# Patient Record
Sex: MALE | Race: BLACK OR AFRICAN AMERICAN | Employment: UNEMPLOYED | URBAN - METROPOLITAN AREA
[De-identification: names, ages, dates, MRNs, and addresses within clinical notes are randomized per-mention and may not be internally consistent; named-entity substitution may affect disease eponyms.]

---

## 2017-10-24 ENCOUNTER — HOSPITAL ENCOUNTER (INPATIENT)
Age: 57
LOS: 3 days | Discharge: HOME | DRG: 897 | End: 2017-10-27
Attending: EMERGENCY MEDICINE | Admitting: GENERAL ACUTE CARE HOSPITAL
Payer: SELF-PAY

## 2017-10-24 ENCOUNTER — APPOINTMENT (OUTPATIENT)
Dept: ULTRASOUND IMAGING | Age: 57
DRG: 897 | End: 2017-10-24
Attending: EMERGENCY MEDICINE
Payer: SELF-PAY

## 2017-10-24 ENCOUNTER — APPOINTMENT (OUTPATIENT)
Dept: CT IMAGING | Age: 57
DRG: 897 | End: 2017-10-24
Attending: EMERGENCY MEDICINE
Payer: SELF-PAY

## 2017-10-24 DIAGNOSIS — F10.930 ALCOHOL WITHDRAWAL SYNDROME WITHOUT COMPLICATION (HCC): Primary | ICD-10-CM

## 2017-10-24 DIAGNOSIS — K52.9 GASTROENTERITIS, ACUTE: ICD-10-CM

## 2017-10-24 DIAGNOSIS — R74.01 TRANSAMINITIS: ICD-10-CM

## 2017-10-24 PROBLEM — F10.939 ALCOHOL WITHDRAWAL (HCC): Status: ACTIVE | Noted: 2017-10-24

## 2017-10-24 LAB
ALBUMIN SERPL-MCNC: 4.4 G/DL (ref 3.5–5)
ALBUMIN/GLOB SERPL: 1.2 {RATIO} (ref 1.1–2.2)
ALP SERPL-CCNC: 125 U/L (ref 45–117)
ALT SERPL-CCNC: 96 U/L (ref 12–78)
ANION GAP SERPL CALC-SCNC: 12 MMOL/L (ref 5–15)
APPEARANCE UR: CLEAR
AST SERPL-CCNC: 299 U/L (ref 15–37)
BACTERIA URNS QL MICRO: ABNORMAL /HPF
BASOPHILS # BLD: 0 K/UL (ref 0–0.1)
BASOPHILS NFR BLD: 0 % (ref 0–1)
BILIRUB SERPL-MCNC: 1.3 MG/DL (ref 0.2–1)
BILIRUB UR QL: NEGATIVE
BUN SERPL-MCNC: 11 MG/DL (ref 6–20)
BUN/CREAT SERPL: 12 (ref 12–20)
CALCIUM SERPL-MCNC: 8.9 MG/DL (ref 8.5–10.1)
CHLORIDE SERPL-SCNC: 100 MMOL/L (ref 97–108)
CO2 SERPL-SCNC: 25 MMOL/L (ref 21–32)
COLOR UR: ABNORMAL
CREAT SERPL-MCNC: 0.95 MG/DL (ref 0.7–1.3)
EOSINOPHIL # BLD: 0 K/UL (ref 0–0.4)
EOSINOPHIL NFR BLD: 0 % (ref 0–7)
EPITH CASTS URNS QL MICRO: ABNORMAL /LPF
ERYTHROCYTE [DISTWIDTH] IN BLOOD BY AUTOMATED COUNT: 14.6 % (ref 11.5–14.5)
ETHANOL SERPL-MCNC: 25 MG/DL
GLOBULIN SER CALC-MCNC: 3.8 G/DL (ref 2–4)
GLUCOSE SERPL-MCNC: 108 MG/DL (ref 65–100)
GLUCOSE UR STRIP.AUTO-MCNC: NEGATIVE MG/DL
HCT VFR BLD AUTO: 31.8 % (ref 36.6–50.3)
HGB BLD-MCNC: 10.9 G/DL (ref 12.1–17)
HGB UR QL STRIP: ABNORMAL
INR PPP: 1.2 (ref 0.9–1.1)
KETONES UR QL STRIP.AUTO: ABNORMAL MG/DL
LEUKOCYTE ESTERASE UR QL STRIP.AUTO: NEGATIVE
LIPASE SERPL-CCNC: 188 U/L (ref 73–393)
LYMPHOCYTES # BLD: 1.5 K/UL (ref 0.8–3.5)
LYMPHOCYTES NFR BLD: 24 % (ref 12–49)
MCH RBC QN AUTO: 33.6 PG (ref 26–34)
MCHC RBC AUTO-ENTMCNC: 34.3 G/DL (ref 30–36.5)
MCV RBC AUTO: 98.1 FL (ref 80–99)
MONOCYTES # BLD: 0.4 K/UL (ref 0–1)
MONOCYTES NFR BLD: 6 % (ref 5–13)
NEUTS SEG # BLD: 4.3 K/UL (ref 1.8–8)
NEUTS SEG NFR BLD: 70 % (ref 32–75)
NITRITE UR QL STRIP.AUTO: NEGATIVE
PH UR STRIP: 6 [PH] (ref 5–8)
PLATELET # BLD AUTO: 297 K/UL (ref 150–400)
POTASSIUM SERPL-SCNC: 4.3 MMOL/L (ref 3.5–5.1)
PROT SERPL-MCNC: 8.2 G/DL (ref 6.4–8.2)
PROT UR STRIP-MCNC: 30 MG/DL
PROTHROMBIN TIME: 11.7 SEC (ref 9–11.1)
RBC # BLD AUTO: 3.24 M/UL (ref 4.1–5.7)
RBC #/AREA URNS HPF: ABNORMAL /HPF (ref 0–5)
SODIUM SERPL-SCNC: 137 MMOL/L (ref 136–145)
SP GR UR REFRACTOMETRY: 1.02 (ref 1–1.03)
TROPONIN I SERPL-MCNC: <0.04 NG/ML
UA: UC IF INDICATED,UAUC: ABNORMAL
UROBILINOGEN UR QL STRIP.AUTO: 1 EU/DL (ref 0.2–1)
WBC # BLD AUTO: 6.2 K/UL (ref 4.1–11.1)
WBC URNS QL MICRO: ABNORMAL /HPF (ref 0–4)
YEAST URNS QL MICRO: PRESENT

## 2017-10-24 PROCEDURE — 85610 PROTHROMBIN TIME: CPT | Performed by: EMERGENCY MEDICINE

## 2017-10-24 PROCEDURE — 93005 ELECTROCARDIOGRAM TRACING: CPT

## 2017-10-24 PROCEDURE — 74011636320 HC RX REV CODE- 636/320: Performed by: EMERGENCY MEDICINE

## 2017-10-24 PROCEDURE — 96376 TX/PRO/DX INJ SAME DRUG ADON: CPT

## 2017-10-24 PROCEDURE — 36415 COLL VENOUS BLD VENIPUNCTURE: CPT | Performed by: EMERGENCY MEDICINE

## 2017-10-24 PROCEDURE — 96374 THER/PROPH/DIAG INJ IV PUSH: CPT

## 2017-10-24 PROCEDURE — 87086 URINE CULTURE/COLONY COUNT: CPT | Performed by: EMERGENCY MEDICINE

## 2017-10-24 PROCEDURE — 83690 ASSAY OF LIPASE: CPT | Performed by: EMERGENCY MEDICINE

## 2017-10-24 PROCEDURE — 96365 THER/PROPH/DIAG IV INF INIT: CPT

## 2017-10-24 PROCEDURE — 81001 URINALYSIS AUTO W/SCOPE: CPT | Performed by: EMERGENCY MEDICINE

## 2017-10-24 PROCEDURE — 74011000250 HC RX REV CODE- 250: Performed by: EMERGENCY MEDICINE

## 2017-10-24 PROCEDURE — 74011000258 HC RX REV CODE- 258: Performed by: EMERGENCY MEDICINE

## 2017-10-24 PROCEDURE — 74177 CT ABD & PELVIS W/CONTRAST: CPT

## 2017-10-24 PROCEDURE — 76705 ECHO EXAM OF ABDOMEN: CPT

## 2017-10-24 PROCEDURE — 96375 TX/PRO/DX INJ NEW DRUG ADDON: CPT

## 2017-10-24 PROCEDURE — 80053 COMPREHEN METABOLIC PANEL: CPT | Performed by: EMERGENCY MEDICINE

## 2017-10-24 PROCEDURE — 65660000000 HC RM CCU STEPDOWN

## 2017-10-24 PROCEDURE — 74011250636 HC RX REV CODE- 250/636: Performed by: EMERGENCY MEDICINE

## 2017-10-24 PROCEDURE — 85025 COMPLETE CBC W/AUTO DIFF WBC: CPT | Performed by: EMERGENCY MEDICINE

## 2017-10-24 PROCEDURE — 84484 ASSAY OF TROPONIN QUANT: CPT | Performed by: EMERGENCY MEDICINE

## 2017-10-24 PROCEDURE — 99285 EMERGENCY DEPT VISIT HI MDM: CPT

## 2017-10-24 PROCEDURE — 80307 DRUG TEST PRSMV CHEM ANLYZR: CPT | Performed by: EMERGENCY MEDICINE

## 2017-10-24 PROCEDURE — 94762 N-INVAS EAR/PLS OXIMTRY CONT: CPT

## 2017-10-24 RX ORDER — LABETALOL HYDROCHLORIDE 5 MG/ML
20 INJECTION, SOLUTION INTRAVENOUS
Status: COMPLETED | OUTPATIENT
Start: 2017-10-24 | End: 2017-10-24

## 2017-10-24 RX ORDER — SODIUM CHLORIDE 9 MG/ML
50 INJECTION, SOLUTION INTRAVENOUS
Status: COMPLETED | OUTPATIENT
Start: 2017-10-24 | End: 2017-10-24

## 2017-10-24 RX ORDER — LORAZEPAM 2 MG/ML
1 INJECTION INTRAMUSCULAR
Status: COMPLETED | OUTPATIENT
Start: 2017-10-24 | End: 2017-10-24

## 2017-10-24 RX ORDER — FENTANYL CITRATE 50 UG/ML
50 INJECTION, SOLUTION INTRAMUSCULAR; INTRAVENOUS
Status: COMPLETED | OUTPATIENT
Start: 2017-10-24 | End: 2017-10-24

## 2017-10-24 RX ORDER — LORAZEPAM 2 MG/ML
2 INJECTION INTRAMUSCULAR
Status: DISCONTINUED | OUTPATIENT
Start: 2017-10-24 | End: 2017-10-27 | Stop reason: HOSPADM

## 2017-10-24 RX ORDER — THIAMINE HYDROCHLORIDE 100 MG/ML
100 INJECTION, SOLUTION INTRAMUSCULAR; INTRAVENOUS
Status: DISCONTINUED | OUTPATIENT
Start: 2017-10-24 | End: 2017-10-24 | Stop reason: SDUPTHER

## 2017-10-24 RX ORDER — SODIUM CHLORIDE 0.9 % (FLUSH) 0.9 %
10 SYRINGE (ML) INJECTION
Status: COMPLETED | OUTPATIENT
Start: 2017-10-24 | End: 2017-10-24

## 2017-10-24 RX ADMIN — LORAZEPAM 1 MG: 2 INJECTION INTRAMUSCULAR; INTRAVENOUS at 16:03

## 2017-10-24 RX ADMIN — SODIUM CHLORIDE 50 ML/HR: 900 INJECTION, SOLUTION INTRAVENOUS at 19:37

## 2017-10-24 RX ADMIN — Medication 5 MG/HR: at 23:14

## 2017-10-24 RX ADMIN — IOPAMIDOL 100 ML: 755 INJECTION, SOLUTION INTRAVENOUS at 19:36

## 2017-10-24 RX ADMIN — LORAZEPAM 1 MG: 2 INJECTION INTRAMUSCULAR; INTRAVENOUS at 18:18

## 2017-10-24 RX ADMIN — LABETALOL HYDROCHLORIDE 20 MG: 5 INJECTION INTRAVENOUS at 21:40

## 2017-10-24 RX ADMIN — THIAMINE HYDROCHLORIDE 100 MG: 100 INJECTION, SOLUTION INTRAMUSCULAR; INTRAVENOUS at 18:18

## 2017-10-24 RX ADMIN — Medication 10 ML: at 19:36

## 2017-10-24 RX ADMIN — LABETALOL HYDROCHLORIDE 20 MG: 5 INJECTION INTRAVENOUS at 20:24

## 2017-10-24 RX ADMIN — SODIUM CHLORIDE 1000 ML: 900 INJECTION, SOLUTION INTRAVENOUS at 18:18

## 2017-10-24 RX ADMIN — FENTANYL CITRATE 50 MCG: 50 INJECTION, SOLUTION INTRAMUSCULAR; INTRAVENOUS at 16:03

## 2017-10-24 RX ADMIN — SODIUM CHLORIDE 1000 ML: 900 INJECTION, SOLUTION INTRAVENOUS at 16:02

## 2017-10-24 RX ADMIN — LORAZEPAM 2 MG: 2 INJECTION INTRAMUSCULAR; INTRAVENOUS at 20:25

## 2017-10-24 NOTE — IP AVS SNAPSHOT
[address] [facility] 
[phone] Patient: Antione Moser MRN: [id number] VAP:2/65/0266 About your hospitalization You were admitted on:  October 24, 2017 You last received care in the:  [facility] 3 NEUROSCIENCE TELEMETRY You were discharged on:  October 27, 2017 Why you were hospitalized Your primary diagnosis was:  Not on File Your diagnoses also included:  Alcohol Withdrawal (Hcc) Things You Need To Do (next 8 weeks) Friday Nov 03, 2017 HOSPITAL FOLLOW-UP with Luisito Watson MD at 10:30 AM  
Where:  [address]) Discharge Orders None A check flo indicates which time of day the medication should be taken. My Medications TAKE these medications as instructed Instructions Each Dose to Equal  
 Morning Noon Evening Bedtime diphenhydrAMINE 25 mg capsule Commonly known as:  BENADRYL Your last dose was: Your next dose is: Take 25 mg by mouth as needed for Itching. 25 mg  
    
   
   
   
  
 folic acid 1 mg tablet Commonly known as:  [employer] Your last dose was: Your next dose is: Take 1 Tab by mouth daily. 1 mg  
    
   
   
   
  
 ibuprofen 200 mg tablet Commonly known as:  MOTRIN Your last dose was: Your next dose is: Take 200 mg by mouth as needed for Pain. 200 mg  
    
   
   
   
  
 lisinopril 20 mg tablet Commonly known as:  Marilynne Shows Your last dose was: Your next dose is: Take 1 Tab by mouth daily. 20 mg  
    
   
   
   
  
 metoprolol tartrate 25 mg tablet Commonly known as:  LOPRESSOR Your last dose was: Your next dose is: Take 1 Tab by mouth two (2) times a day. 25 mg  
    
   
   
   
  
 pantoprazole 40 mg tablet Commonly known as:  PROTONIX Your last dose was: Your next dose is: Take 1 Tab by mouth Daily (before breakfast). 40 mg  
    
   
   
   
  
 therapeutic multivitamin tablet Commonly known as:  [facility] Your last dose was: Your next dose is: Take 1 Tab by mouth daily. 1 Tab  
    
   
   
   
  
 thiamine 100 mg tablet Commonly known as:  B-1 Your last dose was: Your next dose is: Take 1 Tab by mouth daily. 100 mg Where to Get Your Medications Information on where to get these meds will be given to you by the nurse or doctor. ! Ask your nurse or doctor about these medications  
  folic acid 1 mg tablet  
 lisinopril 20 mg tablet  
 metoprolol tartrate 25 mg tablet  
 pantoprazole 40 mg tablet  
 therapeutic multivitamin tablet  
 thiamine 100 mg tablet Discharge Instructions None [email or web address] Announcement We are excited to announce that we are making your provider's discharge notes available to you in Open[employer]. You will see these notes when they are completed and signed by the physician that discharged you from your recent hospital stay. If you have any questions or concerns about any information you see in Openbay, please call the Health Information Department where you were seen or reach out to your Primary Care Provider for more information about your plan of care. Introducing [address] [facility] introduces Openbay patient portal. Now you can access parts of your medical record, email your doctor's office, and request medication refills online. 1. In your internet browser, go to [email or web address] 2. Click on the First Time User? Click Here link in the Sign In box. You will see the New Member Sign Up page. 3. Enter your Openbay Access Code exactly as it appears below.  You will not need to use this code after youve completed the sign-up process. If you do not sign up before the expiration date, you must request a new code. · [employer] Access Code: [email or web address] Expires: 1/22/2018  1:57 PM 
 
4. Enter the last four digits of your Social Security Number (xxxx) and Date of Birth (mm/dd/yyyy) as indicated and click Submit. You will be taken to the next sign-up page. 5. Create a [employer] ID. This will be your [employer] login ID and cannot be changed, so think of one that is secure and easy to remember. 6. Create a [employer] password. You can change your password at any time. 7. Enter your Password Reset Question and Answer. This can be used at a later time if you forget your password. 8. Enter your e-mail address. You will receive e-mail notification when new information is available in [address] 9. Click Sign Up. You can now view and download portions of your medical record. 10. Click the Download Summary menu link to download a portable copy of your medical information. If you have questions, please visit the Frequently Asked Questions section of the [employer] website. Remember, [employer] is NOT to be used for urgent needs. For medical emergencies, dial 911. Now available from your iPhone and Android! Providers Seen During Your Hospitalization Provider Specialty Primary office phone Bernice Lyman MD Emergency Medicine [phone] Antony Yip MD Internal Medicine [phone] Zee Horne MD Internal Medicine [phone] Your Primary Care Physician (PCP) Primary Care Physician Office Phone Office Fax Juan José Polo [phone] You are allergic to the following No active allergies Recent Documentation Height Weight BMI Smoking Status 1.753 m 90.9 kg 29.59 kg/m2 Current Some Day Smoker Emergency Contacts Name Discharge Info Relation Home Work Mobile Nova Wilkinson  Child [2] [phone] Patient Belongings The following personal items are in your possession at time of discharge: 
  Dental Appliances: None  Visual Aid: At home, Glasses      Home Medications: None   Jewelry: None  Clothing: Pants, Slippers, Shirt, Undergarments    Other Valuables: None Please provide this summary of care documentation to your next provider. Signatures-by signing, you are acknowledging that this After Visit Summary has been reviewed with you and you have received a copy. Patient Signature:  ____________________________________________________________ Date:  ____________________________________________________________  
  
[facility] Provider Signature:  ____________________________________________________________ Date:  ____________________________________________________________

## 2017-10-24 NOTE — ED TRIAGE NOTES
Pt. Presents to Ed today for complaints of lower abdominal pain that started last night. Pt. Also reports increase in abdominal distention yesterday. Pt. Verbally reports drinking yesterday. Pt. States he drank a liter of liquor yesterday.

## 2017-10-24 NOTE — ED NOTES
Spoke with Dr. Ramona Hammonds again regarding patient BP. Verbal orders received for IV labetolol 20mg.

## 2017-10-24 NOTE — IP AVS SNAPSHOT
[address] [facility] 
[phone] Patient: Estella Christensen MRN: [id number] AUY:7/79/6680 My Medications TAKE these medications as instructed Instructions Each Dose to Equal  
 Morning Noon Evening Bedtime diphenhydrAMINE 25 mg capsule Commonly known as:  BENADRYL Your last dose was: Your next dose is: Take 25 mg by mouth as needed for Itching. 25 mg  
    
   
   
   
  
 folic acid 1 mg tablet Commonly known as:  Google Your last dose was: Your next dose is: Take 1 Tab by mouth daily. 1 mg  
    
   
   
   
  
 ibuprofen 200 mg tablet Commonly known as:  MOTRIN Your last dose was: Your next dose is: Take 200 mg by mouth as needed for Pain. 200 mg  
    
   
   
   
  
 lisinopril 20 mg tablet Commonly known as:  Robertha Roup Your last dose was: Your next dose is: Take 1 Tab by mouth daily. 20 mg  
    
   
   
   
  
 metoprolol tartrate 25 mg tablet Commonly known as:  LOPRESSOR Your last dose was: Your next dose is: Take 1 Tab by mouth two (2) times a day. 25 mg  
    
   
   
   
  
 pantoprazole 40 mg tablet Commonly known as:  PROTONIX Your last dose was: Your next dose is: Take 1 Tab by mouth Daily (before breakfast). 40 mg  
    
   
   
   
  
 therapeutic multivitamin tablet Commonly known as:  [facility] Your last dose was: Your next dose is: Take 1 Tab by mouth daily. 1 Tab  
    
   
   
   
  
 thiamine 100 mg tablet Commonly known as:  B-1 Your last dose was: Your next dose is: Take 1 Tab by mouth daily. 100 mg Where to Get Your Medications Information on where to get these meds will be given to you by the nurse or doctor. ! Ask your nurse or doctor about these medications  
  folic acid 1 mg tablet  
 lisinopril 20 mg tablet  
 metoprolol tartrate 25 mg tablet  
 pantoprazole 40 mg tablet  
 therapeutic multivitamin tablet  
 thiamine 100 mg tablet

## 2017-10-24 NOTE — Clinical Note
Status[de-identified] Inpatient [101] Type of Bed: Telemetry [19] Inpatient Hospitalization Certified Necessary for the Following Reasons: 3. Patient receiving treatment that can only be provided in an inpatient setting (further clarification in H&P documentation) Admitting Diagnosis: Alcohol withdrawal ([facility] [291.81. ICD-9-CM] Admitting Physician: Isi Proctor [[id number]] Attending Physician: Isi Proctor [[id number]] Estimated Length of Stay: 2 Midnights Discharge Plan[de-identified] Home with Office Follow-up

## 2017-10-24 NOTE — ED NOTES
Patient is a difficult stick with multiple unsuccessful attempts. Spoke with Dr. Guillen Ivan. Staff to attempt ultrasound IV at this time.

## 2017-10-25 LAB
ATRIAL RATE: 97 BPM
CALCULATED P AXIS, ECG09: 28 DEGREES
CALCULATED R AXIS, ECG10: 14 DEGREES
CALCULATED T AXIS, ECG11: 10 DEGREES
DIAGNOSIS, 93000: NORMAL
P-R INTERVAL, ECG05: 114 MS
Q-T INTERVAL, ECG07: 374 MS
QRS DURATION, ECG06: 80 MS
QTC CALCULATION (BEZET), ECG08: 474 MS
VENTRICULAR RATE, ECG03: 97 BPM

## 2017-10-25 PROCEDURE — 74011250636 HC RX REV CODE- 250/636: Performed by: GENERAL ACUTE CARE HOSPITAL

## 2017-10-25 PROCEDURE — 74011250636 HC RX REV CODE- 250/636: Performed by: EMERGENCY MEDICINE

## 2017-10-25 PROCEDURE — 74011000258 HC RX REV CODE- 258: Performed by: EMERGENCY MEDICINE

## 2017-10-25 PROCEDURE — 74011000250 HC RX REV CODE- 250: Performed by: GENERAL ACUTE CARE HOSPITAL

## 2017-10-25 PROCEDURE — 77030011256 HC DRSG MEPILEX <16IN NO BORD MOLN -A

## 2017-10-25 PROCEDURE — 74011250637 HC RX REV CODE- 250/637: Performed by: INTERNAL MEDICINE

## 2017-10-25 PROCEDURE — C9113 INJ PANTOPRAZOLE SODIUM, VIA: HCPCS | Performed by: GENERAL ACUTE CARE HOSPITAL

## 2017-10-25 PROCEDURE — 74011250637 HC RX REV CODE- 250/637: Performed by: GENERAL ACUTE CARE HOSPITAL

## 2017-10-25 PROCEDURE — 65660000000 HC RM CCU STEPDOWN

## 2017-10-25 RX ORDER — LANOLIN ALCOHOL/MO/W.PET/CERES
100 CREAM (GRAM) TOPICAL DAILY
Status: DISCONTINUED | OUTPATIENT
Start: 2017-10-25 | End: 2017-10-27 | Stop reason: HOSPADM

## 2017-10-25 RX ORDER — HYDRALAZINE HYDROCHLORIDE 20 MG/ML
10 INJECTION INTRAMUSCULAR; INTRAVENOUS
Status: DISCONTINUED | OUTPATIENT
Start: 2017-10-25 | End: 2017-10-27 | Stop reason: HOSPADM

## 2017-10-25 RX ORDER — IBUPROFEN 400 MG/1
600 TABLET ORAL
Status: DISCONTINUED | OUTPATIENT
Start: 2017-10-25 | End: 2017-10-25

## 2017-10-25 RX ORDER — DIPHENHYDRAMINE HCL 25 MG
25 CAPSULE ORAL
Status: DISCONTINUED | OUTPATIENT
Start: 2017-10-25 | End: 2017-10-27 | Stop reason: HOSPADM

## 2017-10-25 RX ORDER — SODIUM CHLORIDE 0.9 % (FLUSH) 0.9 %
5-10 SYRINGE (ML) INJECTION EVERY 8 HOURS
Status: DISCONTINUED | OUTPATIENT
Start: 2017-10-25 | End: 2017-10-27 | Stop reason: HOSPADM

## 2017-10-25 RX ORDER — CHLORDIAZEPOXIDE HYDROCHLORIDE 5 MG/1
10 CAPSULE, GELATIN COATED ORAL 3 TIMES DAILY
Status: DISCONTINUED | OUTPATIENT
Start: 2017-10-25 | End: 2017-10-26

## 2017-10-25 RX ORDER — THERA TABS 400 MCG
1 TAB ORAL DAILY
Status: DISCONTINUED | OUTPATIENT
Start: 2017-10-25 | End: 2017-10-27 | Stop reason: HOSPADM

## 2017-10-25 RX ORDER — IBUPROFEN 600 MG/1
600 TABLET ORAL
Status: DISCONTINUED | OUTPATIENT
Start: 2017-10-25 | End: 2017-10-27 | Stop reason: HOSPADM

## 2017-10-25 RX ORDER — SODIUM CHLORIDE 9 MG/ML
100 INJECTION, SOLUTION INTRAVENOUS CONTINUOUS
Status: DISCONTINUED | OUTPATIENT
Start: 2017-10-25 | End: 2017-10-25

## 2017-10-25 RX ORDER — LISINOPRIL 5 MG/1
10 TABLET ORAL DAILY
Status: DISCONTINUED | OUTPATIENT
Start: 2017-10-25 | End: 2017-10-26

## 2017-10-25 RX ORDER — PANTOPRAZOLE SODIUM 40 MG/1
40 TABLET, DELAYED RELEASE ORAL
Status: DISCONTINUED | OUTPATIENT
Start: 2017-10-26 | End: 2017-10-27 | Stop reason: HOSPADM

## 2017-10-25 RX ORDER — FOLIC ACID 1 MG/1
1 TABLET ORAL DAILY
Status: DISCONTINUED | OUTPATIENT
Start: 2017-10-25 | End: 2017-10-27 | Stop reason: HOSPADM

## 2017-10-25 RX ORDER — IBUPROFEN 200 MG
200 TABLET ORAL AS NEEDED
COMMUNITY

## 2017-10-25 RX ORDER — DIPHENHYDRAMINE HCL 25 MG
25 CAPSULE ORAL AS NEEDED
COMMUNITY

## 2017-10-25 RX ORDER — METOPROLOL TARTRATE 25 MG/1
12.5 TABLET, FILM COATED ORAL 2 TIMES DAILY
Status: DISCONTINUED | OUTPATIENT
Start: 2017-10-25 | End: 2017-10-26

## 2017-10-25 RX ORDER — SODIUM CHLORIDE 0.9 % (FLUSH) 0.9 %
5-10 SYRINGE (ML) INJECTION AS NEEDED
Status: DISCONTINUED | OUTPATIENT
Start: 2017-10-25 | End: 2017-10-27 | Stop reason: HOSPADM

## 2017-10-25 RX ORDER — HYDROCORTISONE 1 %
CREAM (GRAM) TOPICAL
Status: DISCONTINUED | OUTPATIENT
Start: 2017-10-25 | End: 2017-10-27 | Stop reason: HOSPADM

## 2017-10-25 RX ORDER — ENOXAPARIN SODIUM 100 MG/ML
40 INJECTION SUBCUTANEOUS EVERY 24 HOURS
Status: DISCONTINUED | OUTPATIENT
Start: 2017-10-25 | End: 2017-10-27 | Stop reason: HOSPADM

## 2017-10-25 RX ORDER — MUPIROCIN 20 MG/G
OINTMENT TOPICAL EVERY 12 HOURS
Status: DISCONTINUED | OUTPATIENT
Start: 2017-10-25 | End: 2017-10-27 | Stop reason: HOSPADM

## 2017-10-25 RX ADMIN — MUPIROCIN: 20 OINTMENT TOPICAL at 08:18

## 2017-10-25 RX ADMIN — Medication 10 ML: at 21:17

## 2017-10-25 RX ADMIN — Medication 10 ML: at 13:57

## 2017-10-25 RX ADMIN — HYDRALAZINE HYDROCHLORIDE 10 MG: 20 INJECTION INTRAMUSCULAR; INTRAVENOUS at 01:59

## 2017-10-25 RX ADMIN — THERA TABS 1 TABLET: TAB at 08:19

## 2017-10-25 RX ADMIN — Medication 100 MG: at 08:18

## 2017-10-25 RX ADMIN — Medication 10 ML: at 01:59

## 2017-10-25 RX ADMIN — CHLORDIAZEPOXIDE HYDROCHLORIDE 10 MG: 5 CAPSULE ORAL at 13:56

## 2017-10-25 RX ADMIN — IBUPROFEN 600 MG: 600 TABLET ORAL at 19:24

## 2017-10-25 RX ADMIN — Medication: at 13:56

## 2017-10-25 RX ADMIN — METOPROLOL TARTRATE 12.5 MG: 25 TABLET ORAL at 09:59

## 2017-10-25 RX ADMIN — CHLORDIAZEPOXIDE HYDROCHLORIDE 10 MG: 5 CAPSULE ORAL at 17:40

## 2017-10-25 RX ADMIN — LORAZEPAM 2 MG: 2 INJECTION INTRAMUSCULAR; INTRAVENOUS at 05:46

## 2017-10-25 RX ADMIN — HYDRALAZINE HYDROCHLORIDE 10 MG: 20 INJECTION INTRAMUSCULAR; INTRAVENOUS at 21:28

## 2017-10-25 RX ADMIN — CHLORDIAZEPOXIDE HYDROCHLORIDE 10 MG: 5 CAPSULE ORAL at 21:17

## 2017-10-25 RX ADMIN — MUPIROCIN: 20 OINTMENT TOPICAL at 21:35

## 2017-10-25 RX ADMIN — HYDRALAZINE HYDROCHLORIDE 10 MG: 20 INJECTION INTRAMUSCULAR; INTRAVENOUS at 08:48

## 2017-10-25 RX ADMIN — ENOXAPARIN SODIUM 40 MG: 40 INJECTION SUBCUTANEOUS at 01:58

## 2017-10-25 RX ADMIN — METOPROLOL TARTRATE 12.5 MG: 25 TABLET ORAL at 21:17

## 2017-10-25 RX ADMIN — LISINOPRIL 10 MG: 5 TABLET ORAL at 10:00

## 2017-10-25 RX ADMIN — FOLIC ACID 1 MG: 1 TABLET ORAL at 08:19

## 2017-10-25 RX ADMIN — IBUPROFEN 600 MG: 600 TABLET ORAL at 05:45

## 2017-10-25 RX ADMIN — SODIUM CHLORIDE 40 MG: 9 INJECTION INTRAMUSCULAR; INTRAVENOUS; SUBCUTANEOUS at 08:18

## 2017-10-25 RX ADMIN — SODIUM CHLORIDE 100 ML/HR: 900 INJECTION, SOLUTION INTRAVENOUS at 01:58

## 2017-10-25 RX ADMIN — DIPHENHYDRAMINE HYDROCHLORIDE 25 MG: 25 CAPSULE ORAL at 12:36

## 2017-10-25 NOTE — ED NOTES
TRANSFER - OUT REPORT:    Verbal report given to (name) on Fredo Liao  being transferred to [employer] (unit) for routine progression of care       Report consisted of patients Situation, Background, Assessment and   Recommendations(SBAR). Information from the following report(s) SBAR, Kardex, ED Summary and MAR was reviewed with the receiving nurse. Lines:   Peripheral IV 10/24/17 Right Antecubital (Active)   Site Assessment Clean, dry, & intact 10/24/2017  6:15 PM   Phlebitis Assessment 0 10/24/2017  6:15 PM   Infiltration Assessment 0 10/24/2017  6:15 PM   Dressing Type Tape;Transparent 10/24/2017  6:15 PM   Hub Color/Line Status Pink 10/24/2017  6:15 PM        Opportunity for questions and clarification was provided.       Patient transported with:   Registered Nurse

## 2017-10-25 NOTE — PROGRESS NOTES
[facility] Consult Service Progress NOTE  Pulmonary, Critical Care, and Sleep Medicine    Name: Kenji Dobson MRN: [id number]   : 1960 Hospital: [place]   Date: 10/25/2017  Admission Date: 10/24/2017     Chart and notes reviewed. Data reviewed. I have evaluated and examined the patient. IMPRESSION:   1. uncontrolled HTN  2. Tobacco use  3. ETOH use  4. Elevated LFTs  5. nausea, vomiting, abdominal pain better  6. Hepatic steatosis      RECOMMENDATIONS/PLAN:   1. stabel for transfer to floor  2. PO HTN meds- will start ACEI and BB  3. Pt education monitor for withdrawal; pt is a  with strong family h/o HTN, CVA  4. advacne diet  5.  Continue CIWA monitoring and Ativan as per protocol     Risk of deterioration: low and medium    [x]      High complexity decision making was performed  [x]      See my orders for details    Hospital Day: 2    No Known Allergies     Current Facility-Administered Medications   Medication    sodium chloride (NS) flush 5-10 mL    sodium chloride (NS) flush 5-10 mL    enoxaparin (LOVENOX) injection 40 mg    therapeutic multivitamin (THERAGRAN) tablet 1 Tab    folic acid (FOLVITE) tablet 1 mg    thiamine (B-1) tablet 100 mg    pantoprazole (PROTONIX) 40 mg in sodium chloride 0.9 % 10 mL injection    hydrALAZINE (APRESOLINE) 20 mg/mL injection 10 mg    mupirocin (BACTROBAN) 2 % ointment    ibuprofen (MOTRIN) tablet 600 mg    lisinopril (PRINIVIL, ZESTRIL) tablet 10 mg    metoprolol tartrate (LOPRESSOR) tablet 12.5 mg    LORazepam (ATIVAN) injection 2 mg      Social History   Substance Use Topics    Smoking status: Current Some Day Smoker     Years: 10.00    Smokeless tobacco: Never Used    Alcohol use Yes      Comment: social      Family History   Problem Relation Age of Onset    Stroke Father     Hypertension Father     Diabetes Sister         Vital Signs: Intake/Output: Intake/Output:   Temp (24hrs), Av.6 °F (37 °C), Min:98.3 °F (36.8 °C), Max:98.8 °F (37.1 °C)      Visit Vitals    BP (!) 170/99    Pulse 91    Temp 98.6 °F (37 °C)    Resp 26    Ht 5' 9\" (1.753 m)    Wt 90.9 kg (200 lb 6.4 oz)    SpO2 96%    BMI 29.59 kg/m2         O2 Device: Room air       Wt Readings from Last 4 Encounters:   10/25/17 90.9 kg (200 lb 6.4 oz)   12/07/15 98.9 kg (218 lb)   08/18/15 96.6 kg (213 lb)   08/14/15 94 kg (207 lb 3.7 oz)          Intake/Output Summary (Last 24 hours) at 10/25/17 0904  Last data filed at 10/25/17 6136   Gross per 24 hour   Intake           610.41 ml   Output              465 ml   Net           145.41 ml       Last shift:      10/25 0701 - 10/25 1900  In: 30 [I.V.:30]  Out: 165 [Urine:165]    Last 3 shifts: 10/23 1901 - 10/25 07  In: 580.4 [I.V.:580.4]  Out: 300 [Urine:300]     Telemetry:normal sinus rhythm    Physical Exam:    General: well developed and well nourished, non-toxic, in no respiratory distress and acyanotic, alert and oriented times 3   HEENT: NCAT   Lungs: normal air entry   Heart: Regular rate and rhythm   Abdomen: soft, non-tender, without masses or organomegaly   :    Extremity: negative, clubbing    Skin: Skin color, texture, turgor normal. No rashes or lesions; Normal upper and lower extremity pulses    Tubes:         DATA:  MAR reviewed and pertinent medications noted or modified as needed    Labs:    Recent Labs      10/24/17   1522  10/24/17   1408   WBC   --   6.2   HGB   --   10.9*   PLT   --   297   INR  1.2*   --      Recent Labs      10/24/17   1408   NA  137   K  4.3   CL  100   CO2  25   GLU  108*   BUN  11   CREA  0.95   CA  8.9   ALB  4.4   SGOT  299*   ALT  96*   LPSE  188     No results for input(s): PH, PCO2, PO2, HCO3, FIO2 in the last 72 hours.   Recent Labs      10/24/17   1522   TROIQ  <0.04     No results found for: BNPP, BNP   No results found for: CULT  No results found for: VANCT, CPK  Lab Results   Component Value Date/Time    Color YELLOW/STRAW 10/24/2017 03:22 PM    Appearance CLEAR 10/24/2017 03:22 PM    Specific gravity >1.030 08/14/2015 12:19 PM    pH (UA) 6.0 10/24/2017 03:22 PM    Protein 30 10/24/2017 03:22 PM    Glucose NEGATIVE  10/24/2017 03:22 PM    Ketone TRACE 10/24/2017 03:22 PM    Bilirubin NEGATIVE  10/24/2017 03:22 PM    Blood TRACE 10/24/2017 03:22 PM    Urobilinogen 1.0 10/24/2017 03:22 PM    Nitrites NEGATIVE  10/24/2017 03:22 PM    Leukocyte Esterase NEGATIVE  10/24/2017 03:22 PM    WBC 10-20 10/24/2017 03:22 PM    RBC 5-10 10/24/2017 03:22 PM    Bacteria 1+ 10/24/2017 03:22 PM       No results found for: TOXA1, RPR, HBCM, HBSAG, HAAB, HCAB1, HAAT, G6PD, CRYAC, HIVGT, HIVR, HIV1, HIV12, HIVPC, HIVRPI  No results found for: IRON, FE, TIBC, IBCT, PSAT, FERR  Lab Results   Component Value Date/Time    TSH 1.440 12/07/2015 11:56 AM       Imaging:  []                           I have personally reviewed the patients radiographs and reports:      Results from [place] encounter on 09/12/15   XR CHEST PA LAT   Narrative **Final Report**      ICD Codes / Adm. Diagnosis: 807.00  719.08 / Closed fracture of rib(s), uns    Examination:  CR CHEST PA AND LATERAL  - [id number] - Sep 12 2015 12:36PM  Accession No:  [id number]  Reason:  rib      REPORT:  EXAM:  CR CHEST PA AND LATERAL    INDICATION:   793.2    COMPARISON: August 18, 2015. FINDINGS: PA and lateral radiographs of the chest demonstrate interval   substantial improvement in bibasilar patchy pulmonary opacification with   minimal residual. The cardiac and mediastinal contours and pulmonary   vascularity are stable. There is no pneumothorax or pleural effusion ,   resolved on the left in the interview    . IMPRESSION: Interval improvement as above           Signing/Reading Doctor: Maria Elena San. Drew Nix ([id number])    Approved: MISTY Nix ([id number])  Sep 12 2015 12:49PM                                      Results from Hospital Encounter encounter on 10/24/17   CT ABD PELV W CONT   Narrative EXAM:  CT ABD PELV W CONT    INDICATION: nausea, diarrhea    COMPARISON: None    CONTRAST:  100 mL of Isovue-370. TECHNIQUE:   Following the uneventful intravenous administration of contrast, thin axial  images were obtained through the abdomen and pelvis. Coronal and sagittal  reconstructions were generated. Oral contrast was not administered. CT dose  reduction was achieved through use of a standardized protocol tailored for this  examination and automatic exposure control for dose modulation. FINDINGS:   LUNG BASES: Clear. INCIDENTALLY IMAGED HEART AND MEDIASTINUM: Unremarkable. LIVER: Hepatic steatosis. GALLBLADDER: Unremarkable. SPLEEN: No mass. PANCREAS: No mass or ductal dilatation. ADRENALS: Unremarkable. KIDNEYS: No mass, calculus, or hydronephrosis. Nonspecific tiny hypodensity in  the right kidney. STOMACH: Unremarkable. SMALL BOWEL: No dilatation or wall thickening. COLON: No dilatation or wall thickening. APPENDIX: Unremarkable. PERITONEUM: No ascites or pneumoperitoneum. RETROPERITONEUM: No lymphadenopathy or aortic aneurysm. REPRODUCTIVE ORGANS: Normal.  URINARY BLADDER: No mass or calculus. BONES: No destructive bone lesion. Degenerative changes L4-L5. ADDITIONAL COMMENTS: N/A         Impression IMPRESSION:  No acute findings. Hepatic steatosis.             My assessment/plan was discussed with:  nursing    respiratory therapy Dr. family Stewart Gomez MD

## 2017-10-25 NOTE — ED NOTES
Dr. Rian Perkins notified of need to upgrade to ICU bed for Cardene infusion. Patient's blood pressure is 053L systolic and <910 diastolic. Order received to stop Cardene infusion and monitor patient's blood pressure.

## 2017-10-25 NOTE — PROGRESS NOTES
Report received and patient arrived to room 3116. Pt voicing no complaints.  Resting and watching TV at this time

## 2017-10-25 NOTE — ED NOTES
Nursing supervisor notified patient off of Cardene infusion. Blood pressure 150/92 45 minutes after infusion off. Per Dr. Stern Six patient appropriate for Stepdown placement. Pending admission bed placement.

## 2017-10-25 NOTE — PROGRESS NOTES
Hospitalist Progress Note    NAME: Pippa Vieira   :  1960   MRN:  [id number]       Assessment / Plan:  EtOH withdrawal  Transaminitis, secondary to EtOH abuse  Likely alcoholic gastritis   -EtOH level 35 - last drink 10/23 evening - 1 pint of liquor  -cont' Librium, CIWA protocol  -MVI, folic acid, thiamin    Hypertensive emergency  -required cardene gtt, now of  -lisinopril, BB, hydralazine prn, titrate prn     Hepatic steatosis on CT A/P  -likely related to alcohol abuse  -, ALT 96, Lipase 188 - continue to monitor  -repeat cmp    ? Candiduria  -UA noted, pt is asymptomatic, hold abx  -f/u urine cx     Code Status: Full Code  Surrogate Decision Maker: Margoth Santiago, son, [phone]     DVT Prophylaxis: Lovenox  GI Prophylaxis: not indicated     Baseline: Independent ADLs     Subjective:     Chief Complaint / Reason for Physician Visit  No acute complaints. Denies any hallucinations. Tolerating po intake. Discussed with RN events overnight. Review of Systems:  Symptom Y/N Comments  Symptom Y/N Comments   Fever/Chills n   Chest Pain n    Poor Appetite    Edema     Cough    Abdominal Pain n    Sputum    Joint Pain     SOB/PAINTING n   Pruritis/Rash     Nausea/vomit    Tolerating PT/OT     Diarrhea    Tolerating Diet y    Constipation    Other       Could NOT obtain due to:      Objective:     VITALS:   Last 24hrs VS reviewed since prior progress note.  Most recent are:  Patient Vitals for the past 24 hrs:   Temp Pulse Resp BP SpO2   10/25/17 1456 99.1 °F (37.3 °C) 99 28 148/90 99 %   10/25/17 1400 - 98 26 168/80 -   10/25/17 1300 - 89 28 169/90 -   10/25/17 1144 - 96 26 (!) 165/93 97 %   10/25/17 1132 98.3 °F (36.8 °C) - 18 (!) 165/93 97 %   10/25/17 1000 - 99 21 (!) 184/105 -   10/25/17 0810 - 91 26 (!) 170/99 96 %   10/25/17 0723 - - - - 97 %   10/25/17 0717 98.6 °F (37 °C) - - - -   10/25/17 0700 - 95 24 (!) 182/112 94 %   10/25/17 0613 - 93 24 167/90 97 %   10/25/17 6428 - 87 24 153/78 96 % 10/25/17 0400 - 91 25 (!) 162/93 96 %   10/25/17 0348 98.6 °F (37 °C) 91 28 (!) 172/102 98 %   10/25/17 0328 - - - (!) 179/103 -   10/25/17 0320 - 93 - - -   10/25/17 0250 - 96 - (!) 160/93 -   10/25/17 0240 - 96 - (!) 176/109 -   10/25/17 0230 - 94 - 166/65 -   10/25/17 0210 - (!) 107 - (!) 172/104 -   10/25/17 0200 - 94 - (!) 154/100 -   10/25/17 0158 - 95 - (!) 169/114 -   10/25/17 0130 - 94 - (!) 169/106 -   10/25/17 0110 - 94 - (!) 177/92 -   10/25/17 0100 - 93 - (!) 173/95 -   10/25/17 0050 - 92 - 146/90 -   10/25/17 0040 - 92 - 146/86 -   10/25/17 0030 - 93 - (!) 150/92 -   10/25/17 0020 - 93 - (!) 148/96 -   10/25/17 0014 - 94 - (!) 127/97 -   10/24/17 2350 - 92 - (!) 144/96 -   10/24/17 2340 - 88 - (!) 172/96 -   10/24/17 2330 - 88 - (!) 179/101 -   10/24/17 2320 - 84 - (!) 184/122 -   10/24/17 2316 98.3 °F (36.8 °C) - - - -   10/24/17 2315 - 83 - (!) 197/117 -   10/24/17 2312 - 83 - - -   10/24/17 2311 - 84 - (!) 190/114 -   10/24/17 2245 - 83 - (!) 229/117 -   10/24/17 2230 - 83 - (!) 230/128 -   10/24/17 2215 - 86 - (!) 220/126 -   10/24/17 2145 - 84 - (!) 203/121 -   10/24/17 2140 - 88 - (!) 192/122 -   10/24/17 2130 - 91 - (!) 194/146 -   10/24/17 2045 - 91 28 (!) 216/112 -   10/24/17 2030 - 92 27 (!) 194/126 96 %   10/24/17 2024 - (!) 101 - (!) 215/126 -   10/24/17 2000 - 99 22 - 98 %   10/24/17 1745 - (!) 102 29 (!) 205/128 96 %   10/24/17 1730 - 100 24 (!) 219/132 96 %   10/24/17 1715 - (!) 101 15 (!) 206/122 97 %   10/24/17 1645 - 99 (!) 31 (!) 223/124 96 %   10/24/17 1630 - 99 27 (!) 213/123 92 %       Intake/Output Summary (Last 24 hours) at 10/25/17 1617  Last data filed at 10/25/17 1017   Gross per 24 hour   Intake           647.08 ml   Output              465 ml   Net           182.08 ml        PHYSICAL EXAM:  General: WD, WN. Alert, cooperative, no acute distress    EENT:  EOMI. Anicteric sclerae. MMM  Resp:  CTA bilaterally, no wheezing or rales.   No accessory muscle use  CV:  Regular rhythm,  No edema  GI:  Soft, Non distended, Non tender.  +Bowel sounds  Neurologic:  Alert and oriented X 3, normal speech,   Psych:   Good insight. Not anxious nor agitated  Skin:  No rashes. No jaundice    Reviewed most current lab test results and cultures  YES  Reviewed most current radiology test results   YES  Review and summation of old records today    NO  Reviewed patient's current orders and MAR    YES  PMH/SH reviewed - no change compared to H&P  ________________________________________________________________________  Care Plan discussed with:    Comments   Patient x    Family      RN x    Care Manager     Consultant                        Multidiciplinary team rounds were held today with , nursing, pharmacist and clinical coordinator. Patient's plan of care was discussed; medications were reviewed and discharge planning was addressed. ________________________________________________________________________  Total NON critical care TIME:  35   Minutes    Total CRITICAL CARE TIME Spent:   Minutes non procedure based      Comments   >50% of visit spent in counseling and coordination of care     ________________________________________________________________________  Kyler Torres MD     Procedures: see electronic medical records for all procedures/Xrays and details which were not copied into this note but were reviewed prior to creation of Plan. LABS:  I reviewed today's most current labs and imaging studies.   Pertinent labs include:  Recent Labs      10/24/17   1408   WBC  6.2   HGB  10.9*   HCT  31.8*   PLT  297     Recent Labs      10/24/17   1522  10/24/17   1408   NA   --   137   K   --   4.3   CL   --   100   CO2   --   25   GLU   --   108*   BUN   --   11   CREA   --   0.95   CA   --   8.9   ALB   --   4.4   TBILI   --   1.3*   SGOT   --   299*   ALT   --   96*   INR  1.2*   --        Signed: Kyler Torres MD

## 2017-10-25 NOTE — PROGRESS NOTES
Zone Phone:   [phone]      Significant changes during shift: transfer from CCU        Patient Information    Sydney Alexis  61 y.o.  10/24/2017  2:31 PM by Josh Saldivar MD. Sydney Alexis was admitted from home    Problem List    Patient Active Problem List    Diagnosis Date Noted    Alcohol withdrawal ([address] 10/24/2017    HLD (hyperlipidemia) 08/18/2015    Elevated blood pressure 08/18/2015     Past Medical History:   Diagnosis Date    Hypercholesterolemia            Activity Status:    Up ad soo        DVT prophylaxis:        Patient Safety:    Falls Score Total Score: 1  Safety Level_______  Bed Alarm On? no  Plan for upcoming shift:   Monitor BP, monitor CIWAH, DC home when stable        Discharge Plan: home when stable  Active Consults:  None

## 2017-10-25 NOTE — H&P
Hospitalist Admission Note    NAME: Leslie Cote   :  1960   MRN:  [id number]     Date/Time:  10/24/2017 11:50 PM    Patient PCP: Taniya Rueda MD  ________________________________________________________________________    My assessment of this patient's clinical condition and my plan of care is as follows. Assessment / Plan:  EtOH withdrawal  Transaminitis, secondary to EtOH abuse  Tachycardia, Hypertension, secondary to EtOH withdrawal  Likely alcoholic gastritis   -EtOH level 35 - last drink 10/23 evening - 1 pint of liquor  -Continue CIWA monitoring and Ativan as per protocol  -Was given Labetalol and started on Cardizem drip by ED to control BP - pt has no history of HTN and so likely a consequence of his EtOH withdrawal and therefore should improve as he goes through CIWA protocol.  -Pt currently does not have any auditory or visual hallucinations, no tremors when hands are outstretched, no tongue fasciculations   -Abdo CT shows hepatic steatosis  -Continue to wean off Cardizem and admit pt to stepdown   -, ALT 96, Lipase 188 - continue to monitor  -MVT/FA/Thiamine  -NPO, IVF    ? Candiduria  -UA noted, follow urine Cx - pt denies any urinary complaints  -Will not treat with abx right now    Code Status: Full Code  Surrogate Decision Maker: Henry Heath, son, [phone]    DVT Prophylaxis: Lovenox  GI Prophylaxis: not indicated    Baseline: Independent ADLs        Subjective:   CHIEF COMPLAINT: nausea, vomiting, abdominal pain, feeling unwell    HISTORY OF PRESENT ILLNESS:     Carolina Ledezma is a 61 y.o.  male who presents with complaints listed above. Pt states that he drinks 1 pint of liquor, assorted, every day. He states that his last drink was on 10/23 evening. He states that he ate some food today and it \"did not sit well with me. \" He states that since then he has been having N/V and unable to tolerate any PO.  He states that he stops drinking alcohol, he \"gets the shakes. \" He is not aware of any intubations related to EtOH abuse. Pt's ex wife is at bedside and she reports long history of EtOH abuse by the pt. We were asked to admit for work up and evaluation of the above problems. Past Medical History:   Diagnosis Date    Hypercholesterolemia         Past Surgical History:   Procedure Laterality Date    HX ORTHOPAEDIC      rotator cuff repair       Social History   Substance Use Topics    Smoking status: Current Some Day Smoker     Years: 10.00    Smokeless tobacco: Never Used    Alcohol use Yes      Comment: social        Family History   Problem Relation Age of Onset   Job [place] Stroke Father     Hypertension Father     Diabetes Sister      No Known Allergies     Prior to Admission medications    Not on File       REVIEW OF SYSTEMS:     I am not able to complete the review of systems because:    The patient is intubated and sedated    The patient has altered mental status due to his acute medical problems    The patient has baseline aphasia from prior stroke(s)    The patient has baseline dementia and is not reliable historian    The patient is in acute medical distress and unable to provide information           Total of 12 systems reviewed as follows:       POSITIVE= underlined text  Negative = text not underlined  General:  fever, chills, sweats, generalized weakness, weight loss/gain,      loss of appetite   Eyes:    blurred vision, eye pain, loss of vision, double vision  ENT:    rhinorrhea, pharyngitis   Respiratory:   cough, sputum production, SOB, PAINTING, wheezing, pleuritic pain   Cardiology:   chest pain, palpitations, orthopnea, PND, edema, syncope   Gastrointestinal:  abdominal pain , N/V, diarrhea, dysphagia, constipation, bleeding   Genitourinary:  frequency, urgency, dysuria, hematuria, incontinence   Muskuloskeletal :  arthralgia, myalgia, back pain  Hematology:  easy bruising, nose or gum bleeding, lymphadenopathy   Dermatological: rash, ulceration, pruritis, color change / jaundice  Endocrine:   hot flashes or polydipsia   Neurological:  headache, dizziness, confusion, focal weakness, paresthesia,     Speech difficulties, memory loss, gait difficulty  Psychological: Feelings of anxiety, depression, agitation    Objective:   VITALS:    Visit Vitals    BP (!) 179/101    Pulse 88    Temp 98.3 °F (36.8 °C)    Resp 28    Ht 5' 9\" (1.753 m)    Wt 92.2 kg (203 lb 4.2 oz)    SpO2 98%    BMI 30.02 kg/m2       PHYSICAL EXAM:    General:    Alert, cooperative, not in any distress, /88  HEENT: Atraumatic, anicteric sclerae, pink conjunctivae  Neck:  Supple, symmetrical,  thyroid: non tender  Lungs:   Clear to auscultation bilaterally. No Wheezing or Rhonchi. No rales. Chest wall:  No tenderness  No Accessory muscle use. Heart:   Regular  rhythm,  No  murmur   No edema  Abdomen:   Soft, non-tender, distended  Extremities: No cyanosis. No clubbing,      Skin turgor normal, Capillary refill normal, Radial dial pulse 2+  Skin:     Not pale. Not Jaundiced  No rashes   Psych:  Fair insight. Not depressed. Not anxious or agitated. Neurologic: EOMs intact. No facial asymmetry. No aphasia or slurred speech. Symmetrical strength, Sensation grossly intact. Alert and oriented X 4. Gait not tested. No auditory or visual hallucinations.      _______________________________________________________________________  Care Plan discussed with:    Comments   Patient y    Family  y    RN y    Care Manager                    Consultant:      _______________________________________________________________________  Expected  Disposition:   Home with Family y   HH/PT/OT/RN    SNF/LTC    KEKE    ________________________________________________________________________  TOTAL TIME:  61 Minutes    Critical Care Provided     Minutes non procedure based      Comments    y Reviewed previous records   >50% of visit spent in counseling and coordination of care y Discussion with patient and/or family and questions answered       ________________________________________________________________________  Signed: Heike Whitley MD    Procedures: see electronic medical records for all procedures/Xrays and details which were not copied into this note but were reviewed prior to creation of Plan. LAB DATA REVIEWED:    Recent Results (from the past 24 hour(s))   EKG, 12 LEAD, INITIAL    Collection Time: 10/24/17  1:47 PM   Result Value Ref Range    Ventricular Rate 97 BPM    Atrial Rate 97 BPM    P-R Interval 114 ms    QRS Duration 80 ms    Q-T Interval 374 ms    QTC Calculation (Bezet) 474 ms    Calculated P Axis 28 degrees    Calculated R Axis 14 degrees    Calculated T Axis 10 degrees    Diagnosis       Normal sinus rhythm  Nonspecific T wave abnormality  Prolonged QT  Abnormal ECG  When compared with ECG of 14-AUG-2015 10:21,  No significant change was found     CBC WITH AUTOMATED DIFF    Collection Time: 10/24/17  2:08 PM   Result Value Ref Range    WBC 6.2 4.1 - 11.1 K/uL    RBC 3.24 (L) 4.10 - 5.70 M/uL    HGB 10.9 (L) 12.1 - 17.0 g/dL    HCT 31.8 (L) 36.6 - 50.3 %    MCV 98.1 80.0 - 99.0 FL    MCH 33.6 26.0 - 34.0 PG    MCHC 34.3 30.0 - 36.5 g/dL    RDW 14.6 (H) 11.5 - 14.5 %    PLATELET 328 578 - 475 K/uL    NEUTROPHILS 70 32 - 75 %    LYMPHOCYTES 24 12 - 49 %    MONOCYTES 6 5 - 13 %    EOSINOPHILS 0 0 - 7 %    BASOPHILS 0 0 - 1 %    ABS. NEUTROPHILS 4.3 1.8 - 8.0 K/UL    ABS. LYMPHOCYTES 1.5 0.8 - 3.5 K/UL    ABS. MONOCYTES 0.4 0.0 - 1.0 K/UL    ABS. EOSINOPHILS 0.0 0.0 - 0.4 K/UL    ABS.  BASOPHILS 0.0 0.0 - 0.1 K/UL   METABOLIC PANEL, COMPREHENSIVE    Collection Time: 10/24/17  2:08 PM   Result Value Ref Range    Sodium 137 136 - 145 mmol/L    Potassium 4.3 3.5 - 5.1 mmol/L    Chloride 100 97 - 108 mmol/L    CO2 25 21 - 32 mmol/L    Anion gap 12 5 - 15 mmol/L    Glucose 108 (H) 65 - 100 mg/dL    BUN 11 6 - 20 MG/DL    Creatinine 0.95 0.70 - 1.30 MG/DL    BUN/Creatinine ratio 12 12 - 20      GFR est AA >60 >60 ml/min/1.73m2    GFR est non-AA >60 >60 ml/min/1.73m2    Calcium 8.9 8.5 - 10.1 MG/DL    Bilirubin, total 1.3 (H) 0.2 - 1.0 MG/DL    ALT (SGPT) 96 (H) 12 - 78 U/L    AST (SGOT) 299 (H) 15 - 37 U/L    Alk.  phosphatase 125 (H) 45 - 117 U/L    Protein, total 8.2 6.4 - 8.2 g/dL    Albumin 4.4 3.5 - 5.0 g/dL    Globulin 3.8 2.0 - 4.0 g/dL    A-G Ratio 1.2 1.1 - 2.2     LIPASE    Collection Time: 10/24/17  2:08 PM   Result Value Ref Range    Lipase 188 73 - 393 U/L   URINALYSIS W/ REFLEX CULTURE    Collection Time: 10/24/17  3:22 PM   Result Value Ref Range    Color YELLOW/STRAW      Appearance CLEAR CLEAR      Specific gravity 1.022 1.003 - 1.030      pH (UA) 6.0 5.0 - 8.0      Protein 30 (A) NEG mg/dL    Glucose NEGATIVE  NEG mg/dL    Ketone TRACE (A) NEG mg/dL    Bilirubin NEGATIVE  NEG      Blood TRACE (A) NEG      Urobilinogen 1.0 0.2 - 1.0 EU/dL    Nitrites NEGATIVE  NEG      Leukocyte Esterase NEGATIVE  NEG      WBC 10-20 0 - 4 /hpf    RBC 5-10 0 - 5 /hpf    Epithelial cells FEW FEW /lpf    Bacteria 1+ (A) NEG /hpf    UA:UC IF INDICATED URINE CULTURE ORDERED (A) CNI      Yeast PRESENT (A) NEG     ETHYL ALCOHOL    Collection Time: 10/24/17  3:22 PM   Result Value Ref Range    ALCOHOL(ETHYL),SERUM 25 (H) <10 MG/DL   PROTHROMBIN TIME + INR    Collection Time: 10/24/17  3:22 PM   Result Value Ref Range    INR 1.2 (H) 0.9 - 1.1      Prothrombin time 11.7 (H) 9.0 - 11.1 sec   TROPONIN I    Collection Time: 10/24/17  3:22 PM   Result Value Ref Range    Troponin-I, Qt. <0.04 <0.05 ng/mL

## 2017-10-25 NOTE — PROGRESS NOTES
Interdisciplinary team rounds were held 10/25/2017 with the following team members: Care Management, Diabetes Treatment Specialist, Nursing, Nutrition, Pharmacy, Physician and Respiratory therapy. Plan of care discussed. Goal: Advance diet, See MD orders and progress notes for further  interventions and desired outcomes.

## 2017-10-25 NOTE — ED NOTES
Dr. Shahida Navarro hospitalist at bedside for admission evaluation. Nicardipine infusion titrated down to 7.5mg/hour for blood pressure 144/96. Dr. Shahida Navarro provided parameter diastolic <359. Will continue to monitor.

## 2017-10-25 NOTE — ED NOTES
Patient keeps attempting to get out of bed per family. Re-educated patient and family of need to stay in bed because he will fall if he tries to get out of bed due to his shakes. Patient placed back into bed. New bedding given due to urinary incontinence. Padding applied to bed rails for seizure precautions. Patient requires 1:1 sitter to reinforce need to stay in bed.

## 2017-10-25 NOTE — PROGRESS NOTES
TRANSFER - IN REPORT:    Verbal report received from Caroline Beebe RN(name) on Bibiana Salas  being received from ED(unit) for routine progression of care      Report consisted of patients Situation, Background, Assessment and   Recommendations(SBAR). Information from the following report(s) SBAR and Kardex was reviewed with the receiving nurse. Opportunity for questions and clarification was provided. Assessment completed upon patients arrival to unit and care assumed. Primary Nurse Leonard Olson RN and Asher Matson performed a dual skin assessment on this patient. No impairment noted. Oneil score is 19     0450 Spoke with Dr. Jose Romero regarding pt c/o abdominal & back pain, per pt he takes ibuprofen at home for pain. Order received for PRN ibuprofen, see MAR.     0700 Report given to Amarjit Alvarenga RN.

## 2017-10-25 NOTE — ED NOTES
Dr. America Kidd given follow-up of patient's vital signs since off Cardene drip. Patient's blood pressure is slowly increasing. Last blood pressure 165/110. Order received to discontinue Cardene drip. Order received for PRN Hydralazine IV to keep systolic blood pressure <086 and diastolic blood pressure < 100.

## 2017-10-25 NOTE — PROGRESS NOTES
3594 - Bedside and Verbal shift change report given by Gilberto Connor RN (offgoing nurse). Report included the following information SBAR, Kardex, ED Summary, Intake/Output, MAR and Cardiac Rhythm NSR.    1400 - Assessment completed. Pt resting comfortably in bed. A&Ox4. Not complaining of pain or nausea at this time. VSS.    [id number] - Patient moved to chair, bed alarm under patient and call bell within reach; steady on his feet. PCT's bathing patient. 9331 - Assisted patient back to bed. Bed alarm under patient & call bell within reach. 6282 - Rounds. New orders received: trans to any tele, cardiac diet, scheduled BP meds, protonix IV to PO.    1115 - Patient called out and requested to walk on unit. Pt completed 2 laps around unit on monitor while pushing cart. Upon completion assisted patient back to bed with bed alarm in place and call bell within reach. 1145 - Assessment completed. Patient complaining of area on L back; thinks he may have been bitten by a bug or spider. He has been scratching and area is open/bleeding. Patient reports \"He was squeezing on it and yellow stuff came out\". 200 - Dr. Willy Chacon at bedside to assess spot on back. New orders received; benadryl, hydrocortisone for open area on back. Librium for agitation. 1345 - Open area on left lower back cleaned with soap & water, hydrocortisone cream applied and covered with bandage. 12 - Transported pt to room 3116 via wheelchair on monitor with patients possessions. Assisted patient to bed, 3 side rails raised and call bell within reach. Chart and medications from  handed to Zenaida Rod, [address] [place] [address].

## 2017-10-26 ENCOUNTER — TELEPHONE (OUTPATIENT)
Dept: INTERNAL MEDICINE CLINIC | Age: 57
End: 2017-10-26

## 2017-10-26 LAB
ALBUMIN SERPL-MCNC: 3.9 G/DL (ref 3.5–5)
ALBUMIN/GLOB SERPL: 1 {RATIO} (ref 1.1–2.2)
ALP SERPL-CCNC: 97 U/L (ref 45–117)
ALT SERPL-CCNC: 55 U/L (ref 12–78)
ANION GAP SERPL CALC-SCNC: 12 MMOL/L (ref 5–15)
AST SERPL-CCNC: 89 U/L (ref 15–37)
BACTERIA SPEC CULT: NORMAL
BILIRUB SERPL-MCNC: 1.2 MG/DL (ref 0.2–1)
BUN SERPL-MCNC: 14 MG/DL (ref 6–20)
BUN/CREAT SERPL: 11 (ref 12–20)
CALCIUM SERPL-MCNC: 8.6 MG/DL (ref 8.5–10.1)
CC UR VC: NORMAL
CHLORIDE SERPL-SCNC: 102 MMOL/L (ref 97–108)
CO2 SERPL-SCNC: 24 MMOL/L (ref 21–32)
CREAT SERPL-MCNC: 1.27 MG/DL (ref 0.7–1.3)
GLOBULIN SER CALC-MCNC: 3.9 G/DL (ref 2–4)
GLUCOSE SERPL-MCNC: 109 MG/DL (ref 65–100)
MAGNESIUM SERPL-MCNC: 1.5 MG/DL (ref 1.6–2.4)
PHOSPHATE SERPL-MCNC: 4 MG/DL (ref 2.6–4.7)
POTASSIUM SERPL-SCNC: 3.3 MMOL/L (ref 3.5–5.1)
PROT SERPL-MCNC: 7.8 G/DL (ref 6.4–8.2)
SERVICE CMNT-IMP: NORMAL
SODIUM SERPL-SCNC: 138 MMOL/L (ref 136–145)

## 2017-10-26 PROCEDURE — G8978 MOBILITY CURRENT STATUS: HCPCS

## 2017-10-26 PROCEDURE — 80053 COMPREHEN METABOLIC PANEL: CPT | Performed by: GENERAL ACUTE CARE HOSPITAL

## 2017-10-26 PROCEDURE — 65660000000 HC RM CCU STEPDOWN

## 2017-10-26 PROCEDURE — G8988 SELF CARE GOAL STATUS: HCPCS | Performed by: OCCUPATIONAL THERAPIST

## 2017-10-26 PROCEDURE — 77030018836 HC SOL IRR NACL ICUM -A

## 2017-10-26 PROCEDURE — 97161 PT EVAL LOW COMPLEX 20 MIN: CPT

## 2017-10-26 PROCEDURE — 74011250637 HC RX REV CODE- 250/637: Performed by: INTERNAL MEDICINE

## 2017-10-26 PROCEDURE — 74011250636 HC RX REV CODE- 250/636: Performed by: GENERAL ACUTE CARE HOSPITAL

## 2017-10-26 PROCEDURE — G8989 SELF CARE D/C STATUS: HCPCS | Performed by: OCCUPATIONAL THERAPIST

## 2017-10-26 PROCEDURE — 36415 COLL VENOUS BLD VENIPUNCTURE: CPT | Performed by: GENERAL ACUTE CARE HOSPITAL

## 2017-10-26 PROCEDURE — 83735 ASSAY OF MAGNESIUM: CPT | Performed by: GENERAL ACUTE CARE HOSPITAL

## 2017-10-26 PROCEDURE — 84100 ASSAY OF PHOSPHORUS: CPT | Performed by: GENERAL ACUTE CARE HOSPITAL

## 2017-10-26 PROCEDURE — G8987 SELF CARE CURRENT STATUS: HCPCS | Performed by: OCCUPATIONAL THERAPIST

## 2017-10-26 PROCEDURE — G8979 MOBILITY GOAL STATUS: HCPCS

## 2017-10-26 PROCEDURE — 74011250637 HC RX REV CODE- 250/637: Performed by: GENERAL ACUTE CARE HOSPITAL

## 2017-10-26 PROCEDURE — 97165 OT EVAL LOW COMPLEX 30 MIN: CPT | Performed by: OCCUPATIONAL THERAPIST

## 2017-10-26 PROCEDURE — G8980 MOBILITY D/C STATUS: HCPCS

## 2017-10-26 RX ORDER — CHLORDIAZEPOXIDE HYDROCHLORIDE 5 MG/1
10 CAPSULE, GELATIN COATED ORAL 2 TIMES DAILY
Status: DISCONTINUED | OUTPATIENT
Start: 2017-10-26 | End: 2017-10-27

## 2017-10-26 RX ORDER — LISINOPRIL 20 MG/1
20 TABLET ORAL DAILY
Status: DISCONTINUED | OUTPATIENT
Start: 2017-10-26 | End: 2017-10-27 | Stop reason: HOSPADM

## 2017-10-26 RX ORDER — THERA TABS 400 MCG
1 TAB ORAL DAILY
Qty: 30 TAB | Refills: 0 | Status: SHIPPED | OUTPATIENT
Start: 2017-10-27

## 2017-10-26 RX ORDER — METOPROLOL TARTRATE 25 MG/1
25 TABLET, FILM COATED ORAL 2 TIMES DAILY
Status: DISCONTINUED | OUTPATIENT
Start: 2017-10-26 | End: 2017-10-27

## 2017-10-26 RX ORDER — METOPROLOL TARTRATE 25 MG/1
25 TABLET, FILM COATED ORAL 2 TIMES DAILY
Qty: 60 TAB | Refills: 0 | Status: SHIPPED | OUTPATIENT
Start: 2017-10-26 | End: 2017-11-13 | Stop reason: ALTCHOICE

## 2017-10-26 RX ORDER — POTASSIUM CHLORIDE 20 MEQ/1
40 TABLET, EXTENDED RELEASE ORAL
Status: COMPLETED | OUTPATIENT
Start: 2017-10-26 | End: 2017-10-26

## 2017-10-26 RX ORDER — PANTOPRAZOLE SODIUM 40 MG/1
40 TABLET, DELAYED RELEASE ORAL
Qty: 30 TAB | Refills: 0 | Status: SHIPPED | OUTPATIENT
Start: 2017-10-27 | End: 2019-10-18

## 2017-10-26 RX ORDER — LISINOPRIL 20 MG/1
20 TABLET ORAL DAILY
Qty: 30 TAB | Refills: 0 | Status: SHIPPED | OUTPATIENT
Start: 2017-10-27 | End: 2017-11-13 | Stop reason: SDUPTHER

## 2017-10-26 RX ORDER — LANOLIN ALCOHOL/MO/W.PET/CERES
100 CREAM (GRAM) TOPICAL DAILY
Qty: 30 TAB | Refills: 0 | Status: SHIPPED | OUTPATIENT
Start: 2017-10-27

## 2017-10-26 RX ORDER — FOLIC ACID 1 MG/1
1 TABLET ORAL DAILY
Qty: 30 TAB | Refills: 0 | Status: SHIPPED | OUTPATIENT
Start: 2017-10-27 | End: 2019-10-18

## 2017-10-26 RX ADMIN — LISINOPRIL 20 MG: 20 TABLET ORAL at 08:56

## 2017-10-26 RX ADMIN — ENOXAPARIN SODIUM 40 MG: 40 INJECTION SUBCUTANEOUS at 21:26

## 2017-10-26 RX ADMIN — METOPROLOL TARTRATE 25 MG: 25 TABLET ORAL at 08:56

## 2017-10-26 RX ADMIN — Medication 10 ML: at 21:26

## 2017-10-26 RX ADMIN — CHLORDIAZEPOXIDE HYDROCHLORIDE 10 MG: 5 CAPSULE ORAL at 08:55

## 2017-10-26 RX ADMIN — CHLORDIAZEPOXIDE HYDROCHLORIDE 10 MG: 5 CAPSULE ORAL at 18:39

## 2017-10-26 RX ADMIN — METOPROLOL TARTRATE 25 MG: 25 TABLET ORAL at 21:26

## 2017-10-26 RX ADMIN — Medication 100 MG: at 08:55

## 2017-10-26 RX ADMIN — THERA TABS 1 TABLET: TAB at 08:55

## 2017-10-26 RX ADMIN — FOLIC ACID 1 MG: 1 TABLET ORAL at 08:56

## 2017-10-26 RX ADMIN — HYDRALAZINE HYDROCHLORIDE 10 MG: 20 INJECTION INTRAMUSCULAR; INTRAVENOUS at 04:10

## 2017-10-26 RX ADMIN — ENOXAPARIN SODIUM 40 MG: 40 INJECTION SUBCUTANEOUS at 00:28

## 2017-10-26 RX ADMIN — MUPIROCIN: 20 OINTMENT TOPICAL at 08:59

## 2017-10-26 RX ADMIN — PANTOPRAZOLE SODIUM 40 MG: 40 TABLET, DELAYED RELEASE ORAL at 08:55

## 2017-10-26 RX ADMIN — Medication 10 ML: at 04:11

## 2017-10-26 RX ADMIN — DIPHENHYDRAMINE HYDROCHLORIDE 25 MG: 25 CAPSULE ORAL at 00:33

## 2017-10-26 RX ADMIN — POTASSIUM CHLORIDE 40 MEQ: 20 TABLET, EXTENDED RELEASE ORAL at 12:22

## 2017-10-26 NOTE — PROGRESS NOTES
Occupational Therapy EVALUATION/discharge  Patient: Antione Moser (47 y.o. male)  Date: 10/26/2017  Primary Diagnosis: Alcohol withdrawal ([facility] [address]  Alcohol withdrawal ([facility] [address]        Precautions:        ASSESSMENT:   Based on the objective data described below, the patient presents at a completely independent level for ADL. He demonstrates good awareness of safety. Further skilled acute occupational therapy is not indicated at this time. Discharge Recommendations: None  Further Equipment Recommendations for Discharge: None      SUBJECTIVE:   Patient stated I rarely sit still. I'm always on the go.     OBJECTIVE DATA SUMMARY:   HISTORY:   Past Medical History:   Diagnosis Date    Hypercholesterolemia      Past Surgical History:   Procedure Laterality Date    HX ORTHOPAEDIC      rotator cuff repair       Prior Level of Function/Home Situation: Independent  Expanded or extensive additional review of patient history:     Home Situation  Home Environment: Private residence  # Steps to Enter: 0  One/Two Story Residence: One story  Living Alone: Yes (lives in the garage apartment at [facility])  New [place]: Child(heron), Friends \ neighbors, Parent  Patient Expects to be Discharged to[de-identified] Private residence  Current DME Used/Available at Home: [employer], Blood pressure cuff  Tub or Shower Type: Tub/Shower combination  [x]  Right hand dominant   []  Left hand dominant    EXAMINATION OF PERFORMANCE DEFICITS:  Cognitive/Behavioral Status:  Neurologic State: Alert  Orientation Level: Oriented X4  Cognition: Appropriate for age attention/concentration; Follows commands  Perception: Appears intact  Perseveration: No perseveration noted  Safety/Judgement: Awareness of environment;Good awareness of safety precautions    Hearing:   Auditory  Auditory Impairment: None    Vision/Perceptual:                           Acuity: Within Defined Limits         Range of Motion:  AROM: Within functional limits Strength:  Strength: Within functional limits                Coordination:  Coordination: Within functional limits  Fine Motor Skills-Upper: Left Intact; Right Intact    Gross Motor Skills-Upper: Left Intact; Right Intact    Tone & Sensation:  Tone: Normal  Sensation: Intact                      Balance:  Sitting: Intact  Standing: Intact    Functional Mobility and Transfers for ADLs:  Bed Mobility:  Rolling: Independent  Supine to Sit: Independent  Scooting: Independent    Transfers:  Sit to Stand: Independent  Stand to Sit: Independent  Bed to Chair: Independent  Toilet Transfer : Independent    ADL Assessment:  Feeding: Independent    Oral Facial Hygiene/Grooming: Independent    Bathing: Independent    Upper Body Dressing: Independent    Lower Body Dressing: Independent    Toileting: Independent                Functional Measure:  Barthel Index:    Bathin  Bladder: 10  Bowels: 10  Groomin  Dressing: 10  Feeding: 10  Mobility: 15  Stairs: 0  Toilet Use: 10  Transfer (Bed to Chair and Back): 15  Total: 90       Barthel and G-code impairment scale:  Percentage of impairment CH  0% CI  1-19% CJ  20-39% CK  40-59% CL  60-79% CM  80-99% CN  100%   Barthel Score 0-100 100 99-80 79-60 59-40 20-39 1-19   0   Barthel Score 0-20 20 17-19 13-16 9-12 5-8 1-4 0      The Barthel ADL Index: Guidelines  1. The index should be used as a record of what a patient does, not as a record of what a patient could do. 2. The main aim is to establish degree of independence from any help, physical or verbal, however minor and for whatever reason. 3. The need for supervision renders the patient not independent. 4. A patient's performance should be established using the best available evidence. Asking the patient, friends/relatives and nurses are the usual sources, but direct observation and common sense are also important. However direct testing is not needed.   5. Usually the patient's performance over the preceding 24-48 hours is important, but occasionally longer periods will be relevant. 6. Middle categories imply that the patient supplies over 50 per cent of the effort. 7. Use of aids to be independent is allowed. Link Heir., Barthel, D.W. (1838). Functional evaluation: the Barthel Index. [facility] (14)2. JYOTI Hoover, Lane Amezquita., Wong Molina., [place], [address] (1999). Measuring the change indisability after inpatient rehabilitation; comparison of the responsiveness of the Barthel Index and Functional [place] Measure. Journal of Neurology, Neurosurgery, and Psychiatry, 66(4), 655-777. Angelica Wolf, N.J.A, KEENAN Aceves, & Viky Pina MANDREW. (2004.) Assessment of post-stroke quality of life in cost-effectiveness studies: The usefulness of the Barthel Index and the EuroQoL-5D. Quality of Life Research, 13, 029-74       G codes: In compliance with CMSs Claims Based Outcome Reporting, the following G-code set was chosen for this patient based on their primary functional limitation being treated: The outcome measure chosen to determine the severity of the functional limitation was the Barthel Index with a score of 90/100 which was correlated with the impairment scale. ?  Self Care:     - CURRENT STATUS: CI - 1%-19% impaired, limited or restricted    - GOAL STATUS: CI - 1%-19% impaired, limited or restricted    - D/C STATUS:  CI - 1%-19% impaired, limited or restricted       Occupational Therapy Evaluation Charge Determination   History Examination Decision-Making   LOW Complexity : Brief history review  LOW Complexity : 1-3 performance deficits relating to physical, cognitive , or psychosocial skils that result in activity limitations and / or participation restrictions  LOW Complexity : No comorbidities that affect functional and no verbal or physical assistance needed to complete eval tasks       Based on the above components, the patient evaluation is determined to be of the following complexity level: LOW   Pain:  Pain Scale 1: Numeric (0 - 10)  Pain Intensity 1: 0              Activity Tolerance:   Good  After treatment:   [x]  Patient left in no apparent distress sitting up in chair  []  Patient left in no apparent distress in bed  [x]  Call bell left within reach  [x]  Nursing notified  []  Caregiver present  []  Bed alarm activated    COMMUNICATION/EDUCATION:   Communication/Collaboration:  [x]      Home safety education was provided and the patient/caregiver indicated understanding. [x]      Patient/family have participated as able and agree with findings and recommendations. []      Patient is unable to participate in plan of care at this time.   Findings and recommendations were discussed with: Physical Therapist and Registered Nurse    Charmayne Rower, OTR/L  Time Calculation: 10 mins

## 2017-10-26 NOTE — PROGRESS NOTES
CM consult noted for an earlier appt for PCP and we are waiting for a call back from the PCP.      Philip Bo, [address]

## 2017-10-26 NOTE — PROGRESS NOTES
Pt is a 61 y.o  [place] male admitted with Alcohol Withdrawal. Pt was alert, oriented and in no distress. Demographic information verified and all is correct. Pt lives in a garage apt next to his mother's home with no steps to the entrance. Denies using DME or having home health and rehab. Pt doesn't drive and his siblings and children assist in transporting him. Prior to admission, pt was independent with his ADL's and IADL's. Pt was contacting a detox program last week that he saw on TV and provided them with his information. They were going to get back to him on a payment plan. Pt couldn't remember the name of the company. Pt doesn't have insurance and this CM contacted [facility] to screen pt for the [employer] care card. Preferred pharmacy is [address] [place] [address] in [facility]. Pt's family will transport him home at discharge. CM will provide pt with more substance abuse and addiction resources. CM will continue to follow pt for discharge planning needs. Care Management Interventions  PCP Verified by CM: Yes (Dr. Moon Webber)  Mode of Transport at Discharge: Other (see comment) (pt's family can transport by car)  Transition of Care Consult (CM Consult): Discharge Planning  Discharge Durable Medical Equipment: No  Physical Therapy Consult: Yes  Occupational Therapy Consult: Yes  Speech Therapy Consult: No  Current Support Network:  Other, Own Home (lives with his mother in a garage apt next to his mother's home - no steps)  Confirm Follow Up Transport: Family  Discharge Location  Discharge Placement: [facility] [email or web address] [facility] [address], [address] [place]

## 2017-10-26 NOTE — PROGRESS NOTES
Problem: Falls - Risk of  Goal: *Absence of Falls  Document Lakshmi Fall Risk and appropriate interventions in the flowsheet.    Outcome: Progressing Towards Goal  Fall Risk Interventions:  Mobility Interventions: Bed/chair exit alarm         Medication Interventions: Bed/chair exit alarm, Patient to call before getting OOB    Elimination Interventions: Call light in reach, Toileting schedule/hourly rounds

## 2017-10-26 NOTE — TELEPHONE ENCOUNTER
Call completed to Alan Mireles with [facility], offered and accepted an appointment for  Friday, November 03, 2017 10:30 AM with Dr. Lauren Foreman.

## 2017-10-26 NOTE — PROGRESS NOTES
IV in right ac has worked its way long term out and is leaking. Attempted x two to restart and veins blew both times. Patient stated they had to use ultrasound to get the IV he had. Spoke with Dell Crump in [facility] team who stated they would be up to start new IV but they had a  few patients ahead of him.

## 2017-10-26 NOTE — PROGRESS NOTES
Hospitalist Progress Note    NAME: Evelina Johnson   :  1960   MRN:  [id number]       Assessment / Plan:  EtOH withdrawal  Transaminitis, secondary to EtOH abuse  Likely alcoholic gastritis   -EtOH level 35 - last drink 10/23 evening - 1 pint of liquor  -cont' Librium at tapered dose, CIWA protocol  -MVI, folic acid, thiamine   -counseled on alcohol cessation, pt understands    Hypertensive emergency  -required cardene gtt, now off.  bp remains elevated overnight  -will incr dosing of lisinopril, BB. Hydralazine prn  -CM to help arrange close f/u with PCP on discharge     Hypokalemia  -K 3.3, will replete    Hepatic steatosis on CT A/P  -likely related to alcohol abuse  -, ALT 96, Lipase 188 - continue to monitor  -repeat cmp    ? Candiduria  -UA noted, pt is asymptomatic, hold abx  -urine cx neg to date     Code Status: Full Code  Surrogate Decision Maker: Maricel Couch, son, [phone]     DVT Prophylaxis: Lovenox  GI Prophylaxis: not indicated     Baseline: Independent ADLs     Subjective:     Chief Complaint / Reason for Physician Visit  No acute complaints. Denies any hallucinations. Sitting up in chair. Discussed with RN events overnight. Review of Systems:  Symptom Y/N Comments  Symptom Y/N Comments   Fever/Chills n   Chest Pain n    Poor Appetite    Edema     Cough    Abdominal Pain n    Sputum    Joint Pain     SOB/PAINTING n   Pruritis/Rash     Nausea/vomit    Tolerating PT/OT     Diarrhea    Tolerating Diet y    Constipation    Other       Could NOT obtain due to:      Objective:     VITALS:   Last 24hrs VS reviewed since prior progress note.  Most recent are:  Patient Vitals for the past 24 hrs:   Temp Pulse Resp BP SpO2   10/26/17 1114 98.2 °F (36.8 °C) 82 18 139/81 99 %   10/26/17 0740 98.2 °F (36.8 °C) 94 18 160/90 100 %   10/26/17 0404 98 °F (36.7 °C) 94 18 (!) 158/109 98 %   10/25/17 2305 97.9 °F (36.6 °C) 82 28 (!) 160/95 100 %   10/25/17 2117 - 97 - (!) 172/104 -   10/25/17 2019 97.8 °F (36.6 °C) (!) 101 26 (!) 159/109 99 %   10/25/17 1456 99.1 °F (37.3 °C) 99 28 148/90 99 %   10/25/17 1400 - 98 26 168/80 -   10/25/17 1300 - 89 28 169/90 -   10/25/17 1144 - 96 26 (!) 165/93 97 %   10/25/17 1132 98.3 °F (36.8 °C) - 18 (!) 165/93 97 %     No intake or output data in the 24 hours ending 10/26/17 1123     PHYSICAL EXAM:  General: WD, WN. Alert, cooperative, no acute distress    EENT:  EOMI. Anicteric sclerae. MMM  Resp:  CTA bilaterally, no wheezing or rales. No accessory muscle use  CV:  Regular  rhythm,  No edema  GI:  Soft, Non distended, Non tender.  +Bowel sounds  Neurologic:  Alert and oriented X 3, normal speech,   Psych:   Good insight. Not anxious nor agitated  Skin:  No rashes. No jaundice    Reviewed most current lab test results and cultures  YES  Reviewed most current radiology test results   YES  Review and summation of old records today    NO  Reviewed patient's current orders and MAR    YES  PMH/SH reviewed - no change compared to H&P  ________________________________________________________________________  Care Plan discussed with:    Comments   Patient x    Family      RN x    Care Manager     Consultant                        Multidiciplinary team rounds were held today with , nursing, pharmacist and clinical coordinator. Patient's plan of care was discussed; medications were reviewed and discharge planning was addressed. ________________________________________________________________________  Total NON critical care TIME:  35   Minutes    Total CRITICAL CARE TIME Spent:   Minutes non procedure based      Comments   >50% of visit spent in counseling and coordination of care     ________________________________________________________________________  Khalida Lyn MD     Procedures: see electronic medical records for all procedures/Xrays and details which were not copied into this note but were reviewed prior to creation of Plan.       LABS:  I reviewed today's most current labs and imaging studies.   Pertinent labs include:  Recent Labs      10/24/17   1408   WBC  6.2   HGB  10.9*   HCT  31.8*   PLT  297     Recent Labs      10/26/17   0415  10/24/17   1522  10/24/17   1408   NA  138   --   137   K  3.3*   --   4.3   CL  102   --   100   CO2  24   --   25   GLU  109*   --   108*   BUN  14   --   11   CREA  1.27   --   0.95   CA  8.6   --   8.9   MG  1.5*   --    --    PHOS  4.0   --    --    ALB  3.9   --   4.4   TBILI  1.2*   --   1.3*   SGOT  89*   --   299*   ALT  55   --   96*   INR   --   1.2*   --        Signed: Shazia Bowles MD

## 2017-10-26 NOTE — PROGRESS NOTES
Zone Phone:   [phone]      Significant changes during shift: B/P still high, hydralazine 10 mg IV given        Patient Information    Adolfo Pina  61 y.o.  10/24/2017  2:31 PM by Becki Garg MD. Adolfo Pina was admitted from home    Problem List    Patient Active Problem List    Diagnosis Date Noted    Alcohol withdrawal ([facility] [address] 10/24/2017    HLD (hyperlipidemia) 08/18/2015    Elevated blood pressure 08/18/2015     Past Medical History:   Diagnosis Date    Hypercholesterolemia            Activity Status:    Up ad soo        DVT prophylaxis:lovenox      Patient Safety:    Falls Score Total Score: 1  Safety Level_______  Bed Alarm On? no  Plan for upcoming shift:   Monitor BP, [facility] when [facility]        Discharge Plan: home when stable  Active Consults:  None

## 2017-10-26 NOTE — ROUTINE PROCESS
Bedside report given to Xochitl RN by  Selmer Fabry RN using Kardex, lab values involving patient.       Zone Phone:   [phone]        Significant changes during shift: Libruim decreased and BP meds increased.           Patient Information     Issa Pennington  61 y.o.  10/24/2017  2:31 PM by Antony Yip MD. Issa Pennington was admitted from home      Problem List          Patient Active Problem List     Diagnosis Date Noted    Alcohol withdrawal ([facility] [address] 10/24/2017    HLD (hyperlipidemia) 08/18/2015    Elevated blood pressure 08/18/2015           Past Medical History:   Diagnosis Date    Hypercholesterolemia                   Activity Status:     Up ad soo           DVT prophylaxis:lovenox        Patient Safety:     Falls Score Total Score: 1  Safety Level_______  Bed Alarm On? no  Plan for upcoming shift:   Monitor BP, monitor CIWA, Apply cream that pharmacy is sending and band aid to bite area on back           Discharge Plan: home when stable  Active Consults:  None

## 2017-10-26 NOTE — TELEPHONE ENCOUNTER
Krishna Barajas at [phone] at [address] [facility] would like a call back to reschedule 11/25/17 appt.  The doctor at hospital is requesting an earlier appt with another provider if Dr. Eric Singleton doesn't have any opening.   Patient is being discharged today.         Message received & copied from [facility]

## 2017-10-26 NOTE — PROGRESS NOTES
Problem: Alcohol Withdrawal  Goal: *STG: Participates in treatment plan  Outcome: Progressing Towards Goal  Pt agitated with telemetry wires. Says it keeps him from sleeping. Removed all leads. Nurse entered room to check on him. Refused to have it on. Sleeping prone at present. No apparent distress.

## 2017-10-26 NOTE — PROGRESS NOTES
PCP f/u is scheduled with Dr. Maninder Sharp for Nov 22 at 10;30am  This was the first available    Waiting on a call back from Dr. Nadine Hdez office for a sooner f/u appointment    PCP f/u has been rescheduled with Dr. Elie Nicholson for Nov 3 at 10;30am

## 2017-10-26 NOTE — ROUTINE PROCESS
Bedside report given to [place] KAIA RN by Selin Smith RN using Kardex, lab values involving patient.       Zone Phone:   [phone]        Significant changes during shift: B/P still high, hydralazine 10 mg IV given           Patient Information     Rosa Isela Barry  61 y.o.  10/24/2017  2:31 PM by Rosa Pittman MD. Rosa Isela Barry was admitted from home      Problem List          Patient Active Problem List     Diagnosis Date Noted    Alcohol withdrawal ([facility] [address] 10/24/2017    HLD (hyperlipidemia) 08/18/2015    Elevated blood pressure 08/18/2015           Past Medical History:   Diagnosis Date    Hypercholesterolemia                   Activity Status:     Up ad soo           DVT prophylaxis:lovenox        Patient Safety:     Falls Score Total Score: 1  Safety Level_______  Bed Alarm On? no  Plan for upcoming shift:   Monitor BP, monitor CIWA, DC home when stable           Discharge Plan: home when stable  Active Consults:  None

## 2017-10-26 NOTE — DISCHARGE SUMMARY
Discharge Summary      Name: Fredo Liao  [id number]  YOB: 1960 (Age: 61 y.o.)   Date of Admission: 10/24/2017  Date of Discharge: 10/27/2017  Attending Physician: Margret Flower MD    Discharge Diagnosis:   EtOH withdrawal  Transaminitis, secondary to EtOH abuse  Likely alcoholic gastritis   EtOH withdrawal  Transaminitis, secondary to EtOH abuse  Likely alcoholic gastritis   Hepatic steatosis on CT A/P    Brief Admission History/Reason for Admission Per Lamin Russo MD:   Apple Callaway is a 61 y.o.  male who presents with complaints listed above. Pt states that he drinks 1 pint of liquor, assorted, every day. He states that his last drink was on 10/23 evening. He states that he ate some food today and it \"did not sit well with me. \" He states that since then he has been having N/V and unable to tolerate any PO. He states that he stops drinking alcohol, he \"gets the shakes. \" He is not aware of any intubations related to EtOH abuse. Pt's ex wife is at bedside and she reports long history of EtOH abuse by the pt.   [address] Problems:   EtOH withdrawal  Transaminitis, secondary to EtOH abuse  Likely alcoholic gastritis   EtOH level 35 - last drink 10/23 evening - 1 pint of liquor. Pt started on Librium with ativan as per [facility]'s protocol. Pt remains stable. counseled on cessation. Pt to continue MVI, folic acid, thiamine.       Hypertensive emergency  Required cardene gtt on admission, transitioned to oral regimen including: lisinopril and metoprolol. Counseled on home BP monitoring. Will need to f/u with PCP for further titration of antihypertensives.     Hypokalemia  repleted     Hepatic steatosis on CT A/P  Likely related to alcohol abuse. , ALT 96, Lipase 188. Monitor labs at PCP f/u.     Yeast in urine  Ucx neg.   P is asymptomatic, no abx indicated      Discharge Exam:  Patient seen and examined by me on discharge day. Pertinent Findings:  Visit Vitals    BP (!) 151/96 (BP 1 Location: Right arm, BP Patient Position: Post activity)    Pulse 80    Temp 98.1 °F (36.7 °C)    Resp 20    Ht 5' 9\" (1.753 m)    Wt 90.9 kg (200 lb 6.4 oz)    SpO2 98%    BMI 29.59 kg/m2     Gen:    Not in distress  Chest: Clear lungs  CVS:   Regular rhythm. No edema  Abd:  Soft, not distended, not tender    Discharge/Recent Laboratory Results:  Recent Labs      10/26/17   0415   NA  138   K  3.3*   CL  102   CO2  24   BUN  14   GLU  109*   CA  8.6   PHOS  4.0   MG  1.5*     Recent Labs      10/24/17   1408   HGB  10.9*   HCT  31.8*   WBC  6.2   PLT  297       Discharge Medications:  Current Discharge Medication List      START taking these medications    Details   folic acid (FOLVITE) 1 mg tablet Take 1 Tab by mouth daily. Qty: 30 Tab, Refills: 0      lisinopril (PRINIVIL, ZESTRIL) 20 mg tablet Take 1 Tab by mouth daily. Qty: 30 Tab, Refills: 0      metoprolol tartrate (LOPRESSOR) 25 mg tablet Take 1 Tab by mouth two (2) times a day. Qty: 60 Tab, Refills: 0      therapeutic multivitamin (THERAGRAN) tablet Take 1 Tab by mouth daily. Qty: 30 Tab, Refills: 0      thiamine (B-1) 100 mg tablet Take 1 Tab by mouth daily. Qty: 30 Tab, Refills: 0      pantoprazole (PROTONIX) 40 mg tablet Take 1 Tab by mouth Daily (before breakfast). Qty: 30 Tab, Refills: 0         CONTINUE these medications which have NOT CHANGED    Details   diphenhydrAMINE (BENADRYL) 25 mg capsule Take 25 mg by mouth as needed for Itching. ibuprofen (MOTRIN) 200 mg tablet Take 200 mg by mouth as needed for Pain.              DISPOSITION:    Home with Family: x   Home with HH/PT/OT/RN:    SNF/LTC:    KEKE:    OTHER:          Follow up with:   PCP : Melvyn Collet, MD  Follow-up Information     Follow up With Details [address], [place]   [address] [facility]  [address] [facility]      Gertrudis Hernandez MD On 11/3/2017 10;30am hospital follow-up [address] [place] [address]  [phone]            Diet: cardiac    Total time in minutes spent coordinating this discharge (includes going over instructions, follow-up, prescriptions, and preparing report for sign off to her PCP) :  35 minutes

## 2017-10-26 NOTE — PROGRESS NOTES
physical Therapy EVALUATION/DISCHARGE  Patient: Cristóbal Lange (62 y.o. male)  Date: 10/26/2017  Primary Diagnosis: Alcohol withdrawal ([facility] [address]  Alcohol withdrawal ([facility] [address]        Precautions:     ASSESSMENT :  Based on the objective data described below, the patient presents with baseline strength, baseline, and overall independence w/ all aspects of functional mobility. Pt received supine in bed, agreeable to participation with therapy and reported complete resolution of previously noted back and abdominal pain. Pt independently performed all bed mobility, sit<>stand transfers, and ambulation of 150ft. Gait steady and stable overall with no LOB or difficulty noted. At this time, pt is functioning at his baseline independent level and is safe to be up ad soo within room/hallways as well as discharge home w/ no further needs. Skilled physical therapy is not indicated at this time. PLAN :  Discharge Recommendations: None  Further Equipment Recommendations for Discharge: none     SUBJECTIVE:   Patient stated I'm used to go, go, go.     OBJECTIVE DATA SUMMARY:   HISTORY:    Past Medical History:   Diagnosis Date    Hypercholesterolemia      Past Surgical History:   Procedure Laterality Date    HX ORTHOPAEDIC      rotator cuff repair     Prior Level of Function/Home Situation: Independent w/ ambulation and ADLs. Denies history of falls. Lives in garage apartment at [facility]. 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    Personal factors and/or comorbidities impacting plan of care:     Home Situation  Home Environment: Private residence  # Steps to Enter: 0  One/Two Story Residence: One story  Living Alone: Yes (lives in the garage apartment at [facility])  New [place]: Child(heron), Friends \ neighbors, Parent  Patient Expects to be Discharged to[de-identified] Private residence  Current DME Used/Available at Home: Walker, Blood pressure cuff  Tub or Shower Type: Tub/Shower combination    EXAMINATION/PRESENTATION/DECISION MAKING:   Critical Behavior:  Neurologic State: Alert  Orientation Level: Oriented X4  Cognition: Appropriate for age attention/concentration, Follows commands     Hearing: Auditory  Auditory Impairment: None  Skin:  Intact  Edema: None noted   Range Of Motion:  AROM: Within functional limits                       Strength:    Strength: Within functional limits                    Tone & Sensation:   Tone: Normal              Sensation: Intact               Coordination:  Coordination: Within functional limits  Vision:      Functional Mobility:  Bed Mobility:  Rolling: Independent  Supine to Sit: Independent     Scooting: Independent  Transfers:  Sit to Stand: Independent  Stand to Sit: Independent        Bed to Chair: Independent              Balance:   Sitting: Intact  Standing: Intact  Ambulation/Gait Training:  Distance (ft): 150 Feet (ft)  Assistive Device: Gait belt  Ambulation - Level of Assistance: Independent        Gait Abnormalities: Decreased step clearance                                          Functional Measure:  Barthel Index:    Bathin  Bladder: 10  Bowels: 10  Groomin  Dressing: 10  Feeding: 10  Mobility: 15  Stairs: 0  Toilet Use: 10  Transfer (Bed to Chair and Back): 15  Total: 90       Barthel and G-code impairment scale:  Percentage of impairment CH  0% CI  1-19% CJ  20-39% CK  40-59% CL  60-79% CM  80-99% CN  100%   Barthel Score 0-100 100 99-80 79-60 59-40 20-39 1-19   0   Barthel Score 0-20 20 17-19 13-16 9-12 5-8 1-4 0      The Barthel ADL Index: Guidelines  1. The index should be used as a record of what a patient does, not as a record of what a patient could do. 2. The main aim is to establish degree of independence from any help, physical or verbal, however minor and for whatever reason. 3. The need for supervision renders the patient not independent.   4. A patient's performance should be established using the best available evidence. Asking the patient, friends/relatives and nurses are the usual sources, but direct observation and common sense are also important. However direct testing is not needed. 5. Usually the patient's performance over the preceding 24-48 hours is important, but occasionally longer periods will be relevant. 6. Middle categories imply that the patient supplies over 50 per cent of the effort. 7. Use of aids to be independent is allowed. Edgardo Luna., BarthelAYANA. (9208). Functional evaluation: the Barthel Index. [facility] (14)2. Roel Shook joanna JYOTI Anaya, Lida Espinoza., Rodolfo White., [facility], [address] [facility] (1999). Measuring the change indisability after inpatient rehabilitation; comparison of the responsiveness of the Barthel Index and Functional [place] Measure. Journal of Neurology, Neurosurgery, and Psychiatry, 66(4), 146-501. ALONA Ochoa, KEENAN Aceves, & Destiny Encinas M.A. (2004.) Assessment of post-stroke quality of life in cost-effectiveness studies: The usefulness of the Barthel Index and the EuroQoL-5D. Quality of Life Research, 13, 063-96       G codes: In compliance with CMSs Claims Based Outcome Reporting, the following G-code set was chosen for this patient based on their primary functional limitation being treated: The outcome measure chosen to determine the severity of the functional limitation was the Barthel Index with a score of 90/100 which was correlated with the impairment scale.     ? Mobility - Walking and Moving Around:     - CURRENT STATUS: CI - 1%-19% impaired, limited or restricted    - GOAL STATUS: CI - 1%-19% impaired, limited or restricted    - D/C STATUS:  CI - 1%-19% impaired, limited or restricted          Physical Therapy Evaluation Charge Determination   History Examination Presentation Decision-Making   MEDIUM  Complexity : 1-2 comorbidities / personal factors will impact the outcome/ POC  LOW Complexity : 1-2 Standardized tests and measures addressing body structure, function, activity limitation and / or participation in recreation  MEDIUM Complexity : Evolving with changing characteristics  MEDIUM Complexity : FOTO score of 26-74      Based on the above components, the patient evaluation is determined to be of the following complexity level: LOW     Pain:Pain Scale 1: Numeric (0 - 10)  Pain Intensity 1: 0     Activity Tolerance:   VSS  Please refer to the flowsheet for vital signs taken during this treatment. After treatment:   [x]   Patient left in no apparent distress sitting up in chair  []   Patient left in no apparent distress in bed  [x]   Call bell left within reach  [x]   Nursing notified  []   Caregiver present  []   Bed alarm activated    COMMUNICATION/EDUCATION:   Communication/Collaboration:  [x]   Fall prevention education was provided and the patient/caregiver indicated understanding. [x]   Patient/family have participated as able and agree with findings and recommendations. []   Patient is unable to participate in plan of care at this time.   Findings and recommendations were discussed with: Occupational Therapist, Registered Nurse and     Thank you for this referral.  Cyndi Felty, PT, DPT   Time Calculation: 10 mins

## 2017-10-27 VITALS
SYSTOLIC BLOOD PRESSURE: 151 MMHG | WEIGHT: 200.4 LBS | HEART RATE: 80 BPM | HEIGHT: 69 IN | OXYGEN SATURATION: 98 % | BODY MASS INDEX: 29.68 KG/M2 | TEMPERATURE: 98.1 F | RESPIRATION RATE: 20 BRPM | DIASTOLIC BLOOD PRESSURE: 96 MMHG

## 2017-10-27 PROCEDURE — 74011250637 HC RX REV CODE- 250/637: Performed by: GENERAL ACUTE CARE HOSPITAL

## 2017-10-27 PROCEDURE — 74011250637 HC RX REV CODE- 250/637: Performed by: INTERNAL MEDICINE

## 2017-10-27 RX ORDER — CHLORDIAZEPOXIDE HYDROCHLORIDE 5 MG/1
10 CAPSULE, GELATIN COATED ORAL DAILY
Status: DISCONTINUED | OUTPATIENT
Start: 2017-10-28 | End: 2017-10-27 | Stop reason: HOSPADM

## 2017-10-27 RX ORDER — METOPROLOL TARTRATE 50 MG/1
50 TABLET ORAL 2 TIMES DAILY
Status: DISCONTINUED | OUTPATIENT
Start: 2017-10-27 | End: 2017-10-27 | Stop reason: HOSPADM

## 2017-10-27 RX ADMIN — Medication 10 ML: at 06:21

## 2017-10-27 RX ADMIN — Medication 100 MG: at 08:16

## 2017-10-27 RX ADMIN — PANTOPRAZOLE SODIUM 40 MG: 40 TABLET, DELAYED RELEASE ORAL at 06:22

## 2017-10-27 RX ADMIN — LISINOPRIL 20 MG: 20 TABLET ORAL at 08:16

## 2017-10-27 RX ADMIN — FOLIC ACID 1 MG: 1 TABLET ORAL at 08:16

## 2017-10-27 RX ADMIN — CHLORDIAZEPOXIDE HYDROCHLORIDE 10 MG: 5 CAPSULE ORAL at 08:16

## 2017-10-27 RX ADMIN — METOPROLOL TARTRATE 25 MG: 25 TABLET ORAL at 08:16

## 2017-10-27 RX ADMIN — THERA TABS 1 TABLET: TAB at 08:16

## 2017-10-27 RX ADMIN — MUPIROCIN: 20 OINTMENT TOPICAL at 00:01

## 2017-10-27 NOTE — PROGRESS NOTES
DC information reviewed with patient . Side effects of meds discussed. Pt verbalizes understanding of all information provided. Pt dc'd to home with family.

## 2017-10-27 NOTE — ROUTINE PROCESS
Bedside report given to Edgar Turner by  Marita Duron RN using Kardex, lab values involving patient.       Zone Phone:   [phone]        Significant changes during shift: None        Patient Information     Antione Moser  61 y.o.  10/24/2017  2:31 PM by Manoj Ochoa MD. Antione Moser was admitted from home      Problem List          Patient Active Problem List     Diagnosis Date Noted    Alcohol withdrawal ([address] 10/24/2017    HLD (hyperlipidemia) 08/18/2015    Elevated blood pressure 08/18/2015           Past Medical History:   Diagnosis Date    Hypercholesterolemia                   Activity Status:     Up ad soo           DVT prophylaxis:lovenox        Patient Safety:     Falls Score Total Score: 1  Safety Level_______  Bed Alarm On? no  Plan for upcoming shift:   Monitor BP, monitor CIWA,         Discharge Plan: yes home when stable  Active Consults:  None

## 2017-10-27 NOTE — PROGRESS NOTES
F/u appointment made and documented on the discharge paperwork. Pt's family will transport pt home by car. Pt in good spirits and ready for discharge. Care Management Interventions  PCP Verified by CM: Yes (Dr. Yves Johnson)  Mode of Transport at Discharge: Other (see comment) (pt's family transport pt by car)  Hospital Transport Time of Discharge: [address] (CM Consult): Discharge Planning  Discharge Durable Medical Equipment: No  Physical Therapy Consult: Yes  Occupational Therapy Consult: Yes  Speech Therapy Consult: No  Current Support Network:  Other, Own Home (lives with his mother in a garage apt next to his mother's home - no steps)  Confirm Follow Up Transport: Family  Plan discussed with Pt/Family/Caregiver: Yes  Discharge Location  Discharge Placement: [address] [place], [address]

## 2017-10-27 NOTE — PROGRESS NOTES
Problem: Alcohol Withdrawal  Goal: *STG: Remains safe in hospital  Outcome: Progressing Towards Goal  Pt up ad soo, gait steady, denies dizziness, lightheadedness. Continues to have mild tremors. States he is ready to go home tomorrow.

## 2017-11-13 ENCOUNTER — OFFICE VISIT (OUTPATIENT)
Dept: INTERNAL MEDICINE CLINIC | Age: 57
End: 2017-11-13

## 2017-11-13 VITALS
SYSTOLIC BLOOD PRESSURE: 176 MMHG | RESPIRATION RATE: 18 BRPM | DIASTOLIC BLOOD PRESSURE: 83 MMHG | TEMPERATURE: 98.1 F | BODY MASS INDEX: 30.81 KG/M2 | OXYGEN SATURATION: 99 % | HEIGHT: 69 IN | WEIGHT: 208 LBS | HEART RATE: 72 BPM

## 2017-11-13 DIAGNOSIS — I10 ESSENTIAL HYPERTENSION: Primary | ICD-10-CM

## 2017-11-13 DIAGNOSIS — F10.10 ALCOHOL ABUSE: ICD-10-CM

## 2017-11-13 RX ORDER — METOPROLOL SUCCINATE 50 MG/1
50 TABLET, EXTENDED RELEASE ORAL DAILY
Qty: 30 TAB | Refills: 5 | Status: SHIPPED | OUTPATIENT
Start: 2017-11-13 | End: 2018-08-28

## 2017-11-13 RX ORDER — LISINOPRIL 20 MG/1
20 TABLET ORAL DAILY
Qty: 30 TAB | Refills: 5 | Status: SHIPPED | OUTPATIENT
Start: 2017-11-13 | End: 2018-08-28

## 2017-11-13 NOTE — MR AVS SNAPSHOT
Visit Information Date & Time Provider Department Dept. Phone Encounter #  
 11/13/2017 10:30 AM Gerardo Vick, [address] [place] [address],[address] [phone] [id number] Follow-up Instructions Return for 6-8 WEEKS follow up on BP and fasting labs. Beatrice Kelly Upcoming Health Maintenance Date Due Hepatitis C Screening 1960 Pneumococcal 19-64 Medium Risk (1 of 1 - PPSV23) 3/22/1978 DTaP/Tdap/Td series (1 - Tdap) 3/22/1980 FOBT Q 1 YEAR AGE 50-75 3/22/2009 Influenza Age 5 to Adult 8/1/2017 Allergies as of 11/13/2017  Review Complete On: 11/13/2017 By: Gerardo Vick MD  
 No Known Allergies Current Immunizations  Never Reviewed No immunizations on file. Not reviewed this visit You Were Diagnosed With   
  
 Codes Comments Essential hypertension    -  Primary ICD-10-CM: I10 
ICD-9-CM: 401.9 Vitals BP Pulse Temp Resp Height(growth percentile) Weight(growth percentile) 176/83 (BP 1 Location: Left arm, BP Patient Position: Sitting) 72 98.1 °F (36.7 °C) (Oral) 18 5' 9\" (1.753 m) 208 lb (94.3 kg) SpO2 BMI Smoking Status 99% 30.72 kg/m2 Former Smoker BMI and BSA Data Body Mass Index Body Surface Area 30.72 kg/m 2 2.14 m 2 Preferred Pharmacy Pharmacy Name Phone [facility], [facility] [address] [phone] Your Updated Medication List  
  
   
This list is accurate as of: 11/13/17 11:43 AM.  Always use your most recent med list.  
  
  
  
  
 diphenhydrAMINE 25 mg capsule Commonly known as:  BENADRYL Take 25 mg by mouth as needed for Itching. folic acid 1 mg tablet Commonly known as:  [employer] Take 1 Tab by mouth daily. ibuprofen 200 mg tablet Commonly known as:  MOTRIN Take 200 mg by mouth as needed for Pain. lisinopril 20 mg tablet Commonly known as:  Merilynn Atwood Take 1 Tab by mouth daily. Hold until needed. metoprolol succinate 50 mg XL tablet Commonly known as:  TOPROL-XL Take 1 Tab by mouth daily. In place of metoprolol tartrate. pantoprazole 40 mg tablet Commonly known as:  PROTONIX Take 1 Tab by mouth Daily (before breakfast). therapeutic multivitamin tablet Commonly known as:  [facility] Take 1 Tab by mouth daily. thiamine 100 mg tablet Commonly known as:  B-1 Take 1 Tab by mouth daily. Prescriptions Sent to Pharmacy Refills  
 metoprolol succinate (TOPROL-XL) 50 mg XL tablet 5 Sig: Take 1 Tab by mouth daily. In place of metoprolol tartrate. Class: Normal  
 Pharmacy: [address] Ph #: [phone] Route: Oral  
 lisinopril (PRINIVIL, ZESTRIL) 20 mg tablet 5 Sig: Take 1 Tab by mouth daily. Hold until needed. Class: Normal  
 Pharmacy: [address] Ph #: [phone] Route: Oral  
  
Follow-up Instructions Return for 6-8 WEEKS follow up on BP and fasting labs. .  
  
  
Patient Instructions Continue the thiamine and take a multivitamin daily. Carpal Tunnel Syndrome: Exercises Your Care Instructions Here are some examples of typical rehabilitation exercises for your condition. Start each exercise slowly. Ease off the exercise if you start to have pain. Your doctor or your physical or occupational therapist will tell you when you can start these exercises and which ones will work best for you. Warm-up stretches When you no longer have pain or numbness, you can do exercises to help prevent carpal tunnel syndrome from coming back. Do not do any stretch or movement that is uncomfortable or painful. 1. Rotate your wrist up, down, and from side to side. Repeat 4 times. 2. Stretch your fingers far apart. Relax them, and then stretch them again. Repeat 4 times.  
3. Stretch your thumb by pulling it back gently, holding it, and then releasing it. Repeat 4 times. How to do the exercises Prayer stretch 1. Start with your palms together in front of your chest just below your chin. 2. Slowly lower your hands toward your waistline, keeping your hands close to your stomach and your palms together until you feel a mild to moderate stretch under your forearms. 3. Hold for at least 15 to 30 seconds. Repeat 2 to 4 times. Wrist flexor stretch 1. Extend your arm in front of you with your palm up. 2. Bend your wrist, pointing your hand toward the floor. 3. With your other hand, gently bend your wrist farther until you feel a mild to moderate stretch in your forearm. 4. Hold for at least 15 to 30 seconds. Repeat 2 to 4 times. Wrist extensor stretch 1. Repeat steps 1 through 4 of the stretch above, but begin with your extended hand palm down. Follow-up care is a key part of your treatment and safety. Be sure to make and go to all appointments, and call your doctor if you are having problems. It's also a good idea to know your test results and keep a list of the medicines you take. Where can you learn more? Go to http://alf-vic.info/. Enter U679 in the search box to learn more about \"Carpal Tunnel Syndrome: Exercises. \" Current as of: March 21, 2017 Content Version: 11.4 © 5836-3068 Healthwise[employer]. Care instructions adapted under license by [employer] (which disclaims liability or warranty for this information). If you have questions about a medical condition or this instruction, always ask your healthcare professional. [place] [address] any warranty or liability for your use of this information. SLEEP WITH WRIST SPLINTS ON NIGHTLY FOR 2 WEEKS. CHANGING THE METOPROLOL TARTRATE TWICE A DAY TO A ONCE A DAY METOPROLOL SUCCINATE ONCE IN THE MORNING. STAY ON THE LISINOPRIL. Introducing [facility]! [facility] introduces [employer] patient portal. Now you can access parts of your medical record, email your doctor's office, and request medication refills online. 1. In your internet browser, go to [email or web address] 2. Click on the First Time User? Click Here link in the Sign In box. You will see the New Member Sign Up page. 3. Enter your [employer] Access Code exactly as it appears below. You will not need to use this code after youve completed the sign-up process. If you do not sign up before the expiration date, you must request a new code. · [employer] Access Code: [email or web address] Expires: 1/22/2018 12:57 PM 
 
4. Enter the last four digits of your Social Security Number (xxxx) and Date of Birth (mm/dd/yyyy) as indicated and click Submit. You will be taken to the next sign-up page. 5. Create a [employer] ID. This will be your [employer] login ID and cannot be changed, so think of one that is secure and easy to remember. 6. Create a [employer] password. You can change your password at any time. 7. Enter your Password Reset Question and Answer. This can be used at a later time if you forget your password. 8. Enter your e-mail address. You will receive e-mail notification when new information is available in [address] 9. Click Sign Up. You can now view and download portions of your medical record. 10. Click the Download Summary menu link to download a portable copy of your medical information. If you have questions, please visit the Frequently Asked Questions section of the [employer] website. Remember, [employer] is NOT to be used for urgent needs. For medical emergencies, dial 911. Now available from your iPhone and Android! Please provide this summary of care documentation to your next provider. Your primary care clinician is listed as Mohit Mcclure. If you have any questions after today's visit, please call [phone].

## 2017-11-13 NOTE — PATIENT INSTRUCTIONS
Continue the thiamine and take a multivitamin daily. Carpal Tunnel Syndrome: Exercises  Your Care Instructions  Here are some examples of typical rehabilitation exercises for your condition. Start each exercise slowly. Ease off the exercise if you start to have pain. Your doctor or your physical or occupational therapist will tell you when you can start these exercises and which ones will work best for you. Warm-up stretches  When you no longer have pain or numbness, you can do exercises to help prevent carpal tunnel syndrome from coming back. Do not do any stretch or movement that is uncomfortable or painful. 1. Rotate your wrist up, down, and from side to side. Repeat 4 times. 2. Stretch your fingers far apart. Relax them, and then stretch them again. Repeat 4 times. 3. Stretch your thumb by pulling it back gently, holding it, and then releasing it. Repeat 4 times. How to do the exercises  Prayer stretch    1. Start with your palms together in front of your chest just below your chin. 2. Slowly lower your hands toward your waistline, keeping your hands close to your stomach and your palms together until you feel a mild to moderate stretch under your forearms. 3. Hold for at least 15 to 30 seconds. Repeat 2 to 4 times. Wrist flexor stretch    1. Extend your arm in front of you with your palm up. 2. Bend your wrist, pointing your hand toward the floor. 3. With your other hand, gently bend your wrist farther until you feel a mild to moderate stretch in your forearm. 4. Hold for at least 15 to 30 seconds. Repeat 2 to 4 times. Wrist extensor stretch    1. Repeat steps 1 through 4 of the stretch above, but begin with your extended hand palm down. Follow-up care is a key part of your treatment and safety. Be sure to make and go to all appointments, and call your doctor if you are having problems. It's also a good idea to know your test results and keep a list of the medicines you take.   Where can you learn more? Go to http://alf-vic.info/. Enter S151 in the search box to learn more about \"Carpal Tunnel Syndrome: Exercises. \"  Current as of: March 21, 2017  Content Version: 11.4  © 7108-2694 [employer]. Care instructions adapted under license by [employer] (which disclaims liability or warranty for this information). If you have questions about a medical condition or this instruction, always ask your healthcare professional. [place] [address] any warranty or liability for your use of this information. SLEEP WITH WRIST SPLINTS ON NIGHTLY FOR 2 WEEKS. CHANGING THE METOPROLOL TARTRATE TWICE A DAY TO A ONCE A DAY METOPROLOL SUCCINATE ONCE IN THE MORNING. STAY ON THE LISINOPRIL.

## 2017-11-13 NOTE — PROGRESS NOTES
Nelida Brar is a 61 y.o. male who presents for follow up after hospitalization 10/24-10/27. Was in with alcohol withdrawal.  He reports he was in with pain in joints. He states that he thought he was bitten by a spider. Had gastritis and elevated transaminases. Fatty liver. No cirrhosis. He has resumed drinking. Was a pint a day, now 1/2 pint every other day. Supportive friends, nondrinkers. Has informatin on AA, has not gone. Prior AA. Quit drinking for one month in the past with AA. Returned to drinking he states for habit. Stopped protonix, no epigastric pain or nausea now. No GERD. Taking metoprolol 25mg BID and lisinopril 20mg once a day. BP is elevated today. Reports compliance with medications. Reports both hands are numb at times. Works as a . Taking thiamine daily. Not taking folic acid. Past Medical History:   Diagnosis Date    Hypercholesterolemia     Hypertension        Family History   Problem Relation Age of Onset    Stroke Father     Hypertension Father     Diabetes Sister        Social History     Social History    Marital status: LEGALLY      Spouse name: N/A    Number of children: N/A    Years of education: N/A     Occupational History    Not on file. Social History Main Topics    Smoking status: Former Smoker     Years: 10.00     Quit date: 2/1/2014    Smokeless tobacco: Never Used    Alcohol use Yes      Comment: social    Drug use: Yes     Special: Cocaine      Comment: last used Friday    Sexual activity: Yes     Partners: Female     Birth control/ protection: Condom     Other Topics Concern    Not on file     Social History Narrative       Current Outpatient Prescriptions on File Prior to Visit   Medication Sig Dispense Refill    thiamine (B-1) 100 mg tablet Take 1 Tab by mouth daily. 30 Tab 0    diphenhydrAMINE (BENADRYL) 25 mg capsule Take 25 mg by mouth as needed for Itching.       ibuprofen (MOTRIN) 200 mg tablet Take 200 mg by mouth as needed for Pain.  folic acid (FOLVITE) 1 mg tablet Take 1 Tab by mouth daily. 30 Tab 0    therapeutic multivitamin (THERAGRAN) tablet Take 1 Tab by mouth daily. 30 Tab 0    pantoprazole (PROTONIX) 40 mg tablet Take 1 Tab by mouth Daily (before breakfast). 30 Tab 0     No current facility-administered medications on file prior to visit. Review of Systems  Pertinent items are noted in HPI. Objective:     Visit Vitals    /83 (BP 1 Location: Left arm, BP Patient Position: Sitting)    Pulse 72    Temp 98.1 °F (36.7 °C) (Oral)    Resp 18    Ht 5' 9\" (1.753 m)    Wt 208 lb (94.3 kg)    SpO2 99%    BMI 30.72 kg/m2     Gen: well appearing male  HEENT:   PERRL,normal conjunctiva. External ear and canals normal, TMs no opacification or erythema,  OP no erythema, no exudates, MMM  Neck:  Supple. Thyroid normal size, nontender, without nodules. No masses or LAD  Resp:  No wheezing, no rhonchi, no rales. CV:  RRR, normal S1S2, no murmur. GI: soft, nontender, without masses. No hepatosplenomegaly. Extrem:  +2 pulses, no edema, warm distally      Assessment/Plan:     1. Essential hypertension- Recommend following a lower sodium diet and regular cardiovascular exercise. Blood pressure goal is less than 130/85 on average. Advised compliance with blood pressure medication and regular follow up.    - metoprolol succinate (TOPROL-XL) 50 mg XL tablet; Take 1 Tab by mouth daily. In place of metoprolol tartrate. Dispense: 30 Tab; Refill: 5  - lisinopril (PRINIVIL, ZESTRIL) 20 mg tablet; Take 1 Tab by mouth daily. Hold until needed. Dispense: 30 Tab; Refill: 5    2. Alcohol abuse- advised cessation. Follow-up Disposition:  Return for 6-8 WEEKS follow up on BP and fasting labs.   Mark Deleon MD

## 2017-11-13 NOTE — PROGRESS NOTES
Chief Complaint   Patient presents with   [facility] Follow Up     ED [facility] 10/27. pt nonfasting     Visit Vitals    /83 (BP 1 Location: Left arm, BP Patient Position: Sitting)    Pulse 72    Temp 98.1 °F (36.7 °C) (Oral)    Resp 18    Ht 5' 9\" (1.753 m)    Wt 208 lb (94.3 kg)    SpO2 99%    BMI 30.72 kg/m2     Reviewed record In preparation for visit and have obtained necessary documentation    1. Have you been to the ER, urgent care clinic since your last visit? Hospitalized since your last visit? Yes ED [facility]  10/23/2017 (gastritis)    2. Have you seen or consulted any other health care providers outside of the [address] [place] [address] since your last visit? Include any pap smears or colon screening. NO    Patient does not have advance directive on file.

## 2017-11-15 PROBLEM — F10.10 ALCOHOL ABUSE: Status: ACTIVE | Noted: 2017-11-15

## 2018-08-28 ENCOUNTER — HOSPITAL ENCOUNTER (EMERGENCY)
Age: 58
Discharge: HOME | End: 2018-08-28
Attending: EMERGENCY MEDICINE
Payer: SELF-PAY

## 2018-08-28 ENCOUNTER — APPOINTMENT (OUTPATIENT)
Dept: GENERAL RADIOLOGY | Age: 58
End: 2018-08-28
Attending: EMERGENCY MEDICINE
Payer: SELF-PAY

## 2018-08-28 VITALS
RESPIRATION RATE: 16 BRPM | SYSTOLIC BLOOD PRESSURE: 159 MMHG | TEMPERATURE: 99 F | HEART RATE: 76 BPM | DIASTOLIC BLOOD PRESSURE: 99 MMHG | OXYGEN SATURATION: 99 %

## 2018-08-28 DIAGNOSIS — F10.10 ALCOHOL ABUSE: ICD-10-CM

## 2018-08-28 DIAGNOSIS — T67.5XXA HEAT EXHAUSTION, INITIAL ENCOUNTER: Primary | ICD-10-CM

## 2018-08-28 DIAGNOSIS — I10 ESSENTIAL HYPERTENSION: ICD-10-CM

## 2018-08-28 DIAGNOSIS — R55 NEAR SYNCOPE: ICD-10-CM

## 2018-08-28 LAB
ALBUMIN SERPL-MCNC: 3.6 G/DL (ref 3.5–5)
ALBUMIN/GLOB SERPL: 1 {RATIO} (ref 1.1–2.2)
ALP SERPL-CCNC: 99 U/L (ref 45–117)
ALT SERPL-CCNC: 122 U/L (ref 12–78)
AMPHET UR QL SCN: NEGATIVE
ANION GAP SERPL CALC-SCNC: 13 MMOL/L (ref 5–15)
APPEARANCE UR: CLEAR
AST SERPL-CCNC: 298 U/L (ref 15–37)
BACTERIA URNS QL MICRO: NEGATIVE /HPF
BARBITURATES UR QL SCN: NEGATIVE
BASOPHILS # BLD: 0 K/UL (ref 0–0.1)
BASOPHILS NFR BLD: 1 % (ref 0–1)
BENZODIAZ UR QL: NEGATIVE
BILIRUB SERPL-MCNC: 0.7 MG/DL (ref 0.2–1)
BILIRUB UR QL: NEGATIVE
BUN SERPL-MCNC: 11 MG/DL (ref 6–20)
BUN/CREAT SERPL: 10 (ref 12–20)
CALCIUM SERPL-MCNC: 8.2 MG/DL (ref 8.5–10.1)
CANNABINOIDS UR QL SCN: NEGATIVE
CHLORIDE SERPL-SCNC: 109 MMOL/L (ref 97–108)
CK MB CFR SERPL CALC: NORMAL % (ref 0–2.5)
CK MB SERPL-MCNC: <1 NG/ML (ref 5–25)
CK SERPL-CCNC: 135 U/L (ref 39–308)
CO2 SERPL-SCNC: 21 MMOL/L (ref 21–32)
COCAINE UR QL SCN: NEGATIVE
COLOR UR: NORMAL
CREAT SERPL-MCNC: 1.15 MG/DL (ref 0.7–1.3)
DIFFERENTIAL METHOD BLD: ABNORMAL
DRUG SCRN COMMENT,DRGCM: NORMAL
EOSINOPHIL # BLD: 0.1 K/UL (ref 0–0.4)
EOSINOPHIL NFR BLD: 2 % (ref 0–7)
EPITH CASTS URNS QL MICRO: NORMAL /LPF
ERYTHROCYTE [DISTWIDTH] IN BLOOD BY AUTOMATED COUNT: 15.5 % (ref 11.5–14.5)
ETHANOL SERPL-MCNC: 156 MG/DL
GLOBULIN SER CALC-MCNC: 3.5 G/DL (ref 2–4)
GLUCOSE SERPL-MCNC: 82 MG/DL (ref 65–100)
GLUCOSE UR STRIP.AUTO-MCNC: NEGATIVE MG/DL
HCT VFR BLD AUTO: 29.3 % (ref 36.6–50.3)
HGB BLD-MCNC: 10.1 G/DL (ref 12.1–17)
HGB UR QL STRIP: NEGATIVE
HYALINE CASTS URNS QL MICRO: NORMAL /LPF (ref 0–5)
IMM GRANULOCYTES # BLD: 0 K/UL (ref 0–0.04)
IMM GRANULOCYTES NFR BLD AUTO: 0 % (ref 0–0.5)
KETONES UR QL STRIP.AUTO: NEGATIVE MG/DL
LEUKOCYTE ESTERASE UR QL STRIP.AUTO: NEGATIVE
LIPASE SERPL-CCNC: 228 U/L (ref 73–393)
LYMPHOCYTES # BLD: 2.2 K/UL (ref 0.8–3.5)
LYMPHOCYTES NFR BLD: 48 % (ref 12–49)
MAGNESIUM SERPL-MCNC: 1.8 MG/DL (ref 1.6–2.4)
MCH RBC QN AUTO: 34.4 PG (ref 26–34)
MCHC RBC AUTO-ENTMCNC: 34.5 G/DL (ref 30–36.5)
MCV RBC AUTO: 99.7 FL (ref 80–99)
METHADONE UR QL: NEGATIVE
MONOCYTES # BLD: 0.5 K/UL (ref 0–1)
MONOCYTES NFR BLD: 10 % (ref 5–13)
NEUTS SEG # BLD: 1.8 K/UL (ref 1.8–8)
NEUTS SEG NFR BLD: 39 % (ref 32–75)
NITRITE UR QL STRIP.AUTO: NEGATIVE
NRBC # BLD: 0.07 K/UL (ref 0–0.01)
NRBC BLD-RTO: 1.5 PER 100 WBC
OPIATES UR QL: NEGATIVE
PCP UR QL: NEGATIVE
PH UR STRIP: 5.5 [PH] (ref 5–8)
PLATELET # BLD AUTO: 245 K/UL (ref 150–400)
PMV BLD AUTO: 11.1 FL (ref 8.9–12.9)
POTASSIUM SERPL-SCNC: 3.4 MMOL/L (ref 3.5–5.1)
PROT SERPL-MCNC: 7.1 G/DL (ref 6.4–8.2)
PROT UR STRIP-MCNC: NEGATIVE MG/DL
RBC # BLD AUTO: 2.94 M/UL (ref 4.1–5.7)
RBC #/AREA URNS HPF: NORMAL /HPF (ref 0–5)
RBC MORPH BLD: ABNORMAL
RBC MORPH BLD: ABNORMAL
SODIUM SERPL-SCNC: 143 MMOL/L (ref 136–145)
SP GR UR REFRACTOMETRY: 1.01 (ref 1–1.03)
TROPONIN I SERPL-MCNC: <0.05 NG/ML
UA: UC IF INDICATED,UAUC: NORMAL
UROBILINOGEN UR QL STRIP.AUTO: 1 EU/DL (ref 0.2–1)
WBC # BLD AUTO: 4.6 K/UL (ref 4.1–11.1)
WBC URNS QL MICRO: NORMAL /HPF (ref 0–4)

## 2018-08-28 PROCEDURE — 80307 DRUG TEST PRSMV CHEM ANLYZR: CPT | Performed by: EMERGENCY MEDICINE

## 2018-08-28 PROCEDURE — 80053 COMPREHEN METABOLIC PANEL: CPT | Performed by: EMERGENCY MEDICINE

## 2018-08-28 PROCEDURE — 84484 ASSAY OF TROPONIN QUANT: CPT | Performed by: EMERGENCY MEDICINE

## 2018-08-28 PROCEDURE — 99285 EMERGENCY DEPT VISIT HI MDM: CPT

## 2018-08-28 PROCEDURE — 93005 ELECTROCARDIOGRAM TRACING: CPT

## 2018-08-28 PROCEDURE — 82550 ASSAY OF CK (CPK): CPT | Performed by: EMERGENCY MEDICINE

## 2018-08-28 PROCEDURE — 83690 ASSAY OF LIPASE: CPT | Performed by: EMERGENCY MEDICINE

## 2018-08-28 PROCEDURE — 81001 URINALYSIS AUTO W/SCOPE: CPT | Performed by: EMERGENCY MEDICINE

## 2018-08-28 PROCEDURE — 85025 COMPLETE CBC W/AUTO DIFF WBC: CPT | Performed by: EMERGENCY MEDICINE

## 2018-08-28 PROCEDURE — 36415 COLL VENOUS BLD VENIPUNCTURE: CPT | Performed by: EMERGENCY MEDICINE

## 2018-08-28 PROCEDURE — 83735 ASSAY OF MAGNESIUM: CPT | Performed by: EMERGENCY MEDICINE

## 2018-08-28 PROCEDURE — 71045 X-RAY EXAM CHEST 1 VIEW: CPT

## 2018-08-28 RX ORDER — LISINOPRIL 20 MG/1
20 TABLET ORAL DAILY
Qty: 30 TAB | Refills: 0 | Status: SHIPPED | OUTPATIENT
Start: 2018-08-28 | End: 2019-08-19 | Stop reason: ALTCHOICE

## 2018-08-28 RX ORDER — METOPROLOL SUCCINATE 50 MG/1
50 TABLET, EXTENDED RELEASE ORAL DAILY
Qty: 30 TAB | Refills: 0 | Status: SHIPPED | OUTPATIENT
Start: 2018-08-28 | End: 2019-10-18 | Stop reason: SDUPTHER

## 2018-08-28 RX ORDER — GUAIFENESIN 100 MG/5ML
162 LIQUID (ML) ORAL DAILY
Qty: 200 TAB | Refills: 0 | Status: SHIPPED | OUTPATIENT
Start: 2018-08-28

## 2018-08-28 NOTE — ED NOTES
Pt arrived via EMS for episode of weakness while working on a vehicle. Denies loss of consciousness. State he is an every day drinker and has 1/2 handle of gin since 0800. Has hx of Afib and HTN. Denies chest pain or shortness of breath.

## 2018-08-28 NOTE — ED NOTES
Discharge instructions given to patient by Santiago Tinsley MD. Patient verbalized understanding of discharge instructions. Pt discharged without difficulty. Pt discharged in stable condition via ambulation, accompanied by wife.

## 2018-08-28 NOTE — ED PROVIDER NOTES
EMERGENCY DEPARTMENT HISTORY AND PHYSICAL EXAM 
 
 
Date: 8/28/2018 Patient Name: Lisset Sender History of Presenting Illness Chief Complaint Patient presents with  Fatigue Pt arrived via EMS for c/o heat exhaustion and fatigue after working on a vehicle. Pt states has been drinking since 0800 History Provided By: Patient and EMS 
 
HPI: Lisset Sender, 60 y.o. male with PMHx significant for HTN, presents via EMS to the ED with cc of worsening sweating due to being overheated since 12 PM today. Pt reports associated symptoms of vision changes (\"cloudy in the eyes\"), \"ears stocked up,\" and near syncope. EMS states the pt had drank 1/2 a liter of gin at 0800 and pt confirms he is an alcoholic. 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 He denies the car being on while working on it. Pt denies the garage being well ventilated, as he had needed to exit the garage for \"fresh air. \" EMS states the pt is more coherent now. BS was 184 and pt has a h/o HTN but is non-compliant on his lisinopril. A 12 lead EKG was performed showing sinus tachycardia and EMS reports a h/o afib. Pt denies being medicated for afib as it is intermittent and more apparent at night. He had last eaten at 7:30/8:00 AM this morning and last urinated at 0300 but pt states his urine was dark at that time. Pt denies any known allergies to medication. He denies a h/o DM. Pt denies CP. There are no other complaints, changes, or physical findings at this time. PCP: Nova Sawyer MD 
 
Current Outpatient Prescriptions Medication Sig Dispense Refill  lisinopril (PRINIVIL, ZESTRIL) 20 mg tablet Take 1 Tab by mouth daily. . 30 Tab 0  
 metoprolol succinate (TOPROL-XL) 50 mg XL tablet Take 1 Tab by mouth daily. In place of metoprolol tartrate. 30 Tab 0  
 aspirin 81 mg chewable tablet Take 2 Tabs by mouth daily.  200 Tab 0  
  folic acid (FOLVITE) 1 mg tablet Take 1 Tab by mouth daily. 30 Tab 0  
 therapeutic multivitamin (THERAGRAN) tablet Take 1 Tab by mouth daily. 30 Tab 0  
 thiamine (B-1) 100 mg tablet Take 1 Tab by mouth daily. 30 Tab 0  
 pantoprazole (PROTONIX) 40 mg tablet Take 1 Tab by mouth Daily (before breakfast). 30 Tab 0  
 diphenhydrAMINE (BENADRYL) 25 mg capsule Take 25 mg by mouth as needed for Itching.  ibuprofen (MOTRIN) 200 mg tablet Take 200 mg by mouth as needed for Pain. Past History Past Medical History: 
Past Medical History:  
Diagnosis Date  Hypercholesterolemia  Hypertension Past Surgical History: 
Past Surgical History:  
Procedure Laterality Date  HX ORTHOPAEDIC    
 rotator cuff repair Family History: 
Family History Problem Relation Age of Onset  Stroke Father  Hypertension Father  Diabetes Sister Social History: 
Social History Substance Use Topics  Smoking status: Former Smoker Years: 10.00 Quit date: 2/1/2014  Smokeless tobacco: Never Used  Alcohol use Yes Comment: social  
 
 
Allergies: 
No Known Allergies Review of Systems Review of Systems Constitutional: Positive for diaphoresis. Negative for chills and fever. HENT: Negative. Negative for congestion, rhinorrhea and sinus pressure. Eyes: Positive for visual disturbance. Negative for discharge and redness. Respiratory: Negative. Negative for chest tightness and shortness of breath. Cardiovascular: Negative. Negative for chest pain. Gastrointestinal: Negative. Negative for abdominal pain and blood in stool. Endocrine: Negative. Genitourinary: Negative. Negative for flank pain and hematuria. Musculoskeletal: Negative. Negative for back pain. Skin: Negative. Negative for rash. Neurological: Negative. Negative for dizziness, seizures, weakness, numbness and headaches. +near syncope Hematological: Negative. All other systems reviewed and are negative. Physical Exam  
Physical Exam  
Constitutional: He is oriented to person, place, and time. He appears well-developed and well-nourished. No distress. HENT:  
Head: Normocephalic and atraumatic. Nose: Nose normal.  
Mouth/Throat: No oropharyngeal exudate. Eyes: Conjunctivae and EOM are normal. Pupils are equal, round, and reactive to light. No scleral icterus. Neck: Normal range of motion. Neck supple. No JVD present. No thyromegaly present. Cardiovascular: Normal rate, regular rhythm, normal heart sounds, intact distal pulses and normal pulses. PMI is not displaced. Exam reveals no gallop and no friction rub. No murmur heard. Pulmonary/Chest: Effort normal and breath sounds normal. No stridor. No respiratory distress. He has no decreased breath sounds. He has no wheezes. He has no rhonchi. He has no rales. He exhibits no tenderness. Abdominal: Soft. Normal aorta and bowel sounds are normal. He exhibits no distension, no abdominal bruit and no mass. There is no hepatosplenomegaly. There is no tenderness. There is no rebound, no guarding and no CVA tenderness. No hernia. Neurological: He is alert and oriented to person, place, and time. He has normal strength and normal reflexes. He is not disoriented. He displays no atrophy and no tremor. No cranial nerve deficit or sensory deficit. He exhibits normal muscle tone. He displays no seizure activity. Coordination normal. GCS eye subscore is 4. GCS verbal subscore is 5. GCS motor subscore is 6. Reflex Scores: 
     Patellar reflexes are 2+ on the right side and 2+ on the left side. Skin: Skin is warm. No rash noted. He is not diaphoretic. No erythema. Nursing note and vitals reviewed. Diagnostic Study Results Labs - Recent Results (from the past 12 hour(s)) EKG, 12 LEAD, INITIAL Collection Time: 08/28/18  5:10 PM  
Result Value Ref Range  Ventricular Rate 88 BPM  
 Atrial Rate 88 BPM  
 P-R Interval 144 ms QRS Duration 82 ms Q-T Interval 390 ms QTC Calculation (Bezet) 471 ms Calculated P Axis 51 degrees Calculated R Axis 15 degrees Calculated T Axis 25 degrees Diagnosis Normal sinus rhythm T wave abnormality, consider lateral ischemia Prolonged QT When compared with ECG of 24-OCT-2017 13:47, No significant change was found CBC WITH AUTOMATED DIFF Collection Time: 08/28/18  5:14 PM  
Result Value Ref Range WBC 4.6 4.1 - 11.1 K/uL  
 RBC 2.94 (L) 4.10 - 5.70 M/uL  
 HGB 10.1 (L) 12.1 - 17.0 g/dL HCT 29.3 (L) 36.6 - 50.3 % MCV 99.7 (H) 80.0 - 99.0 FL  
 MCH 34.4 (H) 26.0 - 34.0 PG  
 MCHC 34.5 30.0 - 36.5 g/dL  
 RDW 15.5 (H) 11.5 - 14.5 % PLATELET 668 044 - 077 K/uL MPV 11.1 8.9 - 12.9 FL  
 NRBC 1.5 (H) 0  WBC ABSOLUTE NRBC 0.07 (H) 0.00 - 0.01 K/uL NEUTROPHILS 39 32 - 75 % LYMPHOCYTES 48 12 - 49 % MONOCYTES 10 5 - 13 % EOSINOPHILS 2 0 - 7 % BASOPHILS 1 0 - 1 % IMMATURE GRANULOCYTES 0 0.0 - 0.5 % ABS. NEUTROPHILS 1.8 1.8 - 8.0 K/UL  
 ABS. LYMPHOCYTES 2.2 0.8 - 3.5 K/UL  
 ABS. MONOCYTES 0.5 0.0 - 1.0 K/UL  
 ABS. EOSINOPHILS 0.1 0.0 - 0.4 K/UL  
 ABS. BASOPHILS 0.0 0.0 - 0.1 K/UL  
 ABS. IMM. GRANS. 0.0 0.00 - 0.04 K/UL  
 DF MANUAL    
 RBC COMMENTS ANISOCYTOSIS 1+ 
    
 RBC COMMENTS MACROCYTOSIS 
1+ METABOLIC PANEL, COMPREHENSIVE Collection Time: 08/28/18  5:14 PM  
Result Value Ref Range Sodium 143 136 - 145 mmol/L Potassium 3.4 (L) 3.5 - 5.1 mmol/L Chloride 109 (H) 97 - 108 mmol/L  
 CO2 21 21 - 32 mmol/L Anion gap 13 5 - 15 mmol/L Glucose 82 65 - 100 mg/dL BUN 11 6 - 20 MG/DL Creatinine 1.15 0.70 - 1.30 MG/DL  
 BUN/Creatinine ratio 10 (L) 12 - 20 GFR est AA >60 >60 ml/min/1.73m2 GFR est non-AA >60 >60 ml/min/1.73m2 Calcium 8.2 (L) 8.5 - 10.1 MG/DL  Bilirubin, total 0.7 0.2 - 1.0 MG/DL  
 ALT (SGPT) 122 (H) 12 - 78 U/L  
 AST (SGOT) 298 (H) 15 - 37 U/L Alk. phosphatase 99 45 - 117 U/L Protein, total 7.1 6.4 - 8.2 g/dL Albumin 3.6 3.5 - 5.0 g/dL Globulin 3.5 2.0 - 4.0 g/dL A-G Ratio 1.0 (L) 1.1 - 2.2 LIPASE Collection Time: 08/28/18  5:14 PM  
Result Value Ref Range Lipase 228 73 - 393 U/L  
TROPONIN I Collection Time: 08/28/18  5:14 PM  
Result Value Ref Range Troponin-I, Qt. <0.05 <0.05 ng/mL CK W/ CKMB & INDEX Collection Time: 08/28/18  5:14 PM  
Result Value Ref Range  39 - 308 U/L  
 CK - MB <1.0 <3.6 NG/ML  
 CK-MB Index Cannot be calculated 0 - 2.5 MAGNESIUM Collection Time: 08/28/18  5:14 PM  
Result Value Ref Range Magnesium 1.8 1.6 - 2.4 mg/dL ETHYL ALCOHOL Collection Time: 08/28/18  5:14 PM  
Result Value Ref Range ALCOHOL(ETHYL),SERUM 156 (H) <10 MG/DL URINALYSIS W/ REFLEX CULTURE Collection Time: 08/28/18  6:16 PM  
Result Value Ref Range Color YELLOW/STRAW Appearance CLEAR CLEAR Specific gravity 1.011 1.003 - 1.030    
 pH (UA) 5.5 5.0 - 8.0 Protein NEGATIVE  NEG mg/dL Glucose NEGATIVE  NEG mg/dL Ketone NEGATIVE  NEG mg/dL Bilirubin NEGATIVE  NEG Blood NEGATIVE  NEG Urobilinogen 1.0 0.2 - 1.0 EU/dL Nitrites NEGATIVE  NEG Leukocyte Esterase NEGATIVE  NEG    
 WBC 0-4 0 - 4 /hpf  
 RBC 0-5 0 - 5 /hpf Epithelial cells FEW FEW /lpf Bacteria NEGATIVE  NEG /hpf  
 UA:UC IF INDICATED CULTURE NOT INDICATED BY UA RESULT CNI Hyaline cast 0-2 0 - 5 /lpf DRUG SCREEN, URINE Collection Time: 08/28/18  6:16 PM  
Result Value Ref Range AMPHETAMINES NEGATIVE  NEG    
 BARBITURATES NEGATIVE  NEG BENZODIAZEPINES NEGATIVE  NEG    
 COCAINE NEGATIVE  NEG METHADONE NEGATIVE  NEG    
 OPIATES NEGATIVE  NEG    
 PCP(PHENCYCLIDINE) NEGATIVE  NEG    
 THC (TH-CANNABINOL) NEGATIVE  NEG Drug screen comment (NOTE) Radiologic Studies - CXR Results  (Last 48 hours) 08/28/18 1753  XR CHEST PORT Final result Impression:  Impression: 1. No acute disease Narrative:  INDICATION:  weakness Exam: Portable chest 1737. Comparison: 9/12/2015. Findings: Cardiomediastinal silhouette is within normal limits. Pulmonary  
vasculature is not engorged. There are no focal parenchymal opacities,  
effusions, or pneumothorax. Medical Decision Making I am the first provider for this patient. I reviewed the vital signs, available nursing notes, past medical history, past surgical history, family history and social history. Vital Signs-Reviewed the patient's vital signs. Patient Vitals for the past 12 hrs: 
 Pulse Resp BP SpO2  
08/28/18 1900 81 20 (!) 168/102 97 % 08/28/18 1845 82 20 (!) 162/102 97 % 08/28/18 1830 83 18 (!) 155/105 98 % 08/28/18 1815 83 16 (!) 140/106 98 % 08/28/18 1800 89 21 (!) 155/102 97 % 08/28/18 1745 85 13 147/89 100 % 08/28/18 1730 89 12 147/90 100 % 08/28/18 1715 - - (!) 145/92 -  
08/28/18 1700 89 9 (!) 161/99 98 % 08/28/18 1651 95 15 - 98 % Pulse Oximetry Analysis - 100% on RA Cardiac Monitor:  
Rate: 96 bpm 
Rhythm: Normal Sinus Rhythm EKG interpretation: (Preliminary): 1710 Rhythm: normal sinus rhythm; and regular . Rate (approx.): 88; Axis: normal; TN interval: normal; QRS interval: normal ; ST/T wave: T wave abnormality; Other findings: no change 10/2017; prolonged QT. Written by BOYD Sandovalibelida, as dictated by Simone Burris MD. Records Reviewed: Nursing Notes, Old Medical Records, Previous electrocardiograms, Ambulance Run Sheet, Previous Radiology Studies and Previous Laboratory Studies Provider Notes (Medical Decision Making): DDx: Heat exhaustion, alcohol intoxication, arrhythmia, coronary syndrome, electrolyte abnormality, toxin exposure, renal failure, poor medical compliance Impression/Plan: h/o HTN, alcoholism to the ER with feeling of near syncope after being in his garage for several hours with poor ventilation. There was no running vehicle in risk for carbon monoxide. Admits to significant amount of alcohol use. Last urinated at 3 in the morning. Is not altered to suspect a CNS process. Will initiate fluids, labs, make final dispo pending those results. ED Course:  
Initial assessment performed. The patients presenting problems have been discussed, and they are in agreement with the care plan formulated and outlined with them. I have encouraged them to ask questions as they arise throughout their visit. PROGRESS NOTE: 
7:37 PM 
Pt has been re-evaluated. He has made urination and is feeling much improved. Discussed compliance with bp medications; he states he will. Critical Care Time:  
0 Disposition: 
DISCHARGE NOTE 
7:39 PM 
The patient has been re-evaluated and is ready for discharge. Reviewed available results with patient. Counseled patient on diagnosis and care plan. Patient has expressed understanding, and all questions have been answered. Patient agrees with plan and agrees to follow up as recommended, or return to the ED if their symptoms worsen. Discharge instructions have been provided and explained to the patient, along with reasons to return to the ED. PLAN: 
1. Discharge Current Discharge Medication List  
  
START taking these medications Details  
aspirin 81 mg chewable tablet Take 2 Tabs by mouth daily. Qty: 200 Tab, Refills: 0 CONTINUE these medications which have CHANGED Details  
lisinopril (PRINIVIL, ZESTRIL) 20 mg tablet Take 1 Tab by mouth daily. José Munch: 30 Tab, Refills: 0 Associated Diagnoses: Essential hypertension  
  
metoprolol succinate (TOPROL-XL) 50 mg XL tablet Take 1 Tab by mouth daily. In place of metoprolol tartrate. Qty: 30 Tab, Refills: 0 Associated Diagnoses: Essential hypertension 2. Follow-up Information Follow up With Details Comments Contact Info Sánchez Wayne MD Schedule an appointment as soon as possible for a visit in 1 day  [address] [facility] 
[phone] [facility] EMERGENCY DEPT  If symptoms worsen [address] [facility] 
[phone] Return to ED if worse Diagnosis Clinical Impression: 1. Heat exhaustion, initial encounter 2. Near syncope 3. Alcohol abuse 4. Essential hypertension Attestations: This note is prepared by Oneil Lambert, acting as Scribe for Patricia Gtz MD. Patricia Gtz MD: The scribe's documentation has been prepared under my direction and personally reviewed by me in its entirety. I confirm that the note above accurately reflects all work, treatment, procedures, and medical decision making performed by me.

## 2018-08-28 NOTE — ED NOTES
Bedside shift change report given to Gay Pisano RN (oncoming nurse) by Leola Ortiz RN (offgoing nurse). Report included the following information SBAR, Kardex, ED Summary, Intake/Output, MAR and Recent Results.

## 2018-08-28 NOTE — LETTER
[address] 
[facility] EM[facility]Y [facility]PT 
[address] [id number] 
[phone] Work/School Note Date: 8/28/2018 To Whom It May concern: Shu Ruiz was seen and treated today in the emergency room by the following provider(s): 
Attending Provider: Earl Quintero MD. Shu Ruiz No work till 8/30/18 Sincerely, Earl Quintero MD

## 2018-08-28 NOTE — DISCHARGE INSTRUCTIONS
Heat Exhaustion: Care Instructions  Your Care Instructions  Heat exhaustion occurs when you are hot, sweat a lot, and do not drink enough to replace the lost fluids. Heat exhaustion is not the same as heatstroke, which is much more serious. Heatstroke can lead to problems with many different organs and can be life-threatening. After medical care for heat exhaustion, you will need to limit your activities and take good care of your body while it recovers. Follow-up care is a key part of your treatment and safety. Be sure to make and go to all appointments, and call your doctor if you are having problems. It's also a good idea to know your test results and keep a list of the medicines you take. How can you care for yourself at home? · Reduce your activities, and get plenty of rest. Your doctor will give you instructions on when you can resume your normal schedule. · Stay in a cool room for at least the next 24 hours. · Drink rehydration drinks, juices, and water to replace fluids. Drinks such as sports drinks that contain electrolytes work best, because they have salt and minerals. You need salt and minerals as well as water. You are drinking enough fluids when your urine is normal in color (light yellow or clear), and you are urinating every 2 to 4 hours. If you have kidney, heart, or liver disease and have to limit fluids or salt, talk with your doctor before you increase your fluid or salt intake. · Avoid drinks that have caffeine or alcohol. To prevent heat exhaustion  · Drink plenty of fluids, enough so that your urine is light yellow or clear like water. If you have kidney, heart, or liver disease and have to limit fluids, talk with your doctor before you increase the amount of fluids you drink. · Drink plenty of water before, during, and after you are active. This is very important when it is hot out and when you do intense exercise.   · During hot weather, wear light-colored clothing that fits loosely and a hat with a brim to reflect the sun. · Limit or avoid strenuous activity during hot or humid weather, especially during the hottest part of the day (10 a.m. to 4 p.m.). Heat exhaustion and heatstroke usually develop when you are working or exercising in hot weather. Humidity makes hot weather even more dangerous. · Cars can get very hot inside. Open the windows or turn on the air conditioning before you get in and close the doors. · Try to stay cool during hot weather. If your home is not air-conditioned, seek an air-conditioned place. That could be in [employer], a neighborhood café, or a friend's home. [place] yourself with a cool mist. Take a cool shower, bath, or sponge bath. · Be aware that some medicines, such as major tranquilizers, can raise the risk of heat exhaustion. Ask your doctor whether any medicine you take raises your chance of getting heat exhaustion. When should you call for help? Call 911 anytime you think you may need emergency care. For example, call if:    · You feel very hot and:  ¨ You have a seizure. ¨ You feel confused. ¨ Your skin is red, hot, and dry. ¨ You passed out (lost consciousness).    Call your doctor now or seek immediate medical care if:    · You cannot keep fluids down.     · After returning to your normal activities, you have symptoms of heat exhaustion, such as sweating a lot, fatigue, dizziness, or nausea.    Watch closely for changes in your health, and be sure to contact your doctor if:    · You do not get better as expected. Where can you learn more? Go to http://alf-vic.info/. Enter S222 in the search box to learn more about \"Heat Exhaustion: Care Instructions. \"  Current as of: November 20, 2017  Content Version: 11.7  © 6713-6123 [employer]. Care instructions adapted under license by [employer] (which disclaims liability or warranty for this information).  If you have questions about a medical condition or this instruction, always ask your healthcare professional. [address] any warranty or liability for your use of this information. [employer] Activation    Thank you for requesting access to [employer]. Please follow the instructions below to securely access and download your online medical record. [employer] allows you to send messages to your doctor, view your test results, renew your prescriptions, schedule appointments, and more. How Do I Sign Up? 1. In your internet browser, go to [email or web address]  2. Click on the First Time User? Click Here link in the Sign In box. You will be redirect to the New Member Sign Up page. 3. Enter your [employer] Access Code exactly as it appears below. You will not need to use this code after youve completed the sign-up process. If you do not sign up before the expiration date, you must request a new code. [employer] Access Code: [email or web address]  Expires: 2018  4:51 PM (This is the date your [employer] access code will )    4. Enter the last four digits of your Social Security Number (xxxx) and Date of Birth (mm/dd/yyyy) as indicated and click Submit. You will be taken to the next sign-up page. 5. Create a [employer] ID. This will be your [employer] login ID and cannot be changed, so think of one that is secure and easy to remember. 6. Create a [employer] password. You can change your password at any time. 7. Enter your Password Reset Question and Answer. This can be used at a later time if you forget your password. 8. Enter your e-mail address. You will receive e-mail notification when new information is available in [address]. 9. Click Sign Up. You can now view and download portions of your medical record. 10. Click the Download Summary menu link to download a portable copy of your medical information.     Additional Information    If you have questions, please visit the Frequently Asked Questions section of the [employer] website at [email or web address] Remember, [employer] is NOT to be used for urgent needs. For medical emergencies, dial 911.

## 2018-08-29 LAB
ATRIAL RATE: 88 BPM
CALCULATED P AXIS, ECG09: 51 DEGREES
CALCULATED R AXIS, ECG10: 15 DEGREES
CALCULATED T AXIS, ECG11: 25 DEGREES
DIAGNOSIS, 93000: NORMAL
P-R INTERVAL, ECG05: 144 MS
Q-T INTERVAL, ECG07: 390 MS
QRS DURATION, ECG06: 82 MS
QTC CALCULATION (BEZET), ECG08: 471 MS
VENTRICULAR RATE, ECG03: 88 BPM

## 2019-08-19 ENCOUNTER — HOSPITAL ENCOUNTER (EMERGENCY)
Age: 59
Discharge: HOME | End: 2019-08-19
Attending: EMERGENCY MEDICINE
Payer: SELF-PAY

## 2019-08-19 ENCOUNTER — APPOINTMENT (OUTPATIENT)
Dept: GENERAL RADIOLOGY | Age: 59
End: 2019-08-19
Attending: EMERGENCY MEDICINE
Payer: SELF-PAY

## 2019-08-19 ENCOUNTER — APPOINTMENT (OUTPATIENT)
Dept: CT IMAGING | Age: 59
End: 2019-08-19
Payer: SELF-PAY

## 2019-08-19 VITALS
OXYGEN SATURATION: 100 % | WEIGHT: 186.29 LBS | HEIGHT: 69 IN | RESPIRATION RATE: 16 BRPM | BODY MASS INDEX: 27.59 KG/M2 | SYSTOLIC BLOOD PRESSURE: 124 MMHG | DIASTOLIC BLOOD PRESSURE: 85 MMHG | HEART RATE: 100 BPM | TEMPERATURE: 98 F

## 2019-08-19 DIAGNOSIS — R55 VASOVAGAL SYNCOPE: Primary | ICD-10-CM

## 2019-08-19 DIAGNOSIS — F10.10 ALCOHOL ABUSE: ICD-10-CM

## 2019-08-19 LAB
ALBUMIN SERPL-MCNC: 4.2 G/DL (ref 3.5–5)
ALBUMIN/GLOB SERPL: 1 {RATIO} (ref 1.1–2.2)
ALP SERPL-CCNC: 119 U/L (ref 45–117)
ALT SERPL-CCNC: 107 U/L (ref 12–78)
ANION GAP SERPL CALC-SCNC: 9 MMOL/L (ref 5–15)
APPEARANCE UR: ABNORMAL
AST SERPL-CCNC: 277 U/L (ref 15–37)
ATRIAL RATE: 97 BPM
BACTERIA URNS QL MICRO: NEGATIVE /HPF
BASOPHILS # BLD: 0 K/UL (ref 0–0.1)
BASOPHILS NFR BLD: 0 % (ref 0–1)
BILIRUB SERPL-MCNC: 1 MG/DL (ref 0.2–1)
BILIRUB UR QL CFM: NEGATIVE
BILIRUB UR QL: ABNORMAL
BUN SERPL-MCNC: 13 MG/DL (ref 6–20)
BUN/CREAT SERPL: 8 (ref 12–20)
CALCIUM SERPL-MCNC: 9.1 MG/DL (ref 8.5–10.1)
CALCULATED P AXIS, ECG09: 37 DEGREES
CALCULATED R AXIS, ECG10: 24 DEGREES
CALCULATED T AXIS, ECG11: 42 DEGREES
CHLORIDE SERPL-SCNC: 103 MMOL/L (ref 97–108)
CK SERPL-CCNC: 97 U/L (ref 39–308)
CO2 SERPL-SCNC: 24 MMOL/L (ref 21–32)
COLOR UR: ABNORMAL
CREAT SERPL-MCNC: 1.55 MG/DL (ref 0.7–1.3)
DIAGNOSIS, 93000: NORMAL
DIFFERENTIAL METHOD BLD: ABNORMAL
EOSINOPHIL # BLD: 0 K/UL (ref 0–0.4)
EOSINOPHIL NFR BLD: 0 % (ref 0–7)
EPITH CASTS URNS QL MICRO: ABNORMAL /LPF
ERYTHROCYTE [DISTWIDTH] IN BLOOD BY AUTOMATED COUNT: 15.5 % (ref 11.5–14.5)
GLOBULIN SER CALC-MCNC: 4.3 G/DL (ref 2–4)
GLUCOSE SERPL-MCNC: 128 MG/DL (ref 65–100)
GLUCOSE UR STRIP.AUTO-MCNC: NEGATIVE MG/DL
HCT VFR BLD AUTO: 29.8 % (ref 36.6–50.3)
HGB BLD-MCNC: 9.7 G/DL (ref 12.1–17)
HGB UR QL STRIP: NEGATIVE
IMM GRANULOCYTES # BLD AUTO: 0 K/UL (ref 0–0.04)
IMM GRANULOCYTES NFR BLD AUTO: 0 % (ref 0–0.5)
KETONES UR QL STRIP.AUTO: 15 MG/DL
LEUKOCYTE ESTERASE UR QL STRIP.AUTO: ABNORMAL
LYMPHOCYTES # BLD: 0.6 K/UL (ref 0.8–3.5)
LYMPHOCYTES NFR BLD: 8 % (ref 12–49)
MCH RBC QN AUTO: 33.8 PG (ref 26–34)
MCHC RBC AUTO-ENTMCNC: 32.6 G/DL (ref 30–36.5)
MCV RBC AUTO: 103.8 FL (ref 80–99)
MONOCYTES # BLD: 0.6 K/UL (ref 0–1)
MONOCYTES NFR BLD: 7 % (ref 5–13)
NEUTS SEG # BLD: 6.8 K/UL (ref 1.8–8)
NEUTS SEG NFR BLD: 85 % (ref 32–75)
NITRITE UR QL STRIP.AUTO: NEGATIVE
NRBC # BLD: 0.15 K/UL (ref 0–0.01)
NRBC BLD-RTO: 1.9 PER 100 WBC
P-R INTERVAL, ECG05: 138 MS
PH UR STRIP: 5.5 [PH] (ref 5–8)
PLATELET # BLD AUTO: 277 K/UL (ref 150–400)
PMV BLD AUTO: 10.8 FL (ref 8.9–12.9)
POTASSIUM SERPL-SCNC: 3.9 MMOL/L (ref 3.5–5.1)
PROT SERPL-MCNC: 8.5 G/DL (ref 6.4–8.2)
PROT UR STRIP-MCNC: 100 MG/DL
Q-T INTERVAL, ECG07: 364 MS
QRS DURATION, ECG06: 84 MS
QTC CALCULATION (BEZET), ECG08: 462 MS
RBC # BLD AUTO: 2.87 M/UL (ref 4.1–5.7)
RBC #/AREA URNS HPF: ABNORMAL /HPF (ref 0–5)
RBC MORPH BLD: ABNORMAL
RBC MORPH BLD: ABNORMAL
SODIUM SERPL-SCNC: 136 MMOL/L (ref 136–145)
SP GR UR REFRACTOMETRY: 1.03 (ref 1–1.03)
TROPONIN I SERPL-MCNC: <0.05 NG/ML
UA: UC IF INDICATED,UAUC: ABNORMAL
UROBILINOGEN UR QL STRIP.AUTO: 1 EU/DL (ref 0.2–1)
VENTRICULAR RATE, ECG03: 97 BPM
WBC # BLD AUTO: 8 K/UL (ref 4.1–11.1)
WBC URNS QL MICRO: ABNORMAL /HPF (ref 0–4)

## 2019-08-19 PROCEDURE — 70450 CT HEAD/BRAIN W/O DYE: CPT

## 2019-08-19 PROCEDURE — 36415 COLL VENOUS BLD VENIPUNCTURE: CPT

## 2019-08-19 PROCEDURE — 80053 COMPREHEN METABOLIC PANEL: CPT

## 2019-08-19 PROCEDURE — 99284 EMERGENCY DEPT VISIT MOD MDM: CPT

## 2019-08-19 PROCEDURE — 81001 URINALYSIS AUTO W/SCOPE: CPT

## 2019-08-19 PROCEDURE — 82550 ASSAY OF CK (CPK): CPT

## 2019-08-19 PROCEDURE — 84484 ASSAY OF TROPONIN QUANT: CPT

## 2019-08-19 PROCEDURE — 85025 COMPLETE CBC W/AUTO DIFF WBC: CPT

## 2019-08-19 PROCEDURE — 71045 X-RAY EXAM CHEST 1 VIEW: CPT

## 2019-08-19 PROCEDURE — 93005 ELECTROCARDIOGRAM TRACING: CPT

## 2019-08-19 PROCEDURE — 74011250636 HC RX REV CODE- 250/636: Performed by: EMERGENCY MEDICINE

## 2019-08-19 RX ORDER — LOSARTAN POTASSIUM 25 MG/1
25 TABLET ORAL DAILY
Qty: 30 TAB | Refills: 0 | Status: SHIPPED | OUTPATIENT
Start: 2019-08-19 | End: 2019-09-18

## 2019-08-19 RX ADMIN — SODIUM CHLORIDE 1000 ML: 900 INJECTION, SOLUTION INTRAVENOUS at 18:02

## 2019-08-19 NOTE — ED NOTES
Patient had two episodes of syncope this morning where he was sitting talking to his mom and blacked out. Per his mom he fell out of his chair and hit the left side of his head next to his eyes, vomited some \"green stuff\". Both episodes happened within half an hour. Patient stated he ate some candy and that helped him feel better. He has had a total of four episodes from June to today. Patient has a family history of diabetes. Irene Sadler MD at bedside. Patient also states his urine has a foul smell and wants to know if that is related.

## 2019-08-19 NOTE — ED NOTES
Patient tolerated orthostatics very well, denied any dizziness or weakness.  See flowsheet for vitals

## 2019-08-19 NOTE — ED NOTES
Shahla Mccormick MD reviewed discharge instructions with the patient. The patient verbalized understanding. All questions and concerns were addressed. The patient declined a wheelchair and is discharged ambulatory in the care of family members with instructions and prescriptions in hand. Pt is alert and oriented x 4. Respirations are clear and unlabored.

## 2019-08-20 NOTE — ED PROVIDER NOTES
EMERGENCY DEPARTMENT HISTORY AND PHYSICAL EXAM      Date: 8/19/2019  Patient Name: Tong Man  Patient Age and Sex: 60 y.o. male     History of Presenting Illness     Chief Complaint   Patient presents with    Syncope     pt experienced a syncopal episode today. he was sitting in a chair and passed out and fell to concrete ground. abrasion to left cheek. he reports 4-5 such episodes since june       History Provided By: Patient    HPI: Tong Man  Is a 27-year-old male with past medical history of hypertension and alcohol use presenting today after a episode of syncope. Patient states that he came in from working outside today and was sitting down when he started to notice that his vision was going black. He had a syncopal episode. He states that he has had multiple episodes like this since June. He does endorse drinking daily alcohol and states that he drinks about a pint of gin per day. 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 He denies any vomiting, diarrhea, blood in stool, and any prior history of arrhythmia. There are no other complaints, changes, or physical findings at this time. PCP: Jet Crump MD    No current facility-administered medications on file prior to encounter. Current Outpatient Medications on File Prior to Encounter   Medication Sig Dispense Refill    metoprolol succinate (TOPROL-XL) 50 mg XL tablet Take 1 Tab by mouth daily. In place of metoprolol tartrate. 30 Tab 0    aspirin 81 mg chewable tablet Take 2 Tabs by mouth daily. 420 Tab 0    folic acid (FOLVITE) 1 mg tablet Take 1 Tab by mouth daily. 30 Tab 0    therapeutic multivitamin (THERAGRAN) tablet Take 1 Tab by mouth daily. 30 Tab 0    thiamine (B-1) 100 mg tablet Take 1 Tab by mouth daily. 30 Tab 0    pantoprazole (PROTONIX) 40 mg tablet Take 1 Tab by mouth Daily (before breakfast).  30 Tab 0    diphenhydrAMINE (BENADRYL) 25 mg capsule Take 25 mg by mouth as needed for Itching.  ibuprofen (MOTRIN) 200 mg tablet Take 200 mg by mouth as needed for Pain. Past History     Past Medical History:  Past Medical History:   Diagnosis Date    Hypercholesterolemia     Hypertension        Past Surgical History:  Past Surgical History:   Procedure Laterality Date    HX ORTHOPAEDIC      rotator cuff repair       Family History:  Family History   Problem Relation Age of Onset   [facility] Stroke Father     Hypertension Father     Diabetes Sister        Social History:  Social History     Tobacco Use    Smoking status: Former Smoker     Years: 10.00     Last attempt to quit: 2014     Years since quittin.5    Smokeless tobacco: Never Used   Substance Use Topics    Alcohol use: Yes     Comment: pint a day    Drug use: Yes     Types: Cocaine     Comment: last used Friday       Allergies:  No Known Allergies      Review of Systems   Constitutional: No  fever,  No  headache  Skin: No  rash, No  jaundice  HEENT: No  nasal congestion, No  eye drainage. Resp: No cough,  No  wheezing  CV: No chest pain, No  palpitations  GI: No vomiting,  No  diarrhea.,  No  constipation  : No dysuria,  No  hematuria  MSK: No joint pain,  No  trauma  Neuro: No numbness, No  tingling  Psych: No suicidal, No  paranoid      Physical Exam     Patient Vitals for the past 12 hrs:   BP SpO2   19 1845 124/85    19 1838 124/71    19 1835 110/67 100 %   19 1830 122/70    19 1642  98 %     General: alert, No acute distress  Eyes: EOMI, normal conjunctiva  ENT: moist mucous membranes. Neck: Active, full ROM of neck. Skin: No rashes. no jaundice              Lungs: Equal chest expansion. no respiratory distress. Heart: regular rate     no peripheral edema     Abd:  non distended soft   Back: Full ROM  MSK: Full, active ROM in all 4 extremities.    Neuro:   II: vision grossly intact  III, IV, VI: Extraocular motion intact, pupils reactive to light  V: facial sensation intact  VII: face symmetric with normal eye closure and smile  VIII: hearing intact to finger rub bilaterally  IX, X: palate elevates symmetrically, phonation normal  XII: tongue midline with normal movements  Motor: normal bulk, tone, and strength bilaterally, no pronator drift  Sensory: normal sensation to light touch in bilateral upper and lower extremities  Coordination: intact to rapid alternating movements  Gait: steady gait with normal steps, and arm swing. Normal romberg. Psych: Cooperative with exam; Appropriate mood and affect             Diagnostic Study Results     Labs -     Recent Results (from the past 24 hour(s))   EKG, 12 LEAD, INITIAL    Collection Time: 08/19/19  1:53 PM   Result Value Ref Range    Ventricular Rate 97 BPM    Atrial Rate 97 BPM    P-R Interval 138 ms    QRS Duration 84 ms    Q-T Interval 364 ms    QTC Calculation (Bezet) 462 ms    Calculated P Axis 37 degrees    Calculated R Axis 24 degrees    Calculated T Axis 42 degrees    Diagnosis       Normal sinus rhythm  Nonspecific T wave abnormality  Prolonged QT  When compared with ECG of 28-AUG-2018 17:10,  No significant change was found  Confirmed by Cameron Castellanos ([id number]) on 8/19/2019 4:30:21 PM     CBC WITH AUTOMATED DIFF    Collection Time: 08/19/19  2:57 PM   Result Value Ref Range    WBC 8.0 4.1 - 11.1 K/uL    RBC 2.87 (L) 4.10 - 5.70 M/uL    HGB 9.7 (L) 12.1 - 17.0 g/dL    HCT 29.8 (L) 36.6 - 50.3 %    .8 (H) 80.0 - 99.0 FL    MCH 33.8 26.0 - 34.0 PG    MCHC 32.6 30.0 - 36.5 g/dL    RDW 15.5 (H) 11.5 - 14.5 %    PLATELET 817 990 - 878 K/uL    MPV 10.8 8.9 - 12.9 FL    NRBC 1.9 (H) 0  WBC    ABSOLUTE NRBC 0.15 (H) 0.00 - 0.01 K/uL    NEUTROPHILS 85 (H) 32 - 75 %    LYMPHOCYTES 8 (L) 12 - 49 %    MONOCYTES 7 5 - 13 %    EOSINOPHILS 0 0 - 7 %    BASOPHILS 0 0 - 1 %    IMMATURE GRANULOCYTES 0 0.0 - 0.5 %    ABS. NEUTROPHILS 6.8 1.8 - 8.0 K/UL    ABS. LYMPHOCYTES 0.6 (L) 0.8 - 3.5 K/UL    ABS.  MONOCYTES 0.6 0.0 - 1.0 K/UL    ABS. EOSINOPHILS 0.0 0.0 - 0.4 K/UL    ABS. BASOPHILS 0.0 0.0 - 0.1 K/UL    ABS. IMM. GRANS. 0.0 0.00 - 0.04 K/UL    DF AUTOMATED      RBC COMMENTS ANISOCYTOSIS  1+        RBC COMMENTS TARGET CELLS  1+       METABOLIC PANEL, COMPREHENSIVE    Collection Time: 08/19/19  2:57 PM   Result Value Ref Range    Sodium 136 136 - 145 mmol/L    Potassium 3.9 3.5 - 5.1 mmol/L    Chloride 103 97 - 108 mmol/L    CO2 24 21 - 32 mmol/L    Anion gap 9 5 - 15 mmol/L    Glucose 128 (H) 65 - 100 mg/dL    BUN 13 6 - 20 MG/DL    Creatinine 1.55 (H) 0.70 - 1.30 MG/DL    BUN/Creatinine ratio 8 (L) 12 - 20      GFR est AA 56 (L) >60 ml/min/1.73m2    GFR est non-AA 46 (L) >60 ml/min/1.73m2    Calcium 9.1 8.5 - 10.1 MG/DL    Bilirubin, total 1.0 0.2 - 1.0 MG/DL    ALT (SGPT) 107 (H) 12 - 78 U/L    AST (SGOT) 277 (H) 15 - 37 U/L    Alk.  phosphatase 119 (H) 45 - 117 U/L    Protein, total 8.5 (H) 6.4 - 8.2 g/dL    Albumin 4.2 3.5 - 5.0 g/dL    Globulin 4.3 (H) 2.0 - 4.0 g/dL    A-G Ratio 1.0 (L) 1.1 - 2.2     CK W/ REFLX CKMB    Collection Time: 08/19/19  2:57 PM   Result Value Ref Range    CK 97 39 - 308 U/L   TROPONIN I    Collection Time: 08/19/19  2:57 PM   Result Value Ref Range    Troponin-I, Qt. <0.05 <0.05 ng/mL   URINALYSIS W/ REFLEX CULTURE    Collection Time: 08/19/19  5:24 PM   Result Value Ref Range    Color CAT      Appearance CLOUDY (A) CLEAR      Specific gravity 1.030 1.003 - 1.030      pH (UA) 5.5 5.0 - 8.0      Protein 100 (A) NEG mg/dL    Glucose NEGATIVE  NEG mg/dL    Ketone 15 (A) NEG mg/dL    Bilirubin MODERATE (A) NEG      Blood NEGATIVE  NEG      Urobilinogen 1.0 0.2 - 1.0 EU/dL    Nitrites NEGATIVE  NEG      Leukocyte Esterase TRACE (A) NEG      Bilirubin UA, confirm NEGATIVE  NEG      WBC 0-4 0 - 4 /hpf    RBC 0-5 0 - 5 /hpf    Epithelial cells FEW FEW /lpf    Bacteria NEGATIVE  NEG /hpf    UA:UC IF INDICATED CULTURE NOT INDICATED BY UA RESULT CNI         Recent Results (from the past 12 hour(s))   URINALYSIS W/ REFLEX CULTURE    Collection Time: 08/19/19  5:24 PM   Result Value Ref Range    Color CAT      Appearance CLOUDY (A) CLEAR      Specific gravity 1.030 1.003 - 1.030      pH (UA) 5.5 5.0 - 8.0      Protein 100 (A) NEG mg/dL    Glucose NEGATIVE  NEG mg/dL    Ketone 15 (A) NEG mg/dL    Bilirubin MODERATE (A) NEG      Blood NEGATIVE  NEG      Urobilinogen 1.0 0.2 - 1.0 EU/dL    Nitrites NEGATIVE  NEG      Leukocyte Esterase TRACE (A) NEG      Bilirubin UA, confirm NEGATIVE  NEG      WBC 0-4 0 - 4 /hpf    RBC 0-5 0 - 5 /hpf    Epithelial cells FEW FEW /lpf    Bacteria NEGATIVE  NEG /hpf    UA:UC IF INDICATED CULTURE NOT INDICATED BY UA RESULT CNI         Radiologic Studies -   XR CHEST PORT   Final Result   IMPRESSION: No acute abnormality. CT HEAD WO CONT   Final Result   IMPRESSION: No acute intracranial abnormality. CT Results  (Last 48 hours)               08/19/19 1417  CT HEAD WO CONT Final result    Impression:  IMPRESSION: No acute intracranial abnormality. Narrative:  EXAM:  CT HEAD WITHOUT CONTRAST   INDICATION: Syncope/fainting. COMPARISON: 8/14/2015. CONTRAST: None. TECHNIQUE: Unenhanced CT of the head was performed using 5 mm images. Brain and   bone windows were generated. Sagittal and coronal reformations were generated. CT dose reduction was achieved through use of a standardized protocol tailored   for this examination and automatic exposure control for dose modulation. FINDINGS:   The ventricles and sulci are normal in size, shape and configuration and   midline. There is no significant white matter disease. There is no intracranial hemorrhage. There is no extra-axial collection, mass, mass effect or midline shift. The basilar cisterns are open. No acute infarct is identified. The bone windows demonstrate no abnormalities. The visualized portions of the paranasal sinuses and mastoid air cells are   clear.                CXR Results  (Last 48 hours)               08/19/19 1759  XR CHEST PORT Final result    Impression:  IMPRESSION: No acute abnormality. Narrative:  EXAM:  XR CHEST PORT. INDICATION: Syncope. COMPARISON: 8/28/2018. FINDINGS:    A portable AP radiograph of the chest was obtained at 1736 hours. The   positioning is lordotic. Lines and tubes: None. Lungs: The lungs are clear. Pleura: There is no pneumothorax or pleural effusion. Mediastinum: The cardiac and mediastinal contours and pulmonary vascularity are   normal.   Bones and soft tissues: The bones and soft tissues are grossly within normal   limits. Medical Decision Making     Differential Diagnosis: Syncope, vasovagal syncope, arrhythmia, ACS, electrolyte abnormality    I reviewed the vital signs, available nursing notes, past medical history, past surgical history, family history and social history and old medical records. On my interpretation, Laboratory workup is significant for CBC with hemoglobin of 9.7, at his baseline, creatinine slightly elevated 1.45, otherwise unremarkable electrolytes, negative troponin and CK, urinalysis with ketones  On my interpretation of the radiology studies CT head shows no significant acute abnormality, chest x-ray without pneumonia or pneumothorax  On my interpretation of the EKG no acute ischemic changes, ventricular rate is 97, QTc 462, no ST elevation or depression    Management/ED course: Patient presents today after syncopal episode. His symptoms are most consistent with vasovagal syncope. Negative CT head and negative work-up otherwise. The patient was treated with IV fluids given his concentrated urine, and slight elevation in creatinine. We discussed alcohol as a issue for possible dehydration and I encouraged him to drink plenty of fluids while he is out working in the heat particularly in the summer. The patient understood.   He did state that he has been on lisinopril for some time and has had issues with angioedema related to this, given that I switch the patient to losartan. Ultimately patient was discharged and felt symptomatically better. Dispo: Discharged. The patient has been re-evaluated and is ready for discharge. Reviewed available results with patient. Counseled patient on diagnosis and care plan. Patient has expressed understanding, and all questions have been answered. Patient agrees with plan and agrees to follow up as recommended, or to return to the ED if their symptoms worsen. Discharge instructions have been provided and explained to the patient, along with reasons to return to the ED. PLAN:  Discharge Medication List as of 2019  6:42 PM      START taking these medications    Details   losartan (COZAAR) 25 mg tablet Take 1 Tab by mouth daily for 30 days. , Normal, Disp-30 Tab, R-0         CONTINUE these medications which have NOT CHANGED    Details   metoprolol succinate (TOPROL-XL) 50 mg XL tablet Take 1 Tab by mouth daily. In place of metoprolol tartrate., Print, Disp-30 Tab, R-0      aspirin 81 mg chewable tablet Take 2 Tabs by mouth daily. , Print, Disp-200 Tab, R-0      folic acid (FOLVITE) 1 mg tablet Take 1 Tab by mouth daily. , Print, Disp-30 Tab, R-0      therapeutic multivitamin (THERAGRAN) tablet Take 1 Tab by mouth daily. , Print, Disp-30 Tab, R-0      thiamine (B-1) 100 mg tablet Take 1 Tab by mouth daily. , Print, Disp-30 Tab, R-0      pantoprazole (PROTONIX) 40 mg tablet Take 1 Tab by mouth Daily (before breakfast). , Print, Disp-30 Tab, R-0      diphenhydrAMINE (BENADRYL) 25 mg capsule Take 25 mg by mouth as needed for Itching., Historical Med      ibuprofen (MOTRIN) 200 mg tablet Take 200 mg by mouth as needed for Pain., Historical Med         STOP taking these medications       lisinopril (PRINIVIL, ZESTRIL) 20 mg tablet Comments:   Reason for Stoppin.   2.     Follow-up Information     Follow up With Specialties Details Why Contact Info    Roz Joel MD [facility]  As needed [address] [facility]  [phone]          3. Return to ED if worse     Diagnosis     Clinical Impression:   1. Vasovagal syncope    2.  Alcohol abuse        Attestations:    Rebel Obrien MD

## 2019-09-26 ENCOUNTER — TELEPHONE (OUTPATIENT)
Dept: INTERNAL MEDICINE CLINIC | Age: 59
End: 2019-09-26

## 2019-09-26 NOTE — TELEPHONE ENCOUNTER
Gavi Simons 89             Appointment not available     [facility] first and last name and relationship to patient (if not the patient):       Best contact number:     [phone]   Preferred date and time:     asap   Scheduled appointment date and time:       Reason for appointment:   Follow up from ED for seizures 9/12/19 c     Details to clarify the request:       [employer] received & copied from [facility]

## 2019-09-27 NOTE — TELEPHONE ENCOUNTER
#[phone] pt states he needs to be seen on Monday 9-30-19 about 11:30 AM or later. Please see previous message for the reason stated.

## 2019-10-18 ENCOUNTER — OFFICE VISIT (OUTPATIENT)
Dept: INTERNAL MEDICINE CLINIC | Age: 59
End: 2019-10-18

## 2019-10-18 VITALS
RESPIRATION RATE: 16 BRPM | OXYGEN SATURATION: 98 % | BODY MASS INDEX: 27.55 KG/M2 | HEIGHT: 69 IN | WEIGHT: 186 LBS | SYSTOLIC BLOOD PRESSURE: 193 MMHG | HEART RATE: 90 BPM | DIASTOLIC BLOOD PRESSURE: 101 MMHG | TEMPERATURE: 98.4 F

## 2019-10-18 DIAGNOSIS — I10 ESSENTIAL HYPERTENSION: ICD-10-CM

## 2019-10-18 DIAGNOSIS — R00.2 PALPITATIONS: Primary | ICD-10-CM

## 2019-10-18 RX ORDER — AMLODIPINE BESYLATE 5 MG/1
5 TABLET ORAL DAILY
Qty: 30 TAB | Refills: 2 | Status: SHIPPED | OUTPATIENT
Start: 2019-10-18

## 2019-10-18 RX ORDER — METOPROLOL SUCCINATE 50 MG/1
50 TABLET, EXTENDED RELEASE ORAL DAILY
Qty: 90 TAB | Refills: 1 | Status: SHIPPED | OUTPATIENT
Start: 2019-10-18

## 2019-10-18 RX ORDER — LOSARTAN POTASSIUM 25 MG/1
25 TABLET ORAL DAILY
Qty: 30 TAB | Refills: 3 | Status: SHIPPED | OUTPATIENT
Start: 2019-10-18

## 2019-10-18 NOTE — PROGRESS NOTES
Identified pt with two pt identifiers(name and ). Reviewed record in preparation for visit and have obtained necessary documentation. Chief Complaint   Patient presents with   [facility] Follow Up     Pt went to ED [facility] ER 2019 for syncope.  Loss of Consciousness     Pt stated he fainted on 10/14/19. Visit Vitals  BP (!) 193/101 (BP 1 Location: Right arm, BP Patient Position: Sitting)   Pulse 90   Temp 98.4 °F (36.9 °C) (Oral)   Resp 16   Ht 5' 9\" (1.753 m)   Wt 186 lb (84.4 kg)   SpO2 98%   BMI 27.47 kg/m²       Health Maintenance Due   Topic    Hepatitis C Screening     Pneumococcal 0-64 years (1 of 1 - PPSV23)    DTaP/Tdap/Td series (1 - Tdap)    Shingrix Vaccine Age 50> (1 of 2)    FOBT Q 1 YEAR AGE 54-65     Influenza Age 5 to Adult        Med Reconciliation: Completed    Coordination of Care Questionnaire:  :   1) Have you been to an emergency room, urgent care, or hospitalized since your last visit? If yes, where when, and reason for visit? Yes, see chief complaint. 2. Have seen or consulted any other health care provider since your last visit? If yes, where when, and reason for visit? No       3) Do you have an Advanced Directive/ Living Will in place? No  If yes, do we have a copy on file   If no, would you like information     Patient is accompanied by self I have received verbal consent from Katia Deluca to discuss any/all medical information while they are present in the room. Declines

## 2019-10-18 NOTE — PROGRESS NOTES
SUBJECTIVE:   Mr. Liz Maravilla is a [age] [facility] y.o. male who is here for follow up of routine medical issues. Hospital Summary: Pt presented to [facility] ER on 8/19/2019 after an episode of syncope. He stated that he came in from working outside and was sitting down when he started to notice that his vision was going black. He had a syncopal episode. He reported that he had experienced multiple episodes like that since June. A Chest XR and Head CT found no acute abnormalities or intracranial abnormalities. Pt was given IV fluids and encouraged to reduce EtOH abuse before discharge. Loss of Consciousness: Pt reports that he fainted on 10/14/2019. Pt denies headaches, vision changes. He states that he will get very hot and then his vision will fade before he passes out, which happens primarily at night. He notes that he experiences the syncopal episodes 2-3 times per month. Pt endorses palpitations prior to these episodes. HTN: Pt's BP in the office today was elevated at 193/101. Pt is compliant in taking Toprol-XL, except for today, and ran out of losartan approx 1 month ago. He has not been consistent in the time of day he takes the medication. Patient denies chest pain, PAINTING/SOB, edema, headache, visual changes, dizziness, palpitations or syncope. He has not been checking his BP at home. Pt's BP when manually checked during his office visit was 180/110. Pt denies current tobacco abuse and endorses 1 pint of gin per day. He notes that he has been cutting back for about 1 week. At this time, he is otherwise doing well and has brought no other complaints to my attention today. For a list of the medical issues addressed today, see the assessment and plan below. PMH:   Past Medical History:   Diagnosis Date    Hypercholesterolemia     Hypertension      PSH:  has a past surgical history that includes hx orthopaedic. All: has No Known Allergies.    MEDS:   Current Outpatient Medications   Medication Sig    metoprolol succinate (TOPROL-XL) 50 mg XL tablet Take 1 Tab by mouth daily. In place of metoprolol tartrate.  amLODIPine (NORVASC) 5 mg tablet Take 1 Tab by mouth daily.  losartan (COZAAR) 25 mg tablet Take 1 Tab by mouth daily.  aspirin 81 mg chewable tablet Take 2 Tabs by mouth daily.  therapeutic multivitamin (THERAGRAN) tablet Take 1 Tab by mouth daily.  thiamine (B-1) 100 mg tablet Take 1 Tab by mouth daily.  diphenhydrAMINE (BENADRYL) 25 mg capsule Take 25 mg by mouth as needed for Itching.  ibuprofen (MOTRIN) 200 mg tablet Take 200 mg by mouth as needed for Pain. No current facility-administered medications for this visit. FH: family history includes Diabetes in his sister; Hypertension in his father; Stroke in his father. SH:  reports that he quit smoking about 5 years ago. He quit after 10.00 years of use. He has never used smokeless tobacco. He reports that he drinks alcohol. He reports that he has current or past drug history. Drug: Cocaine. Review of Systems - History obtained from the patient  General ROS: no fever, chills, fatigue, body aches  Psychological ROS: no change in anxiety, depression, SI/HI  Ophthalmic ROS: no blurred vision, myopia, double vision  ENT ROS: no dysphagia, otalgia, otorrhea, rhinorrhea, post nasal drip  Respiratory ROS: no cough, shortness of breath, or wheezing  Cardiovascular ROS: +palpitations. no chest pain or dyspnea on exertion  Gastrointestinal ROS: no abdominal pain, change in bowel habits, or black or bloody stools  Genito-Urinary ROS: no frequency, urgency, incontinence, dysuria, hematuria  Musculoskeletal ROS: no arthralgia, myalgia  Neurological ROS: +syncopal episodes.  no headaches, dizziness, lightheadedness, tremors, seizures  Dermatological ROS: no rash or lesions    OBJECTIVE:   Vitals:   Visit Vitals  BP (!) 193/101 (BP 1 Location: Right arm, BP Patient Position: Sitting)   Pulse 90   Temp 98.4 °F (36.9 °C) (Oral)   Resp 16   Ht 5' 9\" (1.753 m)   Wt 186 lb (84.4 kg)   SpO2 98%   BMI 27.47 kg/m²      Gen: Disheveled, Alcoholic 60 y.o.  male in NAD. HEENT: +eyes were bloodshot. PERRLA. EOMI. OP moist and pink. Neck: Supple. No LAD. HEART: RRR, No M/G/R.      LUNGS: CTAB No W/R. EXTREMITIES: Warm. No C/C/E.      NEURO: +tremulous. Alert and oriented x 3. Cranial nerves grossly intact. No focal sensory or motor deficits noted. SKIN: Warm. Dry. No rashes or other lesions noted. ASSESSMENT/ PLAN: Diagnoses and all orders for this visit:    1. Palpitations  -     REFERRAL TO CARDIOLOGY    2. Essential hypertension  -     metoprolol succinate (TOPROL-XL) 50 mg XL tablet; Take 1 Tab by mouth daily. In place of metoprolol tartrate. -     amLODIPine (NORVASC) 5 mg tablet; Take 1 Tab by mouth daily. -     losartan (COZAAR) 25 mg tablet; Take 1 Tab by mouth daily. ICD-10-CM ICD-9-CM    1. Palpitations R00.2 785.1 REFERRAL TO CARDIOLOGY   2. Essential hypertension I10 401.9 metoprolol succinate (TOPROL-XL) 50 mg XL tablet      amLODIPine (NORVASC) 5 mg tablet      losartan (COZAAR) 25 mg tablet      1. Palpitations  I provided a referral to Dr. Allyssa Garibay (cardiology) for f/u.    2. Hypertension  BP is not controlled. I recommended continuing current dose of Toprol-XL and start taking 25mg tablet of losartan every day, holding the amlodipine for now, eating a low sodium diet, and increasing exercise. I encouraged pt to  medication on his way home, take his medication, and check his BP 2 hours later to ensure it comes down. Will continue to monitor for improvements or changes. He was advised to call the office with his bp readings on Monday. If they remain elevated over the weekend he was advised he should go to the ER. I have reviewed the patient's medications and risks/side effects/benefits were discussed. Diagnosis(-es) explained to patient and questions answered. Literature provided where appropriate. Written by Gerardo Odell, as dictated by Jim Cowden, MD.

## 2019-10-21 ENCOUNTER — TELEPHONE (OUTPATIENT)
Dept: INTERNAL MEDICINE CLINIC | Age: 59
End: 2019-10-21

## 2019-10-21 NOTE — TELEPHONE ENCOUNTER
----- Message from Rachael Tobias sent at 10/21/2019 10:36 AM EDT -----  Regarding: Dr. 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

## 2020-01-05 NOTE — ED PROVIDER NOTES
[facility].  EMERGENCY DEPARTMENT HISTORY AND PHYSICAL EXAM       Date of Service: 10/24/2017   Patient Name: Qasim Hook   YOB: 1960  Medical Record Number: [id number]    History of Presenting Illness     Chief Complaint   Patient presents with    Abdominal Pain     generalized abd pain/distention onset last night, +N/v    Urinary Retention     onset last night        History Provided By:  patient    Additional History:   Qasim Hook is a 61 y.o. male with PMHx significant for HCL and EtOH abuse who presents via EMS to the ED with cc of progressively worsening generalized chest pain, upper abdominal pain, generalized back pain, and testicular pain that started yesterday night between 2300-000. He describes his pain has a cramping sensation that fluctuates in severity. He also c/o nausea, vomiting, multiple episodes of diarrhea, urinary frequency, and dark urine. He reports that he has had a normal appetite and adequate fluids intake. He reports that he drinks ~ 1 pint of liquor daily, and he last drank yesterday night. He notes a history of left rib fractures. He denies a history of similar symptoms, history of GI disease, or history of kidney stones. He specifically denies hematuria, dysuria, fevers, or other complaints at this time. Social Hx: + Tobacco, + EtOH, + Illicit Drugs (cocaine)    There are no other complaints, changes or physical findings at this time.     Primary Care Provider: Getachew Sung MD     Past History     Past Medical History:   Past Medical History:   Diagnosis Date    Hypercholesterolemia         Past Surgical History:   Past Surgical History:   Procedure Laterality Date    HX ORTHOPAEDIC      rotator cuff repair        Family History:   Family History   Problem Relation Age of Onset   [address] [facility] Stroke Father     Hypertension Father     Diabetes Sister         Social History:   Social History   Substance Use Topics    Smoking status: Current Some Day Smoker     Years: 10.00    Smokeless tobacco: Never Used    Alcohol use Yes      Comment: social        Allergies:   No Known Allergies      Review of Systems   Review of Systems   Constitutional: Negative for chills and fever. Respiratory: Negative for cough and shortness of breath. Cardiovascular: Positive for chest pain. Gastrointestinal: Positive for abdominal pain, diarrhea, nausea and vomiting. Negative for constipation. Genitourinary: Positive for frequency and testicular pain. Negative for dysuria and hematuria. Musculoskeletal: Positive for back pain. Neurological: Negative for weakness and numbness. All other systems reviewed and are negative. Physical Exam  Physical Exam   Constitutional: He is oriented to person, place, and time. He appears well-developed and well-nourished. HENT:   Head: Normocephalic and atraumatic. Eyes: Conjunctivae and EOM are normal.   Neck: Normal range of motion. Neck supple. Cardiovascular: Regular rhythm. Tachycardia present. Pulmonary/Chest: Effort normal and breath sounds normal. Tachypnea noted. No respiratory distress. Abdominal: Soft. He exhibits distension. There is generalized tenderness. Abdominal tenderness is worst in epigastrium. Musculoskeletal: Normal range of motion. Neurological: He is alert and oriented to person, place, and time. Skin: Skin is warm and dry. Psychiatric: He has a normal mood and affect. Nursing note and vitals reviewed. Medical Decision Making   I am the first provider for this patient. I reviewed the vital signs, available nursing notes, past medical history, past surgical history, family history and social history. Old Medical Records: Old medical records. Nursing notes. Provider Notes:   Patient presents with epigastric abdominal pain.  Differential includes liver failure, gastritis, pancreatitis cholelithiasis, cholecystitis, hepatitis, muscular strain, renal pathology, gastroenteritis. Given pt's history of EtOH abuse, EtOH withdrawals is also a differential.  Less likely ACS. Will obtain labs and possibly US. Will give fluids, analgesics and antiemetics PRN. ED Course:  2:55 PM   Initial assessment performed. The patients presenting problems have been discussed, and they are in agreement with the care plan formulated and outlined with them. I have encouraged them to ask questions as they arise throughout their visit. Procedure Note - Bedside Ultrasound:  5:17 PM  Performed by: Larry Vasquez MD  US of right upper quadrant abdomen performed at beside, showing no signs of gall bladder etiology. Pt's ultrasound does show hepatic steatosis. No ascites present. The procedure took 1-15 minutes, and pt tolerated well. Written by BOYD Doradoibelida, as dictated by Larry Vasquez MD.    Progress Note:  5:20 PM  The patient was informed of his most up to date lab work, and that it is most consistent with EtOH use. Pt's RUQ ultrasound was performed at bedside, and there are no signs of gall bladder etiology. His bedside ultrasound is significant for hepatic steatosis. Pt's labs and ultrasound does not explain his N/V/D and low abdominal pain, however, will get a CT of his abdomen to further evaluate his symptoms. Written by BOYD Dorado, as dictated by Larry Vasquez MD.    Progress Note:  8:30 PM  Spoke with the patient and his family regarding his CT results, and they convey their understanding of these results. Pt has a CIWA score of 14. Spoke with nursing staff, pt has been tachycardic and diaphoretic. Will consult hospitalist for admission for EtOH withdrawal. Pt is receiving IV labetalol.   Written by BOYD Doradoibe, as dictated by Larry Vasquez MD.    CONSULT NOTE:   8:33 PM  Larry Vasquez MD spoke with Felicia Richardson MD,   Specialty: Hospitalist  Discussed pt's hx, disposition, and available diagnostic and imaging results. Reviewed care plans. Consultant will evaluate pt for admission. Written by BOYD Car, as dictated by Araceli Browne MD.    CRITICAL CARE NOTE :    9:36 PM    IMPENDING DETERIORATION -Cardiovascular and Metabolic  ASSOCIATED RISK FACTORS - Dysrhythmia, Metabolic changes and Dehydration  MANAGEMENT- Bedside Assessment and Supervision of Care  INTERPRETATION -  CT Scan, ECG, Blood Pressure and Cardiac Output Measures   INTERVENTIONS - hemodynamic mngmt and Metobolic interventions  CASE REVIEW - Hospitalist, Nursing and Family  TREATMENT RESPONSE -Improved  PERFORMED BY - Self    NOTES   :    I have spent 60 minutes of critical care time involved in lab review, consultations with specialist, family decision- making, bedside attention and documentation. During this entire length of time I was immediately available to the patient .         Diagnostic Study Results     Labs -      Recent Results (from the past 12 hour(s))   EKG, 12 LEAD, INITIAL    Collection Time: 10/24/17  1:47 PM   Result Value Ref Range    Ventricular Rate 97 BPM    Atrial Rate 97 BPM    P-R Interval 114 ms    QRS Duration 80 ms    Q-T Interval 374 ms    QTC Calculation (Bezet) 474 ms    Calculated P Axis 28 degrees    Calculated R Axis 14 degrees    Calculated T Axis 10 degrees    Diagnosis       Normal sinus rhythm  Nonspecific T wave abnormality  Prolonged QT  Abnormal ECG  When compared with ECG of 14-AUG-2015 10:21,  No significant change was found     CBC WITH AUTOMATED DIFF    Collection Time: 10/24/17  2:08 PM   Result Value Ref Range    WBC 6.2 4.1 - 11.1 K/uL    RBC 3.24 (L) 4.10 - 5.70 M/uL    HGB 10.9 (L) 12.1 - 17.0 g/dL    HCT 31.8 (L) 36.6 - 50.3 %    MCV 98.1 80.0 - 99.0 FL    MCH 33.6 26.0 - 34.0 PG    MCHC 34.3 30.0 - 36.5 g/dL    RDW 14.6 (H) 11.5 - 14.5 %    PLATELET 927 648 - 549 K/uL    NEUTROPHILS 70 32 - 75 %    LYMPHOCYTES 24 12 - 49 %    MONOCYTES 6 5 - 13 %    EOSINOPHILS 0 0 - 7 %    BASOPHILS 0 0 - 1 %    ABS. NEUTROPHILS 4.3 1.8 - 8.0 K/UL    ABS. LYMPHOCYTES 1.5 0.8 - 3.5 K/UL    ABS. MONOCYTES 0.4 0.0 - 1.0 K/UL    ABS. EOSINOPHILS 0.0 0.0 - 0.4 K/UL    ABS. BASOPHILS 0.0 0.0 - 0.1 K/UL   METABOLIC PANEL, COMPREHENSIVE    Collection Time: 10/24/17  2:08 PM   Result Value Ref Range    Sodium 137 136 - 145 mmol/L    Potassium 4.3 3.5 - 5.1 mmol/L    Chloride 100 97 - 108 mmol/L    CO2 25 21 - 32 mmol/L    Anion gap 12 5 - 15 mmol/L    Glucose 108 (H) 65 - 100 mg/dL    BUN 11 6 - 20 MG/DL    Creatinine 0.95 0.70 - 1.30 MG/DL    BUN/Creatinine ratio 12 12 - 20      GFR est AA >60 >60 ml/min/1.73m2    GFR est non-AA >60 >60 ml/min/1.73m2    Calcium 8.9 8.5 - 10.1 MG/DL    Bilirubin, total 1.3 (H) 0.2 - 1.0 MG/DL    ALT (SGPT) 96 (H) 12 - 78 U/L    AST (SGOT) 299 (H) 15 - 37 U/L    Alk.  phosphatase 125 (H) 45 - 117 U/L    Protein, total 8.2 6.4 - 8.2 g/dL    Albumin 4.4 3.5 - 5.0 g/dL    Globulin 3.8 2.0 - 4.0 g/dL    A-G Ratio 1.2 1.1 - 2.2     LIPASE    Collection Time: 10/24/17  2:08 PM   Result Value Ref Range    Lipase 188 73 - 393 U/L   URINALYSIS W/ REFLEX CULTURE    Collection Time: 10/24/17  3:22 PM   Result Value Ref Range    Color YELLOW/STRAW      Appearance CLEAR CLEAR      Specific gravity 1.022 1.003 - 1.030      pH (UA) 6.0 5.0 - 8.0      Protein 30 (A) NEG mg/dL    Glucose NEGATIVE  NEG mg/dL    Ketone TRACE (A) NEG mg/dL    Bilirubin NEGATIVE  NEG      Blood TRACE (A) NEG      Urobilinogen 1.0 0.2 - 1.0 EU/dL    Nitrites NEGATIVE  NEG      Leukocyte Esterase NEGATIVE  NEG      WBC 10-20 0 - 4 /hpf    RBC 5-10 0 - 5 /hpf    Epithelial cells FEW FEW /lpf    Bacteria 1+ (A) NEG /hpf    UA:UC IF INDICATED URINE CULTURE ORDERED (A) CNI      Yeast PRESENT (A) NEG     ETHYL ALCOHOL    Collection Time: 10/24/17  3:22 PM   Result Value Ref Range    ALCOHOL(ETHYL),SERUM 25 (H) <10 MG/DL       Radiologic Studies -  The following have been ordered and reviewed:   EXAM:  US ABD LTD     INDICATION: transaminitis, nausea abd pain     COMPARISON:  None.     TECHNIQUE:   Real-time sonography of the abdomen was performed with multiple static images of  the liver, gallbladder, pancreas, , and right kidney obtained.     FINDINGS:  The liver is moderately echogenic. There is no mass or other focal abnormality. Portal venous flow is normal.       The gallbladder is normal and no stones are identified. There is no wall  thickening or fluid around the gallbladder. There is no biliary duct dilatation  and the common duct measures 5 mm in diameter. The pancreas was not well seen  because of gas.       The right kidney demonstrates normal echogenicity. There is  no solid mass,  stone or hydronephrosis. The right kidney measures 9.4 cm. The visualized portion of the IVC is unremarkable.     IMPRESSION  IMPRESSION:      1. Echogenic liver, likely due to hepatic steatosis. No focal liver lesions. 2. No gallstones or biliary dilatation.                    Vital Signs-Reviewed the patient's vital signs. Patient Vitals for the past 12 hrs:   Temp Pulse Resp BP SpO2   10/24/17 1645 - 99 (!) 31 (!) 223/124 96 %   10/24/17 1630 - 99 27 (!) 213/123 92 %   10/24/17 1615 - 99 18 (!) 221/135 95 %   10/24/17 1603 - 98 (!) 32 (!) 212/118 97 %   10/24/17 1515 - 98 15 (!) 188/118 97 %   10/24/17 1342 98.8 °F (37.1 °C) 99 20 (!) 193/132 99 %       Medications Given in the ED:  Medications   sodium chloride 0.9 % bolus infusion 1,000 mL (1,000 mL IntraVENous New Bag 10/24/17 1602)   LORazepam (ATIVAN) injection 1 mg (1 mg IntraVENous Given 10/24/17 1603)   fentaNYL citrate (PF) injection 50 mcg (50 mcg IntraVENous Given 10/24/17 1603)       Diagnosis   Clinical Impression:   No diagnosis found. Plan:  1: Admit to Hospitalist    Disposition Note:  Admit Note:  8:33 PM  Pt is being admitted by Ariane Connor MD. The results of their tests and reason(s) for their admission have been discussed with pt and/or available family.  They convey agreement and understanding for the need to be admitted and for admission diagnosis. This note is prepared by Sterling Arriaza, acting as a Scribe for Claudy Colin MD.    Claudy Colin MD: The scribe's documentation has been prepared under my direction and personally reviewed by me in its entirety. I confirm that the notes above accurately reflects all work, treatment, procedures, and medical decision making performed by me. This note will not be viewable in [address] Principal Discharge DX:	Flank pain  Secondary Diagnosis:	Abdominal pain Principal Discharge DX:	Flank pain  Secondary Diagnosis:	Abdominal pain  Secondary Diagnosis:	Cyst of right ovary

## 2020-01-12 PROBLEM — R79.89 ELEVATED SERUM CREATININE: Status: ACTIVE | Noted: 2020-01-12

## 2020-01-12 PROBLEM — K70.10 ALCOHOLIC HEPATITIS WITHOUT ASCITES: Status: ACTIVE | Noted: 2020-01-12

## 2020-01-12 PROBLEM — K70.40 ALCOHOLIC HEPATIC FAILURE WITHOUT COMA (HCC): Status: ACTIVE | Noted: 2020-01-12

## 2020-01-12 PROBLEM — R74.8 ELEVATED LIVER ENZYMES: Status: ACTIVE | Noted: 2020-01-12

## 2020-01-12 PROBLEM — F10.939 ALCOHOL WITHDRAWAL (HCC): Status: RESOLVED | Noted: 2017-10-24 | Resolved: 2020-01-12

## 2021-09-28 ENCOUNTER — APPOINTMENT (OUTPATIENT)
Dept: CT IMAGING | Age: 61
End: 2021-09-28
Attending: EMERGENCY MEDICINE
Payer: COMMERCIAL

## 2021-09-28 ENCOUNTER — APPOINTMENT (OUTPATIENT)
Dept: MRI IMAGING | Age: 61
End: 2021-09-28
Attending: EMERGENCY MEDICINE
Payer: COMMERCIAL

## 2021-09-28 ENCOUNTER — HOSPITAL ENCOUNTER (EMERGENCY)
Age: 61
Discharge: HOME | End: 2021-09-28
Attending: EMERGENCY MEDICINE
Payer: COMMERCIAL

## 2021-09-28 ENCOUNTER — APPOINTMENT (OUTPATIENT)
Dept: GENERAL RADIOLOGY | Age: 61
End: 2021-09-28
Attending: EMERGENCY MEDICINE
Payer: COMMERCIAL

## 2021-09-28 VITALS
WEIGHT: 185.41 LBS | OXYGEN SATURATION: 100 % | DIASTOLIC BLOOD PRESSURE: 73 MMHG | HEART RATE: 90 BPM | BODY MASS INDEX: 27.46 KG/M2 | SYSTOLIC BLOOD PRESSURE: 130 MMHG | RESPIRATION RATE: 22 BRPM | HEIGHT: 69 IN | TEMPERATURE: 98.2 F

## 2021-09-28 DIAGNOSIS — I65.01 VERTEBRAL ARTERY STENOSIS, RIGHT: ICD-10-CM

## 2021-09-28 DIAGNOSIS — R55 SYNCOPE, UNSPECIFIED SYNCOPE TYPE: Primary | ICD-10-CM

## 2021-09-28 LAB
ALBUMIN SERPL-MCNC: 3.4 G/DL (ref 3.5–5)
ALBUMIN/GLOB SERPL: 0.9 {RATIO} (ref 1.1–2.2)
ALP SERPL-CCNC: 183 U/L (ref 45–117)
ALT SERPL-CCNC: 167 U/L (ref 12–78)
ANION GAP SERPL CALC-SCNC: 11 MMOL/L (ref 5–15)
AST SERPL-CCNC: 396 U/L (ref 15–37)
ATRIAL RATE: 84 BPM
BASOPHILS # BLD: 0 K/UL (ref 0–0.1)
BASOPHILS NFR BLD: 0 % (ref 0–1)
BILIRUB SERPL-MCNC: 0.9 MG/DL (ref 0.2–1)
BUN SERPL-MCNC: 13 MG/DL (ref 6–20)
BUN/CREAT SERPL: 10 (ref 12–20)
CALCIUM SERPL-MCNC: 8.7 MG/DL (ref 8.5–10.1)
CALCULATED P AXIS, ECG09: 44 DEGREES
CALCULATED R AXIS, ECG10: 19 DEGREES
CALCULATED T AXIS, ECG11: 29 DEGREES
CHLORIDE SERPL-SCNC: 104 MMOL/L (ref 97–108)
CO2 SERPL-SCNC: 22 MMOL/L (ref 21–32)
CREAT SERPL-MCNC: 1.24 MG/DL (ref 0.7–1.3)
DIAGNOSIS, 93000: NORMAL
DIFFERENTIAL METHOD BLD: ABNORMAL
EOSINOPHIL # BLD: 0.1 K/UL (ref 0–0.4)
EOSINOPHIL NFR BLD: 1 % (ref 0–7)
ERYTHROCYTE [DISTWIDTH] IN BLOOD BY AUTOMATED COUNT: 16.7 % (ref 11.5–14.5)
GLOBULIN SER CALC-MCNC: 4 G/DL (ref 2–4)
GLUCOSE SERPL-MCNC: 116 MG/DL (ref 65–100)
HCT VFR BLD AUTO: 27 % (ref 36.6–50.3)
HGB BLD-MCNC: 9 G/DL (ref 12.1–17)
IMM GRANULOCYTES # BLD AUTO: 0.1 K/UL (ref 0–0.04)
IMM GRANULOCYTES NFR BLD AUTO: 1 % (ref 0–0.5)
LYMPHOCYTES # BLD: 1.9 K/UL (ref 0.8–3.5)
LYMPHOCYTES NFR BLD: 35 % (ref 12–49)
MAGNESIUM SERPL-MCNC: 1.4 MG/DL (ref 1.6–2.4)
MCH RBC QN AUTO: 34.5 PG (ref 26–34)
MCHC RBC AUTO-ENTMCNC: 33.3 G/DL (ref 30–36.5)
MCV RBC AUTO: 103.4 FL (ref 80–99)
MONOCYTES # BLD: 0.6 K/UL (ref 0–1)
MONOCYTES NFR BLD: 12 % (ref 5–13)
NEUTS SEG # BLD: 2.7 K/UL (ref 1.8–8)
NEUTS SEG NFR BLD: 51 % (ref 32–75)
NRBC # BLD: 0.06 K/UL (ref 0–0.01)
NRBC BLD-RTO: 1.1 PER 100 WBC
P-R INTERVAL, ECG05: 140 MS
PLATELET # BLD AUTO: 146 K/UL (ref 150–400)
PLATELET COMMENTS,PCOM: ABNORMAL
POTASSIUM SERPL-SCNC: 3.6 MMOL/L (ref 3.5–5.1)
PROT SERPL-MCNC: 7.4 G/DL (ref 6.4–8.2)
Q-T INTERVAL, ECG07: 394 MS
QRS DURATION, ECG06: 86 MS
QTC CALCULATION (BEZET), ECG08: 465 MS
RBC # BLD AUTO: 2.61 M/UL (ref 4.1–5.7)
RBC MORPH BLD: ABNORMAL
RBC MORPH BLD: ABNORMAL
SODIUM SERPL-SCNC: 137 MMOL/L (ref 136–145)
TROPONIN I SERPL-MCNC: <0.05 NG/ML
VENTRICULAR RATE, ECG03: 84 BPM
WBC # BLD AUTO: 5.4 K/UL (ref 4.1–11.1)

## 2021-09-28 PROCEDURE — 80053 COMPREHEN METABOLIC PANEL: CPT

## 2021-09-28 PROCEDURE — 96366 THER/PROPH/DIAG IV INF ADDON: CPT

## 2021-09-28 PROCEDURE — 99285 EMERGENCY DEPT VISIT HI MDM: CPT

## 2021-09-28 PROCEDURE — 74011000636 HC RX REV CODE- 636: Performed by: EMERGENCY MEDICINE

## 2021-09-28 PROCEDURE — 70496 CT ANGIOGRAPHY HEAD: CPT

## 2021-09-28 PROCEDURE — 71275 CT ANGIOGRAPHY CHEST: CPT

## 2021-09-28 PROCEDURE — 71045 X-RAY EXAM CHEST 1 VIEW: CPT

## 2021-09-28 PROCEDURE — 83735 ASSAY OF MAGNESIUM: CPT

## 2021-09-28 PROCEDURE — 85025 COMPLETE CBC W/AUTO DIFF WBC: CPT

## 2021-09-28 PROCEDURE — 93005 ELECTROCARDIOGRAM TRACING: CPT

## 2021-09-28 PROCEDURE — 84484 ASSAY OF TROPONIN QUANT: CPT

## 2021-09-28 PROCEDURE — 70551 MRI BRAIN STEM W/O DYE: CPT

## 2021-09-28 PROCEDURE — 96365 THER/PROPH/DIAG IV INF INIT: CPT

## 2021-09-28 PROCEDURE — 74011250636 HC RX REV CODE- 250/636: Performed by: EMERGENCY MEDICINE

## 2021-09-28 PROCEDURE — 36415 COLL VENOUS BLD VENIPUNCTURE: CPT

## 2021-09-28 RX ORDER — MAGNESIUM SULFATE HEPTAHYDRATE 40 MG/ML
2 INJECTION, SOLUTION INTRAVENOUS ONCE
Status: COMPLETED | OUTPATIENT
Start: 2021-09-28 | End: 2021-09-28

## 2021-09-28 RX ADMIN — SODIUM CHLORIDE 1000 ML: 9 INJECTION, SOLUTION INTRAVENOUS at 11:36

## 2021-09-28 RX ADMIN — MAGNESIUM SULFATE HEPTAHYDRATE 2 G: 40 INJECTION, SOLUTION INTRAVENOUS at 13:06

## 2021-09-28 RX ADMIN — IOPAMIDOL 100 ML: 755 INJECTION, SOLUTION INTRAVENOUS at 11:18

## 2021-09-28 NOTE — ED NOTES
1056: Assumed care of pt from triage. Pt is A&O x 4. Pt reports CC of dizziness. Pt states he has had 6-8 syncopal episodes in the last 5-6mo. Pt was on meds for htn, but stopped taking them when he ran out and did not have a new dr. Pt claims when he blacks out he sees stars. He had an episode on Sunday and when he fell he got a lac on his R knee. Pt denies CP/SOB/N/V. Pt placed on the monitor x3 with call bell in reach. 1056: Dr. Tushar Chaney at bedside evaluating pt.

## 2021-09-28 NOTE — ED NOTES
Medicated pt per orders. Pt resting on stretcher in POC with call bell in reach. Pt remains on monitor x 3. VSS at this time. 1309: Medicated pt per orders. 1804: Pt discharged by Dr. Aixa Mcneil. Pt provided with discharge instructions Rx and instructions on follow up care. Pt out of ED via wheelchair accompanied by this RN.

## 2021-09-28 NOTE — ED PROVIDER NOTES
EMERGENCY DEPARTMENT HISTORY AND PHYSICAL EXAM      Date: 9/28/2021  Patient Name: Jose Sandoval    History of Presenting Illness     Chief Complaint   Patient presents with    Syncope     pt has had syncopal episodes for 3 weeks. He states that they occur when he stands or when he holds his arms over his head while standing. He also has been out of his BP meds for six months. He called his pcp, but he referred him here for evaluation prior to receiving refills.  Hypertension       History Provided By: Patient    HPI: Jose Sandoval, 57 y.o. male presents to the ED with self-reported history of high blood pressure, out of his blood pressure medications for approximately 6 months with concern for 6-8 episodes of syncope over approximately 5 to 6 months. This is a different timeline and what he shared in triage. Patient relays that his syncope episodes happen with standing, sitting and sometimes when he is reaching for a TV on the wall to adjust the channel. In this case, he raises his arm and then passes out. Says he does not have a history of stroke nor does he know of any cardiac history except a heart murmur when he was a kid. Review of the medical records also shows a history of hypercholesterolemia. Patient no longer smokes and quit in 2014. Says he is been eating and drinking okay, no difficulties going to the bathroom. Patient said he hurt his knee the other day when he passed out so he is using a cane to walk to alleviate the pain. He called his primary care doctor for blood pressure refills but given his symptoms, they advised him to come to the urgency department for evaluation. There are no other complaints, changes, or physical findings at this time. PCP: None    No current facility-administered medications on file prior to encounter.      Current Outpatient Medications on File Prior to Encounter   Medication Sig Dispense Refill    metoprolol succinate (TOPROL-XL) 50 mg XL tablet Take 1 Tab by mouth daily. In place of metoprolol tartrate. 90 Tab 1    amLODIPine (NORVASC) 5 mg tablet Take 1 Tab by mouth daily. 30 Tab 2    losartan (COZAAR) 25 mg tablet Take 1 Tab by mouth daily. 30 Tab 3    aspirin 81 mg chewable tablet Take 2 Tabs by mouth daily. 200 Tab 0    therapeutic multivitamin (THERAGRAN) tablet Take 1 Tab by mouth daily. 30 Tab 0    thiamine (B-1) 100 mg tablet Take 1 Tab by mouth daily. 30 Tab 0    diphenhydrAMINE (BENADRYL) 25 mg capsule Take 25 mg by mouth as needed for Itching.  ibuprofen (MOTRIN) 200 mg tablet Take 200 mg by mouth as needed for Pain. Past History     Past Medical History:  Past Medical History:   Diagnosis Date    Hypercholesterolemia     Hypertension        Past Surgical History:  Past Surgical History:   Procedure Laterality Date    HX ORTHOPAEDIC      rotator cuff repair       Family History:  Family History   Problem Relation Age of Onset   [facility] Stroke Father     Hypertension Father     Diabetes Sister        Social History:  Social History     Tobacco Use    Smoking status: Former Smoker     Years: 10.00     Quit date: 2014     Years since quittin.6    Smokeless tobacco: Never Used   Substance Use Topics    Alcohol use: Yes     Comment: pint a day    Drug use: Yes     Types: Cocaine     Comment: last used Friday       Allergies:  No Known Allergies      Review of Systems   Review of Systems   Constitutional: Negative for activity change and appetite change. HENT: Negative. Respiratory: Positive for chest tightness and shortness of breath. Cardiovascular: Positive for palpitations. Negative for chest pain and leg swelling. Gastrointestinal: Negative for abdominal pain. Genitourinary: Negative for difficulty urinating. Musculoskeletal: Negative for neck pain and neck stiffness. Neurological: Positive for syncope. All other systems reviewed and are negative.       Physical Exam   Physical Exam   Vital signs and nursing notes reviewed      CONSTITUTIONAL: Alert, in mild distress; well-developed; well-nourished. HEAD:  Normocephalic, atraumatic  EYES: PERRL; EOM's intact. ENTM: Nose: no rhinorrhea; Throat: no erythema or exudate, mucous membranes moist  Neck:  Supple. trachea is midline. No carotid bruits bilaterally. RESP: Chest clear, equal breath sounds. - W/R/R  CV: S1 and S2 WNL; No murmurs, gallops or rubs. 2+ radial and DP pulses bilaterally. GI: non-distended, normal bowel sounds, abdomen soft and non-tender. No masses or organomegaly. : No costo-vertebral angle tenderness. BACK:  Non-tender, normal appearance  UPPER EXT:  Normal inspection. no joint or soft tissue swelling  LOWER EXT: No edema, no calf tenderness. NEURO: Alert and oriented x3, 5/5 strength and light touch sensation intact in bilateral upper and lower extremities. SKIN: No rashes;  Warm and dry  PSYCH: Normal mood, normal affect    Diagnostic Study Results     Labs -     Recent Results (from the past 12 hour(s))   EKG, 12 LEAD, INITIAL    Collection Time: 09/28/21  9:27 AM   Result Value Ref Range    Ventricular Rate 84 BPM    Atrial Rate 84 BPM    P-R Interval 140 ms    QRS Duration 86 ms    Q-T Interval 394 ms    QTC Calculation (Bezet) 465 ms    Calculated P Axis 44 degrees    Calculated R Axis 19 degrees    Calculated T Axis 29 degrees    Diagnosis       Normal sinus rhythm  Nonspecific T wave abnormality  When compared with ECG of 19-AUG-2019 13:53,  No significant change was found  Confirmed by Alen Hartley MD, Srinivasan Morris ([id number]) on 9/28/2021 12:55:30 PM     CBC WITH AUTOMATED DIFF    Collection Time: 09/28/21  9:41 AM   Result Value Ref Range    WBC 5.4 4.1 - 11.1 K/uL    RBC 2.61 (L) 4.10 - 5.70 M/uL    HGB 9.0 (L) 12.1 - 17.0 g/dL    HCT 27.0 (L) 36.6 - 50.3 %    .4 (H) 80.0 - 99.0 FL    MCH 34.5 (H) 26.0 - 34.0 PG    MCHC 33.3 30.0 - 36.5 g/dL    RDW 16.7 (H) 11.5 - 14.5 %    PLATELET 587 (L) 240 - 400 K/uL    NRBC 1.1 (H) 0  WBC    ABSOLUTE NRBC 0.06 (H) 0.00 - 0.01 K/uL    NEUTROPHILS 51 32 - 75 %    LYMPHOCYTES 35 12 - 49 %    MONOCYTES 12 5 - 13 %    EOSINOPHILS 1 0 - 7 %    BASOPHILS 0 0 - 1 %    IMMATURE GRANULOCYTES 1 (H) 0.0 - 0.5 %    ABS. NEUTROPHILS 2.7 1.8 - 8.0 K/UL    ABS. LYMPHOCYTES 1.9 0.8 - 3.5 K/UL    ABS. MONOCYTES 0.6 0.0 - 1.0 K/UL    ABS. EOSINOPHILS 0.1 0.0 - 0.4 K/UL    ABS. BASOPHILS 0.0 0.0 - 0.1 K/UL    ABS. IMM. GRANS. 0.1 (H) 0.00 - 0.04 K/UL    DF SMEAR SCANNED      PLATELET COMMENTS Giant platelets      RBC COMMENTS ANISOCYTOSIS  1+        RBC COMMENTS TARGET CELLS  2+       METABOLIC PANEL, COMPREHENSIVE    Collection Time: 09/28/21  9:41 AM   Result Value Ref Range    Sodium 137 136 - 145 mmol/L    Potassium 3.6 3.5 - 5.1 mmol/L    Chloride 104 97 - 108 mmol/L    CO2 22 21 - 32 mmol/L    Anion gap 11 5 - 15 mmol/L    Glucose 116 (H) 65 - 100 mg/dL    BUN 13 6 - 20 MG/DL    Creatinine 1.24 0.70 - 1.30 MG/DL    BUN/Creatinine ratio 10 (L) 12 - 20      GFR est AA >60 >60 ml/min/1.73m2    GFR est non-AA 59 (L) >60 ml/min/1.73m2    Calcium 8.7 8.5 - 10.1 MG/DL    Bilirubin, total 0.9 0.2 - 1.0 MG/DL    ALT (SGPT) 167 (H) 12 - 78 U/L    AST (SGOT) 396 (H) 15 - 37 U/L    Alk. phosphatase 183 (H) 45 - 117 U/L    Protein, total 7.4 6.4 - 8.2 g/dL    Albumin 3.4 (L) 3.5 - 5.0 g/dL    Globulin 4.0 2.0 - 4.0 g/dL    A-G Ratio 0.9 (L) 1.1 - 2.2     TROPONIN I    Collection Time: 09/28/21  9:41 AM   Result Value Ref Range    Troponin-I, Qt. <0.05 <0.05 ng/mL   MAGNESIUM    Collection Time: 09/28/21  9:41 AM   Result Value Ref Range    Magnesium 1.4 (L) 1.6 - 2.4 mg/dL       Radiologic Studies -   CTA HEAD NECK W CONT   Final Result   CT Head:   1. No evidence of acute intracranial abnormality. 2. Unchanged generalized parenchymal volume loss and mild chronic microvascular   ischemic disease. CTA Head:   1. No evidence of significant stenosis or aneurysm. CTA Neck:   1.  Severe stenosis at the origin of the right vertebral artery from calcified   plaque. The right vertebral artery is otherwise widely patent. 2. No other significant arterial stenosis. CTA CHEST W OR W WO CONT   Final Result   No acute abnormality. Hepatic steatosis. XR CHEST PORT   Final Result   No acute process. MRI BRAIN WO CONT    (Results Pending)     CT Results  (Last 48 hours)               09/28/21 1115  CTA HEAD NECK W CONT Final result    Impression:  CT Head:   1. No evidence of acute intracranial abnormality. 2. Unchanged generalized parenchymal volume loss and mild chronic microvascular   ischemic disease. CTA Head:   1. No evidence of significant stenosis or aneurysm. CTA Neck:   1. Severe stenosis at the origin of the right vertebral artery from calcified   plaque. The right vertebral artery is otherwise widely patent. 2. No other significant arterial stenosis. Narrative:  EXAM:  CTA HEAD NECK W CONT       INDICATION:   Syncope with raising arms above head, eval for subclavian   compression, vertebral artery stenosis       COMPARISON:  CT head 8/19/2019. CONTRAST:  100 mL of Isovue-370. TECHNIQUE:  Unenhanced  images were obtained to localize the volume for   acquisition. Multislice helical axial CT angiography was performed from the   aortic arch to the top of the head during uneventful rapid bolus intravenous   contrast administration. Coronal and sagittal reformations and 3D post   processing was performed. CT dose reduction was achieved through use of a   standardized protocol tailored for this examination and automatic exposure   control for dose modulation. FINDINGS:       CT:   Unchanged generalized parenchymal volume loss with commensurate dilation of the   sulci and ventricular system. Unchanged scattered periventricular and deep white   matter hypodensities, consistent with mild chronic microangiopathic ischemic   changes.  Basilar cisterns are patent. No midline shift. There is no evidence of   acute infarct, hemorrhage, or extraaxial fluid collection. No evidence of   abnormal enhancement. Small retention cyst in the left maxillary sinus. The mastoid air cells and   middle ears are clear. The orbital contents are within normal limits. There are   no significant osseous or extracranial soft tissue lesions. CTA Head:   There is no evidence of large vessel occlusion or flow-limiting stenosis of the   intracranial internal carotid, anterior cerebral, and middle cerebral arteries. Incidental small fenestration in the left carotid terminus. Mild calcification   of the bilateral carotid siphons without significant stenosis. The anterior   communicating artery is patent. There is no evidence of large vessel occlusion or flow-limiting stenosis of the   intracranial vertebral arteries, basilar artery, or posterior cerebral arteries. The right posterior communicating artery is patent, the left is not seen. There is no evidence of aneurysm or vascular malformation. The dural venous   sinuses and deep cerebral venous system are patent. CTA NECK:   NASCET method was utilized for calculating stenosis. The aortic arch is unremarkable. The common carotid arteries demonstrate no   significant stenosis. There is no evidence of significant stenosis in the   cervical right internal carotid artery. There is no evidence of significant   stenosis in the cervical left internal carotid artery. There is a codominant vertebrobasilar arterial system. Severe stenosis at the   right vertebral artery origin from calcified plaque. The distal right vertebral   artery remains widely patent. No significant stenosis of the cervical left   vertebral artery. Visualized soft tissues of the neck are unremarkable. Visualized lung apices are   clear. No acute fracture or aggressive osseous lesion.  Multilevel degenerative   disc disease in the cervical spine. 09/28/21 1115  CTA 1144 [facility] CONT Final result    Impression:  No acute abnormality. Hepatic steatosis. Narrative:  EXAM:  CTA CHEST W OR W WO CONT       INDICATION:   History of syncope with standing and raising arms above head, eval   for aortic aneurysm, dilatation, dissection       COMPARISON: None. TECHNIQUE:    Precontrast  images were obtained to localize the volume for acquisition. Multislice helical CT arteriography was performed from the diaphragm to the   thoracic inlet during uneventful rapid bolus of 100 cc Isovue-370. Lung and soft   tissue windows were generated. Coronal and sagittal images were generated and   3D post processing consisting of coronal maximum intensity images was performed. CT dose reduction was achieved through use of a standardized protocol tailored   for this examination and automatic exposure control for dose modulation. FINDINGS:   CHEST:   THYROID: No nodule. MEDIASTINUM: No mass or lymphadenopathy. ANDRES: No mass or lymphadenopathy. THORACIC AORTA: No dissection or aneurysm. MAIN PULMONARY ARTERY: Normal in caliber. TRACHEA/BRONCHI: Patent. ESOPHAGUS: No wall thickening or dilatation. HEART: Normal in size. PLEURA: No effusion or pneumothorax. LUNGS: Lingular scarring is noted. Otherwise no acute abnormality is identified. INCIDENTALLY IMAGED UPPER ABDOMEN: Hepatic steatosis. BONES: Degenerative changes are seen in the thoracic spine. CXR Results  (Last 48 hours)               09/28/21 1002  XR CHEST PORT Final result    Impression:  No acute process. Narrative: Indication: Chest pain       Comparison: 8/19/2019       Portable exam of the chest obtained at 1002 demonstrates normal heart size. There is no acute process in the lung fields. The osseous structures are   unremarkable.                  Medical Decision Making   I am the first provider for this patient. I reviewed the vital signs, available nursing notes, past medical history, past surgical history, family history and social history. Vital Signs-Reviewed the patient's vital signs. Patient Vitals for the past 12 hrs:   Temp Pulse Resp BP SpO2   09/28/21 1400  90 22 130/73 100 %   09/28/21 1211     100 %   09/28/21 1018  85  (!) 149/91    09/28/21 0907 98.2 °F (36.8 °C) 100 16 (!) 161/91 100 %       EKG interpretation: (Preliminary)  EKG performed at 9:27 AM shows normal sinus rhythm at a rate 84, normal axis, normal intervals except for prolonged QT, no visible acute ischemic changes. Records Reviewed: Nursing Notes, Old Medical Records and Previous Radiology Studies    Provider Notes (Medical Decision Making):   48-year-old male here with episodes of syncope. Differential includes orthostatic hypotension, dysrhythmia, electrolyte derangement, subclavian steal, vertebrobasilar insufficiency. Plan for cardiac monitoring, check electrolytes, orthostatics, CTA of the neck head and chest.  Patient exhibiting no neurological deficits on presentation. No seizure history related though cannot completely rule out today. ED Course:   Initial assessment performed. The patients presenting problems have been discussed, and they are in agreement with the care plan formulated and outlined with them. I have encouraged them to ask questions as they arise throughout their visit. ED Course as of Oct 04 1507   Tue Sep 28, 2021   1333 Spoke to Dr. Cathy Alvarez from [facility] neuro interventional service. He is reviewing the CTA with the right vertebral artery finding and will call back. [TL]   [address] [facility] [address] to Dr. Catherine Angulo. Recommended CTA with arm up. Confirm with patient that CTA was done with his arm up. We will plan for MR to rule out stroke, if negative, can go home on his aspirin 81 and follow-up with neuro interventionalists and cardiology. If positive for stroke, will need admission.     [TL]   7125 MRI negative. Results discussed with the patient. Will dc to follow up with appropriate outpatient physicians. Return precautions discussed. [JM]      ED Course User Index  [JM] Elizabeth Del Rio MD  [TL] Martita Cortez MD     3:17 PM  Signing outpatient to Dr. Avel Castro. Disposition is dependent on patient's MRI outcome. If MRI negative, can be discharged, if MRI positive, should be admitted for stroke work-up. Disposition:    Discharge    Discharge Note:  1743 hrs  The pt is ready for discharge. The pt's signs, symptoms, diagnosis, and discharge instructions have been discussed and pt has conveyed their understanding. The pt is to follow up as recommended or return to ER should their symptoms worsen. Plan has been discussed and pt is in agreement. DISCHARGE PLAN:  1. Current Discharge Medication List        2. Follow-up Information     Follow up With Specialties Details Why Contact Info    Gisselle White MD Endovascular Surgical Neuroradiology  Please call the neuro intervention clinic for a follow-up appointment with Dr. Sangeeta Romero one of his colleagues regarding your CAT scan results. [place] [id number]      Goran Arthur MD Cardiology, [address] [place] [address] Vascular Surgery, Internal Medicine  Please call the cardiology office for a follow-up about your syncopal episodes. [address] [place] [address]  [phone]      [facility] EMERGENCY DEPT Emergency Medicine  If symptoms worsen including repeated passing out episodes or new onset weakness in her face arms or legs. [address] [place] [address]  [phone]        3. Return to ED if worse     Diagnosis     Clinical Impression:   1. Syncope, unspecified syncope type    2. Vertebral artery stenosis, right        Attestations:    Jatinder Fung MD    Please note that this dictation was completed with [employer], the [employer] voice recognition software.   Quite often unanticipated grammatical, syntax, homophones, and other interpretive errors are inadvertently transcribed by the computer software. Please disregard these errors. Please excuse any errors that have escaped final proofreading. Thank you.

## 2021-09-28 NOTE — PROGRESS NOTES
MRI Pending:    Need completed online [employer] MRI screening form. Sign and fax to [phone]. Call [phone] once faxed.

## 2021-09-28 NOTE — DISCHARGE INSTRUCTIONS
He was seen here in the emergency department for episodes of passing out. The cause of your passing out episodes is unclear and you need to follow-up as.  1 with the neuro neuro interventionalist, Dr. Alyse Carlos. The second with a cardiologist.  Follow-up phone numbers are provided and you should call for follow-up appointments within the next 1 to 2 weeks. It is important that she get seen by both physicians to follow-up on the cause of your symptoms.

## 2022-03-18 PROBLEM — R74.8 ELEVATED LIVER ENZYMES: Status: ACTIVE | Noted: 2020-01-12

## 2022-03-19 PROBLEM — K70.10 ALCOHOLIC HEPATITIS WITHOUT ASCITES: Status: ACTIVE | Noted: 2020-01-12

## 2022-03-19 PROBLEM — I10 ESSENTIAL HYPERTENSION: Status: ACTIVE | Noted: 2017-11-13

## 2022-03-19 PROBLEM — F10.10 ALCOHOL ABUSE: Status: ACTIVE | Noted: 2017-11-15

## 2022-03-19 PROBLEM — R79.89 ELEVATED SERUM CREATININE: Status: ACTIVE | Noted: 2020-01-12

## 2022-12-25 ENCOUNTER — HOSPITAL ENCOUNTER (INPATIENT)
Age: 62
LOS: 25 days | Discharge: NURSING, REHAB OR LONG-TERM CARE | DRG: 950 | End: 2023-01-19
Attending: EMERGENCY MEDICINE | Admitting: STUDENT IN AN ORGANIZED HEALTH CARE EDUCATION/TRAINING PROGRAM
Payer: COMMERCIAL

## 2022-12-25 ENCOUNTER — APPOINTMENT (OUTPATIENT)
Dept: CT IMAGING | Age: 62
DRG: 950 | End: 2022-12-25
Attending: EMERGENCY MEDICINE
Payer: COMMERCIAL

## 2022-12-25 ENCOUNTER — APPOINTMENT (OUTPATIENT)
Dept: GENERAL RADIOLOGY | Age: 62
DRG: 950 | End: 2022-12-25
Attending: EMERGENCY MEDICINE
Payer: COMMERCIAL

## 2022-12-25 DIAGNOSIS — M54.16 LUMBAR BACK PAIN WITH RADICULOPATHY AFFECTING LEFT LOWER EXTREMITY: ICD-10-CM

## 2022-12-25 DIAGNOSIS — I67.89 CEREBRAL MICROVASCULAR DISEASE: ICD-10-CM

## 2022-12-25 DIAGNOSIS — E87.29 ALCOHOLIC KETOACIDOSIS: Primary | ICD-10-CM

## 2022-12-25 DIAGNOSIS — G54.1 NONTRAUMATIC LUMBOSACRAL PLEXOPATHY: ICD-10-CM

## 2022-12-25 DIAGNOSIS — I65.23 BILATERAL CAROTID ARTERY STENOSIS: ICD-10-CM

## 2022-12-25 DIAGNOSIS — R29.898 LEFT LEG WEAKNESS: ICD-10-CM

## 2022-12-25 DIAGNOSIS — I26.99 OTHER ACUTE PULMONARY EMBOLISM WITHOUT ACUTE COR PULMONALE (HCC): ICD-10-CM

## 2022-12-25 LAB
ALBUMIN SERPL-MCNC: 3.4 G/DL (ref 3.5–5)
ALBUMIN/GLOB SERPL: 0.7 (ref 1.1–2.2)
ALP SERPL-CCNC: 223 U/L (ref 45–117)
ALT SERPL-CCNC: 91 U/L (ref 12–78)
ANION GAP SERPL CALC-SCNC: 21 MMOL/L (ref 5–15)
ANION GAP SERPL CALC-SCNC: 30 MMOL/L (ref 5–15)
AST SERPL-CCNC: 354 U/L (ref 15–37)
BASE DEFICIT BLD-SCNC: 14.6 MMOL/L
BASOPHILS # BLD: 0 K/UL (ref 0–0.1)
BASOPHILS NFR BLD: 0 % (ref 0–1)
BILIRUB SERPL-MCNC: 1 MG/DL (ref 0.2–1)
BNP SERPL-MCNC: 186 PG/ML
BUN SERPL-MCNC: 11 MG/DL (ref 6–20)
BUN SERPL-MCNC: 11 MG/DL (ref 6–20)
BUN/CREAT SERPL: 10 (ref 12–20)
BUN/CREAT SERPL: 8 (ref 12–20)
CA-I BLD-MCNC: 0.95 MMOL/L (ref 1.12–1.32)
CALCIUM SERPL-MCNC: 7.4 MG/DL (ref 8.5–10.1)
CALCIUM SERPL-MCNC: 8.4 MG/DL (ref 8.5–10.1)
CHLORIDE BLD-SCNC: 109 MMOL/L (ref 100–108)
CHLORIDE SERPL-SCNC: 101 MMOL/L (ref 97–108)
CHLORIDE SERPL-SCNC: 103 MMOL/L (ref 97–108)
CK SERPL-CCNC: 189 U/L (ref 39–308)
CO2 BLD-SCNC: 10 MMOL/L (ref 19–24)
CO2 SERPL-SCNC: 17 MMOL/L (ref 21–32)
CO2 SERPL-SCNC: 9 MMOL/L (ref 21–32)
COMMENT, HOLDF: NORMAL
CREAT SERPL-MCNC: 1.1 MG/DL (ref 0.7–1.3)
CREAT SERPL-MCNC: 1.3 MG/DL (ref 0.7–1.3)
CREAT UR-MCNC: 1.2 MG/DL (ref 0.6–1.3)
DIFFERENTIAL METHOD BLD: ABNORMAL
EOSINOPHIL # BLD: 0 K/UL (ref 0–0.4)
EOSINOPHIL NFR BLD: 0 % (ref 0–7)
ERYTHROCYTE [DISTWIDTH] IN BLOOD BY AUTOMATED COUNT: 19 % (ref 11.5–14.5)
GLOBULIN SER CALC-MCNC: 4.6 G/DL (ref 2–4)
GLUCOSE BLD STRIP.AUTO-MCNC: 185 MG/DL (ref 65–117)
GLUCOSE BLD STRIP.AUTO-MCNC: 43 MG/DL (ref 74–106)
GLUCOSE SERPL-MCNC: 65 MG/DL (ref 65–100)
GLUCOSE SERPL-MCNC: 67 MG/DL (ref 65–100)
HCO3 BLDA-SCNC: 11 MMOL/L
HCT VFR BLD AUTO: 30.9 % (ref 36.6–50.3)
HGB BLD-MCNC: 9.8 G/DL (ref 12.1–17)
IMM GRANULOCYTES # BLD AUTO: 0.1 K/UL (ref 0–0.04)
IMM GRANULOCYTES NFR BLD AUTO: 1 % (ref 0–0.5)
LACTATE BLD-SCNC: 13.24 MMOL/L (ref 0.4–2)
LACTATE SERPL-SCNC: 9.3 MMOL/L (ref 0.4–2)
LYMPHOCYTES # BLD: 0.6 K/UL (ref 0.8–3.5)
LYMPHOCYTES NFR BLD: 7 % (ref 12–49)
MAGNESIUM SERPL-MCNC: 1.9 MG/DL (ref 1.6–2.4)
MCH RBC QN AUTO: 36.2 PG (ref 26–34)
MCHC RBC AUTO-ENTMCNC: 31.7 G/DL (ref 30–36.5)
MCV RBC AUTO: 114 FL (ref 80–99)
MONOCYTES # BLD: 0.6 K/UL (ref 0–1)
MONOCYTES NFR BLD: 8 % (ref 5–13)
NEUTS SEG # BLD: 6.6 K/UL (ref 1.8–8)
NEUTS SEG NFR BLD: 84 % (ref 32–75)
NRBC # BLD: 0.2 K/UL (ref 0–0.01)
NRBC BLD-RTO: 2.5 PER 100 WBC
PCO2 BLDV: 23.7 MMHG (ref 41–51)
PH BLDV: 7.26 (ref 7.32–7.42)
PHOSPHATE SERPL-MCNC: 5 MG/DL (ref 2.6–4.7)
PLATELET # BLD AUTO: 288 K/UL (ref 150–400)
PLATELET COMMENTS,PCOM: ABNORMAL
PMV BLD AUTO: 11.9 FL (ref 8.9–12.9)
PO2 BLDV: 28 MMHG (ref 25–40)
POTASSIUM BLD-SCNC: 5.2 MMOL/L (ref 3.5–5.5)
POTASSIUM SERPL-SCNC: 4.5 MMOL/L (ref 3.5–5.1)
POTASSIUM SERPL-SCNC: 5.1 MMOL/L (ref 3.5–5.1)
PROT SERPL-MCNC: 8 G/DL (ref 6.4–8.2)
RBC # BLD AUTO: 2.71 M/UL (ref 4.1–5.7)
RBC MORPH BLD: ABNORMAL
RBC MORPH BLD: ABNORMAL
SAMPLES BEING HELD,HOLD: NORMAL
SERVICE CMNT-IMP: ABNORMAL
SODIUM BLD-SCNC: 143 MMOL/L (ref 136–145)
SODIUM SERPL-SCNC: 139 MMOL/L (ref 136–145)
SODIUM SERPL-SCNC: 142 MMOL/L (ref 136–145)
SPECIMEN SITE: ABNORMAL
TROPONIN-HIGH SENSITIVITY: 7 NG/L (ref 0–76)
WBC # BLD AUTO: 7.9 K/UL (ref 4.1–11.1)

## 2022-12-25 PROCEDURE — 65660000001 HC RM ICU INTERMED STEPDOWN

## 2022-12-25 PROCEDURE — 74011000258 HC RX REV CODE- 258: Performed by: STUDENT IN AN ORGANIZED HEALTH CARE EDUCATION/TRAINING PROGRAM

## 2022-12-25 PROCEDURE — 74011000636 HC RX REV CODE- 636: Performed by: EMERGENCY MEDICINE

## 2022-12-25 PROCEDURE — 71046 X-RAY EXAM CHEST 2 VIEWS: CPT

## 2022-12-25 PROCEDURE — 74011000250 HC RX REV CODE- 250

## 2022-12-25 PROCEDURE — 83605 ASSAY OF LACTIC ACID: CPT

## 2022-12-25 PROCEDURE — 96374 THER/PROPH/DIAG INJ IV PUSH: CPT

## 2022-12-25 PROCEDURE — 82330 ASSAY OF CALCIUM: CPT

## 2022-12-25 PROCEDURE — 85025 COMPLETE CBC W/AUTO DIFF WBC: CPT

## 2022-12-25 PROCEDURE — 74011000250 HC RX REV CODE- 250: Performed by: STUDENT IN AN ORGANIZED HEALTH CARE EDUCATION/TRAINING PROGRAM

## 2022-12-25 PROCEDURE — 71275 CT ANGIOGRAPHY CHEST: CPT

## 2022-12-25 PROCEDURE — 74011250636 HC RX REV CODE- 250/636: Performed by: EMERGENCY MEDICINE

## 2022-12-25 PROCEDURE — 84484 ASSAY OF TROPONIN QUANT: CPT

## 2022-12-25 PROCEDURE — 83735 ASSAY OF MAGNESIUM: CPT

## 2022-12-25 PROCEDURE — 84100 ASSAY OF PHOSPHORUS: CPT

## 2022-12-25 PROCEDURE — 93005 ELECTROCARDIOGRAM TRACING: CPT

## 2022-12-25 PROCEDURE — 82962 GLUCOSE BLOOD TEST: CPT

## 2022-12-25 PROCEDURE — 80053 COMPREHEN METABOLIC PANEL: CPT

## 2022-12-25 PROCEDURE — 36415 COLL VENOUS BLD VENIPUNCTURE: CPT

## 2022-12-25 PROCEDURE — 83880 ASSAY OF NATRIURETIC PEPTIDE: CPT

## 2022-12-25 PROCEDURE — 74011000250 HC RX REV CODE- 250: Performed by: EMERGENCY MEDICINE

## 2022-12-25 PROCEDURE — 96372 THER/PROPH/DIAG INJ SC/IM: CPT

## 2022-12-25 PROCEDURE — 99285 EMERGENCY DEPT VISIT HI MDM: CPT

## 2022-12-25 PROCEDURE — 74011250637 HC RX REV CODE- 250/637: Performed by: STUDENT IN AN ORGANIZED HEALTH CARE EDUCATION/TRAINING PROGRAM

## 2022-12-25 PROCEDURE — 82550 ASSAY OF CK (CPK): CPT

## 2022-12-25 RX ORDER — ONDANSETRON 2 MG/ML
4 INJECTION INTRAMUSCULAR; INTRAVENOUS
Status: DISCONTINUED | OUTPATIENT
Start: 2022-12-25 | End: 2023-01-20 | Stop reason: HOSPADM

## 2022-12-25 RX ORDER — DIAZEPAM 5 MG/1
10 TABLET ORAL
Status: DISCONTINUED | OUTPATIENT
Start: 2022-12-25 | End: 2023-01-02

## 2022-12-25 RX ORDER — SODIUM CHLORIDE 0.9 % (FLUSH) 0.9 %
5-40 SYRINGE (ML) INJECTION AS NEEDED
Status: DISCONTINUED | OUTPATIENT
Start: 2022-12-25 | End: 2023-01-20 | Stop reason: HOSPADM

## 2022-12-25 RX ORDER — PHENOBARBITAL 32.4 MG/1
64.8 TABLET ORAL 3 TIMES DAILY
Status: COMPLETED | OUTPATIENT
Start: 2022-12-28 | End: 2022-12-29

## 2022-12-25 RX ORDER — DIAZEPAM 5 MG/1
20 TABLET ORAL
Status: DISCONTINUED | OUTPATIENT
Start: 2022-12-25 | End: 2023-01-02

## 2022-12-25 RX ORDER — DIAZEPAM 10 MG/2ML
10 INJECTION INTRAMUSCULAR
Status: DISCONTINUED | OUTPATIENT
Start: 2022-12-25 | End: 2022-12-25

## 2022-12-25 RX ORDER — SODIUM CHLORIDE 0.9 % (FLUSH) 0.9 %
5-40 SYRINGE (ML) INJECTION EVERY 8 HOURS
Status: DISCONTINUED | OUTPATIENT
Start: 2022-12-25 | End: 2023-01-20 | Stop reason: HOSPADM

## 2022-12-25 RX ORDER — LANOLIN ALCOHOL/MO/W.PET/CERES
400 CREAM (GRAM) TOPICAL DAILY
Status: DISCONTINUED | OUTPATIENT
Start: 2022-12-26 | End: 2023-01-20 | Stop reason: HOSPADM

## 2022-12-25 RX ORDER — SODIUM BICARBONATE 1 MEQ/ML
100 SYRINGE (ML) INTRAVENOUS
Status: COMPLETED | OUTPATIENT
Start: 2022-12-25 | End: 2022-12-25

## 2022-12-25 RX ORDER — ENOXAPARIN SODIUM 100 MG/ML
1 INJECTION SUBCUTANEOUS
Status: COMPLETED | OUTPATIENT
Start: 2022-12-25 | End: 2022-12-25

## 2022-12-25 RX ORDER — ACETAMINOPHEN 650 MG/1
650 SUPPOSITORY RECTAL
Status: DISCONTINUED | OUTPATIENT
Start: 2022-12-25 | End: 2023-01-20 | Stop reason: HOSPADM

## 2022-12-25 RX ORDER — DEXTROSE MONOHYDRATE 100 MG/ML
INJECTION, SOLUTION INTRAVENOUS
Status: COMPLETED
Start: 2022-12-25 | End: 2022-12-25

## 2022-12-25 RX ORDER — ACETAMINOPHEN 325 MG/1
650 TABLET ORAL
Status: DISCONTINUED | OUTPATIENT
Start: 2022-12-25 | End: 2022-12-25

## 2022-12-25 RX ORDER — PHENOBARBITAL 32.4 MG/1
32.4 TABLET ORAL 3 TIMES DAILY
Status: DISCONTINUED | OUTPATIENT
Start: 2022-12-30 | End: 2022-12-30

## 2022-12-25 RX ORDER — FOLIC ACID 1 MG/1
1 TABLET ORAL DAILY
Status: DISCONTINUED | OUTPATIENT
Start: 2022-12-26 | End: 2023-01-20 | Stop reason: HOSPADM

## 2022-12-25 RX ORDER — POLYETHYLENE GLYCOL 3350 17 G/17G
17 POWDER, FOR SOLUTION ORAL DAILY PRN
Status: DISCONTINUED | OUTPATIENT
Start: 2022-12-25 | End: 2023-01-20 | Stop reason: HOSPADM

## 2022-12-25 RX ORDER — ONDANSETRON 4 MG/1
4 TABLET, ORALLY DISINTEGRATING ORAL
Status: DISCONTINUED | OUTPATIENT
Start: 2022-12-25 | End: 2023-01-20 | Stop reason: HOSPADM

## 2022-12-25 RX ORDER — DIAZEPAM 10 MG/2ML
20 INJECTION INTRAMUSCULAR
Status: DISCONTINUED | OUTPATIENT
Start: 2022-12-25 | End: 2022-12-25

## 2022-12-25 RX ORDER — DEXTROSE MONOHYDRATE AND SODIUM CHLORIDE 5; .9 G/100ML; G/100ML
100 INJECTION, SOLUTION INTRAVENOUS CONTINUOUS
Status: DISPENSED | OUTPATIENT
Start: 2022-12-25 | End: 2022-12-26

## 2022-12-25 RX ORDER — PHENOBARBITAL 32.4 MG/1
130 TABLET ORAL ONCE
Status: COMPLETED | OUTPATIENT
Start: 2022-12-25 | End: 2022-12-25

## 2022-12-25 RX ORDER — ACETAMINOPHEN 325 MG/1
650 TABLET ORAL
Status: DISCONTINUED | OUTPATIENT
Start: 2022-12-25 | End: 2023-01-20 | Stop reason: HOSPADM

## 2022-12-25 RX ORDER — PHENOBARBITAL 32.4 MG/1
97.2 TABLET ORAL 3 TIMES DAILY
Status: COMPLETED | OUTPATIENT
Start: 2022-12-26 | End: 2022-12-27

## 2022-12-25 RX ORDER — DEXTROSE MONOHYDRATE 100 MG/ML
0-250 INJECTION, SOLUTION INTRAVENOUS AS NEEDED
Status: DISCONTINUED | OUTPATIENT
Start: 2022-12-25 | End: 2022-12-25

## 2022-12-25 RX ORDER — ASPIRIN 325 MG/1
100 TABLET, FILM COATED ORAL DAILY
Status: DISCONTINUED | OUTPATIENT
Start: 2022-12-26 | End: 2023-01-20 | Stop reason: HOSPADM

## 2022-12-25 RX ADMIN — DEXTROSE AND SODIUM CHLORIDE 100 ML/HR: 5; 900 INJECTION, SOLUTION INTRAVENOUS at 22:17

## 2022-12-25 RX ADMIN — SODIUM BICARBONATE 100 MEQ: 84 INJECTION INTRAVENOUS at 18:26

## 2022-12-25 RX ADMIN — ENOXAPARIN SODIUM 100 MG: 100 INJECTION SUBCUTANEOUS at 20:22

## 2022-12-25 RX ADMIN — PHENOBARBITAL 130 MG: 32.4 TABLET ORAL at 21:10

## 2022-12-25 RX ADMIN — THIAMINE HYDROCHLORIDE: 100 INJECTION, SOLUTION INTRAMUSCULAR; INTRAVENOUS at 18:15

## 2022-12-25 RX ADMIN — SODIUM CHLORIDE 1000 ML: 9 INJECTION, SOLUTION INTRAVENOUS at 18:27

## 2022-12-25 RX ADMIN — DEXTROSE MONOHYDRATE: 10 INJECTION, SOLUTION INTRAVENOUS at 18:35

## 2022-12-25 RX ADMIN — DIAZEPAM 10 MG: 5 TABLET ORAL at 20:38

## 2022-12-25 RX ADMIN — IOPAMIDOL 100 ML: 755 INJECTION, SOLUTION INTRAVENOUS at 19:22

## 2022-12-25 RX ADMIN — SODIUM CHLORIDE, PRESERVATIVE FREE 10 ML: 5 INJECTION INTRAVENOUS at 23:33

## 2022-12-25 NOTE — ED TRIAGE NOTES
Pt arrives to ed via ems w reports of sob, cp, nausea onset 2 days pta. Reports passing out yesterday, busted lip, swelling present to bottom lip in triage w dried blood. Given 4mg zofran pta.

## 2022-12-26 LAB
ANION GAP SERPL CALC-SCNC: 11 MMOL/L (ref 5–15)
ANION GAP SERPL CALC-SCNC: 7 MMOL/L (ref 5–15)
BASOPHILS # BLD: 0 K/UL (ref 0–0.1)
BASOPHILS NFR BLD: 0 % (ref 0–1)
BUN SERPL-MCNC: 9 MG/DL (ref 6–20)
BUN SERPL-MCNC: 9 MG/DL (ref 6–20)
BUN/CREAT SERPL: 7 (ref 12–20)
BUN/CREAT SERPL: 9 (ref 12–20)
CALCIUM SERPL-MCNC: 6.9 MG/DL (ref 8.5–10.1)
CALCIUM SERPL-MCNC: 7.3 MG/DL (ref 8.5–10.1)
CHLORIDE SERPL-SCNC: 101 MMOL/L (ref 97–108)
CHLORIDE SERPL-SCNC: 103 MMOL/L (ref 97–108)
CO2 SERPL-SCNC: 25 MMOL/L (ref 21–32)
CO2 SERPL-SCNC: 28 MMOL/L (ref 21–32)
CREAT SERPL-MCNC: 0.99 MG/DL (ref 0.7–1.3)
CREAT SERPL-MCNC: 1.21 MG/DL (ref 0.7–1.3)
DIFFERENTIAL METHOD BLD: ABNORMAL
EOSINOPHIL # BLD: 0 K/UL (ref 0–0.4)
EOSINOPHIL NFR BLD: 0 % (ref 0–7)
ERYTHROCYTE [DISTWIDTH] IN BLOOD BY AUTOMATED COUNT: 18 % (ref 11.5–14.5)
GLUCOSE SERPL-MCNC: 114 MG/DL (ref 65–100)
GLUCOSE SERPL-MCNC: 172 MG/DL (ref 65–100)
HCT VFR BLD AUTO: 23.2 % (ref 36.6–50.3)
HGB BLD-MCNC: 8 G/DL (ref 12.1–17)
IMM GRANULOCYTES # BLD AUTO: 0 K/UL (ref 0–0.04)
IMM GRANULOCYTES NFR BLD AUTO: 1 % (ref 0–0.5)
LACTATE SERPL-SCNC: 2.3 MMOL/L (ref 0.4–2)
LACTATE SERPL-SCNC: 2.3 MMOL/L (ref 0.4–2)
LACTATE SERPL-SCNC: 3.4 MMOL/L (ref 0.4–2)
LYMPHOCYTES # BLD: 1.6 K/UL (ref 0.8–3.5)
LYMPHOCYTES NFR BLD: 27 % (ref 12–49)
MCH RBC QN AUTO: 36.4 PG (ref 26–34)
MCHC RBC AUTO-ENTMCNC: 34.5 G/DL (ref 30–36.5)
MCV RBC AUTO: 105.5 FL (ref 80–99)
MONOCYTES # BLD: 0.6 K/UL (ref 0–1)
MONOCYTES NFR BLD: 11 % (ref 5–13)
NEUTS SEG # BLD: 3.5 K/UL (ref 1.8–8)
NEUTS SEG NFR BLD: 61 % (ref 32–75)
NRBC # BLD: 0.1 K/UL (ref 0–0.01)
NRBC BLD-RTO: 1.7 PER 100 WBC
PLATELET # BLD AUTO: 229 K/UL (ref 150–400)
PMV BLD AUTO: 11.2 FL (ref 8.9–12.9)
POTASSIUM SERPL-SCNC: 4.1 MMOL/L (ref 3.5–5.1)
POTASSIUM SERPL-SCNC: 4.2 MMOL/L (ref 3.5–5.1)
RBC # BLD AUTO: 2.2 M/UL (ref 4.1–5.7)
SODIUM SERPL-SCNC: 136 MMOL/L (ref 136–145)
SODIUM SERPL-SCNC: 139 MMOL/L (ref 136–145)
WBC # BLD AUTO: 5.8 K/UL (ref 4.1–11.1)

## 2022-12-26 PROCEDURE — 80048 BASIC METABOLIC PNL TOTAL CA: CPT

## 2022-12-26 PROCEDURE — 74011000250 HC RX REV CODE- 250: Performed by: STUDENT IN AN ORGANIZED HEALTH CARE EDUCATION/TRAINING PROGRAM

## 2022-12-26 PROCEDURE — 36415 COLL VENOUS BLD VENIPUNCTURE: CPT

## 2022-12-26 PROCEDURE — 74011250637 HC RX REV CODE- 250/637: Performed by: INTERNAL MEDICINE

## 2022-12-26 PROCEDURE — 83605 ASSAY OF LACTIC ACID: CPT

## 2022-12-26 PROCEDURE — 74011250637 HC RX REV CODE- 250/637: Performed by: STUDENT IN AN ORGANIZED HEALTH CARE EDUCATION/TRAINING PROGRAM

## 2022-12-26 PROCEDURE — 85025 COMPLETE CBC W/AUTO DIFF WBC: CPT

## 2022-12-26 PROCEDURE — 65660000001 HC RM ICU INTERMED STEPDOWN

## 2022-12-26 RX ADMIN — APIXABAN 10 MG: 5 TABLET, FILM COATED ORAL at 18:12

## 2022-12-26 RX ADMIN — THIAMINE HCL TAB 100 MG 100 MG: 100 TAB at 09:11

## 2022-12-26 RX ADMIN — FOLIC ACID 1 MG: 1 TABLET ORAL at 09:11

## 2022-12-26 RX ADMIN — PHENOBARBITAL 97.2 MG: 32.4 TABLET ORAL at 09:10

## 2022-12-26 RX ADMIN — SODIUM CHLORIDE, PRESERVATIVE FREE 10 ML: 5 INJECTION INTRAVENOUS at 05:24

## 2022-12-26 RX ADMIN — APIXABAN 10 MG: 5 TABLET, FILM COATED ORAL at 09:11

## 2022-12-26 RX ADMIN — SODIUM CHLORIDE, PRESERVATIVE FREE 10 ML: 5 INJECTION INTRAVENOUS at 16:08

## 2022-12-26 RX ADMIN — PHENOBARBITAL 97.2 MG: 32.4 TABLET ORAL at 16:05

## 2022-12-26 RX ADMIN — MAGNESIUM OXIDE TAB 400 MG (241.3 MG ELEMENTAL MG) 400 MG: 400 (241.3 MG) TAB at 09:11

## 2022-12-26 RX ADMIN — MULTIPLE VITAMINS W/ MINERALS TAB 1 TABLET: TAB at 09:10

## 2022-12-26 RX ADMIN — PHENOBARBITAL 97.2 MG: 32.4 TABLET ORAL at 21:53

## 2022-12-26 RX ADMIN — SODIUM CHLORIDE, PRESERVATIVE FREE 10 ML: 5 INJECTION INTRAVENOUS at 21:56

## 2022-12-26 NOTE — ED PROVIDER NOTES
EMERGENCY DEPARTMENT HISTORY AND PHYSICAL EXAM      Date: 12/25/2022  Patient Name: Lashawn Alonso    History of Presenting Illness     Chief Complaint   Patient presents with    Shortness of Breath    Chest Pain    Nausea    Alcohol intoxication       History Provided By: Patient    HPI: Lashawn Alonso, 60 y.o. male presents to the ED with cc of syncopal episode. Patient notes that he had not been feeling well for the past 2 days. He states he drinks approximately 1 L of gin a day. He states that he had been drinking yesterday and did not know if he had a syncopal event secondary to drinking to the point that he blacked out or if it had anything to do with his shortness of breath. However prior to the syncopal event he had had some shortness of breath as well as some sharp chest pain mild in severity. Patient states he has no chest pain at this time. He states he has continued to have some mild shortness of breath at rest worse with exertion. He denies any blood clot history. He denies any cardiac history. He denies any abdominal pain, nausea, vomiting or diarrhea. He denies any fever or chills. Denies any cough or cold symptoms. He denies any pain or swelling lower extremities. With a syncopal eventPatient started on propofol drip for sedation had been given 50 mg bolus. Will order Levophed to be used as needed if patient develops any hypotension. With the syncopal event did hit his mouth and had some lower lip swelling denies any headache, loss of sensation or loss of strength. He denies any neck or back pain. There are no other complaints, changes, or physical findings at this time. PCP: None    No current facility-administered medications on file prior to encounter. Current Outpatient Medications on File Prior to Encounter   Medication Sig Dispense Refill    metoprolol succinate (TOPROL-XL) 50 mg XL tablet Take 1 Tab by mouth daily. In place of metoprolol tartrate.  (Patient not taking: Reported on 2022) 90 Tab 1    losartan (COZAAR) 25 mg tablet Take 1 Tab by mouth daily. (Patient not taking: Reported on 2022) 30 Tab 3    [DISCONTINUED] amLODIPine (NORVASC) 5 mg tablet Take 1 Tab by mouth daily. 30 Tab 2    aspirin 81 mg chewable tablet Take 2 Tabs by mouth daily. (Patient not taking: Reported on 2022) 200 Tab 0    therapeutic multivitamin (THERAGRAN) tablet Take 1 Tab by mouth daily. (Patient not taking: Reported on 2022) 30 Tab 0    thiamine (B-1) 100 mg tablet Take 1 Tab by mouth daily. (Patient not taking: Reported on 2022) 30 Tab 0    diphenhydrAMINE (BENADRYL) 25 mg capsule Take 25 mg by mouth as needed for Itching. (Patient not taking: Reported on 2022)      ibuprofen (MOTRIN) 200 mg tablet Take 200 mg by mouth as needed for Pain. (Patient not taking: Reported on 2022)         Past History     Past Medical History:  Past Medical History:   Diagnosis Date    Hypercholesterolemia     Hypertension        Past Surgical History:  Past Surgical History:   Procedure Laterality Date    HX ORTHOPAEDIC      rotator cuff repair       Family History:  Family History   Problem Relation Age of Onset    Stroke Father     Hypertension Father     Diabetes Sister        Social History:  Social History     Tobacco Use    Smoking status: Former     Years: 10.00     Types: Cigarettes     Quit date: 2014     Years since quittin.9    Smokeless tobacco: Never   Substance Use Topics    Alcohol use: Yes     Comment: pint a day    Drug use: Yes     Types: Cocaine     Comment: last used Friday       Allergies:  No Known Allergies      Review of Systems   Review of Systems   Constitutional: Negative. Negative for appetite change, chills, fatigue and fever. HENT: Negative. Negative for congestion, rhinorrhea, sinus pressure and sore throat. Eyes: Negative. Respiratory:  Positive for shortness of breath.  Negative for cough, choking, chest tightness and wheezing. Cardiovascular:  Positive for chest pain. Negative for palpitations and leg swelling. Gastrointestinal:  Negative for abdominal pain, constipation, diarrhea, nausea and vomiting. Endocrine: Negative. Genitourinary: Negative. Negative for difficulty urinating, dysuria, flank pain and urgency. Musculoskeletal: Negative. Skin: Negative. Neurological:  Positive for syncope. Negative for dizziness, speech difficulty, weakness, light-headedness, numbness and headaches. Psychiatric/Behavioral:          History of alcohol dependency   All other systems reviewed and are negative. Physical Exam   Physical Exam  Vitals and nursing note reviewed. Constitutional:       General: He is not in acute distress. Appearance: He is well-developed. He is not diaphoretic. HENT:      Head: Normocephalic and atraumatic. Mouth/Throat:      Mouth: Mucous membranes are dry. Pharynx: No oropharyngeal exudate. Comments: Right lower fat lip with abrasion    Eyes:      General: No scleral icterus. Extraocular Movements: Extraocular movements intact. Conjunctiva/sclera: Conjunctivae normal.      Pupils: Pupils are equal, round, and reactive to light. Neck:      Vascular: No JVD. Trachea: No tracheal deviation. Cardiovascular:      Rate and Rhythm: Regular rhythm. Tachycardia present. Heart sounds: Normal heart sounds. No murmur heard. Pulmonary:      Effort: Pulmonary effort is normal. Tachypnea present. No respiratory distress. Breath sounds: Normal breath sounds. No stridor. No wheezing or rales. Chest:      Chest wall: No tenderness. Abdominal:      General: There is no distension. Palpations: Abdomen is soft. Tenderness: There is no abdominal tenderness. There is no guarding or rebound. Musculoskeletal:         General: No tenderness. Normal range of motion. Cervical back: Normal range of motion and neck supple.       Right lower leg: No tenderness. No edema. Left lower leg: No tenderness. No edema. Skin:     General: Skin is warm and dry. Capillary Refill: Capillary refill takes less than 2 seconds. Neurological:      Mental Status: He is alert and oriented to person, place, and time. Cranial Nerves: No cranial nerve deficit. Comments: No gross motor or sensory deficits    Psychiatric:         Mood and Affect: Mood normal.         Behavior: Behavior normal.       Diagnostic Study Results     Labs -     Recent Results (from the past 12 hour(s))   EKG, 12 LEAD, INITIAL    Collection Time: 12/25/22  3:43 PM   Result Value Ref Range    Ventricular Rate 136 BPM    Atrial Rate 136 BPM    P-R Interval 116 ms    QRS Duration 76 ms    Q-T Interval 372 ms    QTC Calculation (Bezet) 559 ms    Calculated P Axis 45 degrees    Calculated R Axis 20 degrees    Calculated T Axis 29 degrees    Diagnosis       Sinus tachycardia  ST & T wave abnormality, consider inferior ischemia  When compared with ECG of 28-SEP-2021 09:27,  Vent. rate has increased BY  52 BPM     TROPONIN-HIGH SENSITIVITY    Collection Time: 12/25/22  3:45 PM   Result Value Ref Range    Troponin-High Sensitivity 7 0 - 76 ng/L   METABOLIC PANEL, COMPREHENSIVE    Collection Time: 12/25/22  3:47 PM   Result Value Ref Range    Sodium 142 136 - 145 mmol/L    Potassium 5.1 3.5 - 5.1 mmol/L    Chloride 103 97 - 108 mmol/L    CO2 9 (LL) 21 - 32 mmol/L    Anion gap 30 (H) 5 - 15 mmol/L    Glucose 65 65 - 100 mg/dL    BUN 11 6 - 20 MG/DL    Creatinine 1.30 0.70 - 1.30 MG/DL    BUN/Creatinine ratio 8 (L) 12 - 20      eGFR >60 >60 ml/min/1.73m2    Calcium 8.4 (L) 8.5 - 10.1 MG/DL    Bilirubin, total 1.0 0.2 - 1.0 MG/DL    ALT (SGPT) 91 (H) 12 - 78 U/L    AST (SGOT) 354 (H) 15 - 37 U/L    Alk.  phosphatase 223 (H) 45 - 117 U/L    Protein, total 8.0 6.4 - 8.2 g/dL    Albumin 3.4 (L) 3.5 - 5.0 g/dL    Globulin 4.6 (H) 2.0 - 4.0 g/dL    A-G Ratio 0.7 (L) 1.1 - 2.2     CBC WITH AUTOMATED DIFF Collection Time: 12/25/22  3:47 PM   Result Value Ref Range    WBC 7.9 4.1 - 11.1 K/uL    RBC 2.71 (L) 4.10 - 5.70 M/uL    HGB 9.8 (L) 12.1 - 17.0 g/dL    HCT 30.9 (L) 36.6 - 50.3 %    .0 (H) 80.0 - 99.0 FL    MCH 36.2 (H) 26.0 - 34.0 PG    MCHC 31.7 30.0 - 36.5 g/dL    RDW 19.0 (H) 11.5 - 14.5 %    PLATELET 467 446 - 276 K/uL    MPV 11.9 8.9 - 12.9 FL    NRBC 2.5 (H) 0  WBC    ABSOLUTE NRBC 0.20 (H) 0.00 - 0.01 K/uL    NEUTROPHILS 84 (H) 32 - 75 %    LYMPHOCYTES 7 (L) 12 - 49 %    MONOCYTES 8 5 - 13 %    EOSINOPHILS 0 0 - 7 %    BASOPHILS 0 0 - 1 %    IMMATURE GRANULOCYTES 1 (H) 0.0 - 0.5 %    ABS. NEUTROPHILS 6.6 1.8 - 8.0 K/UL    ABS. LYMPHOCYTES 0.6 (L) 0.8 - 3.5 K/UL    ABS. MONOCYTES 0.6 0.0 - 1.0 K/UL    ABS. EOSINOPHILS 0.0 0.0 - 0.4 K/UL    ABS. BASOPHILS 0.0 0.0 - 0.1 K/UL    ABS. IMM. GRANS. 0.1 (H) 0.00 - 0.04 K/UL    DF SMEAR SCANNED      PLATELET COMMENTS PAPPENHEIMER BODIES      RBC COMMENTS ANISOCYTOSIS  1+        RBC COMMENTS MACROCYTOSIS  PRESENT       NT-PRO BNP    Collection Time: 12/25/22  3:47 PM   Result Value Ref Range    NT pro- (H) <125 PG/ML   SAMPLES BEING HELD    Collection Time: 12/25/22  3:47 PM   Result Value Ref Range    SAMPLES BEING HELD LAV     COMMENT        Add-on orders for these samples will be processed based on acceptable specimen integrity and analyte stability, which may vary by analyte.    CK    Collection Time: 12/25/22  3:47 PM   Result Value Ref Range     39 - 308 U/L   MAGNESIUM    Collection Time: 12/25/22  3:47 PM   Result Value Ref Range    Magnesium 1.9 1.6 - 2.4 mg/dL   BLOOD GAS,CHEM8,LACTIC ACID POC    Collection Time: 12/25/22  6:38 PM   Result Value Ref Range    Calcium, ionized (POC) 0.95 (L) 1.12 - 1.32 mmol/L    BICARBONATE 11 mmol/L    Base deficit (POC) 14.6 mmol/L    Sample source VENOUS BLOOD      CO2, POC 10 (L) 19 - 24 MMOL/L    Sodium,  136 - 145 MMOL/L    Potassium, POC 5.2 3.5 - 5.5 MMOL/L    Chloride,  (H) 100 - 108 MMOL/L    Glucose, POC 43 (LL) 74 - 106 MG/DL    Creatinine, POC 1.2 0.6 - 1.3 MG/DL    Lactic Acid (POC) 13.24 (HH) 0.40 - 2.00 mmol/L    pH, venous (POC) 7.26 (L) 7.32 - 7.42      pCO2, venous (POC) 23.7 (L) 41 - 51 MMHG    pO2, venous (POC) 28 25 - 40 mmHg   GLUCOSE, POC    Collection Time: 12/25/22  7:05 PM   Result Value Ref Range    Glucose (POC) 185 (H) 65 - 117 mg/dL    Performed by Cliff QUINTEROS    LACTIC ACID    Collection Time: 12/25/22  8:42 PM   Result Value Ref Range    Lactic acid 9.3 (HH) 0.4 - 2.0 MMOL/L   METABOLIC PANEL, BASIC    Collection Time: 12/25/22  8:42 PM   Result Value Ref Range    Sodium 139 136 - 145 mmol/L    Potassium 4.5 3.5 - 5.1 mmol/L    Chloride 101 97 - 108 mmol/L    CO2 17 (L) 21 - 32 mmol/L    Anion gap 21 (H) 5 - 15 mmol/L    Glucose 67 65 - 100 mg/dL    BUN 11 6 - 20 MG/DL    Creatinine 1.10 0.70 - 1.30 MG/DL    BUN/Creatinine ratio 10 (L) 12 - 20      eGFR >60 >60 ml/min/1.73m2    Calcium 7.4 (L) 8.5 - 10.1 MG/DL   PHOSPHORUS    Collection Time: 12/25/22  8:42 PM   Result Value Ref Range    Phosphorus 5.0 (H) 2.6 - 4.7 MG/DL       Radiologic Studies -   CTA CHEST W OR W WO CONT   Final Result      Single right middle lobe pulmonary embolism. No evidence for right heart strain   or pulmonary infarction. Clear lungs   Incidental hepatic steatosis   This result was reportedly relieved by me to Dr. Waqas Solano at 1937 hours   789      XR CHEST PA LAT   Final Result      No acute cardiopulmonary process. CT Results  (Last 48 hours)                 12/25/22 1921  CTA CHEST W OR W WO CONT Final result    Impression:      Single right middle lobe pulmonary embolism. No evidence for right heart strain   or pulmonary infarction.    Clear lungs   Incidental hepatic steatosis   This result was reportedly relieved by me to Dr. Waqas Solano at 1937 hours   789       Narrative:  EXAM: CTA CHEST W OR W WO CONT       INDICATION: Syncope, shortness of breath COMPARISON: 9/28/2021. CONTRAST: 100 mL of Isovue-370. TECHNIQUE:    Precontrast  images were obtained to localize the volume for acquisition. Multislice helical CT arteriography was performed from the diaphragm to the   thoracic inlet during uneventful rapid bolus intravenous contrast   administration. Lung and soft tissue windows were generated. Coronal and   sagittal images were generated and 3D post processing consisting of coronal   maximum intensity images was performed. CT dose reduction was achieved through   use of a standardized protocol tailored for this examination and automatic   exposure control for dose modulation. FINDINGS:   The lungs are clear of mass, nodule, airspace disease or edema. There is a small filling defect in a segmental branch of the right middle lobe   (coronal image 83 and axial image 60). There is no mediastinal or hilar adenopathy or mass. The aorta enhances normally   without evidence of aneurysm or dissection. The visualized portions of the upper abdominal organs are normal.  Decreased   density of the liver diffusely. Dilatation of the left ventricle. CXR Results  (Last 48 hours)                 12/25/22 1627  XR CHEST PA LAT Final result    Impression:      No acute cardiopulmonary process. Narrative:  EXAM: XR CHEST PA LAT       INDICATION: Shortness of breath       COMPARISON: None       TECHNIQUE: PA and lateral chest 2 views       FINDINGS: The cardiac size is within normal limits. The pulmonary vasculature is   within normal limits. The lungs and pleural spaces are clear. The visualized bones and upper abdomen   are age-appropriate. Medical Decision Making   I am the first provider for this patient. I reviewed the vital signs, available nursing notes, past medical history, past surgical history, family history and social history.     Vital Signs-Reviewed the patient's vital signs. Patient Vitals for the past 12 hrs:   Pulse Resp BP SpO2   12/25/22 2011 (!) 103 24 -- 94 %   12/25/22 1857 (!) 107 18 119/74 95 %   12/25/22 1827 (!) 110 19 (!) 144/87 95 %   12/25/22 1808 (!) 108 16 -- 98 %   12/25/22 1757 (!) 117 14 (!) 149/92 96 %   12/25/22 1753 -- -- (!) 148/92 100 %   12/25/22 1540 84 24 (!) 148/94 94 %       EKG interpretation: (Preliminary)  Sinus tach, rate 136, normal axis/AK/QRS, nonspecific ST changes inferior    Records Reviewed: Nursing Notes    Provider Notes (Medical Decision Making):   DDx-PE, dehydration, renal failure, alcoholic ketoacidosis, alcohol withdrawal, electrolyte abnormality, rhabdo my lysis    ED Course:   Initial assessment performed. The patients presenting problems have been discussed, and they are in agreement with the care plan formulated and outlined with them. I have encouraged them to ask questions as they arise throughout their visit. Patient does have an elevated lactate however do not feel that this is secondary to an infectious cause. It is likely related to alcoholic ketoacidosis. Patient's blood sugar is in the 40s patient was given amp of D50. Given his tachycardia patient was given 2 L IV fluid we will also start the patient on a banana bag. Patient with anion gap of 30 with low CO2 patient given 2 A of bicarb as well. Patient with no active withdrawal symptoms currently we will place patient on CIWA protocol. Consult note:  Case discussed with hospitalist patient be evaluate admitted.     Critical Care Time:   CRITICAL CARE NOTE :    9:29 PM    IMPENDING DETERIORATION -Respiratory, Cardiovascular, Metabolic, Renal, and Hepatic  ASSOCIATED RISK FACTORS - Dysrhythmia and Lactic acidosis  MANAGEMENT- Bedside Assessment and Supervision of Care  INTERPRETATION -  Xrays, CT Scan, ECG, Blood Pressure, Cardiac Output Measures , and Labs  INTERVENTIONS - hemodynamic mngmt and Metobolic interventions  CASE REVIEW - Hospitalist/Intensivist and Nursing  TREATMENT RESPONSE -Stable  PERFORMED BY - Self    NOTES   :  I have spent 40 minutes of critical care time involved in lab review, consultations with specialist, family decision- making, bedside attention and documentation. This time excludes time spent in any separate billed procedures. During this entire length of time I was immediately available to the patient . April Javed DO      Disposition:  Admit    Diagnosis     Clinical Impression:   1. Alcoholic ketoacidosis    2. Other acute pulmonary embolism without acute cor pulmonale ([facility] [address]        Attestations:    April Javed DO        Please note that this dictation was completed with [employer], the computer voice recognition software. Quite often unanticipated grammatical, syntax, homophones, and other interpretive errors are inadvertently transcribed by the computer software. Please disregard these errors. Please excuse any errors that have escaped final proofreading. Thank you.

## 2022-12-26 NOTE — PROGRESS NOTES
Problem: Alcohol Withdrawal  Goal: *STG: Participates in treatment plan  Outcome: Progressing Towards Goal  Goal: *STG: Remains safe in hospital  Outcome: Progressing Towards Goal  Goal: *STG: Seeks staff when symptoms of withdrawal increase  Outcome: Progressing Towards Goal  Goal: *STG: Complies with medication therapy  Outcome: Progressing Towards Goal  Goal: *STG: Attends activities and groups  Outcome: Progressing Towards Goal  Goal: *STG: Will identify negative impact of chemical dependency including the use of tobacco, alcohol, and other substances  Outcome: Progressing Towards Goal  Goal: *STG: Verbalizes abstinence as an achievable goal  Outcome: Progressing Towards Goal  Goal: *STG: Agrees to participate in outpatient after care program to support ongoing mental health  Outcome: Progressing Towards Goal  Goal: *STG: Able to indentify relapse triggers including interpersonal/social and familial factors  Outcome: Progressing Towards Goal  Goal: *STG: Identify lifestyle changes to support long term sobriety such as vocation, employment, education, and legal issues  Outcome: Progressing Towards Goal  Goal: *STG: Maintains appropriate nutrition and hydration  Outcome: Progressing Towards Goal  Goal: *STG: Vital signs within defined limits  Outcome: Progressing Towards Goal  Goal: *STG/LTG: Relapse prevention plan in place to include housing/aftercare, leisure activities, and spirituality  Outcome: Progressing Towards Goal  Goal: Interventions  Outcome: Progressing Towards Goal     Problem: Patient Education: Go to Patient Education Activity  Goal: Patient/Family Education  Outcome: Progressing Towards Goal     Problem: Falls - Risk of  Goal: *Absence of Falls  Description: Document Lakshmi Fall Risk and appropriate interventions in the flowsheet.   Outcome: Progressing Towards Goal  Note: Fall Risk Interventions:                                Problem: Patient Education: Go to Patient Education Activity  Goal: Patient/Family Education  Outcome: Progressing Towards Goal

## 2022-12-26 NOTE — PROGRESS NOTES
Problem: Alcohol Withdrawal  Goal: *STG: Participates in treatment plan  Outcome: Progressing Towards Goal  Goal: *STG: Remains safe in hospital  Outcome: Progressing Towards Goal  Goal: *STG: Seeks staff when symptoms of withdrawal increase  Outcome: Progressing Towards Goal  Goal: *STG: Complies with medication therapy  Outcome: Progressing Towards Goal  Goal: *STG: Attends activities and groups  Outcome: Progressing Towards Goal  Goal: *STG: Will identify negative impact of chemical dependency including the use of tobacco, alcohol, and other substances  Outcome: Progressing Towards Goal  Goal: *STG: Verbalizes abstinence as an achievable goal  Outcome: Progressing Towards Goal  Goal: *STG: Agrees to participate in outpatient after care program to support ongoing mental health  Outcome: Progressing Towards Goal

## 2022-12-26 NOTE — H&P
This note is compiled to communicate with the medical care team. It may contain sensitive and protected information. It is not intended to serve as a source of communication with patients/families/friends; that communication occurs at the bedside or via alternative direct communications means (secure messaging, letters, video/telephone, etc.). Hospitalist Admission Note    NAME: Lashawn Alonso   :  1960   MRN:  [id number]     Date/Time:  2022 10:31 PM    Patient PCP: None  ______________________________________________________________________  Given the patient's current clinical presentation, I have a high level of concern for decompensation if discharged from the emergency department. Complex decision making was performed, which includes reviewing the patient's available past medical records, laboratory results, and x-ray films. My assessment of this patient's clinical condition and my plan of care is as follows. Subjective:   CHIEF COMPLAINT: Chest pain/shortness of breath    HISTORY OF PRESENT ILLNESS:     Ana Paula Almanza is a 60 y.o.  male with pertinent past medical history of essential hypertension, alcoholism (0.5-1.0 L gin per day) without history of delirium tremens who presents with complaints of progressively worsening chest pain and shortness of breath of 1 days duration. Patient describes persistent aching pain in his side as well as subjective shortness of breath. He denies cough or wheeze. He denies preceding illness and has no known sick contacts. Patient reports he has been drinking heavily for quite some time averaging 0.5-1 L of gin per day and reports if he goes greater than 1 day without a drink he becomes very tremulous, but has no history of withdrawal seizures or delirium tremens. Patient acknowledges he drinks too much and wants to cut back/quit but reports that when discharged he plans to resume drinking.   Patient denies tobacco use, but reports occasional marijuana and denies any other illicit substance. He reports he is independent in ADLs at baseline living in a room in a room in his mother's garage and is unemployed on [employer]. ROS otherwise negative. In the ED, patient tachycardic to 110s otherwise hemodynamically stable saturating mid to upper 90s on room air. ECG demonstrating sinus tachycardia without significant change from prior ECGs. CTA chest demonstrates single right middle lobe pulmonary embolism without evidence of right heart strain or pulmonary infarct as well as incidental hepatic steatosis. Labs demonstrate: Lactic acid 13.24 improved to 9.3 on recheck, high sensitive troponin 7, proBNP 186, sodium 142, potassium 5.1, bicarb 9, anion gap 30, glucose 43, BUN 11, creatinine 1.3 (baseline), WBC 7.9, hemoglobin 9.8 (chronic), platelets 961, CK1 89, magnesium 1.9, Phos 5.0. Patient given rally pack and D10 bolus by ED provider as well as therapeutic Lovenox. We were asked to admit for work up and evaluation of the above problems. Past Medical History:   Diagnosis Date    Hypercholesterolemia     Hypertension         Past Surgical History:   Procedure Laterality Date    HX ORTHOPAEDIC      rotator cuff repair       Social History     Tobacco Use    Smoking status: Former     Years: 10.00     Types: Cigarettes     Quit date: 2014     Years since quittin.9    Smokeless tobacco: Never   Substance Use Topics    Alcohol use: Yes     Comment: pint a day        Family History   Problem Relation Age of Onset    Stroke Father     Hypertension Father     Diabetes Sister        No Known Allergies     Prior to Admission medications    Medication Sig Start Date End Date Taking? Authorizing Provider   metoprolol succinate (TOPROL-XL) 50 mg XL tablet Take 1 Tab by mouth daily. In place of metoprolol tartrate.   Patient not taking: Reported on 2022 10/18/19   Kennedi Reese MD   losartan (COZAAR) 25 mg tablet Take 1 Tab by mouth daily. Patient not taking: Reported on 12/25/2022 10/18/19   Regino Lopez MD   aspirin 81 mg chewable tablet Take 2 Tabs by mouth daily. Patient not taking: Reported on 12/25/2022 8/28/18   Marcial Robles MD   therapeutic multivitamin [facility]) tablet Take 1 Tab by mouth daily. Patient not taking: Reported on 12/25/2022 10/27/17   Alejandrina Bolaños MD   thiamine (B-1) 100 mg tablet Take 1 Tab by mouth daily. Patient not taking: Reported on 12/25/2022 10/27/17   Alejandrina Bolaños MD   diphenhydrAMINE (BENADRYL) 25 mg capsule Take 25 mg by mouth as needed for Itching. Patient not taking: Reported on 12/25/2022    Provider, Historical   ibuprofen (MOTRIN) 200 mg tablet Take 200 mg by mouth as needed for Pain.   Patient not taking: Reported on 12/25/2022    Provider, Historical       REVIEW OF SYSTEMS:       Total of 12 systems reviewed as follows:       POSITIVE= Red text   General: Fever, chills, sweats, weakness/malaise, weight loss/gain, loss of appetite    Eyes:   Vision change, eye pain   ENT:    Rhinorrhea, pharyngitis, Hearing loss    Respiratory:   cough, sputum production, SOB, PAINTING, wheezing, pleuritic pain    Cardiology:   chest pain, palpitations, orthopnea, edema, syncope    Gastrointestinal:  abdominal pain , N/V, diarrhea, dysphagia, constipation, bleeding    Genitourinary:  frequency, urgency, dysuria, hematuria, incontinence    Muskuloskeletal:  arthralgia, myalgia, back pain   Hematology:  easy bruising, nose or gum bleeding, lymphadenopathy    Dermatological: rash, ulceration, pruritis, color change / jaundice   Endocrine:  hot flashes or polydipsia    Neurological:  headache, dizziness, confusion, focal weakness, paresthesia, Speech difficulties, memory loss, gait difficulty   Psychological: Feelings of anxiety, depression, agitation       Objective:   VITALS:    Visit Vitals  /74   Pulse (!) 103   Resp 24   Ht 5' 9\" (1.753 m)   Wt 99.8 kg (220 lb)   SpO2 94%   BMI 32.49 kg/m² PHYSICAL EXAM:    General:   Alert, cooperative, no distress, thin elderly appearing -American male        HEENT:  Atraumatic, anicteric sclerae, pink conjunctivae, mucosa moist, dentition fair     Neck:  Supple, symmetrical, trachea midline, no abnormalities on palpation     Lungs:   CTAB. Symmetric expansion. Good aeration. Mild tachypnea with rates in low 20s. Chest wall:  No tenderness. No Accessory muscle use. Cardiovascular:   RRR, No murmur/rub/gallop. No JVD. Radial/AT/DP pulse 2+     GI/:   Soft, non-tender. Not distended. Bowel sounds normal. No HSM     Extremities No edema. No cyanosis. Capillary refill normal     Skin: No significant lesions noted. Not Jaundiced. No rashes      Neurologic: PERRL. EOMI. No facial asymmetry. No aphasia or slurred speech. Moves all extremities. Sensation to light touch grossly intact BUE/BLE. No overt focal deficits appreciated, not tremulous     Psych:  Alert and oriented X 4. Normal affect. Good insight     Other:   N/A         LAB DATA REVIEWED:    Recent Results (from the past 24 hour(s))   EKG, 12 LEAD, INITIAL    Collection Time: 12/25/22  3:43 PM   Result Value Ref Range    Ventricular Rate 136 BPM    Atrial Rate 136 BPM    P-R Interval 116 ms    QRS Duration 76 ms    Q-T Interval 372 ms    QTC Calculation (Bezet) 559 ms    Calculated P Axis 45 degrees    Calculated R Axis 20 degrees    Calculated T Axis 29 degrees    Diagnosis       Sinus tachycardia  ST & T wave abnormality, consider inferior ischemia  When compared with ECG of 28-SEP-2021 09:27,  Vent.  rate has increased BY  52 BPM     TROPONIN-HIGH SENSITIVITY    Collection Time: 12/25/22  3:45 PM   Result Value Ref Range    Troponin-High Sensitivity 7 0 - 76 ng/L   METABOLIC PANEL, COMPREHENSIVE    Collection Time: 12/25/22  3:47 PM   Result Value Ref Range    Sodium 142 136 - 145 mmol/L    Potassium 5.1 3.5 - 5.1 mmol/L    Chloride 103 97 - 108 mmol/L    CO2 9 (LL) 21 - 32 mmol/L Anion gap 30 (H) 5 - 15 mmol/L    Glucose 65 65 - 100 mg/dL    BUN 11 6 - 20 MG/DL    Creatinine 1.30 0.70 - 1.30 MG/DL    BUN/Creatinine ratio 8 (L) 12 - 20      eGFR >60 >60 ml/min/1.73m2    Calcium 8.4 (L) 8.5 - 10.1 MG/DL    Bilirubin, total 1.0 0.2 - 1.0 MG/DL    ALT (SGPT) 91 (H) 12 - 78 U/L    AST (SGOT) 354 (H) 15 - 37 U/L    Alk. phosphatase 223 (H) 45 - 117 U/L    Protein, total 8.0 6.4 - 8.2 g/dL    Albumin 3.4 (L) 3.5 - 5.0 g/dL    Globulin 4.6 (H) 2.0 - 4.0 g/dL    A-G Ratio 0.7 (L) 1.1 - 2.2     CBC WITH AUTOMATED DIFF    Collection Time: 12/25/22  3:47 PM   Result Value Ref Range    WBC 7.9 4.1 - 11.1 K/uL    RBC 2.71 (L) 4.10 - 5.70 M/uL    HGB 9.8 (L) 12.1 - 17.0 g/dL    HCT 30.9 (L) 36.6 - 50.3 %    .0 (H) 80.0 - 99.0 FL    MCH 36.2 (H) 26.0 - 34.0 PG    MCHC 31.7 30.0 - 36.5 g/dL    RDW 19.0 (H) 11.5 - 14.5 %    PLATELET 713 434 - 821 K/uL    MPV 11.9 8.9 - 12.9 FL    NRBC 2.5 (H) 0  WBC    ABSOLUTE NRBC 0.20 (H) 0.00 - 0.01 K/uL    NEUTROPHILS 84 (H) 32 - 75 %    LYMPHOCYTES 7 (L) 12 - 49 %    MONOCYTES 8 5 - 13 %    EOSINOPHILS 0 0 - 7 %    BASOPHILS 0 0 - 1 %    IMMATURE GRANULOCYTES 1 (H) 0.0 - 0.5 %    ABS. NEUTROPHILS 6.6 1.8 - 8.0 K/UL    ABS. LYMPHOCYTES 0.6 (L) 0.8 - 3.5 K/UL    ABS. MONOCYTES 0.6 0.0 - 1.0 K/UL    ABS. EOSINOPHILS 0.0 0.0 - 0.4 K/UL    ABS. BASOPHILS 0.0 0.0 - 0.1 K/UL    ABS. IMM. GRANS. 0.1 (H) 0.00 - 0.04 K/UL    DF SMEAR SCANNED      PLATELET COMMENTS PAPPENHEIMER BODIES      RBC COMMENTS ANISOCYTOSIS  1+        RBC COMMENTS MACROCYTOSIS  PRESENT       NT-PRO BNP    Collection Time: 12/25/22  3:47 PM   Result Value Ref Range    NT pro- (H) <125 PG/ML   SAMPLES BEING HELD    Collection Time: 12/25/22  3:47 PM   Result Value Ref Range    SAMPLES BEING HELD LAV     COMMENT        Add-on orders for these samples will be processed based on acceptable specimen integrity and analyte stability, which may vary by analyte.    CK    Collection Time: 12/25/22 3:47 PM   Result Value Ref Range     39 - 308 U/L   MAGNESIUM    Collection Time: 22  3:47 PM   Result Value Ref Range    Magnesium 1.9 1.6 - 2.4 mg/dL   BLOOD GAS,CHEM8,LACTIC ACID POC    Collection Time: 22  6:38 PM   Result Value Ref Range    Calcium, ionized (POC) 0.95 (L) 1.12 - 1.32 mmol/L    BICARBONATE 11 mmol/L    Base deficit (POC) 14.6 mmol/L    Sample source VENOUS BLOOD      CO2, POC 10 (L) 19 - 24 MMOL/L    Sodium,  136 - 145 MMOL/L    Potassium, POC 5.2 3.5 - 5.5 MMOL/L    Chloride,  (H) 100 - 108 MMOL/L    Glucose, POC 43 (LL) 74 - 106 MG/DL    Creatinine, POC 1.2 0.6 - 1.3 MG/DL    Lactic Acid (POC) 13.24 (HH) 0.40 - 2.00 mmol/L    pH, venous (POC) 7.26 (L) 7.32 - 7.42      pCO2, venous (POC) 23.7 (L) 41 - 51 MMHG    pO2, venous (POC) 28 25 - 40 mmHg   GLUCOSE, POC    Collection Time: 22  7:05 PM   Result Value Ref Range    Glucose (POC) 185 (H) 65 - 117 mg/dL    Performed by Erica QUINTEROS    LACTIC ACID    Collection Time: 22  8:42 PM   Result Value Ref Range    Lactic acid 9.3 (HH) 0.4 - 2.0 MMOL/L   METABOLIC PANEL, BASIC    Collection Time: 22  8:42 PM   Result Value Ref Range    Sodium 139 136 - 145 mmol/L    Potassium 4.5 3.5 - 5.1 mmol/L    Chloride 101 97 - 108 mmol/L    CO2 17 (L) 21 - 32 mmol/L    Anion gap 21 (H) 5 - 15 mmol/L    Glucose 67 65 - 100 mg/dL    BUN 11 6 - 20 MG/DL    Creatinine 1.10 0.70 - 1.30 MG/DL    BUN/Creatinine ratio 10 (L) 12 - 20      eGFR >60 >60 ml/min/1.73m2    Calcium 7.4 (L) 8.5 - 10.1 MG/DL   PHOSPHORUS    Collection Time: 22  8:42 PM   Result Value Ref Range    Phosphorus 5.0 (H) 2.6 - 4.7 MG/DL       IMAGIN view chest-no acute cardiopulmonary process    CTA chest:  Single right middle lobe pulmonary embolism. No evidence for right heart strain  or pulmonary infarction.   Clear lungs  Incidental hepatic steatosis    EKG: Sinus tachycardia with inferior ST abnormalities present on prior, rate 136, , QTc 559  ______________________________________________________________________    Assessment / Plan:  High anion gap metabolic acidosis-lactic acidosis with suspected superimposed starvation/alcoholic ketoacidosis  Hypoglycemia, resolved  Acute right middle lobe pulmonary embolism  Alcoholism-high risk for severe withdrawals  Chronic macrocytic anemia  Essential hypertension    PLAN:    High anion gap metabolic acidosis-lactic acidosis with suspected superimposed starvation/alcoholic ketoacidosis  Hypoglycemia, resolved  Admit to stepdown unit  Trend BMP every 6 hours-anion gap and bicarb both improving with IV fluids and D10 administration  -Rally pack was given prior to D10  Will place on D5 normal saline at 100 cc/h x 10 hours to continue to suppress ketosis  If anion gap plateaus or persists will pursue toxic work-up  Monitor blood sugar on BMPs    Acute right middle lobe pulmonary embolism  Therapeutic Lovenox given in ED  Start Eliquis tomorrow morning  -Extensively counseled on risks of bleeding while on anticoagulation.   Emphasized need to remain abstinent from alcohol to prevent inadvertent head trauma and potential intracranial bleeding      Alcoholism-high risk for severe withdrawals  Chronic macrocytic anemia  6-day phenobarbital taper with loading dose given high risk for withdrawals  CIWA protocol  Magnesium, thiamine, folate, multivitamin repletion ordered    Essential hypertension  Trend blood pressure and address as needed, hypertension likely secondary to withdrawals in the setting and needs to be addressed accordingly    Code Status: Full    DVT Prophylaxis: Lovenox/Eliquis  GI Prophylaxis: Not indicated  Diet: Regular/cardiac       Care Plan discussed with:    Comments   Patient x    Family      RN     Care Manager                    Consultant:      _______________________________________________________________________  Baseline Level of Function: Independent    Expected Disposition:   Home with Family x   HH/PT/OT/RN    SNF/LTC    KEKE    ________________________________________________________________________  TOTAL TIME:  75 Minutes   MEDICAL COMPLEXITY: high  TOBACCO CESSATION COUNSELING: NO        Comments    x Reviewed previous records   >50% of visit spent in counseling and coordination of care x Discussion with patient and/or family and questions answered       ________________________________________________________________________  Signed: Joya Hernández DO  12/25/2022 10:31 PM    Please note that this documentation was completed in part with Dragon dictation software. Occasionally unanticipated grammatical, syntax, homophones, and other interpretive errors are inadvertently transcribed by the computer software. Please excuse and disregard any errors that have escaped final proofreading. If in doubt, please do not hesitate to reach out to myself or the assigned hospitalist via [facility] paging system for clarification.

## 2022-12-26 NOTE — ED NOTES
1800: Assumed care of pt from triage. Pt c/o SOB on exertion. Pt reports drinking one liter of gin per day. Pt reports only having \"two shots\" today. Also reports marijuana use. Placed pt on monitor x3. Call button within reach. Will continue to monitor. 1900: CHAD 3. Provided pt with water.

## 2022-12-26 NOTE — PROGRESS NOTES
Hospitalist Progress Note    NAME: Krishna Camejo   :  1960   MRN:  [id number]       Assessment / Plan:    High anion gap metabolic acidosis-lactic acidosis with suspected superimposed starvation/alcoholic ketoacidosis  Hypoglycemia, resolved  Acute right middle lobe pulmonary embolism  Alcoholism-high risk for severe withdrawals  Chronic macrocytic anemia  Essential hypertension     PLAN:     High anion gap metabolic acidosis-lactic acidosis with suspected superimposed starvation/alcoholic ketoacidosis  Hypoglycemia, resolved  Continue current stepdown unit  -Anion gap resolved  Will place on D5 normal saline at 100 cc/h x 10 hours to continue to suppress ketosis  -Lactic acid around 13 on admission, 2.3 this morning. Likely from hypoperfusion and dehydration in the setting of alcoholic/starvation ketoacidosis     Acute right middle lobe pulmonary embolism  Therapeutic Lovenox given in ED  Started on Eliquis this morning, I will consult  for double check on insurance coverage as patient has concerns about that  -Extensively counseled on risks of bleeding while on anticoagulation. Emphasized need to remain abstinent from alcohol to prevent inadvertent head trauma and potential intracranial bleeding        Alcoholism-high risk for severe withdrawals  Chronic macrocytic anemia  6-day phenobarbital taper with loading dose given high risk for withdrawals  CIWA protocol  Magnesium, thiamine, folate, multivitamin repletion ordered     Essential hypertension  Trend blood pressure and address as needed, hypertension likely secondary to withdrawals in the setting and needs to be addressed accordingly     Code Status: Full     DVT Prophylaxis: Eliquis  GI Prophylaxis: Not indicated  Diet: Regular/cardiac    30.0 - 39.9 Obese / Body mass index is 32.49 kg/m². Estimated discharge date:   Barriers: Clinical improvement     Subjective:     Patient was seen and examined.   No acute events overnight. Discussed with RN overnight events. All patient's questions were answered. \"Feeling much better than last night\"      Review of Systems:  Symptom Y/N Comments  Symptom Y/N Comments   Fever/Chills n   Chest Pain n    Poor Appetite    Edema     Cough n   Abdominal Pain n    Sputum    Joint Pain     SOB/PAINTING n   Pruritis/Rash     Nausea/vomit n   Tolerating PT/OT     Diarrhea    Tolerating Diet y    Constipation    Other       Could NOT obtain due to:          Objective:     VITALS:   Last 24hrs VS reviewed since prior progress note. Most recent are:  Patient Vitals for the past 24 hrs:   Temp Pulse Resp BP SpO2   12/26/22 1106 98.7 °F (37.1 °C) 83 19 131/73 95 %   12/26/22 0726 98.5 °F (36.9 °C) 85 24 111/74 --   12/26/22 0317 98.9 °F (37.2 °C) 100 16 135/72 98 %   12/25/22 2329 98.6 °F (37 °C) 91 16 (!) 161/98 96 %   12/25/22 2225 -- 99 16 (!) 150/101 93 %   12/25/22 2125 -- 95 17 -- 93 %   12/25/22 2025 -- (!) 107 19 (!) 103/45 --   12/25/22 2011 -- (!) 103 24 -- 94 %   12/25/22 1857 -- (!) 107 18 119/74 95 %   12/25/22 1827 -- (!) 110 19 (!) 144/87 95 %   12/25/22 1808 -- (!) 108 16 -- 98 %   12/25/22 1757 -- (!) 117 14 (!) 149/92 96 %   12/25/22 1753 -- -- -- (!) 148/92 100 %   12/25/22 1540 -- 84 24 (!) 148/94 94 %       Intake/Output Summary (Last 24 hours) at 12/26/2022 1212  Last data filed at 12/26/2022 0915  Gross per 24 hour   Intake 368 ml   Output --   Net 368 ml        I had a face to face encounter and independently examined this patient on 12/26/2022, as outlined below:  PHYSICAL EXAM:  General: WD, WN. Alert, cooperative, no acute distress    EENT:  EOMI. Anicteric sclerae. MMM  Resp:  CTA bilaterally, no wheezing or rales. No accessory muscle use  CV:  Regular  rhythm,  No edema  GI:  Soft, Non distended, Non tender. +Bowel sounds  Neurologic:  EOMs intact. No facial asymmetry. No aphasia or slurred speech. Symmetrical strength, Sensation grossly intact. Alert and oriented X 4. Psych:   Good insight. Not anxious nor agitated  Skin:  No rashes. No jaundice    Reviewed most current lab test results and cultures  YES  Reviewed most current radiology test results   YES  Review and summation of old records today    NO  Reviewed patient's current orders and MAR    YES  PMH/SH reviewed - no change compared to H&P  ________________________________________________________________________  Care Plan discussed with:    Comments   Patient X    Family      RN X    Care Manager     Consultant                        Multidiciplinary team rounds were held today with , nursing, pharmacist and clinical coordinator. Patient's plan of care was discussed; medications were reviewed and discharge planning was addressed. ________________________________________________________________________        Comments   >50% of visit spent in counseling and coordination of care X    ________________________________________________________________________  Yolanda Maher MD     Procedures: see electronic medical records for all procedures/Xrays and details which were not copied into this note but were reviewed prior to creation of Plan. LABS:  I reviewed today's most current labs and imaging studies.   Pertinent labs include:  Recent Labs     12/26/22 0257 12/25/22  1547   WBC 5.8 7.9   HGB 8.0* 9.8*   HCT 23.2* 30.9*    288     Recent Labs     12/26/22  0257 12/25/22  2042 12/25/22  1547    139 142   K 4.2 4.5 5.1    101 103   CO2 25 17* 9*   * 67 65   BUN 9 11 11   CREA 0.99 1.10 1.30   CA 7.3* 7.4* 8.4*   MG  --   --  1.9   PHOS  --  5.0*  --    ALB  --   --  3.4*   TBILI  --   --  1.0   ALT  --   --  91*       Signed: Yolanda Maher MD

## 2022-12-27 PROBLEM — E87.29 ALCOHOLIC KETOACIDOSIS: Status: RESOLVED | Noted: 2022-12-25 | Resolved: 2022-12-27

## 2022-12-27 LAB
ANION GAP SERPL CALC-SCNC: 4 MMOL/L (ref 5–15)
ATRIAL RATE: 136 BPM
BASOPHILS # BLD: 0 K/UL (ref 0–0.1)
BASOPHILS NFR BLD: 0 % (ref 0–1)
BUN SERPL-MCNC: 8 MG/DL (ref 6–20)
BUN/CREAT SERPL: 9 (ref 12–20)
CALCIUM SERPL-MCNC: 7.1 MG/DL (ref 8.5–10.1)
CALCULATED P AXIS, ECG09: 45 DEGREES
CALCULATED R AXIS, ECG10: 20 DEGREES
CALCULATED T AXIS, ECG11: 29 DEGREES
CHLORIDE SERPL-SCNC: 104 MMOL/L (ref 97–108)
CO2 SERPL-SCNC: 29 MMOL/L (ref 21–32)
CREAT SERPL-MCNC: 0.89 MG/DL (ref 0.7–1.3)
DIAGNOSIS, 93000: NORMAL
DIFFERENTIAL METHOD BLD: ABNORMAL
EOSINOPHIL # BLD: 0 K/UL (ref 0–0.4)
EOSINOPHIL NFR BLD: 1 % (ref 0–7)
ERYTHROCYTE [DISTWIDTH] IN BLOOD BY AUTOMATED COUNT: 18.1 % (ref 11.5–14.5)
FOLATE SERPL-MCNC: 87 NG/ML (ref 5–21)
GLUCOSE SERPL-MCNC: 117 MG/DL (ref 65–100)
HCT VFR BLD AUTO: 22.4 % (ref 36.6–50.3)
HGB BLD-MCNC: 7.6 G/DL (ref 12.1–17)
IMM GRANULOCYTES # BLD AUTO: 0 K/UL (ref 0–0.04)
IMM GRANULOCYTES NFR BLD AUTO: 0 % (ref 0–0.5)
IRON SATN MFR SERPL: 90 % (ref 20–50)
IRON SERPL-MCNC: 243 UG/DL (ref 35–150)
LACTATE SERPL-SCNC: 1.1 MMOL/L (ref 0.4–2)
LYMPHOCYTES # BLD: 2.3 K/UL (ref 0.8–3.5)
LYMPHOCYTES NFR BLD: 45 % (ref 12–49)
MCH RBC QN AUTO: 36 PG (ref 26–34)
MCHC RBC AUTO-ENTMCNC: 33.9 G/DL (ref 30–36.5)
MCV RBC AUTO: 106.2 FL (ref 80–99)
MONOCYTES # BLD: 0.5 K/UL (ref 0–1)
MONOCYTES NFR BLD: 10 % (ref 5–13)
NEUTS SEG # BLD: 2.2 K/UL (ref 1.8–8)
NEUTS SEG NFR BLD: 44 % (ref 32–75)
NRBC # BLD: 0.12 K/UL (ref 0–0.01)
NRBC BLD-RTO: 2.4 PER 100 WBC
P-R INTERVAL, ECG05: 116 MS
PLATELET # BLD AUTO: 182 K/UL (ref 150–400)
PMV BLD AUTO: 11 FL (ref 8.9–12.9)
POTASSIUM SERPL-SCNC: 4.2 MMOL/L (ref 3.5–5.1)
Q-T INTERVAL, ECG07: 372 MS
QRS DURATION, ECG06: 76 MS
QTC CALCULATION (BEZET), ECG08: 559 MS
RBC # BLD AUTO: 2.11 M/UL (ref 4.1–5.7)
SODIUM SERPL-SCNC: 137 MMOL/L (ref 136–145)
TIBC SERPL-MCNC: 269 UG/DL (ref 250–450)
VENTRICULAR RATE, ECG03: 136 BPM
VIT B12 SERPL-MCNC: 416 PG/ML (ref 193–986)
WBC # BLD AUTO: 5.1 K/UL (ref 4.1–11.1)

## 2022-12-27 PROCEDURE — 83605 ASSAY OF LACTIC ACID: CPT

## 2022-12-27 PROCEDURE — 82746 ASSAY OF FOLIC ACID SERUM: CPT

## 2022-12-27 PROCEDURE — 83540 ASSAY OF IRON: CPT

## 2022-12-27 PROCEDURE — 80048 BASIC METABOLIC PNL TOTAL CA: CPT

## 2022-12-27 PROCEDURE — 74011000250 HC RX REV CODE- 250: Performed by: STUDENT IN AN ORGANIZED HEALTH CARE EDUCATION/TRAINING PROGRAM

## 2022-12-27 PROCEDURE — 74011250637 HC RX REV CODE- 250/637: Performed by: INTERNAL MEDICINE

## 2022-12-27 PROCEDURE — 97161 PT EVAL LOW COMPLEX 20 MIN: CPT

## 2022-12-27 PROCEDURE — 97535 SELF CARE MNGMENT TRAINING: CPT

## 2022-12-27 PROCEDURE — 85025 COMPLETE CBC W/AUTO DIFF WBC: CPT

## 2022-12-27 PROCEDURE — 97116 GAIT TRAINING THERAPY: CPT

## 2022-12-27 PROCEDURE — 74011250637 HC RX REV CODE- 250/637: Performed by: STUDENT IN AN ORGANIZED HEALTH CARE EDUCATION/TRAINING PROGRAM

## 2022-12-27 PROCEDURE — 65660000001 HC RM ICU INTERMED STEPDOWN

## 2022-12-27 PROCEDURE — 82607 VITAMIN B-12: CPT

## 2022-12-27 PROCEDURE — 97165 OT EVAL LOW COMPLEX 30 MIN: CPT

## 2022-12-27 PROCEDURE — 36415 COLL VENOUS BLD VENIPUNCTURE: CPT

## 2022-12-27 RX ADMIN — APIXABAN 10 MG: 5 TABLET, FILM COATED ORAL at 17:04

## 2022-12-27 RX ADMIN — PHENOBARBITAL 97.2 MG: 32.4 TABLET ORAL at 08:42

## 2022-12-27 RX ADMIN — SODIUM CHLORIDE, PRESERVATIVE FREE 10 ML: 5 INJECTION INTRAVENOUS at 05:27

## 2022-12-27 RX ADMIN — PHENOBARBITAL 97.2 MG: 32.4 TABLET ORAL at 21:24

## 2022-12-27 RX ADMIN — THIAMINE HCL TAB 100 MG 100 MG: 100 TAB at 08:43

## 2022-12-27 RX ADMIN — MAGNESIUM OXIDE TAB 400 MG (241.3 MG ELEMENTAL MG) 400 MG: 400 (241.3 MG) TAB at 09:00

## 2022-12-27 RX ADMIN — SODIUM CHLORIDE, PRESERVATIVE FREE 10 ML: 5 INJECTION INTRAVENOUS at 14:28

## 2022-12-27 RX ADMIN — FOLIC ACID 1 MG: 1 TABLET ORAL at 08:43

## 2022-12-27 RX ADMIN — MULTIPLE VITAMINS W/ MINERALS TAB 1 TABLET: TAB at 08:43

## 2022-12-27 RX ADMIN — APIXABAN 10 MG: 5 TABLET, FILM COATED ORAL at 08:43

## 2022-12-27 RX ADMIN — SODIUM CHLORIDE, PRESERVATIVE FREE 10 ML: 5 INJECTION INTRAVENOUS at 21:25

## 2022-12-27 RX ADMIN — PHENOBARBITAL 97.2 MG: 32.4 TABLET ORAL at 17:04

## 2022-12-27 NOTE — PROGRESS NOTES
OCCUPATIONAL THERAPY EVALUATION/DISCHARGE  Patient: Rick Parish (89 y.o. male)  Date: 12/27/2022  Primary Diagnosis: Alcoholic ketoacidosis [X79.65]  Pulmonary embolism (HCC) [I26.99]       Precautions:      ASSESSMENT  Based on the objective data described below, the patient presents intact strength, coordination, and ROM with baseline independence in ADLs and functional mobility. Patient is completing all ADLs and functional mobility with independence requiring no assist. He was received sitting in recliner chair with no bed or chair alarms present in room or activated. Patient reported he has been up ad soo in room and ambulated to/from bathroom without assist. Patient completed LB dressing with independence and ambulated into bathroom without assist, completing all bathroom ADLs independently. He ambulated in hallway with PT present and tolerated well. Patient returned to room and ended session sitting in recliner chair. Patient does not have any additional acute OT needs and does not require OT services at discharge. Current Level of Function (ADLs/self-care): independent in ADLs and functional mobility     Functional Outcome Measure: The patient scored 100/100 on the Barthel Index outcome measure which is indicative of being independent in ADLs. Other factors to consider for discharge: EtOH     PLAN :    Recommendation for discharge: (in order for the patient to meet his/her long term goals)  No skilled occupational therapy/ follow up rehabilitation needs identified at this time. This discharge recommendation:  Has been made in collaboration with the attending provider and/or case management    IF patient discharges home will need the following DME: none       SUBJECTIVE:   Patient stated I feel better than I did at home.     OBJECTIVE DATA SUMMARY:   HISTORY:   Past Medical History:   Diagnosis Date    Hypercholesterolemia     Hypertension      Past Surgical History:   Procedure Laterality Date    HX ORTHOPAEDIC      rotator cuff repair       Prior Level of Function/Environment/Context: Patient was independent in ADLs and functional mobility. He lived with his mother and brother but did not require assist.   Expanded or extensive additional review of patient history:   Home Situation  Home Environment: Private residence  One/Two Story Residence: One story  Living Alone: No  Support Systems: Parent(s), Other Family Member(s)  Patient Expects to be Discharged to[de-identified] Home  Current DME Used/Available at Home: Cane, straight  Tub or Shower Type: Tub/Shower combination    Hand dominance: Right    EXAMINATION OF PERFORMANCE DEFICITS:  Cognitive/Behavioral Status:  Neurologic State: Alert  Orientation Level: Oriented X4  Cognition: Appropriate decision making; Appropriate for age attention/concentration; Appropriate safety awareness; Follows commands  Perception: Appears intact  Perseveration: No perseveration noted  Safety/Judgement: Awareness of environment;Good awareness of safety precautions    Hearing: Auditory  Auditory Impairment: None    Vision/Perceptual:    Acuity: Within Defined Limits      Range of Motion:  AROM: Within functional limits    Strength:  Strength: Within functional limits    Coordination:  Coordination: Within functional limits  Fine Motor Skills-Upper: Left Intact; Right Intact    Gross Motor Skills-Upper: Left Intact; Right Intact    Tone & Sensation:  Tone: Normal  Sensation: Intact    Balance:  Sitting: Intact  Standing: Intact    Functional Mobility and Transfers for ADLs:  Bed Mobility:  Rolling: Other (comment) (received sitting in recliner chair)    Transfers:  Sit to Stand: Independent  Stand to Sit: Independent  Bathroom Mobility: Independent  Toilet Transfer : Independent    ADL Assessment:  Feeding: Independent  Oral Facial Hygiene/Grooming: Independent  Bathing: Independent  Upper Body Dressing: Independent  Lower Body Dressing: Independent  Toileting: Independent    ADL Intervention and task modifications:    Grooming  Position Performed: Standing (at sink)  Washing Hands: Independent    Lower Body Dressing Assistance  Socks: Independent  Shoes with Elastic Laces: Independent  Leg Crossed Method Used: Yes    Cognitive Retraining  Safety/Judgement: Awareness of environment;Good awareness of safety precautions    Functional Measure:    Barthel Index:  Bathin  Bladder: 10  Bowels: 10  Groomin  Dressing: 10  Feeding: 10  Mobility: 15  Stairs: 10  Toilet Use: 10  Transfer (Bed to Chair and Back): 15  Total: 100/100      The Barthel ADL Index: Guidelines  1. The index should be used as a record of what a patient does, not as a record of what a patient could do. 2. The main aim is to establish degree of independence from any help, physical or verbal, however minor and for whatever reason. 3. The need for supervision renders the patient not independent. 4. A patient's performance should be established using the best available evidence. Asking the patient, friends/relatives and nurses are the usual sources, but direct observation and common sense are also important. However direct testing is not needed. 5. Usually the patient's performance over the preceding 24-48 hours is important, but occasionally longer periods will be relevant. 6. Middle categories imply that the patient supplies over 50 per cent of the effort. 7. Use of aids to be independent is allowed. Score Interpretation (from [address] [address])    Independent   60-79 Minimally independent   40-59 Partially dependent   20-39 Very dependent   <20 Totally dependent     -Max De La Garza., Barthel, D.W. (1965). Functional evaluation: the Barthel Index. [address] [facility] ([address] [place] (1997). The Barthel activities of daily living index: self-reporting versus actual performance in the old (> or = 75 years).  Journal of [address] [place] [address] 45(7), [facility], JANGELES, Fernanda Hunter, Larisa Salazar., Warren Watters. (1999). Measuring the change in disability after inpatient rehabilitation; comparison of the responsiveness of the Barthel Index and Functional [place] Measure. Journal of Neurology, Neurosurgery, and Psychiatry, 66(4), 963-863. ALONA Fregoso, KEENAN Aceves, & Echo Bee M.A. (2004) Assessment of post-stroke quality of life in cost-effectiveness studies: The usefulness of the Barthel Index and the EuroQoL-5D. Quality of Life Research, 13, 407-64      Based on the above components, the patient evaluation is determined to be of the following complexity level: LOW   Pain Rating:  Patient did not c/o pain during session. Activity Tolerance:   WNL and tolerates ADLs without rest breaks    After treatment patient left in no apparent distress:    Sitting in chair and Call bell within reach    COMMUNICATION/EDUCATION:   The patients plan of care was discussed with: Physical therapist and Registered nurse.      Thank you for this referral.  PADMINI Moe/L  Time Calculation: 15 mins

## 2022-12-27 NOTE — PROGRESS NOTES
Hospitalist Progress Note    NAME: Devang Sadler   :  1960   MRN:  [id number]       Assessment / Plan:    High anion gap metabolic acidosis-lactic acidosis with suspected superimposed starvation/alcoholic ketoacidosis  Hypoglycemia, resolved  Acute right middle lobe pulmonary embolism  Alcoholism-high risk for severe withdrawals  Chronic macrocytic anemia  Essential hypertension     PLAN:     High anion gap metabolic acidosis-lactic acidosis with suspected superimposed starvation/alcoholic ketoacidosis  Hypoglycemia, resolved  Continue current stepdown unit  -Anion gap resolved  -Status post D5  - Lactic acidosis resolved     Acute right middle lobe pulmonary embolism  Therapeutic Lovenox given in ED  Started on Eliquis this morning, I will consult  for double check on insurance coverage as patient has concerns about that  -Extensively counseled on risks of bleeding while on anticoagulation. Emphasized need to remain abstinent from alcohol to prevent inadvertent head trauma and potential intracranial bleeding        Alcoholism-high risk for severe withdrawals  Acute on chronic macrocytic anemia  6-day phenobarbital taper with loading dose given high risk for withdrawals  CIWA protocol  Hemoglobin has been dropping slowly, will watch for 24 hours given he is on blood thinners right now  Will send folic acid, N26 levels, iron studies     Essential hypertension  Trend blood pressure and address as needed, hypertension likely secondary to withdrawals in the setting and needs to be addressed accordingly     Code Status: Full     DVT Prophylaxis: Eliquis  GI Prophylaxis: Not indicated  Diet: Regular/cardiac    30.0 - 39.9 Obese / Body mass index is 32.49 kg/m². Estimated discharge date:   Barriers: Clinical improvement, watch hemoglobin     Subjective:     Patient was seen and examined. No acute events overnight. Discussed with RN overnight events.     All patient's questions were answered. \"Feeling all right much better than yesterday\"      Review of Systems:  Symptom Y/N Comments  Symptom Y/N Comments   Fever/Chills n   Chest Pain n    Poor Appetite    Edema     Cough n   Abdominal Pain n    Sputum    Joint Pain     SOB/PAINTING n   Pruritis/Rash     Nausea/vomit n   Tolerating PT/OT     Diarrhea    Tolerating Diet y    Constipation    Other       Could NOT obtain due to:          Objective:     VITALS:   Last 24hrs VS reviewed since prior progress note. Most recent are:  Patient Vitals for the past 24 hrs:   Temp Pulse Resp BP SpO2   12/27/22 1051 97.7 °F (36.5 °C) 88 15 (!) 141/91 --   12/27/22 0717 98.4 °F (36.9 °C) 68 23 (!) 144/85 95 %   12/27/22 0254 98 °F (36.7 °C) 76 22 (!) 144/87 96 %   12/26/22 2306 98.8 °F (37.1 °C) 65 13 102/65 97 %   12/26/22 1940 98.7 °F (37.1 °C) 75 16 128/79 96 %   12/26/22 1500 98.5 °F (36.9 °C) 85 21 138/83 96 %         Intake/Output Summary (Last 24 hours) at 12/27/2022 1250  Last data filed at 12/27/2022 1001  Gross per 24 hour   Intake 740 ml   Output 1050 ml   Net -310 ml          I had a face to face encounter and independently examined this patient on 12/27/2022, as outlined below:  PHYSICAL EXAM:  General: WD, WN. Alert, cooperative, no acute distress    EENT:  EOMI. Anicteric sclerae. MMM  Resp:  CTA bilaterally, no wheezing or rales. No accessory muscle use  CV:  Regular  rhythm,  No edema  GI:  Soft, Non distended, Non tender. +Bowel sounds  Neurologic:  EOMs intact. No facial asymmetry. No aphasia or slurred speech. Symmetrical strength, Sensation grossly intact. Alert and oriented X 4.     Psych:   Good insight. Not anxious nor agitated  Skin:  No rashes. No jaundice.  bruises in his lower lip from the fall and on his right knee    Reviewed most current lab test results and cultures  YES  Reviewed most current radiology test results   YES  Review and summation of old records today    NO  Reviewed patient's current orders and [facility] YES  PMH/SH reviewed - no change compared to H&P  ________________________________________________________________________  Care Plan discussed with:    Comments   Patient X    Family      RN X    Care Manager     Consultant                        Multidiciplinary team rounds were held today with , nursing, pharmacist and clinical coordinator. Patient's plan of care was discussed; medications were reviewed and discharge planning was addressed. ________________________________________________________________________        Comments   >50% of visit spent in counseling and coordination of care X    ________________________________________________________________________  Tatianna Chambers MD     Procedures: see electronic medical records for all procedures/Xrays and details which were not copied into this note but were reviewed prior to creation of Plan. LABS:  I reviewed today's most current labs and imaging studies.   Pertinent labs include:  Recent Labs     12/27/22  0039 12/26/22  0257 12/25/22  1547   WBC 5.1 5.8 7.9   HGB 7.6* 8.0* 9.8*   HCT 22.4* 23.2* 30.9*    229 288       Recent Labs     12/27/22  0039 12/26/22  1310 12/26/22  0257 12/25/22  2042 12/25/22  1547    136 139 139 142   K 4.2 4.1 4.2 4.5 5.1    101 103 101 103   CO2 29 28 25 17* 9*   * 172* 114* 67 65   BUN 8 9 9 11 11   CREA 0.89 1.21 0.99 1.10 1.30   CA 7.1* 6.9* 7.3* 7.4* 8.4*   MG  --   --   --   --  1.9   PHOS  --   --   --  5.0*  --    ALB  --   --   --   --  3.4*   TBILI  --   --   --   --  1.0   ALT  --   --   --   --  91*         Signed: Tatianna Chambers MD

## 2022-12-27 NOTE — PROGRESS NOTES
End of Shift Note    Bedside shift change report given to Santiago Mccoy RN (oncoming nurse) by Abdirahman Encinas (offgoing nurse). Report included the following information SBAR and Kardex    Shift worked:  0606-1354     Shift summary and any significant changes:    Uneventful shift. CHAD has been 0 the entire shift. Hopes he can be discharged today, needs case mgt to look into getting the eliquis for him. Concerns for physician to address:    Zone phone for oncoming shift:       Activity:  Activity Level:  Up with Assistance  Number times ambulated in hallways past shift: 0  Number of times OOB to chair past shift: 0    Cardiac:   Cardiac Monitoring: Yes      Cardiac Rhythm: Sinus Rhythm    Access:  Current line(s): PIV     Genitourinary:   Urinary status: voiding    Respiratory:   O2 Device: None (Room air)  Chronic home O2 use?: NO  Incentive spirometer at bedside: N/A       GI:  Last Bowel Movement Date: 12/26/22  Current diet:  ADULT DIET Regular; Low Fat/Low Chol/High Fiber/2 gm Na  Passing flatus: YES  Tolerating current diet: YES       Pain Management:   Patient states pain is manageable on current regimen: YES    Skin:  Oneil Score: 20  Interventions: increase time out of bed    Patient Safety:  Fall Score:    Interventions: bed/chair alarm, gripper socks, and pt to call before getting OOB       Length of Stay:  Expected LOS: - - -  Actual LOS:   [facility]

## 2022-12-27 NOTE — PROGRESS NOTES
PHYSICAL THERAPY EVALUATION/DISCHARGE  Patient: Mila Beasley (83 y.o. male)  Date: 12/27/2022  Primary Diagnosis: Alcoholic ketoacidosis [T52.37]  Pulmonary embolism (HCC) [I26.99]       Precautions:          ASSESSMENT  Based on the objective data described below, the patient presents with good strength, good functional mobility, steady gait, and good balance following admission for alcoholic ketoacidosis. Patient is functioning at his baseline. He was received sitting in recliner chair with no bed or chair alarms present in room or activated. Patient reported he has been up ad soo in room and ambulated to/from bathroom without assist. Patient ambulated in the hallway independently with no path deviations and no LOB noted. Patient with no PT needs indicated during acute care stay or at discharge. Functional Outcome Measure: The patient scored 100/100 on the Barthel outcome measure which is indicative of independent with ADLS. Other factors to consider for discharge: up ad soo     Further skilled acute physical therapy is not indicated at this time. PLAN :  Recommendation for discharge: (in order for the patient to meet his/her long term goals)  No skilled physical therapy/ follow up rehabilitation needs identified at this time. This discharge recommendation:  A follow-up discussion with the attending provider and/or case management is planned    IF patient discharges home will need the following DME: none       SUBJECTIVE:   Patient stated I have been getting up as I need to.    OBJECTIVE DATA SUMMARY:   HISTORY:    Past Medical History:   Diagnosis Date    Hypercholesterolemia     Hypertension      Past Surgical History:   Procedure Laterality Date    HX ORTHOPAEDIC      rotator cuff repair       Prior level of function: patient lives with his mother and brother. He is independent with all aspects of functional mobility and ADLS. Reports a few falls but was from \"blacking out\".    Personal factors and/or comorbidities impacting plan of care: ETOH    Home Situation  Home Environment: Private residence  One/Two Story Residence: One story  Living Alone: No  Support Systems: Parent(s), Other Family Member(s)  Patient Expects to be Discharged to[de-identified] Home  Current DME Used/Available at Home: Cane, straight  Tub or Shower Type: Tub/Shower combination    EXAMINATION/PRESENTATION/DECISION MAKING:   Critical Behavior:  Neurologic State: Alert  Orientation Level: Oriented X4  Cognition: Appropriate decision making, Appropriate for age attention/concentration, Appropriate safety awareness, Follows commands  Safety/Judgement: Awareness of environment, Good awareness of safety precautions  Hearing: Auditory  Auditory Impairment: None  Skin:  intact  Edema: none  Range Of Motion:  AROM: Within functional limits                       Strength:    Strength: Within functional limits                    Tone & Sensation:   Tone: Normal              Sensation: Intact               Coordination:  Coordination: Within functional limits  Vision:   Acuity: Within Defined Limits  Functional Mobility:  Bed Mobility:  Rolling: Other (comment) (received sitting in recliner chair)           Transfers:  Sit to Stand: Independent  Stand to Sit: Independent                       Balance:   Sitting: Intact  Standing: Intact  Ambulation/Gait Training:  Distance (ft): 150 Feet (ft)  Assistive Device: Gait belt  Ambulation - Level of Assistance: Independent     Gait Description (WDL): Exceptions to WDL           Base of Support: Widened     Speed/Melanie: Pace decreased (<100 feet/min)  Step Length: Left shortened;Right shortened            Functional Measure:  Barthel Index:    Bathin  Bladder: 10  Bowels: 10  Groomin  Dressing: 10  Feeding: 10  Mobility: 15  Stairs: 10  Toilet Use: 10  Transfer (Bed to Chair and Back): 15  Total: 100/100       The Barthel ADL Index: Guidelines  1.  The index should be used as a record of what a patient does, not as a record of what a patient could do. 2. The main aim is to establish degree of independence from any help, physical or verbal, however minor and for whatever reason. 3. The need for supervision renders the patient not independent. 4. A patient's performance should be established using the best available evidence. Asking the patient, friends/relatives and nurses are the usual sources, but direct observation and common sense are also important. However direct testing is not needed. 5. Usually the patient's performance over the preceding 24-48 hours is important, but occasionally longer periods will be relevant. 6. Middle categories imply that the patient supplies over 50 per cent of the effort. 7. Use of aids to be independent is allowed. Score Interpretation (from [address] [facility])    Independent   60-79 Minimally independent   40-59 Partially dependent   20-39 Very dependent   <20 Totally dependent     -Max De La Garza., Barthel, D.W. (1965). Functional evaluation: the Barthel Index. [address] [facility] ([address] (1997). The Barthel activities of daily living index: self-reporting versus actual performance in the old (> or = 75 years). Journal of [address] [place] [address] 457), 14 [facility], J.TEODOROF, Debi Hansen., Sadaf Melgoza. (1999). Measuring the change in disability after inpatient rehabilitation; comparison of the responsiveness of the Barthel Index and Functional [place] Measure. Journal of Neurology, Neurosurgery, and Psychiatry, 66(4), 856-812. Tom Monroe, N.J.A, KEENAN Aceves, & Dave Cedillo, MJoeA. (2004) Assessment of post-stroke quality of life in cost-effectiveness studies: The usefulness of the Barthel Index and the EuroQoL-5D.  Quality of Life Research, 15, 279-14            Physical Therapy Evaluation Charge Determination   History Examination Presentation Decision-Making   MEDIUM  Complexity : 1-2 comorbidities / personal factors will impact the outcome/ POC  MEDIUM Complexity : 3 Standardized tests and measures addressing body structure, function, activity limitation and / or participation in recreation  MEDIUM Complexity : Evolving with changing characteristics  Other outcome measures Barthel   LOW       Based on the above components, the patient evaluation is determined to be of the following complexity level: LOW         Activity Tolerance:   Good      After treatment patient left in no apparent distress:   Sitting in chair and Call bell within reach    COMMUNICATION/EDUCATION:   The patients plan of care was discussed with: Occupational therapist and Registered nurse. Fall prevention education was provided and the patient/caregiver indicated understanding., Patient/family have participated as able in goal setting and plan of care. , and Patient/family agree to work toward stated goals and plan of care.     Thank you for this referral.  Yoselin Sims, PT, DPT   Time Calculation: 25 mins

## 2022-12-27 NOTE — PROGRESS NOTES
Spiritual Care Partner Volunteer visited patient at [address] in [facility] on 12/27/2022   Documented by: Claudene Shay To page : [email or web address]  ([phone])

## 2022-12-27 NOTE — PROGRESS NOTES
0700: report given from [address] to Venancio Carrasquillo [address] [place] [address].  3037: Folate, B12, and iron studies sent to lab.  1900: report given from Venancio Carrasquillo RN to [address].

## 2022-12-28 LAB
INR PPP: 1.1 (ref 0.9–1.1)
PROTHROMBIN TIME: 11.4 SEC (ref 9–11.1)

## 2022-12-28 PROCEDURE — 65660000001 HC RM ICU INTERMED STEPDOWN

## 2022-12-28 PROCEDURE — 90686 IIV4 VACC NO PRSV 0.5 ML IM: CPT | Performed by: INTERNAL MEDICINE

## 2022-12-28 PROCEDURE — 90471 IMMUNIZATION ADMIN: CPT

## 2022-12-28 PROCEDURE — 36415 COLL VENOUS BLD VENIPUNCTURE: CPT

## 2022-12-28 PROCEDURE — 74011000636 HC RX REV CODE- 636: Performed by: INTERNAL MEDICINE

## 2022-12-28 PROCEDURE — 74011250636 HC RX REV CODE- 250/636: Performed by: INTERNAL MEDICINE

## 2022-12-28 PROCEDURE — 85610 PROTHROMBIN TIME: CPT

## 2022-12-28 PROCEDURE — 74011250637 HC RX REV CODE- 250/637: Performed by: STUDENT IN AN ORGANIZED HEALTH CARE EDUCATION/TRAINING PROGRAM

## 2022-12-28 PROCEDURE — 74011250637 HC RX REV CODE- 250/637: Performed by: INTERNAL MEDICINE

## 2022-12-28 PROCEDURE — 74011000250 HC RX REV CODE- 250: Performed by: STUDENT IN AN ORGANIZED HEALTH CARE EDUCATION/TRAINING PROGRAM

## 2022-12-28 RX ORDER — ENOXAPARIN SODIUM 100 MG/ML
100 INJECTION SUBCUTANEOUS EVERY 12 HOURS
Status: DISCONTINUED | OUTPATIENT
Start: 2022-12-28 | End: 2023-01-05

## 2022-12-28 RX ORDER — WARFARIN SODIUM 5 MG/1
5 TABLET ORAL ONCE
Status: COMPLETED | OUTPATIENT
Start: 2022-12-28 | End: 2022-12-28

## 2022-12-28 RX ORDER — FOLIC ACID 1 MG/1
1 TABLET ORAL DAILY
Qty: 30 TABLET | Refills: 1 | Status: SHIPPED | OUTPATIENT
Start: 2022-12-29

## 2022-12-28 RX ADMIN — ENOXAPARIN SODIUM 100 MG: 100 INJECTION SUBCUTANEOUS at 17:32

## 2022-12-28 RX ADMIN — PHENOBARBITAL 64.8 MG: 32.4 TABLET ORAL at 08:39

## 2022-12-28 RX ADMIN — WARFARIN SODIUM 5 MG: 5 TABLET ORAL at 17:32

## 2022-12-28 RX ADMIN — SODIUM CHLORIDE, PRESERVATIVE FREE 10 ML: 5 INJECTION INTRAVENOUS at 05:47

## 2022-12-28 RX ADMIN — PHENOBARBITAL 64.8 MG: 32.4 TABLET ORAL at 17:31

## 2022-12-28 RX ADMIN — THIAMINE HCL TAB 100 MG 100 MG: 100 TAB at 08:39

## 2022-12-28 RX ADMIN — PHENOBARBITAL 64.8 MG: 32.4 TABLET ORAL at 21:36

## 2022-12-28 RX ADMIN — INFLUENZA VIRUS VACCINE 0.5 ML: 15; 15; 15; 15 SUSPENSION INTRAMUSCULAR at 11:42

## 2022-12-28 RX ADMIN — MAGNESIUM OXIDE TAB 400 MG (241.3 MG ELEMENTAL MG) 400 MG: 400 (241.3 MG) TAB at 08:39

## 2022-12-28 RX ADMIN — MULTIPLE VITAMINS W/ MINERALS TAB 1 TABLET: TAB at 08:39

## 2022-12-28 RX ADMIN — FOLIC ACID 1 MG: 1 TABLET ORAL at 08:39

## 2022-12-28 RX ADMIN — APIXABAN 10 MG: 5 TABLET, FILM COATED ORAL at 08:39

## 2022-12-28 NOTE — PROGRESS NOTES
Pharmacist Daily Dosing of Warfarin    Indication & Goal INR: PE, INR Goal 2-3    PTA Warfarin Dose: new start    Notable concurrent conditions and medications:  Phenobarbital    Labs:  Recent Labs     12/28/22  1429 12/27/22  0039 12/26/22  0257 12/25/22  1547   INR 1.1  --   --   --    HGB  --  7.6* 8.0* 9.8*   PLT  --  182 229 288   TBILI  --   --   --  1.0   ALB  --   --   --  3.4*         Impression/Plan:   Patient has only been on phenobarbital for a few days since admission so CYP induction should not be a big concern right now  Will order warfarin 5 mg PO x 1 tonight  Bridging with Lovenox  Daily INR has been ordered  CBC w/o differential every other day has been ordered     Pharmacy will follow daily and adjust the dose as appropriate.     Thank you,  Ildefonso Guadalupe PHARMD      Warfarin Protocol    Located on pharmacy Teams site: Clinical Practice -> Anticoagulation & Cardiology -> Anticoagulation Policies, Protocols, Guidance

## 2022-12-28 NOTE — PROGRESS NOTES
Attempted to schedule hospital follow up for PCP appointment. Sent a message to PCP office to find patient an appointment. Awaiting callback from PCP office.  Sherine Dyson, Care Management Assistant

## 2022-12-28 NOTE — DISCHARGE INSTRUCTIONS
Patient Discharge Instructions    Ny Cruz / [id number] : 1960    Admitted 2022 Discharged: 2022         DISCHARGE DIAGNOSIS:   Acute Pulmonary embolism  Metabolic acidosis  Alcoholism       Take Home Medications     {Medication reconciliation information is now added to the patient's AVS automatically when it is printed. There is no need to use this SmartLink in discharge instructions. Highlight this text and delete it to clear this message}      General drug facts     If you have a very bad allergy, wear an allergy ID at all times. It is important that you take the medication exactly as they are prescribed. Keep your medication in the bottles provided by the pharmacist.  Keep a list of all your drugs (prescription, natural products, vitamins, OTC) with you. Give this list to your doctor. Do not take other medications without consulting your doctor. Do not share your drugs with others and do not take anyone else's drugs. Keep all drugs out of the reach of children and pets. Most drugs may be thrown away in household trash after mixing with coffee grounds or pa litter and sealing in a plastic bag. Keep a list Call your doctor for help with any side effects. If in the [place] you may also call the FDA at 9-468-FDA-9920    Talk with the doctor before starting any new drug, including OTC, natural products, or vitamins. What to do at Home    1. Recommended diet: regular     2. Recommended activity: Activity as tolerated    3. If you experience any of the following symptoms then please call your primary care physician or return to the emergency room if you cannot get hold of your doctor:    4. Wound Care: none     5. Lab work: cbc and cmp in 1 week     6. Stop smocking and alcohol     7. Bring these papers with you to your follow up appointments.  The papers will help your doctors be sure to continue the care plan from the hospital.      If you have questions regarding the hospital related prescriptions or hospital related issues please call [facility] at [phone]. You can always direct your questions to your primary care doctor if you are unable to reach your hospital physician; your PCP works as an extension of your hospital doctor just like your hospital doctor is an extension of your PCP for your time at the hospital [facility], [facility])      Follow-up with:   PCP: @PCP@  None  None ([phone]) Patient stated that they have no PCP             Please call for your own appointment        Information obtained by :  I understand that if any problems occur once I am at home I am to contact my physician. I understand and acknowledge receipt of the instructions indicated above.                                                                                                                                            Physician's or R.N.'s Signature                                                                  Date/Time                                                                                                                                              Patient or Representative Signature                                                          Date/Time

## 2022-12-28 NOTE — PROGRESS NOTES
Transition of Care Plan:    RUR: 12% \"low risk\"  Disposition: Home with family  If SNF or IPR: Date FOC offered: N/A  Date FOC received: N/A  Date authorization started with reference number: N/A  Date authorization received and expires: N/A  Accepting facility: N/A  Follow up appointments: PCP & Specialists as needed  DME needed: None  Transportation at Discharge: Pt's friend will provide transport at d/c  [place] or means to access home: Pt has access to home     Medicare Letter: N/A  Is patient a  and connected with the [place]? N/A  Caregiver Contact: Pt's son Dave Lakhani [phone]  Discharge Caregiver contacted prior to discharge? No  Care Conference needed?: Not at this time    Initial note: Chart reviewed for updates. CM met with pt at bedside, introduced self, confirmed demographics were up-to date and discussed d/c planning. Pt lives in a 1 level home with 1 KYLE at the back door. Pt's son and mother are his support system. Pt is independent with ADLs and does not use any DME. No history of inpatient rehab or home health reported by pt. Pt uses [facility] in [address]. Pt's friend will provide transport at d/c. No PT/OT needs. No CM needs at this time. PCP appointment to be scheduled. CM will continue to follow up. Reason for Admission:  Alcoholic Ketoacidosis; Pulmonary Embolism                 RUR Score: 12% \"low risk\"                  Plan for utilizing home health:   No PT/OT needs identified.  Pt is independent with ADL's at home and does not use any DME        PCP: First and Last name:  Ileana Fragoso     Name of Practice: [facility]   Are you a current patient: Yes/No: Yes   Approximate date of last visit: Last month   Can you participate in a virtual visit with your PCP: In-person                    Current Advanced Directive/Advance Care Plan: Full Code    Healthcare Decision Maker:    shannon Lakhani [phone]       Care Management Interventions  Palliative Care Criteria Met (RRAT>21 & CHF Dx)?: No  Mode of Transport at Discharge:  Other (see comment) (Friend to provide transport at d/c)  Transition of Care Consult (CM Consult): Discharge Planning  Discharge Durable Medical Equipment: No  Physical Therapy Consult: Yes  Occupational Therapy Consult: Yes  Speech Therapy Consult: Yes  Support Systems: Child(heron), Friend/Neighbor (Pt's son and friend are his support system)  The Plan for Transition of Care is Related to the Following Treatment Goals : Home with follow up apts  Discharge Location  Patient Expects to be Discharged to[de-identified] Home (Pt is returning home with follow up apts)     TATI Sweeney    [phone]

## 2022-12-28 NOTE — PROGRESS NOTES
9905 pt was told he could be discharged. Aware that he needs to have clarification on what blood thinner he will be going home on, case management is working on this. I removed pts tele and IV. Told patient that I will be back with his paperwork.  went in and told patient the cost of eliquis. Pt stated he can not afford this medication.  communicating with dr Angely Finley to talk about switching medications. Went to update patient in his room, he was not there. All of his belongings were gone. Over head paged security for missing person. Pt was found on lobby waiting for his ride, pt stated he misunderstood  a\nd thought he was ok to leave. Pt back in his room, dr Angely Finley notified and is calling patient to discuss options for  blood thinner    Pt aware he is NOT BEING DISCHARGED AND THAT HE HAS TO BE DOSED FOR COUMADIN. Dr Chiquis Burton stated pt is ok to not have an iv unless warranted.  Placed patient back on tele

## 2022-12-28 NOTE — PROGRESS NOTES
Pt can not afford eliquis or xarelto  Will start lovenox and warfarin and if tolerates well, will send him home on warfarin and lovenox.

## 2022-12-29 LAB
ANION GAP SERPL CALC-SCNC: 6 MMOL/L (ref 5–15)
BASOPHILS # BLD: 0 K/UL (ref 0–0.1)
BASOPHILS NFR BLD: 1 % (ref 0–1)
BNP SERPL-MCNC: 294 PG/ML
BUN SERPL-MCNC: 6 MG/DL (ref 6–20)
BUN/CREAT SERPL: 7 (ref 12–20)
CALCIUM SERPL-MCNC: 8.1 MG/DL (ref 8.5–10.1)
CHLORIDE SERPL-SCNC: 103 MMOL/L (ref 97–108)
CO2 SERPL-SCNC: 26 MMOL/L (ref 21–32)
CREAT SERPL-MCNC: 0.88 MG/DL (ref 0.7–1.3)
DIFFERENTIAL METHOD BLD: ABNORMAL
EOSINOPHIL # BLD: 0.1 K/UL (ref 0–0.4)
EOSINOPHIL NFR BLD: 2 % (ref 0–7)
ERYTHROCYTE [DISTWIDTH] IN BLOOD BY AUTOMATED COUNT: 18.1 % (ref 11.5–14.5)
GLUCOSE SERPL-MCNC: 82 MG/DL (ref 65–100)
HCT VFR BLD AUTO: 27.3 % (ref 36.6–50.3)
HGB BLD-MCNC: 9.1 G/DL (ref 12.1–17)
IMM GRANULOCYTES # BLD AUTO: 0 K/UL (ref 0–0.04)
IMM GRANULOCYTES NFR BLD AUTO: 0 % (ref 0–0.5)
INR PPP: 1 (ref 0.9–1.1)
LYMPHOCYTES # BLD: 2.2 K/UL (ref 0.8–3.5)
LYMPHOCYTES NFR BLD: 51 % (ref 12–49)
MCH RBC QN AUTO: 35.8 PG (ref 26–34)
MCHC RBC AUTO-ENTMCNC: 33.3 G/DL (ref 30–36.5)
MCV RBC AUTO: 107.5 FL (ref 80–99)
MONOCYTES # BLD: 0.3 K/UL (ref 0–1)
MONOCYTES NFR BLD: 7 % (ref 5–13)
NEUTS SEG # BLD: 1.7 K/UL (ref 1.8–8)
NEUTS SEG NFR BLD: 39 % (ref 32–75)
NRBC # BLD: 0.05 K/UL (ref 0–0.01)
NRBC BLD-RTO: 1.2 PER 100 WBC
PLATELET # BLD AUTO: 180 K/UL (ref 150–400)
PMV BLD AUTO: 11 FL (ref 8.9–12.9)
POTASSIUM SERPL-SCNC: 3.9 MMOL/L (ref 3.5–5.1)
PROTHROMBIN TIME: 10.9 SEC (ref 9–11.1)
RBC # BLD AUTO: 2.54 M/UL (ref 4.1–5.7)
SODIUM SERPL-SCNC: 135 MMOL/L (ref 136–145)
WBC # BLD AUTO: 4.3 K/UL (ref 4.1–11.1)

## 2022-12-29 PROCEDURE — 65660000001 HC RM ICU INTERMED STEPDOWN

## 2022-12-29 PROCEDURE — 74011250637 HC RX REV CODE- 250/637: Performed by: STUDENT IN AN ORGANIZED HEALTH CARE EDUCATION/TRAINING PROGRAM

## 2022-12-29 PROCEDURE — 74011250637 HC RX REV CODE- 250/637: Performed by: INTERNAL MEDICINE

## 2022-12-29 PROCEDURE — 83880 ASSAY OF NATRIURETIC PEPTIDE: CPT

## 2022-12-29 PROCEDURE — 36415 COLL VENOUS BLD VENIPUNCTURE: CPT

## 2022-12-29 PROCEDURE — 85610 PROTHROMBIN TIME: CPT

## 2022-12-29 PROCEDURE — 85025 COMPLETE CBC W/AUTO DIFF WBC: CPT

## 2022-12-29 PROCEDURE — 80048 BASIC METABOLIC PNL TOTAL CA: CPT

## 2022-12-29 PROCEDURE — 74011250636 HC RX REV CODE- 250/636: Performed by: INTERNAL MEDICINE

## 2022-12-29 PROCEDURE — 74011000250 HC RX REV CODE- 250: Performed by: STUDENT IN AN ORGANIZED HEALTH CARE EDUCATION/TRAINING PROGRAM

## 2022-12-29 RX ADMIN — FOLIC ACID 1 MG: 1 TABLET ORAL at 08:50

## 2022-12-29 RX ADMIN — ENOXAPARIN SODIUM 100 MG: 100 INJECTION SUBCUTANEOUS at 05:06

## 2022-12-29 RX ADMIN — MAGNESIUM OXIDE TAB 400 MG (241.3 MG ELEMENTAL MG) 400 MG: 400 (241.3 MG) TAB at 08:50

## 2022-12-29 RX ADMIN — SODIUM CHLORIDE, PRESERVATIVE FREE 10 ML: 5 INJECTION INTRAVENOUS at 05:06

## 2022-12-29 RX ADMIN — SODIUM CHLORIDE, PRESERVATIVE FREE 10 ML: 5 INJECTION INTRAVENOUS at 17:09

## 2022-12-29 RX ADMIN — WARFARIN SODIUM 6 MG: 5 TABLET ORAL at 17:08

## 2022-12-29 RX ADMIN — SODIUM CHLORIDE, PRESERVATIVE FREE 10 ML: 5 INJECTION INTRAVENOUS at 22:01

## 2022-12-29 RX ADMIN — PHENOBARBITAL 64.8 MG: 32.4 TABLET ORAL at 17:08

## 2022-12-29 RX ADMIN — PHENOBARBITAL 64.8 MG: 32.4 TABLET ORAL at 08:51

## 2022-12-29 RX ADMIN — ENOXAPARIN SODIUM 100 MG: 100 INJECTION SUBCUTANEOUS at 17:08

## 2022-12-29 RX ADMIN — THIAMINE HCL TAB 100 MG 100 MG: 100 TAB at 08:51

## 2022-12-29 RX ADMIN — MULTIPLE VITAMINS W/ MINERALS TAB 1 TABLET: TAB at 08:51

## 2022-12-29 RX ADMIN — PHENOBARBITAL 64.8 MG: 32.4 TABLET ORAL at 22:01

## 2022-12-29 NOTE — PROGRESS NOTES
Problem: Alcohol Withdrawal  Goal: *STG: Participates in treatment plan  Outcome: Progressing Towards Goal  Goal: *STG: Remains safe in hospital  Outcome: Progressing Towards Goal  Goal: *STG: Seeks staff when symptoms of withdrawal increase  Outcome: Progressing Towards Goal  Goal: *STG: Complies with medication therapy  Outcome: Progressing Towards Goal  Goal: *STG: Attends activities and groups  Outcome: Progressing Towards Goal  Goal: *STG: Will identify negative impact of chemical dependency including the use of tobacco, alcohol, and other substances  Outcome: Progressing Towards Goal  Goal: *STG: Verbalizes abstinence as an achievable goal  Outcome: Progressing Towards Goal  Goal: *STG: Agrees to participate in outpatient after care program to support ongoing mental health  Outcome: Progressing Towards Goal  Goal: *STG: Able to indentify relapse triggers including interpersonal/social and familial factors  Outcome: Progressing Towards Goal  Goal: *STG: Identify lifestyle changes to support long term sobriety such as vocation, employment, education, and legal issues  Outcome: Progressing Towards Goal  Goal: *STG: Maintains appropriate nutrition and hydration  Outcome: Progressing Towards Goal  Goal: *STG: Vital signs within defined limits  Outcome: Progressing Towards Goal  Goal: *STG/LTG: Relapse prevention plan in place to include housing/aftercare, leisure activities, and spirituality  Outcome: Progressing Towards Goal  Goal: Interventions  Outcome: Progressing Towards Goal     Problem: Patient Education: Go to Patient Education Activity  Goal: Patient/Family Education  Outcome: Progressing Towards Goal     Problem: Falls - Risk of  Goal: *Absence of Falls  Description: Document Lakshmi Fall Risk and appropriate interventions in the flowsheet.   Outcome: Progressing Towards Goal  Note: Fall Risk Interventions:            Medication Interventions: Bed/chair exit alarm, Evaluate medications/consider consulting pharmacy, Teach patient to arise slowly                   Problem: Patient Education: Go to Patient Education Activity  Goal: Patient/Family Education  Outcome: Progressing Towards Goal     Problem: Pulmonary Embolism Care Plan (Adult)  Goal: *Improvement of existing pulmonary embolism  Outcome: Progressing Towards Goal  Goal: *Absence of bleeding  Outcome: Progressing Towards Goal  Goal: *Labs within defined limits  Outcome: Progressing Towards Goal

## 2022-12-29 NOTE — PROGRESS NOTES
End of Shift Note    Bedside shift change report given to RN (oncoming nurse) by Matt Gzt RN (offgoing nurse). Report included the following information SBAR, Kardex, ED Summary, Procedure Summary, Intake/Output, MAR, Recent Results, Med Rec Status, and Cardiac Rhythm Sinus Rhythm    Shift worked:  7p-7a     Shift summary and any significant changes:    Placed new IV when did AM lab work, CIWA 0. Concerns for physician to address:       Zone phone for oncoming shift:          Activity:  Activity Level: Up ad soo  Number times ambulated in hallways past shift: 0  Number of times OOB to chair past shift: 0    Cardiac:   Cardiac Monitoring: Yes      Cardiac Rhythm: Sinus Rhythm    Access:  Current line(s): PIV     Genitourinary:   Urinary status: voiding    Respiratory:   O2 Device: None (Room air)  Chronic home O2 use?: NO  Incentive spirometer at bedside: NO       GI:  Last Bowel Movement Date: 12/26/22  Current diet:  ADULT DIET Regular; Low Fat/Low Chol/High Fiber/2 gm Na  Passing flatus: YES  Tolerating current diet: YES       Pain Management:   Patient states pain is manageable on current regimen: YES    Skin:  Oneil Score: 23  Interventions: increase time out of bed and limit briefs    Patient Safety:  Fall Score:  Total Score: 2  Interventions: bed/chair alarm, gripper socks, pt to call before getting OOB, and stay with me (per policy)       Length of Stay:  Expected LOS: 2d 12h  Actual LOS: 1765 Ravi Goldberg RN

## 2022-12-29 NOTE — PROGRESS NOTES
Problem: Alcohol Withdrawal  Goal: *STG: Participates in treatment plan  Outcome: Progressing Towards Goal  Goal: *STG: Remains safe in hospital  Outcome: Progressing Towards Goal  Goal: *STG: Seeks staff when symptoms of withdrawal increase  Outcome: Progressing Towards Goal  Goal: *STG: Complies with medication therapy  Outcome: Progressing Towards Goal  Goal: *STG: Attends activities and groups  Outcome: Progressing Towards Goal  Goal: *STG: Will identify negative impact of chemical dependency including the use of tobacco, alcohol, and other substances  Outcome: Progressing Towards Goal  Goal: *STG: Verbalizes abstinence as an achievable goal  Outcome: Progressing Towards Goal  Goal: *STG: Agrees to participate in outpatient after care program to support ongoing mental health  Outcome: Progressing Towards Goal  Goal: *STG: Able to indentify relapse triggers including interpersonal/social and familial factors  Outcome: Progressing Towards Goal  Goal: *STG: Identify lifestyle changes to support long term sobriety such as vocation, employment, education, and legal issues  Outcome: Progressing Towards Goal  Goal: *STG: Maintains appropriate nutrition and hydration  Outcome: Progressing Towards Goal  Goal: *STG: Vital signs within defined limits  Outcome: Progressing Towards Goal  Goal: *STG/LTG: Relapse prevention plan in place to include housing/aftercare, leisure activities, and spirituality  Outcome: Progressing Towards Goal  Goal: Interventions  Outcome: Progressing Towards Goal     Problem: Patient Education: Go to Patient Education Activity  Goal: Patient/Family Education  Outcome: Progressing Towards Goal     Problem: Falls - Risk of  Goal: *Absence of Falls  Description: Document Lakshmi Fall Risk and appropriate interventions in the flowsheet.   Outcome: Progressing Towards Goal  Note: Fall Risk Interventions:            Medication Interventions: Bed/chair exit alarm, Evaluate medications/consider consulting pharmacy, Teach patient to arise slowly                   Problem: Patient Education: Go to Patient Education Activity  Goal: Patient/Family Education  Outcome: Progressing Towards Goal     Problem: Pulmonary Embolism Care Plan (Adult)  Goal: *Improvement of existing pulmonary embolism  Outcome: Progressing Towards Goal  Goal: *Absence of bleeding  Outcome: Progressing Towards Goal  Goal: *Labs within defined limits  Outcome: Progressing Towards Goal     Problem: Patient Education: Go to Patient Education Activity  Goal: Patient/Family Education  Outcome: Progressing Towards Goal

## 2022-12-29 NOTE — PROGRESS NOTES
0700 Bedside and Verbal shift change report given to [employer] (oncoming nurse) by Lissy Ponce RN (offgoing nurse). Report included the following information SBAR, Kardex, ED Summary, Intake/Output, MAR, Recent Results, and Cardiac Rhythm NSR . End of Shift Note    Bedside shift change report given to Irlanda Goldberg (oncoming nurse) by Josafat Grant RN (offgoing nurse). Report included the following information SBAR, Kardex, ED Summary, Intake/Output, MAR, Recent Results, and Cardiac Rhythm NSR    Shift worked:  7a to 7p     Shift summary and any significant changes:     Pt up in chair most of  day  Currently resting comfortably     Concerns for physician to address:  N/A     Zone phone for oncoming shift:           Activity:  Activity Level: Up ad soo  Number times ambulated in hallways past shift: 0  Number of times OOB to chair past shift: 1    Cardiac:   Cardiac Monitoring: Yes      Cardiac Rhythm: Sinus Rhythm    Access:  Current line(s): PIV     Genitourinary:   Urinary status: voiding    Respiratory:   O2 Device: None (Room air)  Chronic home O2 use?: NO  Incentive spirometer at bedside: NO       GI:  Last Bowel Movement Date: 12/26/22  Current diet:  ADULT DIET Regular; Low Fat/Low Chol/High Fiber/2 gm Na  Passing flatus: YES  Tolerating current diet: YES       Pain Management:   Patient states pain is manageable on current regimen: YES    Skin:  Oneil Score: 22  Interventions: turn team, speciality bed, float heels, increase time out of bed, foam dressing, PT/OT consult, limit briefs, internal/external urinary devices, and nutritional support     Patient Safety:  Fall Score:  Total Score: 2  Interventions: bed/chair alarm, assistive device (walker, cane, etc), gripper socks, pt to call before getting OOB, stay with me (per policy), and gait belt       Length of Stay:  Expected LOS: 2d 12h  Actual LOS: [address] [place] [address] Robson, RN

## 2022-12-29 NOTE — PROGRESS NOTES
Hospitalist Progress Note    NAME: Johan France   :  1960   MRN:  [id number]       Assessment / Plan:    High anion gap metabolic acidosis-lactic acidosis with suspected superimposed starvation/alcoholic ketoacidosis  Hypoglycemia, resolved  Acute right middle lobe pulmonary embolism  Alcoholism-high risk for severe withdrawals  Chronic macrocytic anemia  Essential hypertension     PLAN:     High anion gap metabolic acidosis-lactic acidosis with suspected superimposed starvation/alcoholic ketoacidosis  Hypoglycemia, resolved  Continue current stepdown unit  -Anion gap resolved  -Status post D5  - Lactic acidosis resolved     Acute right middle lobe pulmonary embolism  Therapeutic Lovenox given in ED  Started on Eliquis this morning, I will consult  for double check on insurance coverage as patient has concerns about that  -Extensively counseled on risks of bleeding while on anticoagulation. Emphasized need to remain abstinent from alcohol to prevent inadvertent head trauma and potential intracranial bleeding  -: Cost of Eliquis for a month would be about $500 which patient reports that he cannot afford. We will start Lovenox and Coumadin. Patient agreeable to it. If patient is able to tolerate Lovenox and warfarin well, we probably can discharge him in 1 to 2 days with Lovenox and warfarin bridge. Case management will have to find out the cost of Lovenox. Patient reports that he will be able to find transportation for INR checks.   -Arrange home health for INR checks initially        Alcoholism-high risk for severe withdrawals  Acute on chronic macrocytic anemia  6-day phenobarbital taper with loading dose given high risk for withdrawals  CIWA protocol  Is currently out of alcohol withdrawal     Essential hypertension  Trend blood pressure and address as needed, hypertension likely secondary to withdrawals in the setting and needs to be addressed accordingly     Code Status: Full     DVT Prophylaxis: Lovenox and warfarin  GI Prophylaxis: Not indicated  Diet: Regular/cardiac    30.0 - 39.9 Obese / Body mass index is 32.49 kg/m². Estimated discharge date: December 29  Barriers: Clinical improvement, watch hemoglobin     Subjective:     He reports feeling better today. Does not report any abdominal pain, nausea or vomiting. No chest pain or shortness of breath  He reports that he cannot afford Eliquis at $500 a month            Objective:     VITALS:   Last 24hrs VS reviewed since prior progress note. Most recent are:  Patient Vitals for the past 24 hrs:   Temp Pulse Resp BP SpO2   12/28/22 1521 98.1 °F (36.7 °C) 72 14 128/83 --   12/28/22 1047 98.1 °F (36.7 °C) 86 22 126/79 --   12/28/22 0720 98.1 °F (36.7 °C) 69 14 (!) 111/59 --   12/28/22 0400 -- 86 -- -- --   12/28/22 0326 98.1 °F (36.7 °C) (!) 58 11 (!) 151/98 97 %   12/28/22 0000 -- 60 -- -- --   12/27/22 2341 98 °F (36.7 °C) 69 12 (!) 144/107 96 %   12/27/22 1951 -- 64 -- -- --         Intake/Output Summary (Last 24 hours) at 12/28/2022 1912  Last data filed at 12/28/2022 1806  Gross per 24 hour   Intake 1190 ml   Output --   Net 1190 ml          I had a face to face encounter and independently examined this patient on 12/28/2022, as outlined below:  PHYSICAL EXAM:  General: WD, WN. Alert, cooperative, no acute distress    EENT:  EOMI. Anicteric sclerae. MMM  Resp:  CTA bilaterally, no wheezing or rales. No accessory muscle use  CV:  Regular  rhythm,  No edema  GI:  Soft, Non distended, Non tender. +Bowel sounds  Neurologic:  EOMs intact. No facial asymmetry. No aphasia or slurred speech. Symmetrical strength, Sensation grossly intact. Alert and oriented X 4.     Psych:   Good insight. Not anxious nor agitated  Skin:  No rashes. No jaundice.  bruises in his lower lip from the fall and on his right knee    Reviewed most current lab test results and cultures  YES  Reviewed most current radiology test results   YES  Review and summation of old records today    NO  Reviewed patient's current orders and MAR    YES  PMH/SH reviewed - no change compared to H&P  ________________________________________________________________________  Care Plan discussed with:    Comments   Patient X    Family      RN X    Care Manager     Consultant                        Multidiciplinary team rounds were held today with , nursing, pharmacist and clinical coordinator. Patient's plan of care was discussed; medications were reviewed and discharge planning was addressed. ________________________________________________________________________        Comments   >50% of visit spent in counseling and coordination of care X    ________________________________________________________________________  Anthony Hull MD     Procedures: see electronic medical records for all procedures/Xrays and details which were not copied into this note but were reviewed prior to creation of Plan. LABS:  I reviewed today's most current labs and imaging studies.   Pertinent labs include:  Recent Labs     12/27/22  0039 12/26/22  0257   WBC 5.1 5.8   HGB 7.6* 8.0*   HCT 22.4* 23.2*    229       Recent Labs     12/28/22  1429 12/27/22  0039 12/26/22  1310 12/26/22  0257 12/25/22  2042   NA  --  137 136 139 139   K  --  4.2 4.1 4.2 4.5   CL  --  104 101 103 101   CO2  --  29 28 25 17*   GLU  --  117* 172* 114* 67   BUN  --  8 9 9 11   CREA  --  0.89 1.21 0.99 1.10   CA  --  7.1* 6.9* 7.3* 7.4*   PHOS  --   --   --   --  5.0*   INR 1.1  --   --   --   --          Signed: Anthony Hull MD

## 2022-12-29 NOTE — PROGRESS NOTES
Hospitalist Progress Note    NAME: Beverley Gifford   :  1960   MRN:  [id number]       Assessment / Plan:    High anion gap metabolic acidosis-lactic acidosis with suspected superimposed starvation/alcoholic ketoacidosis  Hypoglycemia, resolved  Acute right middle lobe pulmonary embolism  Alcoholism-high risk for severe withdrawals  Chronic macrocytic anemia  Essential hypertension     PLAN:     High anion gap metabolic acidosis-lactic acidosis with suspected superimposed starvation/alcoholic ketoacidosis  Hypoglycemia, resolved  Continue current stepdown unit  -Anion gap resolved  -Status post D5  - Lactic acidosis resolved     Acute right middle lobe pulmonary embolism  Therapeutic Lovenox given in ED. CTA showed single right middle lobe pulmonary embolism. No evidence for right heart strain or pulmonary infarction.   -unable to afford Eliquis. Currently on coumadin bridge with warfarin. Will need to see INR trending up prior to discharge. CM to help with pricing of Eliquis (at least 7 days supply)    -Extensively counseled on risks of bleeding while on anticoagulation. Emphasized need to remain abstinent from alcohol to prevent inadvertent head trauma and potential intracranial bleeding    Alcoholism-high risk for severe withdrawals  Acute on chronic macrocytic anemia  6-day phenobarbital taper with loading dose given high risk for withdrawals  CIWA protocol  Is currently out of alcohol withdrawal     Essential hypertension  Trend blood pressure and address as needed, hypertension likely secondary to withdrawals in the setting and needs to be addressed accordingly     Code Status: Full     DVT Prophylaxis: Lovenox and warfarin  GI Prophylaxis: Not indicated  Diet: Regular/cardiac    30.0 - 39.9 Obese / Body mass index is 32.49 kg/m². Estimated discharge date:   Barriers: Clinical improvement, watch hemoglobin     Subjective:   NAD. No new complaint. Denies sob currently. Objective:     VITALS:   Last 24hrs VS reviewed since prior progress note. Most recent are:  Patient Vitals for the past 24 hrs:   Temp Pulse Resp BP SpO2   12/29/22 0742 98.4 °F (36.9 °C) 73 14 119/73 98 %   12/29/22 0400 -- 78 -- -- --   12/29/22 0227 98.1 °F (36.7 °C) 65 13 (!) 138/94 99 %   12/29/22 0000 -- 72 -- -- --   12/28/22 2327 97.8 °F (36.6 °C) 67 18 124/89 100 %   12/28/22 2000 -- 71 -- -- --   12/28/22 1942 98.6 °F (37 °C) 77 16 105/65 97 %   12/28/22 1521 98.1 °F (36.7 °C) 72 14 128/83 --   12/28/22 1047 98.1 °F (36.7 °C) 86 22 126/79 --         Intake/Output Summary (Last 24 hours) at 12/29/2022 0841  Last data filed at 12/29/2022 0353  Gross per 24 hour   Intake 740 ml   Output 500 ml   Net 240 ml          I had a face to face encounter and independently examined this patient on 12/29/2022, as outlined below:  PHYSICAL EXAM:  General: WD, WN. Alert, cooperative, no acute distress    EENT:  EOMI. Anicteric sclerae. MMM  Resp:  CTA bilaterally, no wheezing or rales. No accessory muscle use  CV:  Regular  rhythm,  No edema  GI:  Soft, Non distended, Non tender. +Bowel sounds  Neurologic:  EOMs intact. No facial asymmetry. No aphasia or slurred speech. Symmetrical strength, Sensation grossly intact. Alert and oriented X 4.     Psych:   Good insight. Not anxious nor agitated  Skin:  No rashes. No jaundice.  bruises in his lower lip from the fall and on his right knee    Reviewed most current lab test results and cultures  YES  Reviewed most current radiology test results   YES  Review and summation of old records today    NO  Reviewed patient's current orders and MAR    YES  PMH/SH reviewed - no change compared to H&P  ________________________________________________________________________  Care Plan discussed with:    Comments   Patient X    Family      RN X    Care Manager     Consultant                        Multidiciplinary team rounds were held today with , nursing, pharmacist and clinical coordinator. Patient's plan of care was discussed; medications were reviewed and discharge planning was addressed. ________________________________________________________________________        Comments   >50% of visit spent in counseling and coordination of care X    ________________________________________________________________________  Shannon Chowdhury MD     Procedures: see electronic medical records for all procedures/Xrays and details which were not copied into this note but were reviewed prior to creation of Plan. LABS:  I reviewed today's most current labs and imaging studies.   Pertinent labs include:  Recent Labs     12/29/22 0310 12/27/22 0039   WBC 4.3 5.1   HGB 9.1* 7.6*   HCT 27.3* 22.4*    182       Recent Labs     12/29/22 0310 12/28/22  1429 12/27/22 0039 12/26/22  1310   *  --  137 136   K 3.9  --  4.2 4.1     --  104 101   CO2 26  --  29 28   GLU 82  --  117* 172*   BUN 6  --  8 9   CREA 0.88  --  0.89 1.21   CA 8.1*  --  7.1* 6.9*   INR 1.0 1.1  --   --          Signed: Shannon Chowdhury MD

## 2022-12-29 NOTE — PROGRESS NOTES
Pharmacist Daily Dosing of Warfarin    Indication & Goal INR: PE, INR Goal 2-3    PTA Warfarin Dose: new start    Notable concurrent conditions and medications:  Phenobarbital   weaning until 12/31    Labs:  Recent Labs     12/29/22  0310 12/28/22  1429 12/27/22  0039   INR 1.0 1.1  --    HGB 9.1*  --  7.6*     --  182         Impression/Plan:   Phenobarbital may decrease the effect of the warfarin  Will order warfarin 6 mg PO x 1 tonight  Bridging with Lovenox  Daily INR has been ordered  CBC w/o differential to be checked at least every other day     Pharmacy will follow daily and adjust the dose as appropriate.     Thank you,  Maggie Peres, [facility]      Warfarin Protocol  Located on pharmacy Teams site: Clinical Practice -> Anticoagulation & Cardiology -> Anticoagulation Policies, Protocols, Guidance

## 2022-12-30 LAB
ANION GAP SERPL CALC-SCNC: 4 MMOL/L (ref 5–15)
BASOPHILS # BLD: 0 K/UL (ref 0–0.1)
BASOPHILS NFR BLD: 1 % (ref 0–1)
BUN SERPL-MCNC: 7 MG/DL (ref 6–20)
BUN/CREAT SERPL: 9 (ref 12–20)
CALCIUM SERPL-MCNC: 8.7 MG/DL (ref 8.5–10.1)
CHLORIDE SERPL-SCNC: 104 MMOL/L (ref 97–108)
CO2 SERPL-SCNC: 26 MMOL/L (ref 21–32)
CREAT SERPL-MCNC: 0.77 MG/DL (ref 0.7–1.3)
DIFFERENTIAL METHOD BLD: ABNORMAL
EOSINOPHIL # BLD: 0.1 K/UL (ref 0–0.4)
EOSINOPHIL NFR BLD: 2 % (ref 0–7)
ERYTHROCYTE [DISTWIDTH] IN BLOOD BY AUTOMATED COUNT: 17.9 % (ref 11.5–14.5)
GLUCOSE SERPL-MCNC: 86 MG/DL (ref 65–100)
HCT VFR BLD AUTO: 24.9 % (ref 36.6–50.3)
HGB BLD-MCNC: 8.5 G/DL (ref 12.1–17)
IMM GRANULOCYTES # BLD AUTO: 0 K/UL (ref 0–0.04)
IMM GRANULOCYTES NFR BLD AUTO: 0 % (ref 0–0.5)
INR PPP: 1.1 (ref 0.9–1.1)
LYMPHOCYTES # BLD: 2.1 K/UL (ref 0.8–3.5)
LYMPHOCYTES NFR BLD: 49 % (ref 12–49)
MCH RBC QN AUTO: 36.5 PG (ref 26–34)
MCHC RBC AUTO-ENTMCNC: 34.1 G/DL (ref 30–36.5)
MCV RBC AUTO: 106.9 FL (ref 80–99)
MONOCYTES # BLD: 0.4 K/UL (ref 0–1)
MONOCYTES NFR BLD: 9 % (ref 5–13)
NEUTS SEG # BLD: 1.7 K/UL (ref 1.8–8)
NEUTS SEG NFR BLD: 39 % (ref 32–75)
NRBC # BLD: 0.02 K/UL (ref 0–0.01)
NRBC BLD-RTO: 0.5 PER 100 WBC
PLATELET # BLD AUTO: 189 K/UL (ref 150–400)
PMV BLD AUTO: 11.5 FL (ref 8.9–12.9)
POTASSIUM SERPL-SCNC: 4.3 MMOL/L (ref 3.5–5.1)
PROTHROMBIN TIME: 11 SEC (ref 9–11.1)
RBC # BLD AUTO: 2.33 M/UL (ref 4.1–5.7)
SODIUM SERPL-SCNC: 134 MMOL/L (ref 136–145)
WBC # BLD AUTO: 4.3 K/UL (ref 4.1–11.1)

## 2022-12-30 PROCEDURE — 74011000250 HC RX REV CODE- 250: Performed by: STUDENT IN AN ORGANIZED HEALTH CARE EDUCATION/TRAINING PROGRAM

## 2022-12-30 PROCEDURE — 65660000001 HC RM ICU INTERMED STEPDOWN

## 2022-12-30 PROCEDURE — 74011250637 HC RX REV CODE- 250/637: Performed by: INTERNAL MEDICINE

## 2022-12-30 PROCEDURE — 36415 COLL VENOUS BLD VENIPUNCTURE: CPT

## 2022-12-30 PROCEDURE — 74011250636 HC RX REV CODE- 250/636: Performed by: INTERNAL MEDICINE

## 2022-12-30 PROCEDURE — 85025 COMPLETE CBC W/AUTO DIFF WBC: CPT

## 2022-12-30 PROCEDURE — 74011250637 HC RX REV CODE- 250/637: Performed by: STUDENT IN AN ORGANIZED HEALTH CARE EDUCATION/TRAINING PROGRAM

## 2022-12-30 PROCEDURE — 80048 BASIC METABOLIC PNL TOTAL CA: CPT

## 2022-12-30 PROCEDURE — 85610 PROTHROMBIN TIME: CPT

## 2022-12-30 RX ADMIN — MAGNESIUM OXIDE TAB 400 MG (241.3 MG ELEMENTAL MG) 400 MG: 400 (241.3 MG) TAB at 08:46

## 2022-12-30 RX ADMIN — SODIUM CHLORIDE, PRESERVATIVE FREE 10 ML: 5 INJECTION INTRAVENOUS at 21:58

## 2022-12-30 RX ADMIN — THIAMINE HCL TAB 100 MG 100 MG: 100 TAB at 08:47

## 2022-12-30 RX ADMIN — WARFARIN SODIUM 7.5 MG: 5 TABLET ORAL at 17:42

## 2022-12-30 RX ADMIN — SODIUM CHLORIDE, PRESERVATIVE FREE 10 ML: 5 INJECTION INTRAVENOUS at 05:01

## 2022-12-30 RX ADMIN — ENOXAPARIN SODIUM 100 MG: 100 INJECTION SUBCUTANEOUS at 05:01

## 2022-12-30 RX ADMIN — ENOXAPARIN SODIUM 100 MG: 100 INJECTION SUBCUTANEOUS at 17:42

## 2022-12-30 RX ADMIN — MULTIPLE VITAMINS W/ MINERALS TAB 1 TABLET: TAB at 08:47

## 2022-12-30 RX ADMIN — SODIUM CHLORIDE, PRESERVATIVE FREE 10 ML: 5 INJECTION INTRAVENOUS at 17:42

## 2022-12-30 RX ADMIN — FOLIC ACID 1 MG: 1 TABLET ORAL at 08:46

## 2022-12-30 RX ADMIN — PHENOBARBITAL 32.4 MG: 32.4 TABLET ORAL at 08:47

## 2022-12-30 NOTE — PROGRESS NOTES
ransition of Care Plan:     RUR: 12% \"low risk\"  Disposition: Home with family  If SNF or IPR: Date FOC offered: N/A  Date FOC received: N/A  Date authorization started with reference number: N/A  Date authorization received and expires: N/A  Accepting facility: N/A  Follow up appointments: PCP & Specialists as needed  DME needed: None  Transportation at Discharge: Pt's friend will provide transport at d/c  [place] or means to access home: Pt has access to home    IM Medicare Letter: N/A  Is patient a  and connected with the [place]? N/A  Caregiver Contact: Pt's son Davion Ceron [phone]  Discharge Caregiver contacted prior to discharge? No  Care Conference needed?: Not at this time    Initial note: Chart reviewed for updates. Pt is still not medically cleared. No CM needs identified at this time. CM will continue to follow up.     TATI Reddy    [phone]

## 2022-12-30 NOTE — PROGRESS NOTES
Pharmacist Daily Dosing of Warfarin    Indication & Goal INR: PE, INR Goal 2-3    PTA Warfarin Dose: new start    Notable concurrent conditions and medications: none    Labs:  Recent Labs     12/30/22  0303 12/29/22  0310 12/29/22  0310 12/28/22  1429   INR 1.1  --  1.0 1.1   HGB 8.5*   < > 9.1*  --      --  180  --     < > = values in this interval not displayed. Date 12/28 12/29 12/30   INR 1.1 1 1.1   Warfarin (mg) 5 6 7.5   Vitamin K (mg)          Impression/Plan:   Phenobarbital stopped  Will order warfarin 7.5 mg PO x 1 tonight  Bridging with Lovenox  Daily INR has been ordered  CBC w/o differential to be checked at least every other day     Pharmacy will follow daily and adjust the dose as appropriate.     Thank you,  Malathi Garcia, [facility]      Warfarin Protocol  Located on pharmacy Teams site: Clinical Practice -> Anticoagulation & Cardiology -> Anticoagulation Policies, Protocols, Guidance

## 2022-12-30 NOTE — PROGRESS NOTES
Problem: Alcohol Withdrawal  Goal: *STG: Participates in treatment plan  Outcome: Progressing Towards Goal  Goal: *STG: Remains safe in hospital  Outcome: Progressing Towards Goal  Goal: *STG: Seeks staff when symptoms of withdrawal increase  Outcome: Progressing Towards Goal  Goal: *STG: Complies with medication therapy  Outcome: Progressing Towards Goal  Goal: *STG: Attends activities and groups  Outcome: Progressing Towards Goal  Goal: *STG: Will identify negative impact of chemical dependency including the use of tobacco, alcohol, and other substances  Outcome: Progressing Towards Goal  Goal: *STG: Verbalizes abstinence as an achievable goal  Outcome: Progressing Towards Goal  Goal: *STG: Agrees to participate in outpatient after care program to support ongoing mental health  Outcome: Progressing Towards Goal  Goal: *STG: Able to indentify relapse triggers including interpersonal/social and familial factors  Outcome: Progressing Towards Goal  Goal: *STG: Identify lifestyle changes to support long term sobriety such as vocation, employment, education, and legal issues  Outcome: Progressing Towards Goal  Goal: *STG: Maintains appropriate nutrition and hydration  Outcome: Progressing Towards Goal  Goal: *STG: Vital signs within defined limits  Outcome: Progressing Towards Goal  Goal: *STG/LTG: Relapse prevention plan in place to include housing/aftercare, leisure activities, and spirituality  Outcome: Progressing Towards Goal  Goal: Interventions  Outcome: Progressing Towards Goal     Problem: Falls - Risk of  Goal: *Absence of Falls  Description: Document Lakshmi Fall Risk and appropriate interventions in the flowsheet.   Outcome: Progressing Towards Goal  Note: Fall Risk Interventions:            Medication Interventions: Bed/chair exit alarm, Evaluate medications/consider consulting pharmacy, Teach patient to arise slowly                   Problem: Pulmonary Embolism Care Plan (Adult)  Goal: *Improvement of existing pulmonary embolism  Outcome: Progressing Towards Goal  Goal: *Absence of bleeding  Outcome: Progressing Towards Goal  Goal: *Labs within defined limits  Outcome: Progressing Towards Goal

## 2022-12-30 NOTE — PROGRESS NOTES
0715: Bedside shift change report given to HANSEL Adams (oncoming nurse) by Author HANSEL Kovacs (offgoing nurse). Report included the following information SBAR, Kardex, Intake/Output, MAR, and Recent Results. 1900:   End of Shift Note    Bedside shift change report given to Molly Sykes RN (oncoming nurse) by Елена Frye RN (offgoing nurse). Report included the following information SBAR, Kardex, Intake/Output, MAR, and Recent Results    Shift worked:  [id number]     Shift summary and any significant changes:     No significant changes. Continue lovenox/warfarin bridging. Scheduled phenobarbital order discontinued today. Patient sat in chair most of shift and was a standby assist to the bathroom several times today. Concerns for physician to address:       Zone phone for oncoming shift:          Activity:  Activity Level: Up with Assistance  Number times ambulated in hallways past shift: 0  Number of times OOB to chair past shift: 3    Cardiac:   Cardiac Monitoring: Yes      Cardiac Rhythm: Sinus Rhythm    Access:  Current line(s): PIV     Genitourinary:   Urinary status: voiding and incontinent    Respiratory:   O2 Device: None (Room air)  Chronic home O2 use?: NO  Incentive spirometer at bedside: NO       GI:  Last Bowel Movement Date: 12/26/22  Current diet:  ADULT DIET Regular; Low Fat/Low Chol/High Fiber/2 gm Na  Passing flatus: YES  Tolerating current diet: YES       Pain Management:   Patient states pain is manageable on current regimen: N/A    Skin:  Oneil Score: 22  Interventions: increase time out of bed, PT/OT consult, and nutritional support     Patient Safety:  Fall Score:  Total Score: 2  Interventions: bed/chair alarm, assistive device (walker, cane, etc), gripper socks, and pt to call before getting OOB       Length of Stay:  Expected LOS: 2d 12h  Actual LOS: Rosalinda Paez RN

## 2022-12-31 LAB
ANION GAP SERPL CALC-SCNC: 3 MMOL/L (ref 5–15)
BASOPHILS # BLD: 0 K/UL (ref 0–0.1)
BASOPHILS NFR BLD: 0 % (ref 0–1)
BUN SERPL-MCNC: 9 MG/DL (ref 6–20)
BUN/CREAT SERPL: 11 (ref 12–20)
CALCIUM SERPL-MCNC: 8.5 MG/DL (ref 8.5–10.1)
CHLORIDE SERPL-SCNC: 104 MMOL/L (ref 97–108)
CO2 SERPL-SCNC: 27 MMOL/L (ref 21–32)
CREAT SERPL-MCNC: 0.84 MG/DL (ref 0.7–1.3)
DIFFERENTIAL METHOD BLD: ABNORMAL
EOSINOPHIL # BLD: 0.1 K/UL (ref 0–0.4)
EOSINOPHIL NFR BLD: 1 % (ref 0–7)
ERYTHROCYTE [DISTWIDTH] IN BLOOD BY AUTOMATED COUNT: 17.8 % (ref 11.5–14.5)
GLUCOSE SERPL-MCNC: 83 MG/DL (ref 65–100)
HCT VFR BLD AUTO: 24.6 % (ref 36.6–50.3)
HGB BLD-MCNC: 8.3 G/DL (ref 12.1–17)
IMM GRANULOCYTES # BLD AUTO: 0 K/UL (ref 0–0.04)
IMM GRANULOCYTES NFR BLD AUTO: 0 % (ref 0–0.5)
INR PPP: 1.2 (ref 0.9–1.1)
LYMPHOCYTES # BLD: 2.3 K/UL (ref 0.8–3.5)
LYMPHOCYTES NFR BLD: 50 % (ref 12–49)
MCH RBC QN AUTO: 35.9 PG (ref 26–34)
MCHC RBC AUTO-ENTMCNC: 33.7 G/DL (ref 30–36.5)
MCV RBC AUTO: 106.5 FL (ref 80–99)
MONOCYTES # BLD: 0.4 K/UL (ref 0–1)
MONOCYTES NFR BLD: 9 % (ref 5–13)
NEUTS SEG # BLD: 1.9 K/UL (ref 1.8–8)
NEUTS SEG NFR BLD: 40 % (ref 32–75)
NRBC # BLD: 0 K/UL (ref 0–0.01)
NRBC BLD-RTO: 0 PER 100 WBC
PLATELET # BLD AUTO: 197 K/UL (ref 150–400)
PMV BLD AUTO: 10.8 FL (ref 8.9–12.9)
POTASSIUM SERPL-SCNC: 4.1 MMOL/L (ref 3.5–5.1)
PROTHROMBIN TIME: 12.4 SEC (ref 9–11.1)
RBC # BLD AUTO: 2.31 M/UL (ref 4.1–5.7)
SODIUM SERPL-SCNC: 134 MMOL/L (ref 136–145)
WBC # BLD AUTO: 4.7 K/UL (ref 4.1–11.1)

## 2022-12-31 PROCEDURE — 85610 PROTHROMBIN TIME: CPT

## 2022-12-31 PROCEDURE — 65660000001 HC RM ICU INTERMED STEPDOWN

## 2022-12-31 PROCEDURE — 85025 COMPLETE CBC W/AUTO DIFF WBC: CPT

## 2022-12-31 PROCEDURE — 74011250637 HC RX REV CODE- 250/637: Performed by: STUDENT IN AN ORGANIZED HEALTH CARE EDUCATION/TRAINING PROGRAM

## 2022-12-31 PROCEDURE — 74011250637 HC RX REV CODE- 250/637: Performed by: INTERNAL MEDICINE

## 2022-12-31 PROCEDURE — 36415 COLL VENOUS BLD VENIPUNCTURE: CPT

## 2022-12-31 PROCEDURE — 80048 BASIC METABOLIC PNL TOTAL CA: CPT

## 2022-12-31 PROCEDURE — 74011000250 HC RX REV CODE- 250: Performed by: STUDENT IN AN ORGANIZED HEALTH CARE EDUCATION/TRAINING PROGRAM

## 2022-12-31 PROCEDURE — 74011250636 HC RX REV CODE- 250/636: Performed by: INTERNAL MEDICINE

## 2022-12-31 RX ADMIN — SODIUM CHLORIDE, PRESERVATIVE FREE 10 ML: 5 INJECTION INTRAVENOUS at 07:07

## 2022-12-31 RX ADMIN — THIAMINE HCL TAB 100 MG 100 MG: 100 TAB at 08:32

## 2022-12-31 RX ADMIN — ENOXAPARIN SODIUM 100 MG: 100 INJECTION SUBCUTANEOUS at 17:23

## 2022-12-31 RX ADMIN — MULTIPLE VITAMINS W/ MINERALS TAB 1 TABLET: TAB at 08:33

## 2022-12-31 RX ADMIN — FOLIC ACID 1 MG: 1 TABLET ORAL at 08:32

## 2022-12-31 RX ADMIN — SODIUM CHLORIDE, PRESERVATIVE FREE 10 ML: 5 INJECTION INTRAVENOUS at 17:23

## 2022-12-31 RX ADMIN — WARFARIN SODIUM 7 MG: 5 TABLET ORAL at 17:23

## 2022-12-31 RX ADMIN — ENOXAPARIN SODIUM 100 MG: 100 INJECTION SUBCUTANEOUS at 07:05

## 2022-12-31 RX ADMIN — SODIUM CHLORIDE, PRESERVATIVE FREE 10 ML: 5 INJECTION INTRAVENOUS at 22:37

## 2022-12-31 RX ADMIN — MAGNESIUM OXIDE TAB 400 MG (241.3 MG ELEMENTAL MG) 400 MG: 400 (241.3 MG) TAB at 08:32

## 2022-12-31 NOTE — PROGRESS NOTES
Problem: Falls - Risk of  Goal: *Absence of Falls  Description: Document Zev Clemens Fall Risk and appropriate interventions in the flowsheet.   Outcome: Progressing Towards Goal  Note: Fall Risk Interventions:            Medication Interventions: Bed/chair exit alarm, Evaluate medications/consider consulting pharmacy, Teach patient to arise slowly                   Problem: Patient Education: Go to Patient Education Activity  Goal: Patient/Family Education  Outcome: Progressing Towards Goal     Problem: Pulmonary Embolism Care Plan (Adult)  Goal: *Improvement of existing pulmonary embolism  Outcome: Progressing Towards Goal  Goal: *Absence of bleeding  Outcome: Progressing Towards Goal  Goal: *Labs within defined limits  Outcome: Progressing Towards Goal     Problem: Patient Education: Go to Patient Education Activity  Goal: Patient/Family Education  Outcome: Progressing Towards Goal

## 2022-12-31 NOTE — PROGRESS NOTES
Hospitalist Progress Note    NAME: Alaina Houser   :  1960   MRN:  [id number]       Assessment / Plan:    High anion gap metabolic acidosis-lactic acidosis with suspected superimposed starvation/alcoholic ketoacidosis  Hypoglycemia, resolved  Acute right middle lobe pulmonary embolism  Alcoholism-high risk for severe withdrawals  Chronic macrocytic anemia  Essential hypertension     PLAN:     High anion gap metabolic acidosis-lactic acidosis with suspected superimposed starvation/alcoholic ketoacidosis  Hypoglycemia, resolved  Continue current stepdown unit  -Anion gap resolved  -Status post D5  - Lactic acidosis resolved     Acute right middle lobe pulmonary embolism  Therapeutic Lovenox given in ED. CTA showed single right middle lobe pulmonary embolism. No evidence for right heart strain or pulmonary infarction.   -unable to afford Eliquis. Currently on coumadin bridge with warfarin. Will need to see INR trending up prior to discharge. INR 1.2 today. CM to help with pricing of Eliquis (at least 7 days supply)    -Extensively counseled on risks of bleeding while on anticoagulation. Emphasized need to remain abstinent from alcohol to prevent inadvertent head trauma and potential intracranial bleeding    Alcoholism-high risk for severe withdrawals  Acute on chronic macrocytic anemia  6-day phenobarbital taper with loading dose given high risk for withdrawals  CIWA protocol  Is currently out of alcohol withdrawal     Essential hypertension  Trend blood pressure and address as needed, hypertension likely secondary to withdrawals in the setting and needs to be addressed accordingly     Code Status: Full     DVT Prophylaxis: Lovenox and warfarin  GI Prophylaxis: Not indicated  Diet: Regular/cardiac    30.0 - 39.9 Obese / Body mass index is 32.49 kg/m². Estimated discharge date:   Barriers: Clinical improvement, watch hemoglobin     Subjective:   NAD. No new complaint.   Stable on Mohs Case Number: [id number]    Objective:     VITALS:   Last 24hrs VS reviewed since prior progress note. Most recent are:  Patient Vitals for the past 24 hrs:   Temp Pulse Resp BP SpO2   12/31/22 0749 97.9 °F (36.6 °C) 80 22 122/80 95 %   12/31/22 0342 -- 63 12 130/86 --   12/30/22 2343 98.3 °F (36.8 °C) 70 16 111/85 100 %   12/30/22 1945 98 °F (36.7 °C) 72 13 (!) 131/93 97 %   12/30/22 1527 98 °F (36.7 °C) 83 15 122/86 100 %   12/30/22 1103 97.9 °F (36.6 °C) 80 17 (!) 127/93 97 %   12/30/22 1058 -- 76 15 95/81 99 %         Intake/Output Summary (Last 24 hours) at 12/31/2022 0918  Last data filed at 12/31/2022 0341  Gross per 24 hour   Intake --   Output 800 ml   Net -800 ml          I had a face to face encounter and independently examined this patient on 12/31/2022, as outlined below:  PHYSICAL EXAM:  General: WD, WN. Alert, cooperative, no acute distress    EENT:  EOMI. Anicteric sclerae. MMM  Resp:  CTA bilaterally, no wheezing or rales. No accessory muscle use  CV:  Regular  rhythm,  No edema  GI:  Soft, Non distended, Non tender. +Bowel sounds  Neurologic:  EOMs intact. No facial asymmetry. No aphasia or slurred speech. Symmetrical strength, Sensation grossly intact. Alert and oriented X 4.     Psych:   Good insight. Not anxious nor agitated  Skin:  No rashes. No jaundice. bruises in his lower lip from the fall and on his right knee    Reviewed most current lab test results and cultures  YES  Reviewed most current radiology test results   YES  Review and summation of old records today    NO  Reviewed patient's current orders and MAR    YES  PMH/SH reviewed - no change compared to H&P  ________________________________________________________________________  Care Plan discussed with:    Comments   Patient X    Family      RN X    Care Manager     Consultant                        Multidiciplinary team rounds were held today with , nursing, pharmacist and clinical coordinator.   Patient's plan of care was discussed; Biopsy Photograph Reviewed: Yes medications were reviewed and discharge planning was addressed. ________________________________________________________________________        Comments   >50% of visit spent in counseling and coordination of care X    ________________________________________________________________________  Navya Agarwal MD     Procedures: see electronic medical records for all procedures/Xrays and details which were not copied into this note but were reviewed prior to creation of Plan. LABS:  I reviewed today's most current labs and imaging studies.   Pertinent labs include:  Recent Labs     12/31/22 0422 12/30/22 0303 12/29/22 0310   WBC 4.7 4.3 4.3   HGB 8.3* 8.5* 9.1*   HCT 24.6* 24.9* 27.3*    189 180       Recent Labs     12/31/22 0422 12/30/22 0303 12/29/22  0310   * 134* 135*   K 4.1 4.3 3.9    104 103   CO2 27 26 26   GLU 83 86 82   BUN 9 7 6   CREA 0.84 0.77 0.88   CA 8.5 8.7 8.1*   INR 1.2* 1.1 1.0         Signed: Navya Agarwal MD Consent Type: Consent 1 (Standard) Eye Shield Used: No Surgeon Performing Repair (Optional): Edward Lyle M.D. Initial Size Of Lesion: 0.9 X Size Of Lesion In Cm (Optional): 0.2 Number Of Stages: 3 Primary Defect Length In Cm (Final Defect Size - Required For Flaps/Grafts): 1.3 Repair Type: Flap Oculoplastic Surgeon Procedure Text (A): After obtaining clear surgical margins the patient was sent to oculoplastics for surgical repair.  The patient understands they will receive post-surgical care and follow-up from the referring physician's office. Otolaryngologist Procedure Text (A): After obtaining clear surgical margins the patient was sent to otolaryngology for surgical repair.  The patient understands they will receive post-surgical care and follow-up from the referring physician's office. Plastic Surgeon Procedure Text (A): After obtaining clear surgical margins the patient was sent to plastics for surgical repair.  The patient understands they will receive post-surgical care and follow-up from the referring physician's office. Mid-Level Procedure Text (A): After obtaining clear surgical margins the patient was sent to a mid-level provider for surgical repair.  The patient understands they will receive post-surgical care and follow-up from the mid-level provider. Provider Procedure Text (A): After obtaining clear surgical margins the defect was repaired by another provider. Asc Procedure Text (A): After obtaining clear surgical margins the patient was sent to an ASC for surgical repair.  The patient understands they will receive post-surgical care and follow-up from the ASC physician. Suturegard Retention Suture: 2-0 Nylon Retention Suture Bite Size: 3 mm Length To Time In Minutes Device Was In Place: 10 Number Of Hemigard Strips Per Side: 1 Simple / Intermediate / Complex Repair - Final Wound Length In Cm: 0 Undermining Type: Entire Wound Debridement Text: The wound edges were debrided prior to proceeding with the closure to facilitate wound healing. Helical Rim Text: The closure involved the helical rim. Vermilion Border Text: The closure involved the vermilion border. Nostril Rim Text: The closure involved the nostril rim. Retention Suture Text: Retention sutures were placed to support the closure and prevent dehiscence. Flap Type: Transposition Flap Secondary Defect Length In Cm (Required For Flaps): 1.2 Area H Indication Text: Tumors in this location are included in Area H (eyelids, eyebrows, nose, lips, chin, ear, pre-auricular, post-auricular, temple, genitalia, hands, feet, ankles and areola).  Tissue conservation is critical in these anatomic locations. Area M Indication Text: Tumors in this location are included in Area M (cheek, forehead, scalp, neck, jawline and pretibial skin).  Mohs surgery is indicated for tumors in these anatomic locations. Area L Indication Text: Tumors in this location are included in Area L (trunk and extremities).  Mohs surgery is indicated for larger tumors, or tumors with aggressive histologic features, in these anatomic locations. Tumor Debulked?: curette Depth Of Tumor Invasion (For Histology): dermis Surgical Defect Length In Cm (Optional): 1.0 Surgical Defect Width In Cm (Optional): 0.6 Special Stains Stage 1 - Results: Base On Clearance Noted Above Stage 2: Additional Anesthesia Volume In Cc: 6 Stage 2: Additional Anesthesia Type: 1% lidocaine with epinephrine and a 1:10 solution of 8.4% sodium bicarbonate Surgical Defect Length In Cm (Optional): 1.1 Surgical Defect Width In Cm (Optional): 0.8 Stage 4: Additional Anesthesia Type: 1% lidocaine with epinephrine Include Tumor Staging In Mohs Note?: Please Select the Appropriate Response Staging Info: By selecting yes to the question above you will include information on AJCC 8 tumor staging in your Mohs note. Information on tumor staging will be automatically added for SCCs on the head and neck. AJCC 8 includes tumor size, tumor depth, perineural involvement and bone invasion. Tumor Depth: Less than 6mm from granular layer and no invasion beyond the subcutaneous fat Medical Necessity Statement: Based on my medical judgement, Mohs surgery is the most appropriate treatment for this cancer compared to other treatments. Alternatives Discussed Intro (Do Not Add Period): I discussed alternative treatments to Mohs surgery and specifically discussed the risks and benefits of Consent 1/Introductory Paragraph: The rationale for Mohs was explained to the patient and consent was obtained. The risks, benefits and alternatives to therapy were discussed in detail. Specifically, the risks of infection, scarring, bleeding, prolonged wound healing, incomplete removal, allergy to anesthesia, nerve injury and recurrence were addressed. Prior to the procedure, the treatment site was clearly identified and confirmed by the patient. All components of Universal Protocol/PAUSE Rule completed. Consent 2/Introductory Paragraph: Mohs surgery was explained to the patient and consent was obtained. The risks, benefits and alternatives to therapy were discussed in detail. Specifically, the risks of infection, scarring, bleeding, prolonged wound healing, incomplete removal, allergy to anesthesia, nerve injury and recurrence were addressed. Prior to the procedure, the treatment site was clearly identified and confirmed by the patient. All components of Universal Protocol/PAUSE Rule completed. Consent 3/Introductory Paragraph: I gave the patient a chance to ask questions they had about the procedure.  Following this I explained the Mohs procedure and consent was obtained. The risks, benefits and alternatives to therapy were discussed in detail. Specifically, the risks of infection, scarring, bleeding, prolonged wound healing, incomplete removal, allergy to anesthesia, nerve injury and recurrence were addressed. Prior to the procedure, the treatment site was clearly identified and confirmed by the patient. All components of Universal Protocol/PAUSE Rule completed. Consent (Temporal Branch)/Introductory Paragraph: The rationale for Mohs was explained to the patient and consent was obtained. The risks, benefits and alternatives to therapy were discussed in detail. Specifically, the risks of damage to the temporal branch of the facial nerve, infection, scarring, bleeding, prolonged wound healing, incomplete removal, allergy to anesthesia, and recurrence were addressed. Prior to the procedure, the treatment site was clearly identified and confirmed by the patient. All components of Universal Protocol/PAUSE Rule completed. Consent (Marginal Mandibular)/Introductory Paragraph: The rationale for Mohs was explained to the patient and consent was obtained. The risks, benefits and alternatives to therapy were discussed in detail. Specifically, the risks of damage to the marginal mandibular branch of the facial nerve, infection, scarring, bleeding, prolonged wound healing, incomplete removal, allergy to anesthesia, and recurrence were addressed. Prior to the procedure, the treatment site was clearly identified and confirmed by the patient. All components of Universal Protocol/PAUSE Rule completed. Consent (Spinal Accessory)/Introductory Paragraph: The rationale for Mohs was explained to the patient and consent was obtained. The risks, benefits and alternatives to therapy were discussed in detail. Specifically, the risks of damage to the spinal accessory nerve, infection, scarring, bleeding, prolonged wound healing, incomplete removal, allergy to anesthesia, and recurrence were addressed. Prior to the procedure, the treatment site was clearly identified and confirmed by the patient. All components of Universal Protocol/PAUSE Rule completed. Consent (Near Eyelid Margin)/Introductory Paragraph: The rationale for Mohs was explained to the patient and consent was obtained. The risks, benefits and alternatives to therapy were discussed in detail. Specifically, the risks of ectropion or eyelid deformity, infection, scarring, bleeding, prolonged wound healing, incomplete removal, allergy to anesthesia, nerve injury and recurrence were addressed. Prior to the procedure, the treatment site was clearly identified and confirmed by the patient. All components of Universal Protocol/PAUSE Rule completed. Consent (Ear)/Introductory Paragraph: The rationale for Mohs was explained to the patient and consent was obtained. The risks, benefits and alternatives to therapy were discussed in detail. Specifically, the risks of ear deformity, infection, scarring, bleeding, prolonged wound healing, incomplete removal, allergy to anesthesia, nerve injury and recurrence were addressed. Prior to the procedure, the treatment site was clearly identified and confirmed by the patient. All components of Universal Protocol/PAUSE Rule completed. Consent (Nose)/Introductory Paragraph: The rationale for Mohs was explained to the patient and consent was obtained. The risks, benefits and alternatives to therapy were discussed in detail. Specifically, the risks of nasal deformity, changes in the flow of air through the nose, infection, scarring, bleeding, prolonged wound healing, incomplete removal, allergy to anesthesia, nerve injury and recurrence were addressed. Prior to the procedure, the treatment site was clearly identified and confirmed by the patient. All components of Universal Protocol/PAUSE Rule completed. Consent (Lip)/Introductory Paragraph: The rationale for Mohs was explained to the patient and consent was obtained. The risks, benefits and alternatives to therapy were discussed in detail. Specifically, the risks of lip deformity, changes in the oral aperture, infection, scarring, bleeding, prolonged wound healing, incomplete removal, allergy to anesthesia, nerve injury and recurrence were addressed. Prior to the procedure, the treatment site was clearly identified and confirmed by the patient. All components of Universal Protocol/PAUSE Rule completed. Consent (Scalp)/Introductory Paragraph: The rationale for Mohs was explained to the patient and consent was obtained. The risks, benefits and alternatives to therapy were discussed in detail. Specifically, the risks of changes in hair growth pattern secondary to repair, infection, scarring, bleeding, prolonged wound healing, incomplete removal, allergy to anesthesia, nerve injury and recurrence were addressed. Prior to the procedure, the treatment site was clearly identified and confirmed by the patient. All components of Universal Protocol/PAUSE Rule completed. Detail Level: Detailed Postop Diagnosis: same Anesthesia Volume In Cc: 15 Hemostasis: Electrodesiccation Estimated Blood Loss (Cc): minimal Repair Anesthesia Method: local infiltration Anesthesia Volume In Cc: 7 Undermining Location (Optional): below the muscle Brow Lift Text: A midfrontal incision was made medially to the defect to allow access to the tissues just superior to the left eyebrow. Following careful dissection inferiorly in a supraperiosteal plane to the level of the left eyebrow, several 3-0 monocryl sutures were used to resuspend the eyebrow orbicularis oculi muscular unit to the superior frontal bone periosteum. This resulted in an appropriate reapproximation of static eyebrow symmetry and correction of the left brow ptosis. Deep Sutures: 4-0 Maxon Epidermal Sutures: 5-0 Surgipro Epidermal Closure: running Suturegard Intro: Intraoperative tissue expansion was performed, utilizing the SUTUREGARD device, in order to reduce wound tension. Suturegard Body: The suture ends were repeatedly re-tightened and re-clamped to achieve the desired tissue expansion. Hemigard Intro: Due to skin fragility and wound tension, it was decided to use HEMIGARD adhesive retention suture devices to permit a linear closure. The skin was cleaned and dried for a 6cm distance away from the wound. Excessive hair, if present, was removed to allow for adhesion. Hemigard Postcare Instructions: The HEMIGARD strips are to remain completely dry for at least 5-7 days. Donor Site Anesthesia Type: same as repair anesthesia Epidermal Closure Graft Donor Site (Optional): simple interrupted Graft Donor Site Bandage (Optional-Leave Blank If You Don't Want In Note): Steri-strips and a pressure bandage were applied to the donor site. Closure 2 Information: This tab is for additional flaps and grafts, including complex repair and grafts and complex repair and flaps. You can also specify a different location for the additional defect, if the location is the same you do not need to select a new one. We will insert the automated text for the repair you select below just as we do for solitary flaps and grafts. Please note that at this time if you select a location with a different insurance zone you will need to override the ICD10 and CPT if appropriate. Closure 3 Information: This tab is for additional flaps and grafts above and beyond our usual structured repairs.  Please note if you enter information here it will not currently bill and you will need to add the billing information manually. Wound Care: Petrolatum Dressing: pressure dressing with telfa Dressing (No Sutures): dry sterile dressing Suture Removal: 7 days Unna Boot Text: An Unna boot was placed to help immobilize the limb and facilitate more rapid healing. Home Suture Removal Text: Patient was provided instructions on removing sutures and will remove their sutures at home.  If they have any questions or difficulties they will call the office. Post-Care Instructions: I reviewed with the patient in detail post-care instructions. Patient is not to engage in any heavy lifting, exercise, or swimming for the next 14 days. Should the patient develop any fevers, chills, bleeding, severe pain patient will contact the office immediately. Pain Refusal Text: I offered to prescribe pain medication but the patient refused to take this medication. Mauc Instructions: By selecting yes to the question below the MAUC number will be added into the note.  This will be calculated automatically based on the diagnosis chosen, the size entered, the body zone selected (H,M,L) and the specific indications you chose. You will also have the option to override the Mohs AUC if you disagree with the automatically calculated number and this option is found in the Case Summary tab. Where Do You Want The Question To Include Opioid Counseling Located?: Case Summary Tab Eye Protection Verbiage: Before proceeding with the stage, a plastic scleral shield was inserted. The globe was anesthetized with a few drops of 1% lidocaine with 1:100,000 epinephrine. Then, an appropriate sized scleral shield was chosen and coated with lacrilube ointment. The shield was gently inserted and left in place for the duration of each stage. After the stage was completed, the shield was gently removed. Mohs Method Verbiage: An incision at a 45 degree angle following the standard Mohs approach was done and the specimen was harvested as a microscopic controlled layer. Surgeon/Pathologist Verbiage (Will Incorporate Name Of Surgeon From Intro If Not Blank): operated in two distinct and integrated capacities as the surgeon and pathologist. Mohs Histo Method Verbiage: Each section was then chromacoded and processed in the Mohs lab using the Mohs protocol and submitted for frozen section. Subsequent Stages Histo Method Verbiage: Using a similar technique to that described above, a thin layer of tissue was removed from all areas where tumor was visible on the previous stage.  The tissue was again oriented, mapped, dyed, and processed as above. Mohs Rapid Report Verbiage: The area of clinically evident tumor was marked with skin marking ink and appropriately hatched.  The initial incision was made following the Mohs approach through the skin.  The specimen was taken to the lab, divided into the necessary number of pieces, chromacoded and processed according to the Mohs protocol.  This was repeated in successive stages until a tumor free defect was achieved. Complex Repair Preamble Text (Leave Blank If You Do Not Want): Extensive wide undermining was performed. Intermediate Repair Preamble Text (Leave Blank If You Do Not Want): Undermining was performed with blunt dissection. Non-Graft Cartilage Fenestration Text: The cartilage was fenestrated with a 2mm punch biopsy to help facilitate healing. Graft Cartilage Fenestration Text: The cartilage was fenestrated with a 2mm punch biopsy to help facilitate graft survival and healing. Secondary Intention Text (Leave Blank If You Do Not Want): The defect will heal with secondary intention. No Repair - Repaired With Adjacent Surgical Defect Text (Leave Blank If You Do Not Want): After obtaining clear surgical margins the defect was repaired concurrently with another surgical defect which was in close approximation. Advancement Flap (Single) Text: The defect edges were debeveled with a #15 scalpel blade.  Given the location of the defect and the proximity to free margins a single advancement flap was deemed most appropriate.  Using a sterile surgical marker, an appropriate advancement flap was drawn incorporating the defect and placing the expected incisions within the relaxed skin tension lines where possible.    The area thus outlined was incised deep to adipose tissue with a #15 scalpel blade.  The skin margins were undermined to an appropriate distance in all directions utilizing iris scissors. Advancement Flap (Double) Text: The defect edges were debeveled with a #15 scalpel blade.  Given the location of the defect and the proximity to free margins a double advancement flap was deemed most appropriate.  Using a sterile surgical marker, the appropriate advancement flaps were drawn incorporating the defect and placing the expected incisions within the relaxed skin tension lines where possible.    The area thus outlined was incised deep to adipose tissue with a #15 scalpel blade.  The skin margins were undermined to an appropriate distance in all directions utilizing iris scissors. Burow's Advancement Flap Text: The defect edges were debeveled with a #15 scalpel blade.  Given the location of the defect and the proximity to free margins a Burow's advancement flap was deemed most appropriate.  Using a sterile surgical marker, the appropriate advancement flap was drawn incorporating the defect and placing the expected incisions within the relaxed skin tension lines where possible.    The area thus outlined was incised deep to adipose tissue with a #15 scalpel blade.  The skin margins were undermined to an appropriate distance in all directions utilizing iris scissors. Chonodrocutaneous Helical Advancement Flap Text: The defect edges were debeveled with a #15 scalpel blade.  Given the location of the defect and the proximity to free margins a chondrocutaneous helical advancement flap was deemed most appropriate.  Using a sterile surgical marker, the appropriate advancement flap was drawn incorporating the defect and placing the expected incisions within the relaxed skin tension lines where possible.    The area thus outlined was incised deep to adipose tissue with a #15 scalpel blade.  The skin margins were undermined to an appropriate distance in all directions utilizing iris scissors. Crescentic Advancement Flap Text: The defect edges were debeveled with a #15 scalpel blade.  Given the location of the defect and the proximity to free margins a crescentic advancement flap was deemed most appropriate.  Using a sterile surgical marker, the appropriate advancement flap was drawn incorporating the defect and placing the expected incisions within the relaxed skin tension lines where possible.    The area thus outlined was incised deep to adipose tissue with a #15 scalpel blade.  The skin margins were undermined to an appropriate distance in all directions utilizing iris scissors. A-T Advancement Flap Text: The defect edges were debeveled with a #15 scalpel blade.  Given the location of the defect, shape of the defect and the proximity to free margins an A-T advancement flap was deemed most appropriate.  Using a sterile surgical marker, an appropriate advancement flap was drawn incorporating the defect and placing the expected incisions within the relaxed skin tension lines where possible.    The area thus outlined was incised deep to adipose tissue with a #15 scalpel blade.  The skin margins were undermined to an appropriate distance in all directions utilizing iris scissors. O-T Advancement Flap Text: The defect edges were debeveled with a #15 scalpel blade.  Given the location of the defect, shape of the defect and the proximity to free margins an O-T advancement flap was deemed most appropriate.  Using a sterile surgical marker, an appropriate advancement flap was drawn incorporating the defect and placing the expected incisions within the relaxed skin tension lines where possible.    The area thus outlined was incised deep to adipose tissue with a #15 scalpel blade.  The skin margins were undermined to an appropriate distance in all directions utilizing iris scissors. O-L Flap Text: The defect edges were debeveled with a #15 scalpel blade.  Given the location of the defect, shape of the defect and the proximity to free margins an O-L flap was deemed most appropriate.  Using a sterile surgical marker, an appropriate advancement flap was drawn incorporating the defect and placing the expected incisions within the relaxed skin tension lines where possible.    The area thus outlined was incised deep to adipose tissue with a #15 scalpel blade.  The skin margins were undermined to an appropriate distance in all directions utilizing iris scissors. O-Z Flap Text: The defect edges were debeveled with a #15 scalpel blade.  Given the location of the defect, shape of the defect and the proximity to free margins an O-Z flap was deemed most appropriate.  Using a sterile surgical marker, an appropriate transposition flap was drawn incorporating the defect and placing the expected incisions within the relaxed skin tension lines where possible. The area thus outlined was incised deep to adipose tissue with a #15 scalpel blade.  The skin margins were undermined to an appropriate distance in all directions utilizing iris scissors. Double O-Z Flap Text: The defect edges were debeveled with a #15 scalpel blade.  Given the location of the defect, shape of the defect and the proximity to free margins a Double O-Z flap was deemed most appropriate.  Using a sterile surgical marker, an appropriate transposition flap was drawn incorporating the defect and placing the expected incisions within the relaxed skin tension lines where possible. The area thus outlined was incised deep to adipose tissue with a #15 scalpel blade.  The skin margins were undermined to an appropriate distance in all directions utilizing iris scissors. V-Y Flap Text: The defect edges were debeveled with a #15 scalpel blade.  Given the location of the defect, shape of the defect and the proximity to free margins a V-Y flap was deemed most appropriate.  Using a sterile surgical marker, an appropriate advancement flap was drawn incorporating the defect and placing the expected incisions within the relaxed skin tension lines where possible.    The area thus outlined was incised deep to adipose tissue with a #15 scalpel blade.  The skin margins were undermined to an appropriate distance in all directions utilizing iris scissors. Advancement-Rotation Flap Text: The defect edges were debeveled with a #15 scalpel blade.  Given the location of the defect, shape of the defect and the proximity to free margins an advancement-rotation flap was deemed most appropriate.  Using a sterile surgical marker, an appropriate flap was drawn incorporating the defect and placing the expected incisions within the relaxed skin tension lines where possible. The area thus outlined was incised deep to adipose tissue with a #15 scalpel blade.  The skin margins were undermined to an appropriate distance in all directions utilizing iris scissors. Mercedes Flap Text: The defect edges were debeveled with a #15 scalpel blade.  Given the location of the defect, shape of the defect and the proximity to free margins a Mercedes flap was deemed most appropriate.  Using a sterile surgical marker, an appropriate advancement flap was drawn incorporating the defect and placing the expected incisions within the relaxed skin tension lines where possible. The area thus outlined was incised deep to adipose tissue with a #15 scalpel blade.  The skin margins were undermined to an appropriate distance in all directions utilizing iris scissors. Modified Advancement Flap Text: The defect edges were debeveled with a #15 scalpel blade.  Given the location of the defect, shape of the defect and the proximity to free margins a modified advancement flap was deemed most appropriate.  Using a sterile surgical marker, an appropriate advancement flap was drawn incorporating the defect and placing the expected incisions within the relaxed skin tension lines where possible.    The area thus outlined was incised deep to adipose tissue with a #15 scalpel blade.  The skin margins were undermined to an appropriate distance in all directions utilizing iris scissors. Mucosal Advancement Flap Text: Given the location of the defect, shape of the defect and the proximity to free margins a mucosal advancement flap was deemed most appropriate. Incisions were made with a 15 blade scalpel in the appropriate fashion along the cutaneous vermilion border and the mucosal lip. The remaining actinically damaged mucosal tissue was excised.  The mucosal advancement flap was then elevated to the gingival sulcus with care taken to preserve the neurovascular structures and advanced into the primary defect. Care was taken to ensure that precise realignment of the vermilion border was achieved. Peng Advancement Flap Text: The defect edges were debeveled with a #15 scalpel blade.  Given the location of the defect, shape of the defect and the proximity to free margins a Peng advancement flap was deemed most appropriate.  Using a sterile surgical marker, an appropriate advancement flap was drawn incorporating the defect and placing the expected incisions within the relaxed skin tension lines where possible. The area thus outlined was incised deep to adipose tissue with a #15 scalpel blade.  The skin margins were undermined to an appropriate distance in all directions utilizing iris scissors. Hatchet Flap Text: The defect edges were debeveled with a #15 scalpel blade.  Given the location of the defect, shape of the defect and the proximity to free margins a hatchet flap was deemed most appropriate.  Using a sterile surgical marker, an appropriate hatchet flap was drawn incorporating the defect and placing the expected incisions within the relaxed skin tension lines where possible.    The area thus outlined was incised deep to adipose tissue with a #15 scalpel blade.  The skin margins were undermined to an appropriate distance in all directions utilizing iris scissors. Rotation Flap Text: The defect edges were debeveled with a #15 scalpel blade.  Given the location of the defect, shape of the defect and the proximity to free margins a rotation flap was deemed most appropriate.  Using a sterile surgical marker, an appropriate rotation flap was drawn incorporating the defect and placing the expected incisions within the relaxed skin tension lines where possible.    The area thus outlined was incised deep to adipose tissue with a #15 scalpel blade.  The skin margins were undermined to an appropriate distance in all directions utilizing iris scissors. Spiral Flap Text: The defect edges were debeveled with a #15 scalpel blade.  Given the location of the defect, shape of the defect and the proximity to free margins a spiral flap was deemed most appropriate.  Using a sterile surgical marker, an appropriate rotation flap was drawn incorporating the defect and placing the expected incisions within the relaxed skin tension lines where possible. The area thus outlined was incised deep to adipose tissue with a #15 scalpel blade.  The skin margins were undermined to an appropriate distance in all directions utilizing iris scissors. Staged Advancement Flap Text: The defect edges were debeveled with a #15 scalpel blade.  Given the location of the defect, shape of the defect and the proximity to free margins a staged advancement flap was deemed most appropriate.  Using a sterile surgical marker, an appropriate advancement flap was drawn incorporating the defect and placing the expected incisions within the relaxed skin tension lines where possible. The area thus outlined was incised deep to adipose tissue with a #15 scalpel blade.  The skin margins were undermined to an appropriate distance in all directions utilizing iris scissors. Star Wedge Flap Text: The defect edges were debeveled with a #15 scalpel blade.  Given the location of the defect, shape of the defect and the proximity to free margins a star wedge flap was deemed most appropriate.  Using a sterile surgical marker, an appropriate rotation flap was drawn incorporating the defect and placing the expected incisions within the relaxed skin tension lines where possible. The area thus outlined was incised deep to adipose tissue with a #15 scalpel blade.  The skin margins were undermined to an appropriate distance in all directions utilizing iris scissors. Transposition Flap Text: The defect edges were debeveled with a #15 scalpel blade.  Given the location of the defect and the proximity to free margins a transposition flap was deemed most appropriate.  Using a sterile surgical marker, an appropriate transposition flap was drawn incorporating the defect.    The area thus outlined was incised deep to adipose tissue with a #15 scalpel blade.  The skin margins were undermined to an appropriate distance in all directions utilizing iris scissors. Muscle Hinge Flap Text: The defect edges were debeveled with a #15 scalpel blade.  Given the size, depth and location of the defect and the proximity to free margins a muscle hinge flap was deemed most appropriate.  Using a sterile surgical marker, an appropriate hinge flap was drawn incorporating the defect. The area thus outlined was incised with a #15 scalpel blade.  The skin margins were undermined to an appropriate distance in all directions utilizing iris scissors. Mustarde Flap Text: The defect edges were debeveled with a #15 scalpel blade.  Given the size, depth and location of the defect and the proximity to free margins a Mustarde flap was deemed most appropriate.  Using a sterile surgical marker, an appropriate flap was drawn incorporating the defect. The area thus outlined was incised with a #15 scalpel blade.  The skin margins were undermined to an appropriate distance in all directions utilizing iris scissors. Nasal Turnover Hinge Flap Text: The defect edges were debeveled with a #15 scalpel blade.  Given the size, depth, location of the defect and the defect being full thickness a nasal turnover hinge flap was deemed most appropriate.  Using a sterile surgical marker, an appropriate hinge flap was drawn incorporating the defect. The area thus outlined was incised with a #15 scalpel blade. The flap was designed to recreate the nasal mucosal lining and the alar rim. The skin margins were undermined to an appropriate distance in all directions utilizing iris scissors. Nasalis-Muscle-Based Myocutaneous Island Pedicle Flap Text: Using a #15 blade, an incision was made around the donor flap to the level of the nasalis muscle. Wide lateral undermining was then performed in both the subcutaneous plane above the nasalis muscle, and in a submuscular plane just above periosteum. This allowed the formation of a free nasalis muscle axial pedicle (based on the angular artery) which was still attached to the actual cutaneous flap, increasing its mobility and vascular viability. Hemostasis was obtained with pinpoint electrocoagulation. The flap was mobilized into position and the pivotal anchor points positioned and stabilized with buried interrupted sutures. Subcutaneous and dermal tissues were closed in a multilayered fashion with sutures. Tissue redundancies were excised, and the epidermal edges were apposed without significant tension and sutured with sutures. Orbicularis Oris Muscle Flap Text: The defect edges were debeveled with a #15 scalpel blade.  Given that the defect affected the competency of the oral sphincter an orbicularis oris muscle flap was deemed most appropriate to restore this competency and normal muscle function.  Using a sterile surgical marker, an appropriate flap was drawn incorporating the defect. The area thus outlined was incised with a #15 scalpel blade. Melolabial Transposition Flap Text: The defect edges were debeveled with a #15 scalpel blade.  Given the location of the defect and the proximity to free margins a melolabial flap was deemed most appropriate.  Using a sterile surgical marker, an appropriate melolabial transposition flap was drawn incorporating the defect.    The area thus outlined was incised deep to adipose tissue with a #15 scalpel blade.  The skin margins were undermined to an appropriate distance in all directions utilizing iris scissors. Rhombic Flap Text: The defect edges were debeveled with a #15 scalpel blade.  Given the location of the defect and the proximity to free margins a rhombic flap was deemed most appropriate.  Using a sterile surgical marker, an appropriate rhombic flap was drawn incorporating the defect.    The area thus outlined was incised deep to adipose tissue with a #15 scalpel blade.  The skin margins were undermined to an appropriate distance in all directions utilizing iris scissors. Rhomboid Transposition Flap Text: The defect edges were debeveled with a #15 scalpel blade.  Given the location of the defect and the proximity to free margins a rhomboid transposition flap was deemed most appropriate.  Using a sterile surgical marker, an appropriate rhomboid flap was drawn incorporating the defect.    The area thus outlined was incised deep to adipose tissue with a #15 scalpel blade.  The skin margins were undermined to an appropriate distance in all directions utilizing iris scissors. Bi-Rhombic Flap Text: The defect edges were debeveled with a #15 scalpel blade.  Given the location of the defect and the proximity to free margins a bi-rhombic flap was deemed most appropriate.  Using a sterile surgical marker, an appropriate rhombic flap was drawn incorporating the defect. The area thus outlined was incised deep to adipose tissue with a #15 scalpel blade.  The skin margins were undermined to an appropriate distance in all directions utilizing iris scissors. Helical Rim Advancement Flap Text: The defect edges were debeveled with a #15 blade scalpel.  Given the location of the defect and the proximity to free margins (helical rim) a double helical rim advancement flap was deemed most appropriate.  Using a sterile surgical marker, the appropriate advancement flaps were drawn incorporating the defect and placing the expected incisions between the helical rim and antihelix where possible.  The area thus outlined was incised through and through with a #15 scalpel blade.  With a skin hook and iris scissors, the flaps were gently and sharply undermined and freed up. Bilateral Helical Rim Advancement Flap Text: The defect edges were debeveled with a #15 blade scalpel.  Given the location of the defect and the proximity to free margins (helical rim) a bilateral helical rim advancement flap was deemed most appropriate.  Using a sterile surgical marker, the appropriate advancement flaps were drawn incorporating the defect and placing the expected incisions between the helical rim and antihelix where possible.  The area thus outlined was incised through and through with a #15 scalpel blade.  With a skin hook and iris scissors, the flaps were gently and sharply undermined and freed up. Ear Star Wedge Flap Text: The defect edges were debeveled with a #15 blade scalpel.  Given the location of the defect and the proximity to free margins (helical rim) an ear star wedge flap was deemed most appropriate.  Using a sterile surgical marker, the appropriate flap was drawn incorporating the defect and placing the expected incisions between the helical rim and antihelix where possible.  The area thus outlined was incised through and through with a #15 scalpel blade. Banner Transposition Flap Text: The defect edges were debeveled with a #15 scalpel blade.  Given the location of the defect and the proximity to free margins a Banner transposition flap was deemed most appropriate.  Using a sterile surgical marker, an appropriate flap drawn around the defect. The area thus outlined was incised deep to adipose tissue with a #15 scalpel blade.  The skin margins were undermined to an appropriate distance in all directions utilizing iris scissors. Bilobed Flap Text: The defect edges were debeveled with a #15 scalpel blade.  Given the location of the defect and the proximity to free margins a bilobe flap was deemed most appropriate.  Using a sterile surgical marker, an appropriate bilobe flap drawn around the defect.    The area thus outlined was incised deep to adipose tissue with a #15 scalpel blade.  The skin margins were undermined to an appropriate distance in all directions utilizing iris scissors. Bilobed Transposition Flap Text: The defect edges were debeveled with a #15 scalpel blade.  Given the location of the defect and the proximity to free margins a bilobed transposition flap was deemed most appropriate.  Using a sterile surgical marker, an appropriate bilobe flap drawn around the defect.    The area thus outlined was incised deep to adipose tissue with a #15 scalpel blade.  The skin margins were undermined to an appropriate distance in all directions utilizing iris scissors. Trilobed Flap Text: The defect edges were debeveled with a #15 scalpel blade.  Given the location of the defect and the proximity to free margins a trilobed flap was deemed most appropriate.  Using a sterile surgical marker, an appropriate trilobed flap drawn around the defect.    The area thus outlined was incised deep to adipose tissue with a #15 scalpel blade.  The skin margins were undermined to an appropriate distance in all directions utilizing iris scissors. Dorsal Nasal Flap Text: The defect edges were debeveled with a #15 scalpel blade.  Given the location of the defect and the proximity to free margins a dorsal nasal flap was deemed most appropriate.  Using a sterile surgical marker, an appropriate dorsal nasal flap was drawn around the defect.    The area thus outlined was incised deep to adipose tissue with a #15 scalpel blade.  The skin margins were undermined to an appropriate distance in all directions utilizing iris scissors. Island Pedicle Flap Text: The defect edges were debeveled with a #15 scalpel blade.  Given the location of the defect, shape of the defect and the proximity to free margins an island pedicle advancement flap was deemed most appropriate.  Using a sterile surgical marker, an appropriate advancement flap was drawn incorporating the defect, outlining the appropriate donor tissue and placing the expected incisions within the relaxed skin tension lines where possible.    The area thus outlined was incised deep to adipose tissue with a #15 scalpel blade.  The skin margins were undermined to an appropriate distance in all directions around the primary defect and laterally outward around the island pedicle utilizing iris scissors.  There was minimal undermining beneath the pedicle flap. Island Pedicle Flap With Canthal Suspension Text: The defect edges were debeveled with a #15 scalpel blade.  Given the location of the defect, shape of the defect and the proximity to free margins an island pedicle advancement flap was deemed most appropriate.  Using a sterile surgical marker, an appropriate advancement flap was drawn incorporating the defect, outlining the appropriate donor tissue and placing the expected incisions within the relaxed skin tension lines where possible. The area thus outlined was incised deep to adipose tissue with a #15 scalpel blade.  The skin margins were undermined to an appropriate distance in all directions around the primary defect and laterally outward around the island pedicle utilizing iris scissors.  There was minimal undermining beneath the pedicle flap. A suspension suture was placed in the canthal tendon to prevent tension and prevent ectropion. Alar Island Pedicle Flap Text: The defect edges were debeveled with a #15 scalpel blade.  Given the location of the defect, shape of the defect and the proximity to the alar rim an island pedicle advancement flap was deemed most appropriate.  Using a sterile surgical marker, an appropriate advancement flap was drawn incorporating the defect, outlining the appropriate donor tissue and placing the expected incisions within the nasal ala running parallel to the alar rim. The area thus outlined was incised with a #15 scalpel blade.  The skin margins were undermined minimally to an appropriate distance in all directions around the primary defect and laterally outward around the island pedicle utilizing iris scissors.  There was minimal undermining beneath the pedicle flap. Double Island Pedicle Flap Text: The defect edges were debeveled with a #15 scalpel blade.  Given the location of the defect, shape of the defect and the proximity to free margins a double island pedicle advancement flap was deemed most appropriate.  Using a sterile surgical marker, an appropriate advancement flap was drawn incorporating the defect, outlining the appropriate donor tissue and placing the expected incisions within the relaxed skin tension lines where possible.    The area thus outlined was incised deep to adipose tissue with a #15 scalpel blade.  The skin margins were undermined to an appropriate distance in all directions around the primary defect and laterally outward around the island pedicle utilizing iris scissors.  There was minimal undermining beneath the pedicle flap. Island Pedicle Flap-Requiring Vessel Identification Text: The defect edges were debeveled with a #15 scalpel blade.  Given the location of the defect, shape of the defect and the proximity to free margins an island pedicle advancement flap was deemed most appropriate.  Using a sterile surgical marker, an appropriate advancement flap was drawn, based on the axial vessel mentioned above, incorporating the defect, outlining the appropriate donor tissue and placing the expected incisions within the relaxed skin tension lines where possible.    The area thus outlined was incised deep to adipose tissue with a #15 scalpel blade.  The skin margins were undermined to an appropriate distance in all directions around the primary defect and laterally outward around the island pedicle utilizing iris scissors.  There was minimal undermining beneath the pedicle flap. Keystone Flap Text: The defect edges were debeveled with a #15 scalpel blade.  Given the location of the defect, shape of the defect a keystone flap was deemed most appropriate.  Using a sterile surgical marker, an appropriate keystone flap was drawn incorporating the defect, outlining the appropriate donor tissue and placing the expected incisions within the relaxed skin tension lines where possible. The area thus outlined was incised deep to adipose tissue with a #15 scalpel blade.  The skin margins were undermined to an appropriate distance in all directions around the primary defect and laterally outward around the flap utilizing iris scissors. O-T Plasty Text: The defect edges were debeveled with a #15 scalpel blade.  Given the location of the defect, shape of the defect and the proximity to free margins an O-T plasty was deemed most appropriate.  Using a sterile surgical marker, an appropriate O-T plasty was drawn incorporating the defect and placing the expected incisions within the relaxed skin tension lines where possible.    The area thus outlined was incised deep to adipose tissue with a #15 scalpel blade.  The skin margins were undermined to an appropriate distance in all directions utilizing iris scissors. O-Z Plasty Text: The defect edges were debeveled with a #15 scalpel blade.  Given the location of the defect, shape of the defect and the proximity to free margins an O-Z plasty (double transposition flap) was deemed most appropriate.  Using a sterile surgical marker, the appropriate transposition flaps were drawn incorporating the defect and placing the expected incisions within the relaxed skin tension lines where possible.    The area thus outlined was incised deep to adipose tissue with a #15 scalpel blade.  The skin margins were undermined to an appropriate distance in all directions utilizing iris scissors.  Hemostasis was achieved with electrocautery.  The flaps were then transposed into place, one clockwise and the other counterclockwise, and anchored with interrupted buried subcutaneous sutures. Double O-Z Plasty Text: The defect edges were debeveled with a #15 scalpel blade.  Given the location of the defect, shape of the defect and the proximity to free margins a Double O-Z plasty (double transposition flap) was deemed most appropriate.  Using a sterile surgical marker, the appropriate transposition flaps were drawn incorporating the defect and placing the expected incisions within the relaxed skin tension lines where possible. The area thus outlined was incised deep to adipose tissue with a #15 scalpel blade.  The skin margins were undermined to an appropriate distance in all directions utilizing iris scissors.  Hemostasis was achieved with electrocautery.  The flaps were then transposed into place, one clockwise and the other counterclockwise, and anchored with interrupted buried subcutaneous sutures. V-Y Plasty Text: The defect edges were debeveled with a #15 scalpel blade.  Given the location of the defect, shape of the defect and the proximity to free margins an V-Y advancement flap was deemed most appropriate.  Using a sterile surgical marker, an appropriate advancement flap was drawn incorporating the defect and placing the expected incisions within the relaxed skin tension lines where possible.    The area thus outlined was incised deep to adipose tissue with a #15 scalpel blade.  The skin margins were undermined to an appropriate distance in all directions utilizing iris scissors. H Plasty Text: Given the location of the defect, shape of the defect and the proximity to free margins a H-plasty was deemed most appropriate for repair.  Using a sterile surgical marker, the appropriate advancement arms of the H-plasty were drawn incorporating the defect and placing the expected incisions within the relaxed skin tension lines where possible. The area thus outlined was incised deep to adipose tissue with a #15 scalpel blade. The skin margins were undermined to an appropriate distance in all directions utilizing iris scissors.  The opposing advancement arms were then advanced into place in opposite direction and anchored with interrupted buried subcutaneous sutures. W Plasty Text: The lesion was extirpated to the level of the fat with a #15 scalpel blade.  Given the location of the defect, shape of the defect and the proximity to free margins a W-plasty was deemed most appropriate for repair.  Using a sterile surgical marker, the appropriate transposition arms of the W-plasty were drawn incorporating the defect and placing the expected incisions within the relaxed skin tension lines where possible.    The area thus outlined was incised deep to adipose tissue with a #15 scalpel blade.  The skin margins were undermined to an appropriate distance in all directions utilizing iris scissors.  The opposing transposition arms were then transposed into place in opposite direction and anchored with interrupted buried subcutaneous sutures. Z Plasty Text: The lesion was extirpated to the level of the fat with a #15 scalpel blade.  Given the location of the defect, shape of the defect and the proximity to free margins a Z-plasty was deemed most appropriate for repair.  Using a sterile surgical marker, the appropriate transposition arms of the Z-plasty were drawn incorporating the defect and placing the expected incisions within the relaxed skin tension lines where possible.    The area thus outlined was incised deep to adipose tissue with a #15 scalpel blade.  The skin margins were undermined to an appropriate distance in all directions utilizing iris scissors.  The opposing transposition arms were then transposed into place in opposite direction and anchored with interrupted buried subcutaneous sutures. Zygomaticofacial Flap Text: Given the location of the defect, shape of the defect and the proximity to free margins a zygomaticofacial flap was deemed most appropriate for repair.  Using a sterile surgical marker, the appropriate flap was drawn incorporating the defect and placing the expected incisions within the relaxed skin tension lines where possible. The area thus outlined was incised deep to adipose tissue with a #15 scalpel blade with preservation of a vascular pedicle.  The skin margins were undermined to an appropriate distance in all directions utilizing iris scissors.  The flap was then placed into the defect and anchored with interrupted buried subcutaneous sutures. Cheek Interpolation Flap Text: A decision was made to reconstruct the defect utilizing an interpolation axial flap and a staged reconstruction.  A telfa template was made of the defect.  This telfa template was then used to outline the Cheek Interpolation flap.  The donor area for the pedicle flap was then injected with anesthesia.  The flap was excised through the skin and subcutaneous tissue down to the layer of the underlying musculature.  The interpolation flap was carefully excised within this deep plane to maintain its blood supply.  The edges of the donor site were undermined.   The donor site was closed in a primary fashion.  The pedicle was then rotated into position and sutured.  Once the tube was sutured into place, adequate blood supply was confirmed with blanching and refill.  The pedicle was then wrapped with xeroform gauze and dressed appropriately with a telfa and gauze bandage to ensure continued blood supply and protect the attached pedicle. Cheek-To-Nose Interpolation Flap Text: A decision was made to reconstruct the defect utilizing an interpolation axial flap and a staged reconstruction.  A telfa template was made of the defect.  This telfa template was then used to outline the Cheek-To-Nose Interpolation flap.  The donor area for the pedicle flap was then injected with anesthesia.  The flap was excised through the skin and subcutaneous tissue down to the layer of the underlying musculature.  The interpolation flap was carefully excised within this deep plane to maintain its blood supply.  The edges of the donor site were undermined.   The donor site was closed in a primary fashion.  The pedicle was then rotated into position and sutured.  Once the tube was sutured into place, adequate blood supply was confirmed with blanching and refill.  The pedicle was then wrapped with xeroform gauze and dressed appropriately with a telfa and gauze bandage to ensure continued blood supply and protect the attached pedicle. Interpolation Flap Text: A decision was made to reconstruct the defect utilizing an interpolation axial flap and a staged reconstruction.  A telfa template was made of the defect.  This telfa template was then used to outline the interpolation flap.  The donor area for the pedicle flap was then injected with anesthesia.  The flap was excised through the skin and subcutaneous tissue down to the layer of the underlying musculature.  The interpolation flap was carefully excised within this deep plane to maintain its blood supply.  The edges of the donor site were undermined.   The donor site was closed in a primary fashion.  The pedicle was then rotated into position and sutured.  Once the tube was sutured into place, adequate blood supply was confirmed with blanching and refill.  The pedicle was then wrapped with xeroform gauze and dressed appropriately with a telfa and gauze bandage to ensure continued blood supply and protect the attached pedicle. Melolabial Interpolation Flap Text: A decision was made to reconstruct the defect utilizing an interpolation axial flap and a staged reconstruction.  A telfa template was made of the defect.  This telfa template was then used to outline the melolabial interpolation flap.  The donor area for the pedicle flap was then injected with anesthesia.  The flap was excised through the skin and subcutaneous tissue down to the layer of the underlying musculature.  The pedicle flap was carefully excised within this deep plane to maintain its blood supply.  The edges of the donor site were undermined.   The donor site was closed in a primary fashion.  The pedicle was then rotated into position and sutured.  Once the tube was sutured into place, adequate blood supply was confirmed with blanching and refill.  The pedicle was then wrapped with xeroform gauze and dressed appropriately with a telfa and gauze bandage to ensure continued blood supply and protect the attached pedicle. Mastoid Interpolation Flap Text: A decision was made to reconstruct the defect utilizing an interpolation axial flap and a staged reconstruction.  A telfa template was made of the defect.  This telfa template was then used to outline the mastoid interpolation flap.  The donor area for the pedicle flap was then injected with anesthesia.  The flap was excised through the skin and subcutaneous tissue down to the layer of the underlying musculature.  The pedicle flap was carefully excised within this deep plane to maintain its blood supply.  The edges of the donor site were undermined.   The donor site was closed in a primary fashion.  The pedicle was then rotated into position and sutured.  Once the tube was sutured into place, adequate blood supply was confirmed with blanching and refill.  The pedicle was then wrapped with xeroform gauze and dressed appropriately with a telfa and gauze bandage to ensure continued blood supply and protect the attached pedicle. Posterior Auricular Interpolation Flap Text: A decision was made to reconstruct the defect utilizing an interpolation axial flap and a staged reconstruction.  A telfa template was made of the defect.  This telfa template was then used to outline the posterior auricular interpolation flap.  The donor area for the pedicle flap was then injected with anesthesia.  The flap was excised through the skin and subcutaneous tissue down to the layer of the underlying musculature.  The pedicle flap was carefully excised within this deep plane to maintain its blood supply.  The edges of the donor site were undermined.   The donor site was closed in a primary fashion.  The pedicle was then rotated into position and sutured.  Once the tube was sutured into place, adequate blood supply was confirmed with blanching and refill.  The pedicle was then wrapped with xeroform gauze and dressed appropriately with a telfa and gauze bandage to ensure continued blood supply and protect the attached pedicle. Paramedian Forehead Flap Text: A decision was made to reconstruct the defect utilizing an interpolation axial flap and a staged reconstruction.  A telfa template was made of the defect.  This telfa template was then used to outline the paramedian forehead pedicle flap.  The donor area for the pedicle flap was then injected with anesthesia.  The flap was excised through the skin and subcutaneous tissue down to the layer of the underlying musculature.  The pedicle flap was carefully excised within this deep plane to maintain its blood supply.  The edges of the donor site were undermined.   The donor site was closed in a primary fashion.  The pedicle was then rotated into position and sutured.  Once the tube was sutured into place, adequate blood supply was confirmed with blanching and refill.  The pedicle was then wrapped with xeroform gauze and dressed appropriately with a telfa and gauze bandage to ensure continued blood supply and protect the attached pedicle. Cheiloplasty (Less Than 50%) Text: A decision was made to reconstruct the defect with a  cheiloplasty.  The defect was undermined extensively.  Additional obicularis oris muscle was excised with a 15 blade scalpel.  The defect was converted into a full thickness wedge, of less than 50% of the vertical height of the lip, to facilite a better cosmetic result.  Small vessels were then tied off with 5-0 monocyrl. The obicularis oris, superficial fascia, adipose and dermis were then reapproximated.  After the deeper layers were approximated the epidermis was reapproximated with particular care given to realign the vermilion border. Cheiloplasty (Complex) Text: A decision was made to reconstruct the defect with a  cheiloplasty.  The defect was undermined extensively.  Additional obicularis oris muscle was excised with a 15 blade scalpel.  The defect was converted into a full thickness wedge to facilite a better cosmetic result.  Small vessels were then tied off with 5-0 monocyrl. The obicularis oris, superficial fascia, adipose and dermis were then reapproximated.  After the deeper layers were approximated the epidermis was reapproximated with particular care given to realign the vermilion border. Ear Wedge Repair Text: A wedge excision was completed by carrying down an excision through the full thickness of the ear and cartilage with an inward facing Burow's triangle. The wound was then closed in a layered fashion. Full Thickness Lip Wedge Repair (Flap) Text: Given the location of the defect and the proximity to free margins a full thickness wedge repair was deemed most appropriate.  Using a sterile surgical marker, the appropriate repair was drawn incorporating the defect and placing the expected incisions perpendicular to the vermilion border.  The vermilion border was also meticulously outlined to ensure appropriate reapproximation during the repair.  The area thus outlined was incised through and through with a #15 scalpel blade.  The muscularis and dermis were reaproximated with deep sutures following hemostasis. Care was taken to realign the vermilion border before proceeding with the superficial closure.  Once the vermilion was realigned the superfical and mucosal closure was finished. Ftsg Text: The defect edges were debeveled with a #15 scalpel blade.  Given the location of the defect, shape of the defect and the proximity to free margins a full thickness skin graft was deemed most appropriate.  Using a sterile surgical marker, the primary defect shape was transferred to the donor site. The area thus outlined was incised deep to adipose tissue with a #15 scalpel blade.  The harvested graft was then trimmed of adipose tissue until only dermis and epidermis was left.  The skin margins of the secondary defect were undermined to an appropriate distance in all directions utilizing iris scissors.  The secondary defect was closed with interrupted buried subcutaneous sutures.  The skin edges were then re-apposed with running  sutures.  The skin graft was then placed in the primary defect and oriented appropriately. Split-Thickness Skin Graft Text: The defect edges were debeveled with a #15 scalpel blade.  Given the location of the defect, shape of the defect and the proximity to free margins a split thickness skin graft was deemed most appropriate.  Using a sterile surgical marker, the primary defect shape was transferred to the donor site. The split thickness graft was then harvested.  The skin graft was then placed in the primary defect and oriented appropriately. Burow's Graft Text: The defect edges were debeveled with a #15 scalpel blade.  Given the location of the defect, shape of the defect, the proximity to free margins and the presence of a standing cone deformity a Burow's skin graft was deemed most appropriate. The standing cone was removed and this tissue was then trimmed to the shape of the primary defect. The adipose tissue was also removed until only dermis and epidermis were left.  The skin margins of the secondary defect were undermined to an appropriate distance in all directions utilizing iris scissors.  The secondary defect was closed with interrupted buried subcutaneous sutures.  The skin edges were then re-apposed with running  sutures.  The skin graft was then placed in the primary defect and oriented appropriately. Cartilage Graft Text: The defect edges were debeveled with a #15 scalpel blade.  Given the location of the defect, shape of the defect, the fact the defect involved a full thickness cartilage defect a cartilage graft was deemed most appropriate.  An appropriate donor site was identified, cleansed, and anesthetized. The cartilage graft was then harvested and transferred to the recipient site, oriented appropriately and then sutured into place.  The secondary defect was then repaired using a primary closure. Composite Graft Text: The defect edges were debeveled with a #15 scalpel blade.  Given the location of the defect, shape of the defect, the proximity to free margins and the fact the defect was full thickness a composite graft was deemed most appropriate.  The defect was outline and then transferred to the donor site.  A full thickness graft was then excised from the donor site. The graft was then placed in the primary defect, oriented appropriately and then sutured into place.  The secondary defect was then repaired using a primary closure. Epidermal Autograft Text: The defect edges were debeveled with a #15 scalpel blade.  Given the location of the defect, shape of the defect and the proximity to free margins an epidermal autograft was deemed most appropriate.  Using a sterile surgical marker, the primary defect shape was transferred to the donor site. The epidermal graft was then harvested.  The skin graft was then placed in the primary defect and oriented appropriately. Dermal Autograft Text: The defect edges were debeveled with a #15 scalpel blade.  Given the location of the defect, shape of the defect and the proximity to free margins a dermal autograft was deemed most appropriate.  Using a sterile surgical marker, the primary defect shape was transferred to the donor site. The area thus outlined was incised deep to adipose tissue with a #15 scalpel blade.  The harvested graft was then trimmed of adipose and epidermal tissue until only dermis was left.  The skin graft was then placed in the primary defect and oriented appropriately. Skin Substitute Text: The defect edges were debeveled with a #15 scalpel blade.  Given the location of the defect, shape of the defect and the proximity to free margins a skin substitute graft was deemed most appropriate.  The graft material was trimmed to fit the size of the defect. The graft was then placed in the primary defect and oriented appropriately. Tissue Cultured Epidermal Autograft Text: The defect edges were debeveled with a #15 scalpel blade.  Given the location of the defect, shape of the defect and the proximity to free margins a tissue cultured epidermal autograft was deemed most appropriate.  The graft was then trimmed to fit the size of the defect.  The graft was then placed in the primary defect and oriented appropriately. Xenograft Text: The defect edges were debeveled with a #15 scalpel blade.  Given the location of the defect, shape of the defect and the proximity to free margins a xenograft was deemed most appropriate.  The graft was then trimmed to fit the size of the defect.  The graft was then placed in the primary defect and oriented appropriately. Purse String (Simple) Text: Given the location of the defect and the characteristics of the surrounding skin a purse string closure was deemed most appropriate.  Undermining was performed circumfirentially around the surgical defect.  A purse string suture was then placed and tightened. Purse String (Intermediate) Text: Given the location of the defect and the characteristics of the surrounding skin a purse string intermediate closure was deemed most appropriate.  Undermining was performed circumfirentially around the surgical defect.  A purse string suture was then placed and tightened. Partial Purse String (Simple) Text: Given the location of the defect and the characteristics of the surrounding skin a simple purse string closure was deemed most appropriate.  Undermining was performed circumfirentially around the surgical defect.  A purse string suture was then placed and tightened. Wound tension only allowed a partial closure of the circular defect. Partial Purse String (Intermediate) Text: Given the location of the defect and the characteristics of the surrounding skin an intermediate purse string closure was deemed most appropriate.  Undermining was performed circumfirentially around the surgical defect.  A purse string suture was then placed and tightened. Wound tension only allowed a partial closure of the circular defect. Localized Dermabrasion With Wire Brush Text: The patient was draped in routine manner.  Localized dermabrasion using 3 x 17 mm wire brush was performed in routine manner to papillary dermis. This spot dermabrasion is being performed to complete skin cancer reconstruction. It also will eliminate the other sun damaged precancerous cells that are known to be part of the regional effect of a lifetime's worth of sun exposure. This localized dermabrasion is therapeutic and should not be considered cosmetic in any regard. Tarsorrhaphy Text: A tarsorrhaphy was performed using Frost sutures. Complex Repair And Flap Additional Text (Will Appearing After The Standard Complex Repair Text): The complex repair was not sufficient to completely close the primary defect. The remaining additional defect was repaired with the flap mentioned below. Complex Repair And Graft Additional Text (Will Appearing After The Standard Complex Repair Text): The complex repair was not sufficient to completely close the primary defect. The remaining additional defect was repaired with the graft mentioned below. Manual Repair Warning Statement: We plan on removing the manually selected variable below in favor of our much easier automatic structured text blocks found in the previous tab. We decided to do this to help make the flow better and give you the full power of structured data. Manual selection is never going to be ideal in our platform and I would encourage you to avoid using manual selection from this point on, especially since I will be sunsetting this feature. It is important that you do one of two things with the customized text below. First, you can save all of the text in a word file so you can have it for future reference. Second, transfer the text to the appropriate area in the Library tab. Lastly, if there is a flap or graft type which we do not have you need to let us know right away so I can add it in before the variable is hidden. No need to panic, we plan to give you roughly 6 months to make the change. Same Histology In Subsequent Stages Text: The pattern and morphology of the tumor is as described in the first stage. No Residual Tumor Seen Histology Text: There were no malignant cells seen in the sections examined. Inflammation Suggestive Of Cancer Camouflage Histology Text: There was a dense lymphocytic infiltrate which prevented adequate histologic evaluation of adjacent structures. Bcc Histology Text: There were numerous aggregates of basaloid cells. Bcc Infiltrative Histology Text: There were numerous aggregates of basaloid cells demonstrating an infiltrative pattern. Mart-1 - Positive Histology Text: MART-1 staining demonstrates areas of higher density and clustering of melanocytes with Pagetoid spread upwards within the epidermis. The surgical margins are positive for tumor cells. Mart-1 - Negative Histology Text: MART-1 staining demonstrates a normal density and pattern of melanocytes along the dermal-epidermal junction. The surgical margins are negative for tumor cells. Information: Selecting Yes will display possible errors in your note based on the variables you have selected. This validation is only offered as a suggestion for you. PLEASE NOTE THAT THE VALIDATION TEXT WILL BE REMOVED WHEN YOU FINALIZE YOUR NOTE. IF YOU WANT TO FAX A PRELIMINARY NOTE YOU WILL NEED TO TOGGLE THIS TO 'NO' IF YOU DO NOT WANT IT IN YOUR FAXED NOTE. Adjacent Tissue Transfer Text: The defect edges were debeveled with a #15 scalpel blade.  Given the location of the defect and the proximity to free margins an adjacent tissue transfer was deemed most appropriate.  Using a sterile surgical marker, an appropriate flap was drawn incorporating the defect and placing the expected incisions within the relaxed skin tension lines where possible.    The area thus outlined was incised deep to adipose tissue with a #15 scalpel blade.  The skin margins were undermined to an appropriate distance in all directions utilizing iris scissors.

## 2022-12-31 NOTE — PROGRESS NOTES
0730 Bedside shift change report given to [address] Karen Collier (oncoming nurse) by Claernce Bauer (offgoing nurse). Report included the following information SBAR, Kardex, ED Summary, MAR, Recent Results, and Cardiac Rhythm NSR . End of Shift Note    Bedside shift change report given to Clarence Bauer (oncoming nurse) by Abel Bagley (offgoing nurse). Report included the following information SBAR and Kardex    Shift worked:  4904-3327     Shift summary and any significant changes:     None      Concerns for physician to address:  None     Zone phone for oncoming shift:   [id number]       Activity:  Activity Level: Up with Assistance  Number times ambulated in hallways past shift: 0  Number of times OOB to chair past shift: 2    Cardiac:   Cardiac Monitoring: Yes      Cardiac Rhythm: Sinus Rhythm    Access:  Current line(s): PIV     Genitourinary:   Urinary status: voiding    Respiratory:   O2 Device: None (Room air)  Chronic home O2 use?: NO  Incentive spirometer at bedside: NO       GI:  Last Bowel Movement Date: 12/31/22  Current diet:  ADULT DIET Regular; Low Fat/Low Chol/High Fiber/2 gm Na  Passing flatus: YES  Tolerating current diet: YES       Pain Management:   Patient states pain is manageable on current regimen: YES    Skin:  Oneil Score: 21  Interventions: float heels, increase time out of bed, and PT/OT consult    Patient Safety:  Fall Score:  Total Score: 3  Interventions: bed/chair alarm, gripper socks, pt to call before getting OOB, and stay with me (per policy)  High Fall Risk: Yes    Length of Stay:  Expected LOS: 2d 12h  Actual LOS: [address]

## 2022-12-31 NOTE — PROGRESS NOTES
Hospitalist Progress Note    NAME: Devang Sadler   :  1960   MRN:  [id number]       Assessment / Plan:    High anion gap metabolic acidosis-lactic acidosis with suspected superimposed starvation/alcoholic ketoacidosis  Hypoglycemia, resolved  Acute right middle lobe pulmonary embolism  Alcoholism-high risk for severe withdrawals  Chronic macrocytic anemia  Essential hypertension     PLAN:     High anion gap metabolic acidosis-lactic acidosis with suspected superimposed starvation/alcoholic ketoacidosis  Hypoglycemia, resolved  Continue current stepdown unit  -Anion gap resolved  -Status post D5  - Lactic acidosis resolved     Acute right middle lobe pulmonary embolism  Therapeutic Lovenox given in ED. CTA showed single right middle lobe pulmonary embolism. No evidence for right heart strain or pulmonary infarction.   -unable to afford Eliquis. Currently on coumadin bridge with warfarin. Will need to see INR trending up prior to discharge. INR 1.2 today. CM to help with pricing of Eliquis (at least 7 days supply)    -Extensively counseled on risks of bleeding while on anticoagulation. Emphasized need to remain abstinent from alcohol to prevent inadvertent head trauma and potential intracranial bleeding    Alcoholism-high risk for severe withdrawals  Acute on chronic macrocytic anemia  6-day phenobarbital taper with loading dose given high risk for withdrawals  CIWA protocol  Is currently out of alcohol withdrawal     Essential hypertension  Trend blood pressure and address as needed, hypertension likely secondary to withdrawals in the setting and needs to be addressed accordingly     Code Status: Full     DVT Prophylaxis: Lovenox and warfarin  GI Prophylaxis: Not indicated  Diet: Regular/cardiac    30.0 - 39.9 Obese / Body mass index is 32.49 kg/m². Estimated discharge date:   Barriers: Clinical improvement, watch hemoglobin     Subjective:   NAD. No new complaint.   Stable on RA    Objective:     VITALS:   Last 24hrs VS reviewed since prior progress note. Most recent are:  Patient Vitals for the past 24 hrs:   Temp Pulse Resp BP SpO2   12/31/22 0749 97.9 °F (36.6 °C) 80 22 122/80 95 %   12/31/22 0342 -- 63 12 130/86 --   12/30/22 2343 98.3 °F (36.8 °C) 70 16 111/85 100 %   12/30/22 1945 98 °F (36.7 °C) 72 13 (!) 131/93 97 %   12/30/22 1527 98 °F (36.7 °C) 83 15 122/86 100 %   12/30/22 1103 97.9 °F (36.6 °C) 80 17 (!) 127/93 97 %   12/30/22 1058 -- 76 15 95/81 99 %         Intake/Output Summary (Last 24 hours) at 12/31/2022 1056  Last data filed at 12/31/2022 0341  Gross per 24 hour   Intake --   Output 800 ml   Net -800 ml          I had a face to face encounter and independently examined this patient on 12/31/2022, as outlined below:  PHYSICAL EXAM:  General: WD, WN. Alert, cooperative, no acute distress    EENT:  EOMI. Anicteric sclerae. MMM  Resp:  CTA bilaterally, no wheezing or rales. No accessory muscle use  CV:  Regular  rhythm,  No edema  GI:  Soft, Non distended, Non tender. +Bowel sounds  Neurologic:  EOMs intact. No facial asymmetry. No aphasia or slurred speech. Symmetrical strength, Sensation grossly intact. Alert and oriented X 4.     Psych:   Good insight. Not anxious nor agitated  Skin:  No rashes. No jaundice. bruises in his lower lip from the fall and on his right knee    Reviewed most current lab test results and cultures  YES  Reviewed most current radiology test results   YES  Review and summation of old records today    NO  Reviewed patient's current orders and MAR    YES  PMH/SH reviewed - no change compared to H&P  ________________________________________________________________________  Care Plan discussed with:    Comments   Patient X    Family      RN X    Care Manager     Consultant                        Multidiciplinary team rounds were held today with , nursing, pharmacist and clinical coordinator.   Patient's plan of care was discussed; medications were reviewed and discharge planning was addressed. ________________________________________________________________________        Comments   >50% of visit spent in counseling and coordination of care X    ________________________________________________________________________  Tory Graves MD     Procedures: see electronic medical records for all procedures/Xrays and details which were not copied into this note but were reviewed prior to creation of Plan. LABS:  I reviewed today's most current labs and imaging studies.   Pertinent labs include:  Recent Labs     12/31/22 0422 12/30/22 0303 12/29/22 0310   WBC 4.7 4.3 4.3   HGB 8.3* 8.5* 9.1*   HCT 24.6* 24.9* 27.3*    189 180       Recent Labs     12/31/22 0422 12/30/22 0303 12/29/22  0310   * 134* 135*   K 4.1 4.3 3.9    104 103   CO2 27 26 26   GLU 83 86 82   BUN 9 7 6   CREA 0.84 0.77 0.88   CA 8.5 8.7 8.1*   INR 1.2* 1.1 1.0         Signed: Tory Graves MD

## 2022-12-31 NOTE — PROGRESS NOTES
Pharmacist Daily Dosing of Warfarin    Indication & Goal INR: PE, INR Goal 2-3    PTA Warfarin Dose: new start    Notable concurrent conditions and medications:  Phenobarbital   weaning until 12/31    Labs:  Recent Labs     12/31/22  0422 12/30/22  0303 12/29/22  0310   INR 1.2* 1.1 1.0   HGB 8.3* 8.5* 9.1*    189 180       Impression/Plan:   Phenobarbital stopped  Average daily dose last 3 days = 6.2mg  Warfarin 7mg 12/31  Lovenox bridge  Daily INR ordered  CBC w/o differential to be checked at least every other day     Pharmacy will follow daily and adjust the dose as appropriate.     Thank you,  Darren Nelson, [facility]

## 2023-01-01 ENCOUNTER — APPOINTMENT (OUTPATIENT)
Dept: GENERAL RADIOLOGY | Age: 63
DRG: 950 | End: 2023-01-01
Attending: INTERNAL MEDICINE
Payer: COMMERCIAL

## 2023-01-01 LAB
ANION GAP SERPL CALC-SCNC: 7 MMOL/L (ref 5–15)
BASOPHILS # BLD: 0.1 K/UL (ref 0–0.1)
BASOPHILS NFR BLD: 1 % (ref 0–1)
BUN SERPL-MCNC: 9 MG/DL (ref 6–20)
BUN/CREAT SERPL: 11 (ref 12–20)
CALCIUM SERPL-MCNC: 8.5 MG/DL (ref 8.5–10.1)
CHLORIDE SERPL-SCNC: 107 MMOL/L (ref 97–108)
CO2 SERPL-SCNC: 24 MMOL/L (ref 21–32)
CREAT SERPL-MCNC: 0.83 MG/DL (ref 0.7–1.3)
CRP SERPL-MCNC: 0.68 MG/DL (ref 0–0.6)
DIFFERENTIAL METHOD BLD: ABNORMAL
EOSINOPHIL # BLD: 0.1 K/UL (ref 0–0.4)
EOSINOPHIL NFR BLD: 1 % (ref 0–7)
ERYTHROCYTE [DISTWIDTH] IN BLOOD BY AUTOMATED COUNT: 17.3 % (ref 11.5–14.5)
ERYTHROCYTE [SEDIMENTATION RATE] IN BLOOD: 81 MM/HR (ref 0–20)
GLUCOSE SERPL-MCNC: 76 MG/DL (ref 65–100)
HCT VFR BLD AUTO: 24.6 % (ref 36.6–50.3)
HGB BLD-MCNC: 8.2 G/DL (ref 12.1–17)
IMM GRANULOCYTES # BLD AUTO: 0 K/UL (ref 0–0.04)
IMM GRANULOCYTES NFR BLD AUTO: 0 % (ref 0–0.5)
INR PPP: 1.3 (ref 0.9–1.1)
LYMPHOCYTES # BLD: 2.2 K/UL (ref 0.8–3.5)
LYMPHOCYTES NFR BLD: 37 % (ref 12–49)
MCH RBC QN AUTO: 36.1 PG (ref 26–34)
MCHC RBC AUTO-ENTMCNC: 33.3 G/DL (ref 30–36.5)
MCV RBC AUTO: 108.4 FL (ref 80–99)
MONOCYTES # BLD: 0.7 K/UL (ref 0–1)
MONOCYTES NFR BLD: 11 % (ref 5–13)
NEUTS SEG # BLD: 2.9 K/UL (ref 1.8–8)
NEUTS SEG NFR BLD: 50 % (ref 32–75)
NRBC # BLD: 0 K/UL (ref 0–0.01)
NRBC BLD-RTO: 0 PER 100 WBC
PLATELET # BLD AUTO: 230 K/UL (ref 150–400)
PMV BLD AUTO: 11.4 FL (ref 8.9–12.9)
POTASSIUM SERPL-SCNC: 4 MMOL/L (ref 3.5–5.1)
PROTHROMBIN TIME: 13.3 SEC (ref 9–11.1)
RBC # BLD AUTO: 2.27 M/UL (ref 4.1–5.7)
RBC MORPH BLD: ABNORMAL
RBC MORPH BLD: ABNORMAL
SODIUM SERPL-SCNC: 138 MMOL/L (ref 136–145)
URATE SERPL-MCNC: 5.6 MG/DL (ref 3.5–7.2)
WBC # BLD AUTO: 6 K/UL (ref 4.1–11.1)

## 2023-01-01 PROCEDURE — 86140 C-REACTIVE PROTEIN: CPT

## 2023-01-01 PROCEDURE — 74011250636 HC RX REV CODE- 250/636: Performed by: INTERNAL MEDICINE

## 2023-01-01 PROCEDURE — 85610 PROTHROMBIN TIME: CPT

## 2023-01-01 PROCEDURE — 85025 COMPLETE CBC W/AUTO DIFF WBC: CPT

## 2023-01-01 PROCEDURE — 74011000250 HC RX REV CODE- 250: Performed by: STUDENT IN AN ORGANIZED HEALTH CARE EDUCATION/TRAINING PROGRAM

## 2023-01-01 PROCEDURE — 65660000001 HC RM ICU INTERMED STEPDOWN

## 2023-01-01 PROCEDURE — 85652 RBC SED RATE AUTOMATED: CPT

## 2023-01-01 PROCEDURE — 73501 X-RAY EXAM HIP UNI 1 VIEW: CPT

## 2023-01-01 PROCEDURE — 36415 COLL VENOUS BLD VENIPUNCTURE: CPT

## 2023-01-01 PROCEDURE — 80048 BASIC METABOLIC PNL TOTAL CA: CPT

## 2023-01-01 PROCEDURE — 84550 ASSAY OF BLOOD/URIC ACID: CPT

## 2023-01-01 PROCEDURE — 74011250637 HC RX REV CODE- 250/637: Performed by: STUDENT IN AN ORGANIZED HEALTH CARE EDUCATION/TRAINING PROGRAM

## 2023-01-01 PROCEDURE — 74011250637 HC RX REV CODE- 250/637: Performed by: INTERNAL MEDICINE

## 2023-01-01 RX ORDER — TRAMADOL HYDROCHLORIDE 50 MG/1
50 TABLET ORAL
Status: DISCONTINUED | OUTPATIENT
Start: 2023-01-01 | End: 2023-01-04

## 2023-01-01 RX ADMIN — ENOXAPARIN SODIUM 100 MG: 100 INJECTION SUBCUTANEOUS at 06:46

## 2023-01-01 RX ADMIN — MAGNESIUM OXIDE TAB 400 MG (241.3 MG ELEMENTAL MG) 400 MG: 400 (241.3 MG) TAB at 09:15

## 2023-01-01 RX ADMIN — TRAMADOL HYDROCHLORIDE 50 MG: 50 TABLET ORAL at 13:52

## 2023-01-01 RX ADMIN — FOLIC ACID 1 MG: 1 TABLET ORAL at 09:15

## 2023-01-01 RX ADMIN — THIAMINE HCL TAB 100 MG 100 MG: 100 TAB at 09:15

## 2023-01-01 RX ADMIN — MULTIPLE VITAMINS W/ MINERALS TAB 1 TABLET: TAB at 09:15

## 2023-01-01 RX ADMIN — WARFARIN SODIUM 7 MG: 5 TABLET ORAL at 17:46

## 2023-01-01 RX ADMIN — SODIUM CHLORIDE, PRESERVATIVE FREE 10 ML: 5 INJECTION INTRAVENOUS at 21:53

## 2023-01-01 RX ADMIN — SODIUM CHLORIDE, PRESERVATIVE FREE 10 ML: 5 INJECTION INTRAVENOUS at 13:54

## 2023-01-01 RX ADMIN — ENOXAPARIN SODIUM 100 MG: 100 INJECTION SUBCUTANEOUS at 17:46

## 2023-01-01 NOTE — PROGRESS NOTES
Hospitalist Progress Note    NAME: Darrin James   :  1960   MRN:  [id number]       Assessment / Plan:    High anion gap metabolic acidosis-lactic acidosis with suspected superimposed starvation/alcoholic ketoacidosis  Hypoglycemia, resolved  Acute right middle lobe pulmonary embolism  Alcoholism-high risk for severe withdrawals  Chronic macrocytic anemia  Essential hypertension     PLAN:     L hip pain  No reported trauma, ? OA  Obtain XR, check uric acid  Add tramadol prn  PT/OT to eval    High anion gap metabolic acidosis-lactic acidosis with suspected superimposed starvation/alcoholic ketoacidosis  Hypoglycemia, resolved  Continue current stepdown unit  -Anion gap resolved  -Status post D5  - Lactic acidosis resolved     Acute right middle lobe pulmonary embolism  Therapeutic Lovenox given in ED. CTA showed single right middle lobe pulmonary embolism. No evidence for right heart strain or pulmonary infarction.   -unable to afford Eliquis. Currently on coumadin bridge with warfarin. Will need to see INR trending up prior to discharge. INR 1.3 today. CM to help with pricing of Eliquis (at least 7 days supply)    -Extensively counseled on risks of bleeding while on anticoagulation. Emphasized need to remain abstinent from alcohol to prevent inadvertent head trauma and potential intracranial bleeding    Alcoholism-high risk for severe withdrawals  Acute on chronic macrocytic anemia  6-day phenobarbital taper with loading dose given high risk for withdrawals  CIWA protocol  Is currently out of alcohol withdrawal     Essential hypertension  Trend blood pressure and address as needed, hypertension likely secondary to withdrawals in the setting and needs to be addressed accordingly     I spoke to pt's son and gave updates.     Code Status: Full   DVT Prophylaxis: Lovenox and warfarin  GI Prophylaxis: Not indicated  Diet: Regular/cardiac    30.0 - 39.9 Obese / Body mass index is 32.49 kg/m². Estimated discharge date: 1/3  Barriers: Clinical improvement, watch hemoglobin     Subjective:   NAD. C/o of L hip pain, denies any trauma or falls    Objective:     VITALS:   Last 24hrs VS reviewed since prior progress note. Most recent are:  Patient Vitals for the past 24 hrs:   Temp Pulse Resp BP SpO2   01/01/23 0735 98.2 °F (36.8 °C) 63 10 117/80 --   01/01/23 0525 97.3 °F (36.3 °C) 65 16 (!) 117/93 100 %   12/31/22 2354 98 °F (36.7 °C) 78 16 (!) 100/54 99 %   12/31/22 2013 98 °F (36.7 °C) 92 17 106/75 99 %   12/31/22 1440 98.5 °F (36.9 °C) 80 19 111/74 --   12/31/22 1101 98.3 °F (36.8 °C) 78 15 109/78 100 %         Intake/Output Summary (Last 24 hours) at 1/1/2023 1009  Last data filed at 1/1/2023 0526  Gross per 24 hour   Intake --   Output 250 ml   Net -250 ml          I had a face to face encounter and independently examined this patient on 1/1/2023, as outlined below:  PHYSICAL EXAM:  General: WD, WN. Alert, cooperative, no acute distress    EENT:  EOMI. Anicteric sclerae. MMM  Resp:  CTA bilaterally, no wheezing or rales. No accessory muscle use  CV:  Regular  rhythm,  No edema  GI:  Soft, Non distended, Non tender. +Bowel sounds  Neurologic:  EOMs intact. No facial asymmetry. No aphasia or slurred speech. Symmetrical strength, Sensation grossly intact. Alert and oriented X 4.     Psych:   Good insight. Not anxious nor agitated  Skin:  No rashes. No jaundice.  bruises in his lower lip from the fall and on his right knee    Reviewed most current lab test results and cultures  YES  Reviewed most current radiology test results   YES  Review and summation of old records today    NO  Reviewed patient's current orders and MAR    YES  PMH/SH reviewed - no change compared to H&P  ________________________________________________________________________  Care Plan discussed with:    Comments   Patient X    Family      RN X    Care Manager     Consultant                        Multidiciplinary team rounds were held today with , nursing, pharmacist and clinical coordinator. Patient's plan of care was discussed; medications were reviewed and discharge planning was addressed. ________________________________________________________________________        Comments   >50% of visit spent in counseling and coordination of care X    ________________________________________________________________________  Rey Mitchell MD     Procedures: see electronic medical records for all procedures/Xrays and details which were not copied into this note but were reviewed prior to creation of Plan. LABS:  I reviewed today's most current labs and imaging studies.   Pertinent labs include:  Recent Labs     01/01/23  0655 12/31/22  0422 12/30/22  0303   WBC 6.0 4.7 4.3   HGB 8.2* 8.3* 8.5*   HCT 24.6* 24.6* 24.9*    197 189       Recent Labs     01/01/23  0655 12/31/22  0422 12/30/22  0303    134* 134*   K 4.0 4.1 4.3    104 104   CO2 24 27 26   GLU 76 83 86   BUN 9 9 7   CREA 0.83 0.84 0.77   CA 8.5 8.5 8.7   INR 1.3* 1.2* 1.1         Signed: Rey Mitchell MD

## 2023-01-01 NOTE — PROGRESS NOTES
1115 - Reordered the labs put in this morning so that I can reprint labels. 1350 - Pain medicine given for hip pain. 1500 - Xray at bedside     End of Shift Note    Bedside shift change report given to Kimmie Gibbons RN (oncoming nurse) by Antoinette Melgra RN (offgoing nurse). Report included the following information SBAR, Kardex, Intake/Output, MAR, and Recent Results    Shift worked:  Day       Shift summary and any significant changes:     See above     Concerns for physician to address:  none     Zone phone for oncoming shift:          Activity:  Activity Level: Up with Assistance  Number times ambulated in hallways past shift: 0  Number of times OOB to chair past shift: 0    Cardiac:   Cardiac Monitoring: Yes      Cardiac Rhythm: Sinus Rhythm    Access:  Current line(s): PIV     Genitourinary:   Urinary status: voiding    Respiratory:   O2 Device: None (Room air)  Chronic home O2 use?: YES  Incentive spirometer at bedside: YES       GI:  Last Bowel Movement Date: 12/31/22  Current diet:  ADULT DIET Regular; Low Fat/Low Chol/High Fiber/2 gm Na  Passing flatus: YES  Tolerating current diet: YES       Pain Management:   Patient states pain is manageable on current regimen: YES    Skin:  Oneil Score: 22  Interventions: turn team, speciality bed, float heels, increase time out of bed, and foam dressing    Patient Safety:  Fall Score:  Total Score: 3  Interventions: bed/chair alarm, assistive device (walker, cane, etc), gripper socks, pt to call before getting OOB, and stay with me (per policy)  High Fall Risk: Yes    Length of Stay:  Expected LOS: 2d 12h  Actual LOS: 7      Antoinette Melgar RN

## 2023-01-01 NOTE — PROGRESS NOTES
Pharmacist Daily Dosing of Warfarin    Indication & Goal INR: PE, INR Goal 2-3    PTA Warfarin Dose: new start    Notable concurrent conditions and medications:     Labs:  Recent Labs     01/01/23  0655 12/31/22  0422 12/30/22  0303   INR 1.3* 1.2* 1.1   HGB 8.2* 8.3* 8.5*    197 189       Impression/Plan:   Phenobarbital stopped  Average daily dose last 4 days = 6.4mg  Warfarin 7mg 1/1  Lovenox bridge  Daily INR ordered  CBC w/o differential to be checked at least every other day     Pharmacy will follow daily and adjust the dose as appropriate.     Thank you,  Zbigniew Gomez, [facility]

## 2023-01-01 NOTE — PROGRESS NOTES
Problem: Alcohol Withdrawal  Goal: *STG: Participates in treatment plan  Outcome: Progressing Towards Goal  Goal: *STG: Remains safe in hospital  Outcome: Progressing Towards Goal  Goal: *STG: Seeks staff when symptoms of withdrawal increase  Outcome: Progressing Towards Goal  Goal: *STG: Complies with medication therapy  Outcome: Progressing Towards Goal  Goal: *STG: Attends activities and groups  Outcome: Progressing Towards Goal  Goal: *STG: Will identify negative impact of chemical dependency including the use of tobacco, alcohol, and other substances  Outcome: Progressing Towards Goal  Goal: *STG: Verbalizes abstinence as an achievable goal  Outcome: Progressing Towards Goal  Goal: *STG: Agrees to participate in outpatient after care program to support ongoing mental health  Outcome: Progressing Towards Goal  Goal: *STG: Able to indentify relapse triggers including interpersonal/social and familial factors  Outcome: Progressing Towards Goal  Goal: *STG: Identify lifestyle changes to support long term sobriety such as vocation, employment, education, and legal issues  Outcome: Progressing Towards Goal  Goal: *STG: Maintains appropriate nutrition and hydration  Outcome: Progressing Towards Goal  Goal: *STG: Vital signs within defined limits  Outcome: Progressing Towards Goal  Goal: *STG/LTG: Relapse prevention plan in place to include housing/aftercare, leisure activities, and spirituality  Outcome: Progressing Towards Goal  Goal: Interventions  Outcome: Progressing Towards Goal     Problem: Patient Education: Go to Patient Education Activity  Goal: Patient/Family Education  Outcome: Progressing Towards Goal     Problem: Falls - Risk of  Goal: *Absence of Falls  Description: Document Lakshmi Fall Risk and appropriate interventions in the flowsheet.   Outcome: Progressing Towards Goal  Note: Fall Risk Interventions:            Medication Interventions: Evaluate medications/consider consulting pharmacy, Patient to call before getting OOB, Teach patient to arise slowly, Bed/chair exit alarm    Elimination Interventions: Bed/chair exit alarm, Call light in reach, Patient to call for help with toileting needs, Toileting schedule/hourly rounds    History of Falls Interventions: Bed/chair exit alarm, Door open when patient unattended, Evaluate medications/consider consulting pharmacy, Investigate reason for fall, Consult care management for discharge planning         Problem: Patient Education: Go to Patient Education Activity  Goal: Patient/Family Education  Outcome: Progressing Towards Goal     Problem: Pulmonary Embolism Care Plan (Adult)  Goal: *Improvement of existing pulmonary embolism  Outcome: Progressing Towards Goal  Goal: *Absence of bleeding  Outcome: Progressing Towards Goal  Goal: *Labs within defined limits  Outcome: Progressing Towards Goal     Problem: Patient Education: Go to Patient Education Activity  Goal: Patient/Family Education  Outcome: Progressing Towards Goal

## 2023-01-02 ENCOUNTER — APPOINTMENT (OUTPATIENT)
Dept: GENERAL RADIOLOGY | Age: 63
DRG: 950 | End: 2023-01-02
Attending: INTERNAL MEDICINE
Payer: COMMERCIAL

## 2023-01-02 ENCOUNTER — APPOINTMENT (OUTPATIENT)
Dept: NON INVASIVE DIAGNOSTICS | Age: 63
DRG: 950 | End: 2023-01-02
Attending: INTERNAL MEDICINE
Payer: COMMERCIAL

## 2023-01-02 ENCOUNTER — APPOINTMENT (OUTPATIENT)
Dept: INTERVENTIONAL RADIOLOGY/VASCULAR | Age: 63
DRG: 950 | End: 2023-01-02
Attending: INTERNAL MEDICINE
Payer: COMMERCIAL

## 2023-01-02 ENCOUNTER — APPOINTMENT (OUTPATIENT)
Dept: CT IMAGING | Age: 63
DRG: 950 | End: 2023-01-02
Attending: NURSE PRACTITIONER
Payer: COMMERCIAL

## 2023-01-02 ENCOUNTER — APPOINTMENT (OUTPATIENT)
Dept: GENERAL RADIOLOGY | Age: 63
DRG: 950 | End: 2023-01-02
Attending: HOSPITALIST
Payer: COMMERCIAL

## 2023-01-02 ENCOUNTER — ANESTHESIA (OUTPATIENT)
Dept: SURGERY | Age: 63
DRG: 950 | End: 2023-01-02
Payer: COMMERCIAL

## 2023-01-02 ENCOUNTER — APPOINTMENT (OUTPATIENT)
Dept: CT IMAGING | Age: 63
DRG: 950 | End: 2023-01-02
Attending: INTERNAL MEDICINE
Payer: COMMERCIAL

## 2023-01-02 ENCOUNTER — APPOINTMENT (OUTPATIENT)
Dept: GENERAL RADIOLOGY | Age: 63
DRG: 950 | End: 2023-01-02
Attending: NURSE PRACTITIONER
Payer: COMMERCIAL

## 2023-01-02 ENCOUNTER — APPOINTMENT (OUTPATIENT)
Dept: ULTRASOUND IMAGING | Age: 63
DRG: 950 | End: 2023-01-02
Attending: INTERNAL MEDICINE
Payer: COMMERCIAL

## 2023-01-02 ENCOUNTER — ANESTHESIA EVENT (OUTPATIENT)
Dept: SURGERY | Age: 63
DRG: 950 | End: 2023-01-02
Payer: COMMERCIAL

## 2023-01-02 PROBLEM — R58 RETROPERITONEAL BLEED: Status: ACTIVE | Noted: 2023-01-02

## 2023-01-02 LAB
ALBUMIN SERPL-MCNC: 1.7 G/DL (ref 3.5–5)
ALBUMIN SERPL-MCNC: 2.4 G/DL (ref 3.5–5)
ALBUMIN/GLOB SERPL: 0.7 (ref 1.1–2.2)
ALP SERPL-CCNC: 122 U/L (ref 45–117)
ALT SERPL-CCNC: 58 U/L (ref 12–78)
ANION GAP SERPL CALC-SCNC: 19 MMOL/L (ref 5–15)
ANION GAP SERPL CALC-SCNC: 20 MMOL/L (ref 5–15)
ANION GAP SERPL CALC-SCNC: 20 MMOL/L (ref 5–15)
ANION GAP SERPL CALC-SCNC: 9 MMOL/L (ref 5–15)
ARTERIAL PATENCY WRIST A: ABNORMAL
ARTERIAL PATENCY WRIST A: ABNORMAL
AST SERPL-CCNC: 73 U/L (ref 15–37)
ATRIAL RATE: 99 BPM
BASE DEFICIT BLDA-SCNC: 20.4 MMOL/L
BASE DEFICIT BLDA-SCNC: 6.6 MMOL/L
BDY SITE: ABNORMAL
BDY SITE: ABNORMAL
BILIRUB DIRECT SERPL-MCNC: 0.3 MG/DL (ref 0–0.2)
BILIRUB SERPL-MCNC: 0.7 MG/DL (ref 0.2–1)
BNP SERPL-MCNC: 398 PG/ML
BNP SERPL-MCNC: 942 PG/ML
BREATHS.SPONTANEOUS ON VENT: 25
BUN SERPL-MCNC: 13 MG/DL (ref 6–20)
BUN SERPL-MCNC: 16 MG/DL (ref 6–20)
BUN SERPL-MCNC: 17 MG/DL (ref 6–20)
BUN SERPL-MCNC: 19 MG/DL (ref 6–20)
BUN/CREAT SERPL: 6 (ref 12–20)
BUN/CREAT SERPL: 7 (ref 12–20)
BUN/CREAT SERPL: 7 (ref 12–20)
BUN/CREAT SERPL: 9 (ref 12–20)
CALCIUM SERPL-MCNC: 7.8 MG/DL (ref 8.5–10.1)
CALCIUM SERPL-MCNC: 7.9 MG/DL (ref 8.5–10.1)
CALCIUM SERPL-MCNC: 8.3 MG/DL (ref 8.5–10.1)
CALCIUM SERPL-MCNC: 8.5 MG/DL (ref 8.5–10.1)
CALCIUM SERPL-MCNC: 8.7 MG/DL (ref 8.5–10.1)
CALCULATED P AXIS, ECG09: 52 DEGREES
CALCULATED R AXIS, ECG10: 42 DEGREES
CALCULATED T AXIS, ECG11: 60 DEGREES
CHLORIDE SERPL-SCNC: 101 MMOL/L (ref 97–108)
CHLORIDE SERPL-SCNC: 101 MMOL/L (ref 97–108)
CHLORIDE SERPL-SCNC: 104 MMOL/L (ref 97–108)
CHLORIDE SERPL-SCNC: 99 MMOL/L (ref 97–108)
CO2 SERPL-SCNC: 13 MMOL/L (ref 21–32)
CO2 SERPL-SCNC: 16 MMOL/L (ref 21–32)
CO2 SERPL-SCNC: 16 MMOL/L (ref 21–32)
CO2 SERPL-SCNC: 21 MMOL/L (ref 21–32)
CREAT SERPL-MCNC: 1.44 MG/DL (ref 0.7–1.3)
CREAT SERPL-MCNC: 2.36 MG/DL (ref 0.7–1.3)
CREAT SERPL-MCNC: 2.74 MG/DL (ref 0.7–1.3)
CREAT SERPL-MCNC: 2.76 MG/DL (ref 0.7–1.3)
DIAGNOSIS, 93000: NORMAL
ECHO RV INTERNAL DIMENSION: 4.5 CM
ECHO TV REGURGITANT MAX VELOCITY: 2.4 M/S
ECHO TV REGURGITANT PEAK GRADIENT: 23 MMHG
ERYTHROCYTE [DISTWIDTH] IN BLOOD BY AUTOMATED COUNT: 16.9 % (ref 11.5–14.5)
ERYTHROCYTE [DISTWIDTH] IN BLOOD BY AUTOMATED COUNT: 17.6 % (ref 11.5–14.5)
ERYTHROCYTE [DISTWIDTH] IN BLOOD BY AUTOMATED COUNT: 17.6 % (ref 11.5–14.5)
GAS FLOW.O2 O2 DELIVERY SYS: 6 L/MIN
GLOBULIN SER CALC-MCNC: 3.4 G/DL (ref 2–4)
GLUCOSE BLD STRIP.AUTO-MCNC: 134 MG/DL (ref 65–117)
GLUCOSE SERPL-MCNC: 167 MG/DL (ref 65–100)
GLUCOSE SERPL-MCNC: 200 MG/DL (ref 65–100)
GLUCOSE SERPL-MCNC: 204 MG/DL (ref 65–100)
GLUCOSE SERPL-MCNC: 253 MG/DL (ref 65–100)
HCO3 BLDA-SCNC: 16 MMOL/L (ref 22–26)
HCO3 BLDA-SCNC: 6 MMOL/L (ref 22–26)
HCT VFR BLD AUTO: 16.3 % (ref 36.6–50.3)
HCT VFR BLD AUTO: 19.1 % (ref 36.6–50.3)
HCT VFR BLD AUTO: 22.3 % (ref 36.6–50.3)
HCT VFR BLD AUTO: 23.2 % (ref 36.6–50.3)
HCT VFR BLD AUTO: 32.7 % (ref 36.6–50.3)
HGB BLD-MCNC: 10.7 G/DL (ref 12.1–17)
HGB BLD-MCNC: 5 G/DL (ref 12.1–17)
HGB BLD-MCNC: 6.4 G/DL (ref 12.1–17)
HGB BLD-MCNC: 7.2 G/DL (ref 12.1–17)
HGB BLD-MCNC: 7.8 G/DL (ref 12.1–17)
HISTORY CHECKED?,CKHIST: NORMAL
INR PPP: 1.3 (ref 0.9–1.1)
INR PPP: 1.7 (ref 0.9–1.1)
LACTATE SERPL-SCNC: 12.7 MMOL/L (ref 0.4–2)
LACTATE SERPL-SCNC: 12.9 MMOL/L (ref 0.4–2)
LACTATE SERPL-SCNC: 7 MMOL/L (ref 0.4–2)
LACTATE SERPL-SCNC: 9.7 MMOL/L (ref 0.4–2)
MAGNESIUM SERPL-MCNC: 2.1 MG/DL (ref 1.6–2.4)
MCH RBC QN AUTO: 31.4 PG (ref 26–34)
MCH RBC QN AUTO: 31.7 PG (ref 26–34)
MCH RBC QN AUTO: 35.6 PG (ref 26–34)
MCHC RBC AUTO-ENTMCNC: 32.3 G/DL (ref 30–36.5)
MCHC RBC AUTO-ENTMCNC: 33.5 G/DL (ref 30–36.5)
MCHC RBC AUTO-ENTMCNC: 33.6 G/DL (ref 30–36.5)
MCV RBC AUTO: 110.4 FL (ref 80–99)
MCV RBC AUTO: 93.6 FL (ref 80–99)
MCV RBC AUTO: 94.3 FL (ref 80–99)
NRBC # BLD: 0.04 K/UL (ref 0–0.01)
NRBC # BLD: 0.1 K/UL (ref 0–0.01)
NRBC # BLD: 0.15 K/UL (ref 0–0.01)
NRBC BLD-RTO: 0.5 PER 100 WBC
NRBC BLD-RTO: 0.6 PER 100 WBC
NRBC BLD-RTO: 0.8 PER 100 WBC
P-R INTERVAL, ECG05: 138 MS
PCO2 BLDA: 17 MMHG (ref 35–45)
PCO2 BLDA: 26 MMHG (ref 35–45)
PH BLDA: 7.16 (ref 7.35–7.45)
PH BLDA: 7.41 (ref 7.35–7.45)
PHOSPHATE SERPL-MCNC: 8 MG/DL (ref 2.6–4.7)
PLATELET # BLD AUTO: 140 K/UL (ref 150–400)
PLATELET # BLD AUTO: 149 K/UL (ref 150–400)
PLATELET # BLD AUTO: 269 K/UL (ref 150–400)
PMV BLD AUTO: 11.1 FL (ref 8.9–12.9)
PMV BLD AUTO: 11.7 FL (ref 8.9–12.9)
PMV BLD AUTO: 12.2 FL (ref 8.9–12.9)
PO2 BLDA: 114 MMHG (ref 80–100)
PO2 BLDA: 138 MMHG (ref 80–100)
POTASSIUM SERPL-SCNC: 4.8 MMOL/L (ref 3.5–5.1)
POTASSIUM SERPL-SCNC: 5.7 MMOL/L (ref 3.5–5.1)
POTASSIUM SERPL-SCNC: 5.9 MMOL/L (ref 3.5–5.1)
POTASSIUM SERPL-SCNC: 6.4 MMOL/L (ref 3.5–5.1)
PROCALCITONIN SERPL-MCNC: 0.59 NG/ML
PROT SERPL-MCNC: 5.8 G/DL (ref 6.4–8.2)
PROTHROMBIN TIME: 13.5 SEC (ref 9–11.1)
PROTHROMBIN TIME: 16.8 SEC (ref 9–11.1)
Q-T INTERVAL, ECG07: 380 MS
QRS DURATION, ECG06: 74 MS
QTC CALCULATION (BEZET), ECG08: 487 MS
RBC # BLD AUTO: 2.02 M/UL (ref 4.1–5.7)
RBC # BLD AUTO: 2.04 M/UL (ref 4.1–5.7)
RBC # BLD AUTO: 2.46 M/UL (ref 4.1–5.7)
SAO2 % BLD: 98 % (ref 92–97)
SAO2 % BLD: 98 % (ref 92–97)
SAO2% DEVICE SAO2% SENSOR NAME: ABNORMAL
SAO2% DEVICE SAO2% SENSOR NAME: ABNORMAL
SERVICE CMNT-IMP: ABNORMAL
SERVICE CMNT-IMP: ABNORMAL
SODIUM SERPL-SCNC: 133 MMOL/L (ref 136–145)
SODIUM SERPL-SCNC: 134 MMOL/L (ref 136–145)
SODIUM SERPL-SCNC: 135 MMOL/L (ref 136–145)
SODIUM SERPL-SCNC: 137 MMOL/L (ref 136–145)
SPECIMEN SITE: ABNORMAL
SPECIMEN SITE: ABNORMAL
TROPONIN-HIGH SENSITIVITY: 5 NG/L (ref 0–76)
TROPONIN-HIGH SENSITIVITY: 71 NG/L (ref 0–76)
TROPONIN-HIGH SENSITIVITY: 8 NG/L (ref 0–76)
VENTRICULAR RATE, ECG03: 99 BPM
WBC # BLD AUTO: 16 K/UL (ref 4.1–11.1)
WBC # BLD AUTO: 19.9 K/UL (ref 4.1–11.1)
WBC # BLD AUTO: 8.6 K/UL (ref 4.1–11.1)

## 2023-01-02 PROCEDURE — 51798 US URINE CAPACITY MEASURE: CPT

## 2023-01-02 PROCEDURE — 99221 1ST HOSP IP/OBS SF/LOW 40: CPT | Performed by: SURGERY

## 2023-01-02 PROCEDURE — 80048 BASIC METABOLIC PNL TOTAL CA: CPT

## 2023-01-02 PROCEDURE — 74011000258 HC RX REV CODE- 258: Performed by: INTERNAL MEDICINE

## 2023-01-02 PROCEDURE — 36600 WITHDRAWAL OF ARTERIAL BLOOD: CPT

## 2023-01-02 PROCEDURE — 86900 BLOOD TYPING SEROLOGIC ABO: CPT

## 2023-01-02 PROCEDURE — 85018 HEMOGLOBIN: CPT

## 2023-01-02 PROCEDURE — 80076 HEPATIC FUNCTION PANEL: CPT

## 2023-01-02 PROCEDURE — 71260 CT THORAX DX C+: CPT

## 2023-01-02 PROCEDURE — 80069 RENAL FUNCTION PANEL: CPT

## 2023-01-02 PROCEDURE — 2709999900 HC NON-CHARGEABLE SUPPLY

## 2023-01-02 PROCEDURE — P9016 RBC LEUKOCYTES REDUCED: HCPCS

## 2023-01-02 PROCEDURE — 83880 ASSAY OF NATRIURETIC PEPTIDE: CPT

## 2023-01-02 PROCEDURE — 86920 COMPATIBILITY TEST SPIN: CPT

## 2023-01-02 PROCEDURE — 71045 X-RAY EXAM CHEST 1 VIEW: CPT

## 2023-01-02 PROCEDURE — C1889 IMPLANT/INSERT DEVICE, NOC: HCPCS

## 2023-01-02 PROCEDURE — 36430 TRANSFUSION BLD/BLD COMPNT: CPT

## 2023-01-02 PROCEDURE — C1751 CATH, INF, PER/CENT/MIDLINE: HCPCS

## 2023-01-02 PROCEDURE — 93005 ELECTROCARDIOGRAM TRACING: CPT

## 2023-01-02 PROCEDURE — C1894 INTRO/SHEATH, NON-LASER: HCPCS

## 2023-01-02 PROCEDURE — 74011250636 HC RX REV CODE- 250/636: Performed by: RADIOLOGY

## 2023-01-02 PROCEDURE — 74011000250 HC RX REV CODE- 250: Performed by: STUDENT IN AN ORGANIZED HEALTH CARE EDUCATION/TRAINING PROGRAM

## 2023-01-02 PROCEDURE — 77030041052 HC CTRLR AZUR HNDL TERU -C

## 2023-01-02 PROCEDURE — 77010033678 HC OXYGEN DAILY

## 2023-01-02 PROCEDURE — B543ZZA ULTRASONOGRAPHY OF RIGHT JUGULAR VEINS, GUIDANCE: ICD-10-PCS | Performed by: HOSPITALIST

## 2023-01-02 PROCEDURE — 06HY33Z INSERTION OF INFUSION DEVICE INTO LOWER VEIN, PERCUTANEOUS APPROACH: ICD-10-PCS | Performed by: INTERNAL MEDICINE

## 2023-01-02 PROCEDURE — 74011250636 HC RX REV CODE- 250/636: Performed by: NURSE PRACTITIONER

## 2023-01-02 PROCEDURE — 74011000250 HC RX REV CODE- 250: Performed by: INTERNAL MEDICINE

## 2023-01-02 PROCEDURE — 77030014021 HC SYR ANGI FIX LOK MRTM -A

## 2023-01-02 PROCEDURE — 93970 EXTREMITY STUDY: CPT

## 2023-01-02 PROCEDURE — 77030019698 HC SYR ANGI MDLON MRTM -A

## 2023-01-02 PROCEDURE — 77030010324 HC TBNG CNTRST MRTM -A

## 2023-01-02 PROCEDURE — 82962 GLUCOSE BLOOD TEST: CPT

## 2023-01-02 PROCEDURE — 77030004561 HC CATH ANGI DX COBRA ANGI -B

## 2023-01-02 PROCEDURE — 76937 US GUIDE VASCULAR ACCESS: CPT

## 2023-01-02 PROCEDURE — 74011000636 HC RX REV CODE- 636: Performed by: RADIOLOGY

## 2023-01-02 PROCEDURE — C1769 GUIDE WIRE: HCPCS

## 2023-01-02 PROCEDURE — C9113 INJ PANTOPRAZOLE SODIUM, VIA: HCPCS | Performed by: INTERNAL MEDICINE

## 2023-01-02 PROCEDURE — 82803 BLOOD GASES ANY COMBINATION: CPT

## 2023-01-02 PROCEDURE — 74011000636 HC RX REV CODE- 636: Performed by: INTERNAL MEDICINE

## 2023-01-02 PROCEDURE — 74011250636 HC RX REV CODE- 250/636: Performed by: INTERNAL MEDICINE

## 2023-01-02 PROCEDURE — 74177 CT ABD & PELVIS W/CONTRAST: CPT

## 2023-01-02 PROCEDURE — 86923 COMPATIBILITY TEST ELECTRIC: CPT

## 2023-01-02 PROCEDURE — 74011000250 HC RX REV CODE- 250: Performed by: RADIOLOGY

## 2023-01-02 PROCEDURE — 74011250636 HC RX REV CODE- 250/636: Performed by: HOSPITALIST

## 2023-01-02 PROCEDURE — 90945 DIALYSIS ONE EVALUATION: CPT

## 2023-01-02 PROCEDURE — 04L03DZ OCCLUSION OF ABDOMINAL AORTA WITH INTRALUMINAL DEVICE, PERCUTANEOUS APPROACH: ICD-10-PCS | Performed by: RADIOLOGY

## 2023-01-02 PROCEDURE — 82310 ASSAY OF CALCIUM: CPT

## 2023-01-02 PROCEDURE — 74011000258 HC RX REV CODE- 258: Performed by: NURSE PRACTITIONER

## 2023-01-02 PROCEDURE — 65610000006 HC RM INTENSIVE CARE

## 2023-01-02 PROCEDURE — 30233K1 TRANSFUSION OF NONAUTOLOGOUS FROZEN PLASMA INTO PERIPHERAL VEIN, PERCUTANEOUS APPROACH: ICD-10-PCS | Performed by: INTERNAL MEDICINE

## 2023-01-02 PROCEDURE — 70450 CT HEAD/BRAIN W/O DYE: CPT

## 2023-01-02 PROCEDURE — 36415 COLL VENOUS BLD VENIPUNCTURE: CPT

## 2023-01-02 PROCEDURE — C1887 CATHETER, GUIDING: HCPCS

## 2023-01-02 PROCEDURE — 85027 COMPLETE CBC AUTOMATED: CPT

## 2023-01-02 PROCEDURE — 84145 PROCALCITONIN (PCT): CPT

## 2023-01-02 PROCEDURE — 84484 ASSAY OF TROPONIN QUANT: CPT

## 2023-01-02 PROCEDURE — 83605 ASSAY OF LACTIC ACID: CPT

## 2023-01-02 PROCEDURE — 85610 PROTHROMBIN TIME: CPT

## 2023-01-02 PROCEDURE — 30233N1 TRANSFUSION OF NONAUTOLOGOUS RED BLOOD CELLS INTO PERIPHERAL VEIN, PERCUTANEOUS APPROACH: ICD-10-PCS | Performed by: INTERNAL MEDICINE

## 2023-01-02 PROCEDURE — 93306 TTE W/DOPPLER COMPLETE: CPT

## 2023-01-02 PROCEDURE — P9059 PLASMA, FRZ BETWEEN 8-24HOUR: HCPCS

## 2023-01-02 PROCEDURE — 05HM33Z INSERTION OF INFUSION DEVICE INTO RIGHT INTERNAL JUGULAR VEIN, PERCUTANEOUS APPROACH: ICD-10-PCS | Performed by: HOSPITALIST

## 2023-01-02 PROCEDURE — 83735 ASSAY OF MAGNESIUM: CPT

## 2023-01-02 PROCEDURE — 74011000250 HC RX REV CODE- 250: Performed by: HOSPITALIST

## 2023-01-02 PROCEDURE — 74011250637 HC RX REV CODE- 250/637: Performed by: STUDENT IN AN ORGANIZED HEALTH CARE EDUCATION/TRAINING PROGRAM

## 2023-01-02 PROCEDURE — 74011000250 HC RX REV CODE- 250: Performed by: NURSE PRACTITIONER

## 2023-01-02 RX ORDER — NOREPINEPHRINE BITARTRATE/D5W 8 MG/250ML
.5-15 PLASTIC BAG, INJECTION (ML) INTRAVENOUS
Status: DISCONTINUED | OUTPATIENT
Start: 2023-01-02 | End: 2023-01-06

## 2023-01-02 RX ORDER — SODIUM BICARBONATE 1 MEQ/ML
SYRINGE (ML) INTRAVENOUS
Status: DISPENSED
Start: 2023-01-02 | End: 2023-01-03

## 2023-01-02 RX ORDER — HYDROCORTISONE SODIUM SUCCINATE 100 MG/2ML
50 INJECTION, POWDER, FOR SOLUTION INTRAMUSCULAR; INTRAVENOUS EVERY 6 HOURS
Status: DISCONTINUED | OUTPATIENT
Start: 2023-01-02 | End: 2023-01-03

## 2023-01-02 RX ORDER — SODIUM CHLORIDE 9 MG/ML
250 INJECTION, SOLUTION INTRAVENOUS AS NEEDED
Status: DISCONTINUED | OUTPATIENT
Start: 2023-01-02 | End: 2023-01-06

## 2023-01-02 RX ORDER — SODIUM BICARBONATE 1 MEQ/ML
100 SYRINGE (ML) INTRAVENOUS ONCE
Status: COMPLETED | OUTPATIENT
Start: 2023-01-02 | End: 2023-01-02

## 2023-01-02 RX ORDER — SODIUM BICARBONATE 1 MEQ/ML
50 SYRINGE (ML) INTRAVENOUS ONCE
Status: COMPLETED | OUTPATIENT
Start: 2023-01-02 | End: 2023-01-02

## 2023-01-02 RX ORDER — DEXMEDETOMIDINE HYDROCHLORIDE 4 UG/ML
.1-1.5 INJECTION, SOLUTION INTRAVENOUS
Status: DISCONTINUED | OUTPATIENT
Start: 2023-01-02 | End: 2023-01-04

## 2023-01-02 RX ORDER — SODIUM CHLORIDE 9 MG/ML
250 INJECTION, SOLUTION INTRAVENOUS AS NEEDED
Status: DISCONTINUED | OUTPATIENT
Start: 2023-01-02 | End: 2023-01-03

## 2023-01-02 RX ORDER — HEPARIN SODIUM 200 [USP'U]/100ML
800 INJECTION, SOLUTION INTRAVENOUS ONCE
Status: COMPLETED | OUTPATIENT
Start: 2023-01-02 | End: 2023-01-02

## 2023-01-02 RX ORDER — LIDOCAINE HYDROCHLORIDE 20 MG/ML
18 INJECTION, SOLUTION INFILTRATION; PERINEURAL ONCE
Status: COMPLETED | OUTPATIENT
Start: 2023-01-02 | End: 2023-01-02

## 2023-01-02 RX ADMIN — CALCIUM CHLORIDE, MAGNESIUM CHLORIDE, DEXTROSE MONOHYDRATE, LACTIC ACID, SODIUM CHLORIDE, SODIUM BICARBONATE AND POTASSIUM CHLORIDE: 5.15; 2.03; 22; 5.4; 6.46; 3.09; .157 INJECTION INTRAVENOUS at 22:11

## 2023-01-02 RX ADMIN — SODIUM CHLORIDE, PRESERVATIVE FREE 10 ML: 5 INJECTION INTRAVENOUS at 13:38

## 2023-01-02 RX ADMIN — SODIUM BICARBONATE: 84 INJECTION, SOLUTION INTRAVENOUS at 18:52

## 2023-01-02 RX ADMIN — PIPERACILLIN AND TAZOBACTAM 3.38 G: 3; .375 INJECTION, POWDER, FOR SOLUTION INTRAVENOUS at 17:13

## 2023-01-02 RX ADMIN — SODIUM CHLORIDE, POTASSIUM CHLORIDE, SODIUM LACTATE AND CALCIUM CHLORIDE 500 ML: 600; 310; 30; 20 INJECTION, SOLUTION INTRAVENOUS at 03:24

## 2023-01-02 RX ADMIN — SODIUM CHLORIDE, PRESERVATIVE FREE 10 ML: 5 INJECTION INTRAVENOUS at 22:01

## 2023-01-02 RX ADMIN — NOREPINEPHRINE BITARTRATE 16 MCG/MIN: 1 SOLUTION INTRAVENOUS at 08:11

## 2023-01-02 RX ADMIN — NOREPINEPHRINE BITARTRATE 10 MCG/MIN: 1 SOLUTION INTRAVENOUS at 06:03

## 2023-01-02 RX ADMIN — SODIUM CHLORIDE 1000 ML: 9 INJECTION, SOLUTION INTRAVENOUS at 19:39

## 2023-01-02 RX ADMIN — THIAMINE HCL TAB 100 MG 100 MG: 100 TAB at 12:27

## 2023-01-02 RX ADMIN — NOREPINEPHRINE BITARTRATE 30 MCG/MIN: 1 SOLUTION INTRAVENOUS at 17:07

## 2023-01-02 RX ADMIN — SODIUM CHLORIDE 40 MG: 9 INJECTION, SOLUTION INTRAMUSCULAR; INTRAVENOUS; SUBCUTANEOUS at 22:02

## 2023-01-02 RX ADMIN — LIDOCAINE HYDROCHLORIDE 8 ML: 20 INJECTION, SOLUTION INFILTRATION; PERINEURAL at 21:45

## 2023-01-02 RX ADMIN — SODIUM CHLORIDE, POTASSIUM CHLORIDE, SODIUM LACTATE AND CALCIUM CHLORIDE 1000 ML: 600; 310; 30; 20 INJECTION, SOLUTION INTRAVENOUS at 07:00

## 2023-01-02 RX ADMIN — HEPARIN SODIUM IN SODIUM CHLORIDE 80 ML: 200 INJECTION INTRAVENOUS at 21:45

## 2023-01-02 RX ADMIN — PIPERACILLIN AND TAZOBACTAM 3.38 G: 3; .375 INJECTION, POWDER, FOR SOLUTION INTRAVENOUS at 23:17

## 2023-01-02 RX ADMIN — SODIUM BICARBONATE 50 MEQ: 84 INJECTION INTRAVENOUS at 17:08

## 2023-01-02 RX ADMIN — MAGNESIUM OXIDE TAB 400 MG (241.3 MG ELEMENTAL MG) 400 MG: 400 (241.3 MG) TAB at 12:27

## 2023-01-02 RX ADMIN — IOPAMIDOL 100 ML: 755 INJECTION, SOLUTION INTRAVENOUS at 17:48

## 2023-01-02 RX ADMIN — NOREPINEPHRINE BITARTRATE 20 MCG/MIN: 1 SOLUTION INTRAVENOUS at 18:18

## 2023-01-02 RX ADMIN — SODIUM CHLORIDE 40 MG: 9 INJECTION, SOLUTION INTRAMUSCULAR; INTRAVENOUS; SUBCUTANEOUS at 12:36

## 2023-01-02 RX ADMIN — SODIUM BICARBONATE 100 MEQ: 84 INJECTION INTRAVENOUS at 17:05

## 2023-01-02 RX ADMIN — MULTIPLE VITAMINS W/ MINERALS TAB 1 TABLET: TAB at 12:27

## 2023-01-02 RX ADMIN — HYDROCORTISONE SODIUM SUCCINATE 50 MG: 100 INJECTION, POWDER, FOR SOLUTION INTRAMUSCULAR; INTRAVENOUS at 21:56

## 2023-01-02 RX ADMIN — VASOPRESSIN 0.03 UNITS/MIN: 20 INJECTION INTRAVENOUS at 16:25

## 2023-01-02 RX ADMIN — SODIUM CHLORIDE, PRESERVATIVE FREE 10 ML: 5 INJECTION INTRAVENOUS at 07:15

## 2023-01-02 RX ADMIN — IOPAMIDOL 110 ML: 755 INJECTION, SOLUTION INTRAVENOUS at 21:44

## 2023-01-02 RX ADMIN — FOLIC ACID 1 MG: 1 TABLET ORAL at 12:27

## 2023-01-02 NOTE — CONSULTS
[facility] INITIAL ASSESSMENT. Name: Barby Betancur   : 1960   MRN: [id number]   Date: 2023        Chief Complaint   Patient presents with    Shortness of Breath    Chest Pain    Nausea    Alcohol intoxication         HPI:   60year old with h/o ETOH abuse admitted  for shortness of breath and chest pain who was found to have RML PE. He was started on lovenox then coumadin was added (due to cost). Early this a.m. RRT was called for decreased responsiveness. He was then found to be hypotensive. He was started on NE and ICU was consulted. On initial assessment, he was lethargic but arousable, tachycardic and cold clammy extremities. He was on NE at 78DEQ with systolics in 03A. Patient denies N/V/ abdominal pain. Nursing reports no signs of bleeding. Lovenox last given last night. INR 1.4    Assessment/Plan:     Shock  Hemorrhagic vs septic vs cardiogenic (massive PE)   - Hgb pending  - NG tube to check for gastric bleeding   - STAT echo / pro bnp  - stat LE doppler.  - blood cx / urine cx / procal  - NE for MAP  >65 + vasopressin    DVT prophylaxis: hold due to bleed  SUP: pepcid   Code status: full     Next of kin: Son luna     0900 - addendum - HGB 5 with lactate 7.0 consistent with clinical picture of hemorrhagic shock. I uniti of emergent blood given. 2 more units pending. Family updated. 1830 - addendum - rapidly increase in vasopressor need,  Patient complaining of shortenss of breath approximately 1600. Labs concerning for worsening renal function. Nephrology consulted. Bedside exam now with severe left flank tenderness and worsening and distension. Lactate 13. Patient sent for stat CT abd which revealed RP bleed. Patient remaining in profound shock despite 4 units and ffp. Now on 2 pressors. Surgery bedside. Discussed case with IR who recommends continuing resuscitation.   Discussed concerns of multisystem organ failure with both IR and surgery who will follow closely. Will place HD cath and start CRRT per nephro recs. I personally spent 180 minutes of critical care time. This is time spent at this critically ill patient's bedside actively involved in patient care as well as the coordination of care and discussions with the patient's family. This does not include any procedural time which has been billed separately. Review of systems:     ROS   Unable to obtain     Objective:     Vital Signs:  Visit Vitals  BP 95/84   Pulse (!) 118   Temp (!) 94.3 °F (34.6 °C)   Resp 28   Ht 5' 9\" (1.753 m)   Wt 99.8 kg (220 lb)   SpO2 100%   BMI 32.49 kg/m²    O2 Flow Rate (L/min): 6 l/min O2 Device: Nasal cannula Temp (24hrs), Av.5 °F (34.7 °C), Min:91 °F (32.8 °C), Max:98.6 °F (37 °C)           Intake/Output:     Intake/Output Summary (Last 24 hours) at 2023 1905  Last data filed at 2023 1812  Gross per 24 hour   Intake 3394.42 ml   Output 500 ml   Net 2894.42 ml         Physical Exam  Lethargic, arousable  Tachycardic, NSR  Clear to ascultation  Soft NT ND + BS  No edema  Past Medical History:        has a past medical history of Hypercholesterolemia and Hypertension. Past Surgical History:      has a past surgical history that includes hx orthopaedic. Home Medications:     Prior to Admission medications    Medication Sig Start Date End Date Taking? Authorizing Provider   apixaban (ELIQUIS) 5 mg tablet Take 2 Tablets by mouth two (2) times a day for 9 doses. 22 Yes Sindhu Gutierrez MD   apixaban (ELIQUIS) 5 mg tablet Take 1 Tablet by mouth two (2) times a day. 23  Yes Sindhu Gutierrez MD   folic acid (FOLVITE) 1 mg tablet Take 1 Tablet by mouth daily. 22  Yes Sindhu Gutierrez MD   aspirin 81 mg chewable tablet Take 2 Tabs by mouth daily. 18  Yes Marc Pickering MD   diphenhydrAMINE (BENADRYL) 25 mg capsule Take 25 mg by mouth as needed for Itching.    Yes Provider, Historical   metoprolol succinate (TOPROL-XL) 50 mg XL tablet Take 1 Tab by mouth daily. In place of metoprolol tartrate. 10/18/19   Adriel Segura MD   losartan (COZAAR) 25 mg tablet Take 1 Tab by mouth daily. Patient not taking: Reported on 2022 10/18/19   Adriel Segura MD   therapeutic multivitamin [facility]) tablet Take 1 Tab by mouth daily. Patient not taking: Reported on 2022 10/27/17   Checo Brennan MD   thiamine (B-1) 100 mg tablet Take 1 Tab by mouth daily. 10/27/17   Checo Brennan MD   ibuprofen (MOTRIN) 200 mg tablet Take 200 mg by mouth as needed for Pain. Patient not taking: Reported on 2022    Provider, Historical         Allergies/Social/Family History:     No Known Allergies   Social History     Tobacco Use    Smoking status: Former     Years: 10.00     Types: Cigarettes     Quit date: 2014     Years since quittin.9    Smokeless tobacco: Never   Substance Use Topics    Alcohol use: Yes     Comment: pint a day      Family History   Problem Relation Age of Onset    Stroke Father     Hypertension Father     Diabetes Sister          LABS AND  DATA:   Reviewed      CRITICAL CARE CONSULTANT NOTE  I had a face to face encounter with the patient, reviewed and interpreted patient data including clinical events, labs, images, vital signs, I/O's, and examined patient. I have discussed the case and the plan and management of the patient's care with the consulting services, the bedside nurses and the respiratory therapist.      NOTE OF PERSONAL INVOLVEMENT IN CARE   This patient has a high probability of imminent, clinically significant deterioration, which requires the highest level of preparedness to intervene urgently. I participated in the decision-making and personally managed or directed the management of the following life and organ supporting interventions that required my frequent assessment to treat or prevent imminent deterioration.       Kentrell James MD   Pulmonary/[facility]  [phone]  2023

## 2023-01-02 NOTE — PROGRESS NOTES
0900  ngt inserted at 59cm. 0910 PRBC uncrossmatched started wide open rate. Levo off due to no other access. 3110  Dr. Shaan Daniels at bedside. Family updated. Unable to get temp. Axillary reading 91.6. BP 84/62 off levo, PRBC infusing. 0930  Ivone hugger placed for low temp. 1100 second PRBC complete. Axillary temp 93.     1300  patient attempting to void in urinal. Unable to go,. Bladder scan shows 200cc. 1310 3rd PRBC completed. Patient sleeping. 1410  gabriel inserted. No urine output. Dr. Shaan Daniels aware. 1450 venous doppler of LE's done at bedside. All labs sent. 1530  echo done at bedside. Levo increased to 20'  1630 levo increased to 30mas ordered. Vasopressin started. 1645  CXR, ABG done. 1740  CT scan. Surgery at bedside. 1815  FFP infusing. Labs sent. 0950  Dr. Eran Robles at bedside. Insertion of dialysis catheter. Bicarb drip started. 1905  blood wide open as instructed, along with LR bolus. Vein collapsing during robert insertion per Dr. Eran Robles. Urologist called to notifiy that gabriel is visible in the bladder on the CT scan.

## 2023-01-02 NOTE — PROGRESS NOTES
Rapid Response called for Low blood pressure and patient went unresponsive for a moment. Patient more alert but blood pressure is low. Respiratory not needed at this time. 0645: ABG ordered. Attempted once but blood pressure too low. 0710: Akin MCLEOD NP obtained ABG via ultrasound.

## 2023-01-02 NOTE — PROGRESS NOTES
Pharmacist Daily Dosing of Warfarin    Indication & Goal INR: PE, INR Goal 2-3    PTA Warfarin Dose: new start    Notable concurrent conditions and medications:     Labs:  Recent Labs     01/02/23  0647 01/02/23  0311 01/01/23  0655 12/31/22  0422   INR  --  1.3* 1.3* 1.2*   HGB 5.0* 7.2* 8.2* 8.3*   PLT  --  269 230 197       Impression/Plan:   Phenobarbital stopped  Hold due to Hgb 5 and unknown bleeding origin  Discussed w Dr. Susan Hashimoto no reversal recommended due to INR 1.3; repeat blood work after blood intervention complete  Last dose lovenox 1/1 5pm, thus given PE, no protamine currently recommended; reassess after tx intervention   Daily INR ordered     Pharmacy will follow daily and adjust the dose as appropriate.     Thank you,  Alise Muñoz, [facility]

## 2023-01-02 NOTE — PROGRESS NOTES
Occupational Therapy   morning events noted. Pt s/p rapid response for unresponsiveness/hypotension. Pt not medically stable for participation in OT evaluation at this time. Will defer however continue to follow.  Gumaro Quinn OTR/L

## 2023-01-02 NOTE — ANESTHESIA PREPROCEDURE EVALUATION
Relevant Problems   No relevant active problems       Anesthetic History   No history of anesthetic complications            Review of Systems / Medical History  Patient summary reviewed, nursing notes reviewed and pertinent labs reviewed    Pulmonary  Within defined limits                 Neuro/Psych   Within defined limits           Cardiovascular    Hypertension              Exercise tolerance: >4 METS     GI/Hepatic/Renal       Hepatitis    Liver disease     Endo/Other        Anemia     Other Findings   Comments: Alcoholic liver disease  PE on LMWH    Occult bleeding, s/p 3 units PRBC today  Hypotensive on two pressors/sepsis    Drug abuse                Anesthetic Plan    ASA: 4, emergent  Anesthesia type: general    Monitoring Plan: Arterial line and CVP

## 2023-01-02 NOTE — PROGRESS NOTES
End of Shift Note        Shift worked:  [id number]     Shift summary and any significant changes:    0255 Patient sitting on side of bed. He vomited and became unresponsive. Immediately got patient on his side. Eyes rolled back while laying in the bed. Code blue called. Patient still had a pulse and became arousable after calling out his name. RRT called and Code blue canceled. Patient stated he felt very weak. Appeared very diaphoretic and pale in color. NP at bedside with RRT nurse. EKG and lab work obtained. BP  72/52.  cc bolus ordered. Will continue monitor patient closely.        Concerns for physician to address:       Zone phone for oncoming shift:   Elvira Cooper RN

## 2023-01-02 NOTE — PROGRESS NOTES
Orders received, chart reviewed, morning events noted. Pt s/p rapid response for unresponsiveness/hypotension. Pt also with hgb of 5.0 and awaiting head CT to r/o bleed. Pt not medically stable for participation in PT evaluation at this time. Will defer however continue to follow. Of note, pt evaluated by physical therapy 12/27/22 and found to be functioning at his baseline independent level/up ad soo.      Thank you    Carlos Alexis, PT, DPT

## 2023-01-02 NOTE — PROGRESS NOTES
Called to bedside for stat Bipap. Upon arrival patient having an increase WOB on 6LNC with hypotension and diaphoretic. Initiated Bipap per MD but patient didn't tolerate it and asked for it to be removed. MD at bedside. Placed back on 6L NC and obtained ABG with ultrasound. PH 7.16 C02 17 P02 138 HC03 6. Bicarb given by nurse. Patient went for CT scan with results pending.

## 2023-01-02 NOTE — PROGRESS NOTES
1/2/23 -   TRANSITIONS OF CARE PLAN:   RUR: 11%; LOW  DISPOSITION: TBD - possibly own home  TRANSPORT: TBD - possibly family  DME: None at Baseline    NEEDS FOR DISCHARGE: TBD - therapy re-eval's pending  BARRIERS: Medical Progression   ANTICIPATED FOLLOW UPS:  ONGOING INPATIENT NEEDS: Monitoring of Labs, Transfuse as Needed, Ivone Hugger Support as Needed, NG Tube, PT/OT evals once medically appropriate,     Anticipated Discharge is: Greater Than 48 Hours    CM notes that patient was a RRT today, 1/2, due to low BP and Unresponsiveness. Therapies are currently on hold. Potential disposition is for discharge to own home environment with transport via family. CM Team to continue following.     CRM: Ammy Loving, MPH, Jose [address].: [phone]

## 2023-01-02 NOTE — PROGRESS NOTES
Code Blue was called first then cancelled. Rapid Response called because Patient vomitted  and went unresponsive, patient stood up too. . Patient came back and talking  Chest X-ray, labs and EKG obtained. Respiratory .

## 2023-01-02 NOTE — PROGRESS NOTES
TRANSFER - OUT REPORT:    Verbal report given to [address]  on [facility]  being transferred to CCU for urgent transfer       Report consisted of patients Situation, Background, Assessment and   Recommendations(SBAR). Information from the following report(s) SBAR, Kardex, and Recent Results was reviewed with the receiving nurse. Lines:   Peripheral IV 12/29/22 Distal;Left Antecubital (Active)   Site Assessment Clean, dry, & intact 01/02/23 0420   Phlebitis Assessment 0 01/02/23 0420   Infiltration Assessment 0 01/02/23 0420   Dressing Status Clean, dry, & intact 01/02/23 0420   Dressing Type Transparent;Tape 01/02/23 0420   Hub Color/Line Status Pink;Flushed 01/02/23 0420   Action Taken Open ports on tubing capped 01/02/23 0420   Alcohol Cap Used Yes 01/01/23 1431        Opportunity for questions and clarification was provided. Patient transported with:   Monitor  Registered Nurse  RRT nurses transferring with Levophed drip infusing.

## 2023-01-02 NOTE — PROGRESS NOTES
RAPID RESPONSE TEAM    Overhead rapid response paged to room # 2284/01  at [phone] - initially a code blue, was cancelled and rapid response called       Reason for rapid response:  pt vomited, became unresponsive momentarily , per primary RN fell backwords onto bed and eyes rolled back in head. Pt's color has changed (pale)    Initial assessment: Pt alert but groggy, appropriately answering questions, following commands. Pt is pale, cold and clammy. No data found. Interventions: labs drawn, EKG, BG.  BP low (systolic 21-38), LR 127BG bolus given. UPDATE: rapid response called at 0600 for similar episode with hypotenstion refractory to IV fluid. Upon arrival, pt arousable but sleepy. Follows commands, no neuro deficit appreciated. ABG, labs. Levophed initiated. HeadCT ordered for concern for bleeding (1 gram drop in HBG without obvious bleeding source.     Outcome: pt to transfer to CCU    Please call with any questions or concerns    Aga Ruiz RN  Rapid Response Team  Ext [phone]     Recent Results (from the past 8 hour(s))   GLUCOSE, POC    Collection Time: 01/02/23  3:03 AM   Result Value Ref Range    Glucose (POC) 134 (H) 65 - 117 mg/dL    Performed by Caitlyn Blocker \"Katia\"    EKG, 12 LEAD, SUBSEQUENT    Collection Time: 01/02/23  3:06 AM   Result Value Ref Range    Ventricular Rate 99 BPM    Atrial Rate 99 BPM    P-R Interval 138 ms    QRS Duration 74 ms    Q-T Interval 380 ms    QTC Calculation (Bezet) 487 ms    Calculated P Axis 52 degrees    Calculated R Axis 42 degrees    Calculated T Axis 60 degrees    Diagnosis       Normal sinus rhythm with sinus arrhythmia  Prolonged QT  Abnormal ECG  When compared with ECG of 25-DEC-2022 15:43,  Nonspecific T wave abnormality no longer evident in Inferior leads  Nonspecific T wave abnormality no longer evident in Lateral leads

## 2023-01-02 NOTE — CONSULTS
Surgery Consult    Subjective: Krishna Camejo is a 60 y.o. male who is being seen for evaluation of abdominal pain. Patient was admitted 8 days ago with syncopal episodes. He was found to have a PE and metabolic acidosis. He had a syncopal episode early this morning and has rapidly declined since then. He had acute blood loss anemia, acute renal insufficiency and acute hypotension. His abdomen has become tensely distended and tender. He received therapeutic lovenox for a few days and was recently converted to warfarin. He has received 4 units of PRBC and FFP    Patient complains of abdominal pain and nausea.         Patient Active Problem List    Diagnosis Date Noted    Pulmonary embolism ([facility] [address] 2022    Elevated serum creatinine 2020    Elevated liver enzymes 9698    Alcoholic hepatitis without ascites 2020    Alcohol abuse 11/15/2017    Essential hypertension 2017    Mixed hyperlipidemia 2015     Past Medical History:   Diagnosis Date    Hypercholesterolemia     Hypertension       Past Surgical History:   Procedure Laterality Date    HX ORTHOPAEDIC      rotator cuff repair      Social History     Tobacco Use    Smoking status: Former     Years: 10.00     Types: Cigarettes     Quit date: 2014     Years since quittin.9    Smokeless tobacco: Never   Substance Use Topics    Alcohol use: Yes     Comment: pint a day      Family History   Problem Relation Age of Onset    Stroke Father     Hypertension Father     Diabetes Sister       Current Facility-Administered Medications   Medication Dose Route Frequency    NOREPINephrine (LEVOPHED) 8 mg in 5% dextrose 250mL (32 mcg/mL) infusion  0.5-150 mcg/min IntraVENous TITRATE    0.9% sodium chloride infusion 250 mL  250 mL IntraVENous PRN    pantoprazole (PROTONIX) 40 mg in 0.9% sodium chloride 10 mL injection  40 mg IntraVENous Q12H    0.9% sodium chloride infusion 250 mL  250 mL IntraVENous PRN    vasopressin (VASOSTRICT) 20 Units in 0.9% sodium chloride 100 mL infusion  0.03 Units/min IntraVENous CONTINUOUS    piperacillin-tazobactam (ZOSYN) 3.375 g in 0.9% sodium chloride (MBP/ADV) 100 mL MBP  3.375 g IntraVENous Q8H    dexmedeTOMidine in 0.9 % NaCl (PRECEDEX) 400 mcg/100 mL (4 mcg/mL) infusion soln  0.1-1.5 mcg/kg/hr IntraVENous TITRATE    sodium bicarbonate 8.4 % (1 mEq/mL) injection        sodium bicarbonate (8.4%) 150 mEq in dextrose 5 % - 0.45% NaCl 1,000 mL infusion   IntraVENous CONTINUOUS    sodium bicarbonate 8.4 % (1 mEq/mL) injection        bicarbonate dialysis (PRISMASOL) BG K 2/Ca 3.5 5000 ml solution   Extracorporeal DIALYSIS CONTINUOUS    sodium bicarbonate 8.4 % (1 mEq/mL) injection        0.9% sodium chloride infusion 250 mL  250 mL IntraVENous PRN    0.9% sodium chloride infusion 250 mL  250 mL IntraVENous PRN    traMADoL (ULTRAM) tablet 50 mg  50 mg Oral Q6H PRN    [Held by provider] enoxaparin (LOVENOX) injection 100 mg  100 mg SubCUTAneous T29N    folic acid (FOLVITE) tablet 1 mg  1 mg Oral DAILY    thiamine mononitrate (B-1) tablet 100 mg  100 mg Oral DAILY    multivitamin, tx-iron-ca-min (THERA-M w/ IRON) tablet 1 Tablet  1 Tablet Oral DAILY    magnesium oxide (MAG-OX) tablet 400 mg  400 mg Oral DAILY    sodium chloride (NS) flush 5-40 mL  5-40 mL IntraVENous Q8H    sodium chloride (NS) flush 5-40 mL  5-40 mL IntraVENous PRN    acetaminophen (TYLENOL) tablet 650 mg  650 mg Oral Q6H PRN    Or    acetaminophen (TYLENOL) suppository 650 mg  650 mg Rectal Q6H PRN    polyethylene glycol (MIRALAX) packet 17 g  17 g Oral DAILY PRN    ondansetron (ZOFRAN ODT) tablet 4 mg  4 mg Oral Q8H PRN    Or    ondansetron (ZOFRAN) injection 4 mg  4 mg IntraVENous Q6H PRN      No Known Allergies    Review of Systems:    Review of systems not obtained due to patient factors.     Objective:      Visit Vitals  BP 95/84   Pulse (!) 118   Temp (!) 94.3 °F (34.6 °C)   Resp 28   Ht 5' 9\" (1.753 m)   Wt 99.8 kg (220 lb) SpO2 100%   BMI 32.49 kg/m²       Physical Exam:  GENERAL: alert, mild distress, appears stated age, toxic, pale, oriented to person and place , LUNG: clear to auscultation bilaterally, HEART: regular rate and rhythm, tachy  ABDOMEN: distended, dull to percussion, tender diffusely, hypoactive BS    Imaging:      Went to CT scanner with the patient -  there is a left retroperitoneal hematoma with active bleeding. Lab/Data Review:  BMP:   Lab Results   Component Value Date/Time     (L) 01/02/2023 02:12 PM    K 5.9 (H) 01/02/2023 02:12 PM     01/02/2023 02:12 PM    CO2 13 (LL) 01/02/2023 02:12 PM    AGAP 19 (H) 01/02/2023 02:12 PM     (H) 01/02/2023 02:12 PM    BUN 16 01/02/2023 02:12 PM    CREA 2.36 (H) 01/02/2023 02:12 PM     CBC:   Lab Results   Component Value Date/Time    WBC 8.6 01/02/2023 03:11 AM    HGB 10.7 (L) 01/02/2023 02:12 PM    HCT 32.7 (L) 01/02/2023 02:12 PM     01/02/2023 03:11 AM         Assessment:     60year old with active retroperitoneal bleed. The bleed appears spontaneous. In general spontaneous retroperitoneal bleeds stop on their own due to the confined space and it is typically venous in nature. This patient is not behaving like a venous bleed. Patient is in critical condition and is at high risk of declining. I recommend IR emobolization to see if the bleeding can be controlled. If this is unsuccessful and he still has a pressor requirement then I would recommend surgical exploration. Plan:     Discussed with IR. Dr Harman Braun is reviewing images and the patient's chart. Will have a decision soon.     2.  Continue supportive care

## 2023-01-02 NOTE — PROGRESS NOTES
Rapid Response Team Note    Called by RN at 3:01 AM to see patient for brief episode of unresponsiveness, after vomiting stat. Upon entering the room the patient was complaining of feeling throat discomfort, nausea. Events as described by RN caring for patient noted . VS: BP 72/59, HR 99, RR 19, O2 sat 96%      Gen: awake, mildly drowsy, in NAD  HEENT: wnl  Chest: scattered rales  Abd: soft, non tender  Neuro: AAO  Ext: ROSE    Pertinent Recent Labs and Xrays:  Recent Labs     23  0655 22   WBC 6.0 4.7   HGB 8.2* 8.3*   HCT 24.6* 24.6*    197     Recent Labs     23  0655 22    134*   K 4.0 4.1    104   CO2 24 27   BUN 9 9   CREA 0.83 0.84   GLU 76 83   CA 8.5 8.5     Recent Labs     23  0622   INR 1.3* 1.2*      No results for input(s): PH, PCO2, PO2 in the last 72 hours. No results for input(s): PHI, PO2I, PCO2I in the last 72 hours. No results for input(s): CPK, CKNDX, TROIQ in the last 72 hours.     No lab exists for component: CPKMB  Lab Results   Component Value Date/Time    Glucose (POC) 185 (H) 2022 07:05 PM    Glucose, POC 43 (LL) 2022 06:38 PM           A/P:   Brief unresponsiveness  Episode of vomiting  Hypotension  EKG: SR with no ST abnormality  - B  - CBC, BMP, Troponin (5)  - chest X-ray  -  ml IV bolus x 1    0637: RRT noted for AMS and hypotension, systolic in the 79'I    - ABG  - H/H, Lactic  - LR 1L IV bolus  - Levophed to titrate for MAP >65  - Intensivist consultation        Amy Parekh NP  Critical care time unrelated to prior notes: 40 minutes

## 2023-01-03 ENCOUNTER — APPOINTMENT (OUTPATIENT)
Dept: GENERAL RADIOLOGY | Age: 63
DRG: 950 | End: 2023-01-03
Attending: NURSE PRACTITIONER
Payer: COMMERCIAL

## 2023-01-03 LAB
ALBUMIN SERPL-MCNC: 2.1 G/DL (ref 3.5–5)
ALBUMIN SERPL-MCNC: 2.6 G/DL (ref 3.5–5)
ANION GAP SERPL CALC-SCNC: 11 MMOL/L (ref 5–15)
ANION GAP SERPL CALC-SCNC: 11 MMOL/L (ref 5–15)
ANION GAP SERPL CALC-SCNC: 13 MMOL/L (ref 5–15)
ANION GAP SERPL CALC-SCNC: 9 MMOL/L (ref 5–15)
BLD PROD TYP BPU: NORMAL
BPU ID: NORMAL
BUN SERPL-MCNC: 13 MG/DL (ref 6–20)
BUN SERPL-MCNC: 15 MG/DL (ref 6–20)
BUN SERPL-MCNC: 16 MG/DL (ref 6–20)
BUN SERPL-MCNC: 18 MG/DL (ref 6–20)
BUN/CREAT SERPL: 6 (ref 12–20)
BUN/CREAT SERPL: 7 (ref 12–20)
BUN/CREAT SERPL: 7 (ref 12–20)
BUN/CREAT SERPL: 8 (ref 12–20)
CALCIUM SERPL-MCNC: 7.7 MG/DL (ref 8.5–10.1)
CALCIUM SERPL-MCNC: 8.1 MG/DL (ref 8.5–10.1)
CALCIUM SERPL-MCNC: 8.9 MG/DL (ref 8.5–10.1)
CALCIUM SERPL-MCNC: 9 MG/DL (ref 8.5–10.1)
CHLORIDE SERPL-SCNC: 100 MMOL/L (ref 97–108)
CHLORIDE SERPL-SCNC: 100 MMOL/L (ref 97–108)
CHLORIDE SERPL-SCNC: 101 MMOL/L (ref 97–108)
CHLORIDE SERPL-SCNC: 101 MMOL/L (ref 97–108)
CO2 SERPL-SCNC: 24 MMOL/L (ref 21–32)
CO2 SERPL-SCNC: 24 MMOL/L (ref 21–32)
CO2 SERPL-SCNC: 25 MMOL/L (ref 21–32)
CO2 SERPL-SCNC: 27 MMOL/L (ref 21–32)
CREAT SERPL-MCNC: 2.06 MG/DL (ref 0.7–1.3)
CREAT SERPL-MCNC: 2.1 MG/DL (ref 0.7–1.3)
CREAT SERPL-MCNC: 2.37 MG/DL (ref 0.7–1.3)
CREAT SERPL-MCNC: 2.43 MG/DL (ref 0.7–1.3)
ERYTHROCYTE [DISTWIDTH] IN BLOOD BY AUTOMATED COUNT: 17.1 % (ref 11.5–14.5)
ERYTHROCYTE [DISTWIDTH] IN BLOOD BY AUTOMATED COUNT: 17.4 % (ref 11.5–14.5)
ERYTHROCYTE [DISTWIDTH] IN BLOOD BY AUTOMATED COUNT: 17.6 % (ref 11.5–14.5)
GLUCOSE SERPL-MCNC: 167 MG/DL (ref 65–100)
GLUCOSE SERPL-MCNC: 180 MG/DL (ref 65–100)
GLUCOSE SERPL-MCNC: 225 MG/DL (ref 65–100)
GLUCOSE SERPL-MCNC: 280 MG/DL (ref 65–100)
HBV SURFACE AB SER QL: NONREACTIVE
HBV SURFACE AB SER-ACNC: 7.76 MIU/ML
HBV SURFACE AG SER QL: <0.1 INDEX
HBV SURFACE AG SER QL: NEGATIVE
HCT VFR BLD AUTO: 26.6 % (ref 36.6–50.3)
HCT VFR BLD AUTO: 26.6 % (ref 36.6–50.3)
HCT VFR BLD AUTO: 28.6 % (ref 36.6–50.3)
HGB BLD-MCNC: 10 G/DL (ref 12.1–17)
HGB BLD-MCNC: 9.3 G/DL (ref 12.1–17)
HGB BLD-MCNC: 9.3 G/DL (ref 12.1–17)
INR PPP: 2 (ref 0.9–1.1)
LACTATE SERPL-SCNC: 6.5 MMOL/L (ref 0.4–2)
MCH RBC QN AUTO: 32 PG (ref 26–34)
MCH RBC QN AUTO: 32.2 PG (ref 26–34)
MCH RBC QN AUTO: 32.5 PG (ref 26–34)
MCHC RBC AUTO-ENTMCNC: 35 G/DL (ref 30–36.5)
MCV RBC AUTO: 91.4 FL (ref 80–99)
MCV RBC AUTO: 92 FL (ref 80–99)
MCV RBC AUTO: 92.9 FL (ref 80–99)
NRBC # BLD: 0.11 K/UL (ref 0–0.01)
NRBC # BLD: 0.16 K/UL (ref 0–0.01)
NRBC # BLD: 0.26 K/UL (ref 0–0.01)
NRBC BLD-RTO: 0.6 PER 100 WBC
NRBC BLD-RTO: 1 PER 100 WBC
NRBC BLD-RTO: 1.5 PER 100 WBC
PHOSPHATE SERPL-MCNC: 3.8 MG/DL (ref 2.6–4.7)
PHOSPHATE SERPL-MCNC: 4.8 MG/DL (ref 2.6–4.7)
PLATELET # BLD AUTO: 156 K/UL (ref 150–400)
PLATELET # BLD AUTO: 184 K/UL (ref 150–400)
PLATELET # BLD AUTO: 195 K/UL (ref 150–400)
PMV BLD AUTO: 11.7 FL (ref 8.9–12.9)
PMV BLD AUTO: 11.9 FL (ref 8.9–12.9)
PMV BLD AUTO: 12.8 FL (ref 8.9–12.9)
POTASSIUM SERPL-SCNC: 4.8 MMOL/L (ref 3.5–5.1)
POTASSIUM SERPL-SCNC: 4.9 MMOL/L (ref 3.5–5.1)
POTASSIUM SERPL-SCNC: 5.7 MMOL/L (ref 3.5–5.1)
POTASSIUM SERPL-SCNC: 5.8 MMOL/L (ref 3.5–5.1)
PROTHROMBIN TIME: 20.4 SEC (ref 9–11.1)
RBC # BLD AUTO: 2.89 M/UL (ref 4.1–5.7)
RBC # BLD AUTO: 2.91 M/UL (ref 4.1–5.7)
RBC # BLD AUTO: 3.08 M/UL (ref 4.1–5.7)
SODIUM SERPL-SCNC: 136 MMOL/L (ref 136–145)
SODIUM SERPL-SCNC: 136 MMOL/L (ref 136–145)
SODIUM SERPL-SCNC: 137 MMOL/L (ref 136–145)
SODIUM SERPL-SCNC: 137 MMOL/L (ref 136–145)
STATUS OF UNIT,%ST: NORMAL
UNIT DIVISION, %UDIV: 0
WBC # BLD AUTO: 15.7 K/UL (ref 4.1–11.1)
WBC # BLD AUTO: 16.9 K/UL (ref 4.1–11.1)
WBC # BLD AUTO: 17.2 K/UL (ref 4.1–11.1)

## 2023-01-03 PROCEDURE — 85610 PROTHROMBIN TIME: CPT

## 2023-01-03 PROCEDURE — 80069 RENAL FUNCTION PANEL: CPT

## 2023-01-03 PROCEDURE — 74018 RADEX ABDOMEN 1 VIEW: CPT

## 2023-01-03 PROCEDURE — 80048 BASIC METABOLIC PNL TOTAL CA: CPT

## 2023-01-03 PROCEDURE — 36415 COLL VENOUS BLD VENIPUNCTURE: CPT

## 2023-01-03 PROCEDURE — 74011000250 HC RX REV CODE- 250: Performed by: INTERNAL MEDICINE

## 2023-01-03 PROCEDURE — 77010033678 HC OXYGEN DAILY

## 2023-01-03 PROCEDURE — 74011000258 HC RX REV CODE- 258: Performed by: INTERNAL MEDICINE

## 2023-01-03 PROCEDURE — 90945 DIALYSIS ONE EVALUATION: CPT

## 2023-01-03 PROCEDURE — C9113 INJ PANTOPRAZOLE SODIUM, VIA: HCPCS | Performed by: INTERNAL MEDICINE

## 2023-01-03 PROCEDURE — 83605 ASSAY OF LACTIC ACID: CPT

## 2023-01-03 PROCEDURE — P9059 PLASMA, FRZ BETWEEN 8-24HOUR: HCPCS

## 2023-01-03 PROCEDURE — 65610000006 HC RM INTENSIVE CARE

## 2023-01-03 PROCEDURE — 74011250636 HC RX REV CODE- 250/636: Performed by: HOSPITALIST

## 2023-01-03 PROCEDURE — 74011250636 HC RX REV CODE- 250/636: Performed by: INTERNAL MEDICINE

## 2023-01-03 PROCEDURE — 74011250637 HC RX REV CODE- 250/637: Performed by: INTERNAL MEDICINE

## 2023-01-03 PROCEDURE — 85027 COMPLETE CBC AUTOMATED: CPT

## 2023-01-03 PROCEDURE — 36430 TRANSFUSION BLD/BLD COMPNT: CPT

## 2023-01-03 PROCEDURE — 74011250637 HC RX REV CODE- 250/637: Performed by: STUDENT IN AN ORGANIZED HEALTH CARE EDUCATION/TRAINING PROGRAM

## 2023-01-03 PROCEDURE — 74011000250 HC RX REV CODE- 250: Performed by: STUDENT IN AN ORGANIZED HEALTH CARE EDUCATION/TRAINING PROGRAM

## 2023-01-03 PROCEDURE — 87340 HEPATITIS B SURFACE AG IA: CPT

## 2023-01-03 PROCEDURE — 86706 HEP B SURFACE ANTIBODY: CPT

## 2023-01-03 RX ORDER — SODIUM CHLORIDE 9 MG/ML
250 INJECTION, SOLUTION INTRAVENOUS AS NEEDED
Status: DISCONTINUED | OUTPATIENT
Start: 2023-01-03 | End: 2023-01-03

## 2023-01-03 RX ADMIN — SODIUM CHLORIDE 40 MG: 9 INJECTION, SOLUTION INTRAMUSCULAR; INTRAVENOUS; SUBCUTANEOUS at 20:30

## 2023-01-03 RX ADMIN — MAGNESIUM OXIDE TAB 400 MG (241.3 MG ELEMENTAL MG) 400 MG: 400 (241.3 MG) TAB at 08:24

## 2023-01-03 RX ADMIN — HYDROCORTISONE SODIUM SUCCINATE 50 MG: 100 INJECTION, POWDER, FOR SOLUTION INTRAMUSCULAR; INTRAVENOUS at 03:55

## 2023-01-03 RX ADMIN — CALCIUM CHLORIDE, MAGNESIUM CHLORIDE, DEXTROSE MONOHYDRATE, LACTIC ACID, SODIUM CHLORIDE, SODIUM BICARBONATE AND POTASSIUM CHLORIDE: 5.15; 2.03; 22; 5.4; 6.46; 3.09; .157 INJECTION INTRAVENOUS at 18:06

## 2023-01-03 RX ADMIN — CALCIUM CHLORIDE, MAGNESIUM CHLORIDE, DEXTROSE MONOHYDRATE, LACTIC ACID, SODIUM CHLORIDE, SODIUM BICARBONATE AND POTASSIUM CHLORIDE: 5.15; 2.03; 22; 5.4; 6.46; 3.09; .157 INJECTION INTRAVENOUS at 10:54

## 2023-01-03 RX ADMIN — SODIUM CHLORIDE 40 MG: 9 INJECTION, SOLUTION INTRAMUSCULAR; INTRAVENOUS; SUBCUTANEOUS at 08:24

## 2023-01-03 RX ADMIN — PIPERACILLIN AND TAZOBACTAM 3.38 G: 3; .375 INJECTION, POWDER, FOR SOLUTION INTRAVENOUS at 15:05

## 2023-01-03 RX ADMIN — CALCIUM CHLORIDE, MAGNESIUM CHLORIDE, DEXTROSE MONOHYDRATE, LACTIC ACID, SODIUM CHLORIDE, SODIUM BICARBONATE AND POTASSIUM CHLORIDE: 5.15; 2.03; 22; 5.4; 6.46; 3.09; .157 INJECTION INTRAVENOUS at 20:13

## 2023-01-03 RX ADMIN — CALCIUM CHLORIDE, MAGNESIUM CHLORIDE, DEXTROSE MONOHYDRATE, LACTIC ACID, SODIUM CHLORIDE, SODIUM BICARBONATE AND POTASSIUM CHLORIDE: 5.15; 2.03; 22; 5.4; 6.46; 3.09; .157 INJECTION INTRAVENOUS at 00:38

## 2023-01-03 RX ADMIN — PIPERACILLIN AND TAZOBACTAM 3.38 G: 3; .375 INJECTION, POWDER, FOR SOLUTION INTRAVENOUS at 23:39

## 2023-01-03 RX ADMIN — SODIUM CHLORIDE, PRESERVATIVE FREE 10 ML: 5 INJECTION INTRAVENOUS at 15:05

## 2023-01-03 RX ADMIN — MULTIPLE VITAMINS W/ MINERALS TAB 1 TABLET: TAB at 08:24

## 2023-01-03 RX ADMIN — PIPERACILLIN AND TAZOBACTAM 3.38 G: 3; .375 INJECTION, POWDER, FOR SOLUTION INTRAVENOUS at 06:33

## 2023-01-03 RX ADMIN — SODIUM CHLORIDE, PRESERVATIVE FREE 10 ML: 5 INJECTION INTRAVENOUS at 22:00

## 2023-01-03 RX ADMIN — THIAMINE HCL TAB 100 MG 100 MG: 100 TAB at 08:24

## 2023-01-03 RX ADMIN — CALCIUM CHLORIDE, MAGNESIUM CHLORIDE, DEXTROSE MONOHYDRATE, LACTIC ACID, SODIUM CHLORIDE, SODIUM BICARBONATE AND POTASSIUM CHLORIDE: 5.15; 2.03; 22; 5.4; 6.46; 3.09; .157 INJECTION INTRAVENOUS at 05:33

## 2023-01-03 RX ADMIN — VASOPRESSIN 0.03 UNITS/MIN: 20 INJECTION INTRAVENOUS at 01:52

## 2023-01-03 RX ADMIN — NOREPINEPHRINE BITARTRATE 13 MCG/MIN: 1 SOLUTION INTRAVENOUS at 23:42

## 2023-01-03 RX ADMIN — CALCIUM CHLORIDE, MAGNESIUM CHLORIDE, DEXTROSE MONOHYDRATE, LACTIC ACID, SODIUM CHLORIDE, SODIUM BICARBONATE AND POTASSIUM CHLORIDE: 5.15; 2.03; 22; 5.4; 6.46; 3.09; .157 INJECTION INTRAVENOUS at 10:07

## 2023-01-03 RX ADMIN — CALCIUM CHLORIDE, MAGNESIUM CHLORIDE, DEXTROSE MONOHYDRATE, LACTIC ACID, SODIUM CHLORIDE, SODIUM BICARBONATE AND POTASSIUM CHLORIDE: 5.15; 2.03; 22; 5.4; 6.46; 3.09; .157 INJECTION INTRAVENOUS at 22:20

## 2023-01-03 RX ADMIN — FOLIC ACID 1 MG: 1 TABLET ORAL at 08:24

## 2023-01-03 RX ADMIN — SODIUM BICARBONATE: 84 INJECTION, SOLUTION INTRAVENOUS at 02:54

## 2023-01-03 RX ADMIN — CALCIUM CHLORIDE, MAGNESIUM CHLORIDE, DEXTROSE MONOHYDRATE, LACTIC ACID, SODIUM CHLORIDE, SODIUM BICARBONATE AND POTASSIUM CHLORIDE: 5.15; 2.03; 22; 5.4; 6.46; 3.09; .157 INJECTION INTRAVENOUS at 15:06

## 2023-01-03 RX ADMIN — CALCIUM CHLORIDE, MAGNESIUM CHLORIDE, DEXTROSE MONOHYDRATE, LACTIC ACID, SODIUM CHLORIDE, SODIUM BICARBONATE AND POTASSIUM CHLORIDE: 5.15; 2.03; 22; 5.4; 6.46; 3.09; .157 INJECTION INTRAVENOUS at 03:15

## 2023-01-03 RX ADMIN — CALCIUM CHLORIDE, MAGNESIUM CHLORIDE, DEXTROSE MONOHYDRATE, LACTIC ACID, SODIUM CHLORIDE, SODIUM BICARBONATE AND POTASSIUM CHLORIDE: 5.15; 2.03; 22; 5.4; 6.46; 3.09; .157 INJECTION INTRAVENOUS at 16:30

## 2023-01-03 RX ADMIN — CALCIUM CHLORIDE, MAGNESIUM CHLORIDE, DEXTROSE MONOHYDRATE, LACTIC ACID, SODIUM CHLORIDE, SODIUM BICARBONATE AND POTASSIUM CHLORIDE: 5.15; 2.03; 22; 5.4; 6.46; 3.09; .157 INJECTION INTRAVENOUS at 13:38

## 2023-01-03 RX ADMIN — Medication 1 AMPULE: at 20:31

## 2023-01-03 RX ADMIN — SODIUM CHLORIDE, PRESERVATIVE FREE 10 ML: 5 INJECTION INTRAVENOUS at 05:03

## 2023-01-03 RX ADMIN — VASOPRESSIN 0.03 UNITS/MIN: 20 INJECTION INTRAVENOUS at 13:38

## 2023-01-03 RX ADMIN — Medication 1 AMPULE: at 10:07

## 2023-01-03 NOTE — PROGRESS NOTES
Occupational Therapy      Chart reviewed. Patient with transfer to ICU, s/p IR for retroperitoneal bleed and now on new CVVHD started last night at 2205. Discussed patient with RN who agreed patient is not appropriate for skilled Acute OT at this time. Will defer and follow-up as able and as appropriate. To note, previously up ad soo/independent prior to change in status.      Milan Velazquezl, OTR/L

## 2023-01-03 NOTE — DIALYSIS
CRRT / [phone]    Orders   Mode: CVVHD new start @ 3194   Blood Flow Rate: 200 ml/min   Dialysate Flow Rate: 2000 ml/hr   Prismasol Concentrate: 2K / 3.5Ca   Blood Warmer Temp: 37*C   Net Fluid Removal: 25 ml/hr     Metrics   BP: 124/82   HR: 112   Access Pressure: -52   Filter Pressure: 90   Return Pressure: 18   TMP: 13   Pressure Drop: 30     Access   Type & Location: Pt New RIJ temporary non-tunneled CVC, dressing C/D/I with bio-patch dated 01/02/23 - placement confirmed via CXR. No signs of redness, drainage, or infection visualized. Each catheter limb disinfected for 60 seconds per limb with alcohol swabs. Caps removed, dialysis CVC hub scrubbed with Prevantics for 15 seconds, followed by a 5 second dry time per Hospital P&P. +asp/+flush x 2 ports. Labs   HBsAg (Antigen) / date: Pending                                              HBsAb (Antibody) / date: Pending   Source: Epic     Safety:   Time Out Done:   (Time) 2200   Consent obtained/signed: Verified   Education: Access/Site Care   Primary Nurse Rpt Pre: WILLIAM Ko RN   Primary Nurse Rpt Post: WILLIAM Ko RN     Comments / Plan:   Derek Serrano RN at bedside to initiate CRRT per MD orders. Patient, code status, labs, and orders verified. Consents on chart. Time out completed. HF-1000 filter set-up, primed w/ 1L NS, tested and running well. Lines visible and connections secure with blood warmer to return line at 37*C. Education, pre and post to primary RN.

## 2023-01-03 NOTE — PROCEDURES
[facility]      Procedure Note - Central Venous Access:   Performed by Marcus Shoemaker MD    Obtained informed Consent. Immediately prior to the procedure, the patient was reevaluated and found suitable for the planned procedure and any planned medications. Immediately prior to the procedure a time out was called to verify the correct patient, procedure, equipment, staff, and marking as appropriate. The site was prepped with ChloraPrep and Sterile draping. Using Seldinger technique a quad  Lumen CVC was placed in the Femoral Vein via direct cannulation with 1 number of attempts for IV Access. Ultrasound Guidance was utilized. Verbally/Visually confirmed wire removal with RN. Wire in IJ confirmation with US prior to dilation. There was good blood return. The following complications were encountered: None. A follow-up chest x-ray was ordered post procedure. The procedure was tolerated well.       Marcus Shoemaker MD [address] [place] [address],# [phone]

## 2023-01-03 NOTE — PROGRESS NOTES
[facility]    ICU TEAM Progress Note    Name: Pamela Oquendo   : 1960   MRN: [id number]   Date: 1/3/2023           ICU Assessment & Plan of Care       Pamela Oquendo Is a 60 y.o. male with h/o EtOH abuse who is admitted for RML PE. #. Massive PE on lovenox  #. Spontaneous RP bleed   #. L L2 artery bleed s/p IR embolization   #. Hemorrhagic shock    - aggressively resuscitated last night, s/p 4u RBC, 1u FFP  - Hb 10 -- standard transfusion goals with H/H q6h    -- repeat one unit FFP today  - pulm hygiene -- tolerating NC  - now on levo 15, vaso 0.03 -- wean as able  - NPO  - CRRT as per Renal  - low suspicion of infection   -- likely dc steroids + abx given risk:benefit  - will need to readdress [facility] when more stable  - follow insulin needs in setting of steroids    SCDs  Pepcid  FULL      Subjective:   Progress Note: 1/3/2023      Reason for ICU Admission: PE     HPI: 60year old with h/o ETOH abuse admitted  for shortness of breath and chest pain who was found to have RML PE. He was started on lovenox then coumadin was added (due to cost). Early this a.m. RRT was called for decreased responsiveness. He was then found to be hypotensive. He was started on NE and ICU was consulted. On initial assessment, he was lethargic but arousable, tachycardic and cold clammy extremities. He was on NE at 01LBJ with systolics in 68X. Patient denies N/V/ abdominal pain. Nursing reports no signs of bleeding. Lovenox last given last night. INR 1.4    Overnight Events:   1/3 - development of spont RP bleed, now s/p IR embol    POD:  1 Day Post-Op    S/P:   Procedure(s):  LAPAROTOMY EXPLORATORY    Home Medications:     Prior to Admission medications    Medication Sig Start Date End Date Taking? Authorizing Provider   apixaban (ELIQUIS) 5 mg tablet Take 2 Tablets by mouth two (2) times a day for 9 doses.  22 Yes Ko Monet MD   apixaban (ELIQUIS) 5 mg tablet Take 1 Tablet by mouth two (2) times a day. 1/2/23  Yes Freeman Bauer MD   folic acid (FOLVITE) 1 mg tablet Take 1 Tablet by mouth daily. 12/29/22  Yes Freeman Bauer MD   aspirin 81 mg chewable tablet Take 2 Tabs by mouth daily. 8/28/18  Yes Roberto Stanton MD   diphenhydrAMINE (BENADRYL) 25 mg capsule Take 25 mg by mouth as needed for Itching. Yes Provider, Historical   metoprolol succinate (TOPROL-XL) 50 mg XL tablet Take 1 Tab by mouth daily. In place of metoprolol tartrate. 10/18/19   Pito Waldron MD   losartan (COZAAR) 25 mg tablet Take 1 Tab by mouth daily. Patient not taking: Reported on 12/25/2022 10/18/19   Pito Waldron MD   therapeutic multivitamin [facility]) tablet Take 1 Tab by mouth daily. Patient not taking: Reported on 12/25/2022 10/27/17   Martin Harry MD   thiamine (B-1) 100 mg tablet Take 1 Tab by mouth daily. 10/27/17   Martin Harry MD   ibuprofen (MOTRIN) 200 mg tablet Take 200 mg by mouth as needed for Pain.   Patient not taking: Reported on 12/25/2022    Provider, Historical       Current Meds:     Current Facility-Administered Medications   Medication Dose Route Frequency    NOREPINephrine (LEVOPHED) 8 mg in 5% dextrose 250mL (32 mcg/mL) infusion  0.5-150 mcg/min IntraVENous TITRATE    0.9% sodium chloride infusion 250 mL  250 mL IntraVENous PRN    pantoprazole (PROTONIX) 40 mg in 0.9% sodium chloride 10 mL injection  40 mg IntraVENous Q12H    0.9% sodium chloride infusion 250 mL  250 mL IntraVENous PRN    vasopressin (VASOSTRICT) 20 Units in 0.9% sodium chloride 100 mL infusion  0.03 Units/min IntraVENous CONTINUOUS    piperacillin-tazobactam (ZOSYN) 3.375 g in 0.9% sodium chloride (MBP/ADV) 100 mL MBP  3.375 g IntraVENous Q8H    dexmedeTOMidine in 0.9 % NaCl (PRECEDEX) 400 mcg/100 mL (4 mcg/mL) infusion soln  0.1-1.5 mcg/kg/hr IntraVENous TITRATE    sodium bicarbonate (8.4%) 150 mEq in dextrose 5 % - 0.45% NaCl 1,000 mL infusion   IntraVENous CONTINUOUS    bicarbonate dialysis (PRISMASOL) BG K 2/Ca 3.5 5000 ml solution   Extracorporeal DIALYSIS CONTINUOUS    0.9% sodium chloride infusion 250 mL  250 mL IntraVENous PRN    0.9% sodium chloride infusion 250 mL  250 mL IntraVENous PRN    hydrocortisone Sod Succ (PF) (SOLU-CORTEF) injection 50 mg  50 mg IntraVENous Q6H    0.9% sodium chloride infusion 250 mL  250 mL IntraVENous PRN    traMADoL (ULTRAM) tablet 50 mg  50 mg Oral Q6H PRN    [Held by provider] enoxaparin (LOVENOX) injection 100 mg  100 mg SubCUTAneous E29J    folic acid (FOLVITE) tablet 1 mg  1 mg Oral DAILY    thiamine mononitrate (B-1) tablet 100 mg  100 mg Oral DAILY    multivitamin, tx-iron-ca-min (THERA-M w/ IRON) tablet 1 Tablet  1 Tablet Oral DAILY    magnesium oxide (MAG-OX) tablet 400 mg  400 mg Oral DAILY    sodium chloride (NS) flush 5-40 mL  5-40 mL IntraVENous Q8H    sodium chloride (NS) flush 5-40 mL  5-40 mL IntraVENous PRN    acetaminophen (TYLENOL) tablet 650 mg  650 mg Oral Q6H PRN    Or    acetaminophen (TYLENOL) suppository 650 mg  650 mg Rectal Q6H PRN    polyethylene glycol (MIRALAX) packet 17 g  17 g Oral DAILY PRN    ondansetron (ZOFRAN ODT) tablet 4 mg  4 mg Oral Q8H PRN    Or    ondansetron (ZOFRAN) injection 4 mg  4 mg IntraVENous Q6H PRN       Objective:   Vital Signs:  Visit Vitals  BP 97/66   Pulse (!) 103   Temp 98 °F (36.7 °C)   Resp 19   Ht 5' 9\" (1.753 m)   Wt 99.8 kg (220 lb)   SpO2 100%   BMI 32.49 kg/m²    O2 Flow Rate (L/min): 2 l/min O2 Device: Nasal cannula Temp (24hrs), Av.2 °F (35.1 °C), Min:91 °F (32.8 °C), Max:98.4 °F (36.9 °C)           Intake/Output:     Intake/Output Summary (Last 24 hours) at 1/3/2023 0709  Last data filed at 1/3/2023 0700  Gross per 24 hour   Intake 5904.23 ml   Output 1359 ml   Net 4545.23 ml       Physical Exam:  Awake, alert, lying flat in bed (quad-lumen in fem)  Resps even and unlabored, symmetric chest rise on NC, clear throughout  Slightly tachy rate, no heave/rub  Peripheral pulses intact though BLEs slightly cool to touch  Abd soft slightly TTP in LUQ  ROSE  Calm and cooperative    LABS AND  DATA: Personally reviewed  Recent Labs     01/03/23 0215 01/02/23  2200   WBC 17.2* 16.0*   HGB 10.0* 6.4*   HCT 28.6* 19.1*    140*     Recent Labs     01/03/23  0215 01/02/23  1806 01/02/23  1412     136 135*  137 133*   K 5.8*  5.7* 6.4*  5.7* 5.9*     100 99  101 101   CO2 24  25 16*  16* 13*   BUN 16  18 19  17 16   CREA 2.43*  2.37* 2.74*  2.76* 2.36*   *  280* 200*  204* 253*   CA 8.1*  7.7* 7.8*  7.9* 8.5  8.3*   MG  --   --  2.1   PHOS 4.8* 8.0*  --      Recent Labs     01/03/23 0215 01/02/23  1806 01/02/23  1412   AP  --   --  122*   TP  --   --  5.8*   ALB 2.1* 1.7* 2.4*   GLOB  --   --  3.4     Recent Labs     01/03/23 0215 01/02/23  1412   INR 2.0* 1.7*   PTP 20.4* 16.8*      No results for input(s): PHI, PCO2I, PO2I, FIO2I in the last 72 hours. No results for input(s): CPK, CKMB, TROIQ, BNPP in the last 72 hours. Hemodynamics:   PAP:   CO:     Wedge:   CI:     CVP:    SVR:       PVR:       Ventilator Settings:  Mode Rate Tidal Volume Pressure FiO2 PEEP                    Peak airway pressure:      Minute ventilation:          MEDS: Reviewed    Chest X-Ray:  CXR Results  (Last 48 hours)                 01/02/23 1946  XR CHEST PORT Final result    Impression:  Right IJ catheter satisfactory position without pneumothorax. Narrative: Indication: Line placement. Comparison to earlier the same day. Portable exam obtained at [place] [address] demonstrates   placement of a right IJ catheter with the tip projecting over the SVC. There is   no pneumothorax.            01/02/23 1636  XR CHEST PORT Final result    Impression:      No acute process on portable chest.           Narrative:  EXAM:  XR CHEST PORT       INDICATION: Short of breath       COMPARISON: 1/2/2023 at 0312       TECHNIQUE: portable chest AP view       FINDINGS: A nasogastric tube terminates in the gastric body. The heart is normal   size. No focal consolidation, pleural effusion, or pneumothorax. 01/02/23 0324  XR CHEST PORT Final result    Impression:  No acute cardiopulmonary process. Narrative:  EXAM: XR CHEST PORT       HISTORY: brief episode of unresponsiveness, vomiting. COMPARISON: 12/25/2022        FINDINGS: Single view(s) of the chest. Thin lines of discoid atelectasis are   seen in the right base. No focal consolidation, pleural effusion, or   pneumothorax. The cardiomediastinal silhouette is unremarkable. The visualized   osseous structures are unremarkable. Multidisciplinary Rounds Completed:  Pending    ABCDEF Bundle/Checklist Completed:  Yes    SPECIAL EQUIPMENT  CRRT    DISPOSITION  Stay in ICU    CRITICAL CARE CONSULTANT NOTE  I had a face to face encounter with the patient, reviewed and interpreted patient data including clinical events, labs, images, vital signs, I/O's, and examined patient. I have discussed the case and the plan and management of the patient's care with the consulting services, the bedside nurses and the respiratory therapist.      NOTE OF PERSONAL INVOLVEMENT IN CARE   This patient has a high probability of imminent, clinically significant deterioration, which requires the highest level of preparedness to intervene urgently. I participated in the decision-making and personally managed or directed the management of the following life and organ supporting interventions that required my frequent assessment to treat or prevent imminent deterioration. I personally spent 40 minutes of critical care time. This is time spent at this critically ill patient's bedside actively involved in patient care as well as the coordination of care. This does not include any procedural time which has been billed separately.     Yane Wolfe MD  Intensivist  1/3/2023

## 2023-01-03 NOTE — PROGRESS NOTES
1920- Bedside shift change report given to ROSE Hamilton RN (oncoming nurse) by Dorothy Sanchez RN (offgoing nurse). Report included the following information SBAR, Kardex, ED Summary, Procedure Summary, Intake/Output, Recent Results, Cardiac Rhythm NSR-ST, and Alarm Parameters . 2000- Shift assessment completed. Patient alert and oriented x4. Sinus tach on the monitor. All pulses palpable. On 6L NC, clear breath sounds bilaterally. Bowel sounds hypoactive. NGT in L nare @59, to continuous suction. Abdomen distended and tender to touch. Olson catheter in place. See flowsheet for details. Lines:  PIV x1  R sided dialysis catheter  L femoral quad cvc    Drips:  Levo @ 15 mcg/min  Vaso @ 0.03 units/min  Bicarb @125mL/hr    2005- Patient being transported to IR with nursing staff at this time. 2140- Pt back from IR at this time. 2205- CRRT set up at this time by Dialysis nurse. 2243- 1 unit PRBC started at this time. 2300- Lab called to notify of critical lactic acid- 9.7 [facility] notified. 0000- Reassessment completed. See flowsheet for details. 0045- Patient NGT to continuous suction with no output. Spoke with Dick Ramos NP. Will clamp NGT for now. 0136- PRBCs stopped at this time. 0400- Reassessment completed. See flowsheet for details. 0710- Bedside shift change report given to Dustin Garalnd RN (oncoming nurse) by [employer], RN (offgoing nurse). Report included the following information SBAR, Kardex, ED Summary, Intake/Output, MAR, Cardiac Rhythm ST, and Alarm Parameters .

## 2023-01-03 NOTE — CONSULTS
NEPHROLOGY CONSULT NOTE     Patient: Shay Davis MRN: [id number]  PCP: None   :     1960  Age:   60 y.o. Sex:  male      Referring physician: Estelita Shoemaker MD  Reason for consultation: 60 y.o. male with Alcoholic ketoacidosis [B00.14]  Pulmonary embolism ([facility] [address] [M75.58] complicated by GILMAR   Admission Date: 2022  5:34 PM  LOS: 8 days     DISCUSSION / PLAN :   Oliguric acute renal failure   - Serum sodium 2.36 mg/dl up from baseline on admission (22) of 1.3 mg/dl  - Suspect pre-renal in the setting of hemorrhagic shock   - CT A/P with large left retroperitoneal hematoma, right renal infarct- urology/IR/General surgery consulted-- potential plan for IR embolization   - Received boluses of isotonic IVF  - Continue bicarb drip   - No urine output since 0600 am this morning, maintain gabriel for accurate urinary measurement   - Given the above compounded with acidosis and hyperkalemia, will initiate CRRT  - Orders placed and consent obtained by Dr. Quarles Dates aware- Line placed   - Check UA, urine studies, lytes   - Strict I/Os  - Renally dose all medication, avoid nephrotoxins  - Trend renal indices     Anion-gap metabolic acidosis   Lactic acidosis   Hyperkalemia  - Suspect in the setting of GILMAR, with superimposed alcoholic/starvation ketoacidosis  - bicarb 13, gap 19, lactic acid 12.9, ABG- PH 7.1, potassium 5.9  - on bicarb drip, continue for now  - CRRT per above     Acute blood loss anemia  Hemorrhagic shock   - Secondary to large left retroperitoneal bleed  - hemoglobin trended down to 5- received 4 units of PRBCs and FFP  - post transfusion hemoglobin 10.7  - Generally surgery and IR consulted with plans for embolization tonight  - On pressor support- vasopressin and levophed     RML PE  Chronic alcoholism   Hypertension, hx of        Subjective:   HPI: Shay Davis is a 60 y.o.   male who has a past medical history significant for hypertension, chronic alcoholism, and hyperlipidemia. Initially presented to the hospital on 12/25/22 with complaints of chest pain. CT of the chest showed right middle lobe PE and he was subsequently started on coumadin bridge with warfarin. He was also noted to have high anion gap metabolic acidosis likely due to starvation/alcoholic ketoacidosis which improved status post D5. This morning patient developed worsening abdominal pain with associated n/v, he was found to be hypotensive with a hemoglobin of 5.0 for which he received 4- units PRBCs and FFPs. CT of the A/P revealed large retroperitoneal bleed with right kidney infarct. General surgery/IR and urology consulted. Repeat bmp showed worsening renal failure and anion gap acidosis, thus nephrology was consulted    Past Medical Hx:   Past Medical History:   Diagnosis Date    Hypercholesterolemia     Hypertension         Past Surgical Hx:     Past Surgical History:   Procedure Laterality Date    HX ORTHOPAEDIC      rotator cuff repair       Medications:  Prior to Admission medications    Medication Sig Start Date End Date Taking? Authorizing Provider   apixaban (ELIQUIS) 5 mg tablet Take 2 Tablets by mouth two (2) times a day for 9 doses. 12/28/22 1/2/23 Yes Jodee Jin MD   apixaban (ELIQUIS) 5 mg tablet Take 1 Tablet by mouth two (2) times a day. 1/2/23  Yes Jodee Jin MD   folic acid (FOLVITE) 1 mg tablet Take 1 Tablet by mouth daily. 12/29/22  Yes Jodee Jin MD   aspirin 81 mg chewable tablet Take 2 Tabs by mouth daily. 8/28/18  Yes Lina Cannon MD   diphenhydrAMINE (BENADRYL) 25 mg capsule Take 25 mg by mouth as needed for Itching. Yes Provider, Historical   metoprolol succinate (TOPROL-XL) 50 mg XL tablet Take 1 Tab by mouth daily. In place of metoprolol tartrate. 10/18/19   Jr Whitlock MD   losartan (COZAAR) 25 mg tablet Take 1 Tab by mouth daily.   Patient not taking: Reported on 12/25/2022 10/18/19   Jr Whitlock MD   therapeutic multivitamin [facility]) tablet Take 1 Tab by mouth daily. Patient not taking: Reported on 12/25/2022 10/27/17   Jeannie Gray MD   thiamine (B-1) 100 mg tablet Take 1 Tab by mouth daily. 10/27/17   Jeannie Gray MD   ibuprofen (MOTRIN) 200 mg tablet Take 200 mg by mouth as needed for Pain. Patient not taking: Reported on 12/25/2022    Provider, Historical       No Known Allergies    Social Hx:  reports that he quit smoking about 8 years ago. He has never used smokeless tobacco. He reports current alcohol use. He reports current drug use. Drug: Cocaine. Family History   Problem Relation Age of Onset    Stroke Father     Hypertension Father     Diabetes Sister        Review of Systems:  A twelve point review of system was performed today. Pertinent positives and negatives are mentioned in the HPI. The reminder of the ROS is negative and noncontributory. Objective:    Vitals:    Vitals:    01/02/23 1715 01/02/23 1800 01/02/23 1830 01/02/23 1900   BP: 118/85 (!) 123/104 (!) 92/57 126/81   Pulse: (!) 122 (!) 123 (!) 122 (!) 115   Resp: 27 22 24 26   Temp:       SpO2: 100% 96%     Weight:       Height:         I&O's:  01/01 0701 - 01/02 0700  In: 933.3 [I.V.:933.3]  Out: 710 [Urine:710]  Visit Vitals  /81   Pulse (!) 115   Temp (!) 94.3 °F (34.6 °C)   Resp 26   Ht 5' 9\" (1.753 m)   Wt 99.8 kg (220 lb)   SpO2 96%   BMI 32.49 kg/m²       Physical Exam:  General:Alert, No distress,   HEENT: Eyes are PERRL. Conjunctiva without pallor ,erythema. Neck: Supple  Lungs : on oxygen   CVS: tachycardic  Abdomen: tender  Extremities: No cyanosis, No clubbing  Skin: No rash or lesions.   Neurologic: non focal, AAO x 3  Psych: normal affect    Laboratory Results:    Lab Results   Component Value Date    BUN 16 01/02/2023     (L) 01/02/2023    K 5.9 (H) 01/02/2023     01/02/2023    CO2 13 (LL) 01/02/2023       Lab Results   Component Value Date    BUN 16 01/02/2023    BUN 13 01/02/2023    BUN 9 01/01/2023    BUN 9 12/31/2022    BUN 7 12/30/2022    K 5.9 (H) 01/02/2023    K 4.8 01/02/2023    K 4.0 01/01/2023    K 4.1 12/31/2022    K 4.3 12/30/2022       Lab Results   Component Value Date    WBC 8.6 01/02/2023    RBC 2.02 (L) 01/02/2023    HGB 10.7 (L) 01/02/2023    HCT 32.7 (L) 01/02/2023    .4 (H) 01/02/2023    MCH 35.6 (H) 01/02/2023    RDW 16.9 (H) 01/02/2023     01/02/2023       Lab Results   Component Value Date    PHOS 5.0 (H) 12/25/2022       Urine dipstick:   Lab Results   Component Value Date/Time    Color CAT 08/19/2019 05:24 PM    Appearance CLOUDY (A) 08/19/2019 05:24 PM    Specific gravity 1.030 08/19/2019 05:24 PM    Specific gravity 1.011 08/28/2018 06:16 PM    pH (UA) 5.5 08/19/2019 05:24 PM    Protein 100 (A) 08/19/2019 05:24 PM    Glucose NEGATIVE 08/19/2019 05:24 PM    Ketone 15 (A) 08/19/2019 05:24 PM    Bilirubin MODERATE (A) 08/19/2019 05:24 PM    Urobilinogen 1.0 08/19/2019 05:24 PM    Nitrites NEGATIVE 08/19/2019 05:24 PM    Leukocyte Esterase TRACE (A) 08/19/2019 05:24 PM    Epithelial cells FEW 08/19/2019 05:24 PM    Bacteria NEGATIVE 08/19/2019 05:24 PM    WBC 0-4 08/19/2019 05:24 PM    RBC 0-5 08/19/2019 05:24 PM       I have reviewed the following: All pertinent labs, microbiology data, radiology imaging for my assessment     ECG- Rev: Yes / No  Xray/CT/US/MRI REV: Yes/ No    Care Plan discussed with:  patient, primary nurse, Dr. Zaid Nation reviewed. Medications list Personally Reviewed   [x]      Yes     []               No      Thank you for allowing us to participate in the care of this patient. We will follow patient. Please dont hesitate to call with any questions    Melody Price NP  1/2/2023    [facility]  [address], [address]  Phone - [phone]  Fax - [phone]  [email or web address]. [employer]

## 2023-01-03 NOTE — PROGRESS NOTES
Obtained consent for procedure at this time from patients son with patients permission due to pt having tremors and unable to sign his consent due to this medical condition. Pt is A&Ox4, on 6L NC, and is in stable condition in CCU at this time.

## 2023-01-03 NOTE — PROGRESS NOTES
Bedside report given to [employer]. , CCU Primary RNJoe Rosen. ,CCU RN aware of post order set. Patient must lay flat, keep R leg straight for 2 hrs post procedure. PVS assessments must be completed and documented every 30 minutes for 2 hrs. If bleeding occurs from site, hold pressure, if profuse bleeding occurs for more than 15 minutes, call IR.

## 2023-01-03 NOTE — PROGRESS NOTES
Physical Therapy Note    Chart reviewed. Patient with transfer to ICU, s/p IR for retroperitoneal bleed and now on new CVVHD started last night at 2205. Discussed patient with RN who agreed patient is not appropriate for skilled Acute PT at this time. Will defer and follow-up as able and as appropriate. To note, previously up ad soo/independent prior to change in status.      Chapo Bedolla, PT, DPT

## 2023-01-03 NOTE — DIALYSIS
CRRT Note Maida:  Per Dr. Stephanie Patel BFR increased to 220 ml/min and Dialysate increased to 2500 ml/hr. Discussed with EVA Kendrick, primary RN over the phone & advised how to change settings on CRRT machine.

## 2023-01-03 NOTE — PROGRESS NOTES
Surgery NP Progress Note    Maricruz Course  [id number]  male  60 y.o.  1960    Admitted for Active Problems:    Pulmonary embolism ([address] (2022)      Retroperitoneal bleed (2023)      Pt seen with Dr. Zully Marquez:     Patient Hgb stable but still requiring pressors. No evidence of active bleeding. Plan/Recommendations/Medical Decision Making:     - KUB today to determine if NGT can be removed. Showed ileus, keep NGT until having bowel function.   - Monitor Hgb  - Care per medical team  - No plans for surgery at this time    Subjective:     Patient feeling better. Sore throat from NGT. No bowel function. Abd pain stable. Objective:     Blood pressure (!) 84/73, pulse (!) 104, temperature 98.3 °F (36.8 °C), resp. rate 16, height 5' 9\" (1.753 m), weight 99.8 kg (220 lb), SpO2 96 %. Temp (24hrs), Av.7 °F (36.5 °C), Min:94.3 °F (34.6 °C), Max:98.5 °F (36.9 °C)      Pt resting in bed. NAD   SCDs for mechanical DVT proph while in bed  Abd softly distended and tender. Body mass index is 32.49 kg/m². Reference: BMI greater than 30 is classified as obesity and greater than 40 is classified as morbid obesity.      Last 3 Recorded Weights in this Encounter    22 1537 23 1514   Weight: 99.8 kg (220 lb) 99.8 kg (220 lb)         Shanda Tomlnison NP   MSN, APRN, FNP-C, CWOCN-AP, RNFA    23

## 2023-01-03 NOTE — H&P
Interventional and Vascular Radiology History and Physical    Patient: Mihaela Urrutia 60 y.o. male       Chief Complaint: Shortness of Breath, Chest Pain, Nausea, and Alcohol intoxication      History of Present Illness: retroperitoneal  hemorrhage     History:    Past Medical History:   Diagnosis Date    Hypercholesterolemia     Hypertension      Family History   Problem Relation Age of Onset    Stroke Father     Hypertension Father     Diabetes Sister      Social History     Socioeconomic History    Marital status: LEGALLY      Spouse name: Not on file    Number of children: Not on file    Years of education: Not on file    Highest education level: Not on file   Occupational History    Not on file   Tobacco Use    Smoking status: Former     Years: 10.00     Types: Cigarettes     Quit date: 2014     Years since quittin.9    Smokeless tobacco: Never   Substance and Sexual Activity    Alcohol use: Yes     Comment: pint a day    Drug use: Yes     Types: Cocaine     Comment: last used Friday    Sexual activity: Yes     Partners: Female     Birth control/protection: Condom   Other Topics Concern    Not on file   Social History Narrative    Not on file     Social Determinants of Health     Financial Resource Strain: Not on file   Food Insecurity: Not on file   Transportation Needs: Not on file   Physical Activity: Not on file   Stress: Not on file   Social Connections: Not on file   Intimate Partner Violence: Not on file   Housing Stability: Not on file       Allergies: No Known Allergies    Current Medications:  Current Facility-Administered Medications   Medication Dose Route Frequency    NOREPINephrine (LEVOPHED) 8 mg in 5% dextrose 250mL (32 mcg/mL) infusion  0.5-150 mcg/min IntraVENous TITRATE    0.9% sodium chloride infusion 250 mL  250 mL IntraVENous PRN    pantoprazole (PROTONIX) 40 mg in 0.9% sodium chloride 10 mL injection  40 mg IntraVENous Q12H    0.9% sodium chloride infusion 250 mL  250 mL IntraVENous PRN    vasopressin (VASOSTRICT) 20 Units in 0.9% sodium chloride 100 mL infusion  0.03 Units/min IntraVENous CONTINUOUS    piperacillin-tazobactam (ZOSYN) 3.375 g in 0.9% sodium chloride (MBP/ADV) 100 mL MBP  3.375 g IntraVENous Q8H    dexmedeTOMidine in 0.9 % NaCl (PRECEDEX) 400 mcg/100 mL (4 mcg/mL) infusion soln  0.1-1.5 mcg/kg/hr IntraVENous TITRATE    sodium bicarbonate 8.4 % (1 mEq/mL) injection        sodium bicarbonate (8.4%) 150 mEq in dextrose 5 % - 0.45% NaCl 1,000 mL infusion   IntraVENous CONTINUOUS    sodium bicarbonate 8.4 % (1 mEq/mL) injection        bicarbonate dialysis (PRISMASOL) BG K 2/Ca 3.5 5000 ml solution   Extracorporeal DIALYSIS CONTINUOUS    sodium bicarbonate 8.4 % (1 mEq/mL) injection        0.9% sodium chloride infusion 250 mL  250 mL IntraVENous PRN    0.9% sodium chloride infusion 250 mL  250 mL IntraVENous PRN    hydrocortisone Sod Succ (PF) (SOLU-CORTEF) injection 50 mg  50 mg IntraVENous Q6H    heparinized saline 2 units/mL infusion 1,600 Units  800 mL Irrigation ONCE    lidocaine (XYLOCAINE) 20 mg/mL (2 %) injection 360 mg  18 mL SubCUTAneous ONCE    iopamidoL (ISOVUE-370) 370 mg iodine /mL (76 %) injection 300 mL  300 mL IntraarTERial ONCE    traMADoL (ULTRAM) tablet 50 mg  50 mg Oral Q6H PRN    [Held by provider] enoxaparin (LOVENOX) injection 100 mg  100 mg SubCUTAneous I91L    folic acid (FOLVITE) tablet 1 mg  1 mg Oral DAILY    thiamine mononitrate (B-1) tablet 100 mg  100 mg Oral DAILY    multivitamin, tx-iron-ca-min (THERA-M w/ IRON) tablet 1 Tablet  1 Tablet Oral DAILY    magnesium oxide (MAG-OX) tablet 400 mg  400 mg Oral DAILY    sodium chloride (NS) flush 5-40 mL  5-40 mL IntraVENous Q8H    sodium chloride (NS) flush 5-40 mL  5-40 mL IntraVENous PRN    acetaminophen (TYLENOL) tablet 650 mg  650 mg Oral Q6H PRN    Or    acetaminophen (TYLENOL) suppository 650 mg  650 mg Rectal Q6H PRN    polyethylene glycol (MIRALAX) packet 17 g  17 g Oral DAILY PRN ondansetron (ZOFRAN ODT) tablet 4 mg  4 mg Oral Q8H PRN    Or    ondansetron (ZOFRAN) injection 4 mg  4 mg IntraVENous Q6H PRN        Physical Exam:  Blood pressure (!) 150/83, pulse (!) 106, temperature (!) 94.3 °F (34.6 °C), resp. rate 24, height 5' 9\" (1.753 m), weight 99.8 kg (220 lb), SpO2 100 %. LUNG: clear to auscultation bilaterally, HEART: regular rate and rhythm, S1, S2 normal, no murmur, click, rub or gallop      Alerts:    Hospital Problems  Date Reviewed: 1/2/2023            Codes Class Noted POA    Retroperitoneal bleed ICD-10-CM: R58  ICD-9-CM: 459.0  1/2/2023 No        Pulmonary embolism ([facility] [address] ICD-10-CM: I26.99  ICD-9-CM: 415.19  12/25/2022 Unknown           Laboratory:      Recent Labs     01/02/23  1949 01/02/23  1806 01/02/23  1412   HGB 7.8*  --  10.7*   HCT 23.2*  --  32.7*   WBC 19.9*  --   --    *  --   --    INR  --   --  1.7*   BUN  --  19  17 16   CREA  --  2.74*  2.76* 2.36*   K  --  6.4*  5.7* 5.9*         Plan of Care/Planned Procedure:  Risks, benefits, and alternatives reviewed with patient and he agrees to proceed with the procedure. Conscious sedation will be performed with IV fentanyl and versed.  Plan is for angio and possible embolization       Chad Garcia MD

## 2023-01-03 NOTE — PROGRESS NOTES
0700: Bedside shift change report given to Calixto Ochoa (oncoming nurse) by Kurt Deleon RN (offgoing nurse). Report included the following information SBAR, Kardex, ED Summary, Intake/Output, MAR, Recent Results, Cardiac Rhythm NSR/tach, and Alarm Parameters . 0800: AM assessment complete, see flowsheet for details. Pt in bed, alert and oriented x4. S1S2 heart sounds, in sinus tachycardia, on levophed and vasopressin gtt. Lung sounds diminished on 2L NC. Bowel sounds active/hypoactive, distended and tender abdomen, more tender on the L side. NPO with NGT in R nare. Pulses palpable in all extremities, no edema. Normothermic. On CVVH with factor of 15.     0930: Interdisciplinary team rounds were held 1/3/2023 with the following team members:Care Management, Nursing, Nutrition, Pharmacy, and Physician. Plan of care discussed. See clinical pathway and/or care plan for interventions and desired outcomes. Goals of the Day: wean pressors as tolerated. Stop bicarb gtt. 1100: Dr Alaina Saenz at bedside, verbal orders to pull factor of 0. Changes to CVVH.     1200: reassessment complete, no significant changes. 1400: FFP unit started. 1600: FFP transfusion completed. No significant changes to systems. 1900: Bedside shift change report given to [facility] (oncoming nurse) by Danie Garay RN (offgoing nurse). Report included the following information SBAR, Kardex, ED Summary, Intake/Output, MAR, Recent Results, Cardiac Rhythm NSR/tach, and Alarm Parameters .

## 2023-01-03 NOTE — PROGRESS NOTES
Nephrology Progress Note  Formerly [facility] / [address], [address]  [place], [address]  Phone - [phone]  Fax - [phone]                 Patient: Krishna Camejo                   YOB: 1960        Date- 1/3/2023                      Admit Date: 12/25/2022  CC: Follow up for GILMAR stage III          IMPRESSION & PLAN:   GILMAR stage III(secondary to ischemic ATN from hemorrhagic shock)  Hyperkalemia  Anion gap metabolic acidosis  Elevated lactate  Acute blood loss anemia  Acute RP hematoma status post embolization of lumbar artery  Massive PE   Hemorrhagic/cardiogenic shock      PLAN-  Continue with CRRT for now  Will increase dialysate flow rate to 2500 ml/hour  and blood flow to 220 ml to achieve better clearance  Keep the factor at 0 because of labile blood pressure and an ongoing pressor support  Check BMP twice daily  Agree to DC bicarb gtt. Discussed with ICU RN     Subjective: Interval History:   -Seen and examined today.  -Awake and conversant  -CRRT ongoing    Objective:   Vitals:    01/03/23 1034 01/03/23 1100 01/03/23 1128 01/03/23 1200   BP: (!) 82/64 98/66  (!) 84/73   Pulse: (!) 108 (!) 108  (!) 104   Resp:  19  16   Temp:    98.3 °F (36.8 °C)   SpO2:   98% 96%   Weight:       Height:          01/02 0701 - 01/03 0700  In: 5904.2 [I.V.:3653.5]  Out: 5867 [Urine:20]    Last 3 Recorded Weights in this Encounter    12/25/22 1537 01/02/23 1514   Weight: 99.8 kg (220 lb) 99.8 kg (220 lb)        Physical exam:    GEN: Mild distress  NECK- no mass  RESP: No wheezing, decreased BS b/l  CVS: Tachycardic  NEURO: Normal speech, Non focal  EXT: No Edema   HD access: Right IJ Timo    Chart reviewed. Pertinent Notes reviewed.      Data Review :  Recent Labs     01/03/23  0215 01/02/23  1806 01/02/23  1412 01/02/23  0311 01/01/23  1126     136 135*  137 133*   < >  --    K 5.8*  5.7* 6.4*  5.7* 5.9* < >  --      100 99  101 101   < >  --    CO2 24  25 16*  16* 13*   < >  --    BUN 16  18 19  17 16   < >  --    CREA 2.43*  2.37* 2.74*  2.76* 2.36*   < >  --    *  280* 200*  204* 253*   < >  --    CA 8.1*  7.7* 7.8*  7.9* 8.5  8.3*   < >  --    MG  --   --  2.1  --   --    PHOS 4.8* 8.0*  --   --   --    URICA  --   --   --   --  5.6    < > = values in this interval not displayed. Recent Labs     01/03/23  0820 01/03/23  0215 01/02/23  2200   WBC 15.7* 17.2* 16.0*   HGB 9.3* 10.0* 6.4*   HCT 26.6* 28.6* 19.1*    156 140*     No results for input(s): FE, TIBC, PSAT, FERR in the last 72 hours.    No results found for: HBA1C, WFW0VAVQ   No results found for: MCACR, MCA1, MCA2, MCA3, MCAU, MCAU2, MCALPOCT  US Results (most recent):  Medication list  reviewed  Current Facility-Administered Medications   Medication Dose Route Frequency    0.9% sodium chloride infusion 250 mL  250 mL IntraVENous PRN    alcohol 62% (NOZIN) nasal  1 Ampule  1 Ampule Topical Q12H    NOREPINephrine (LEVOPHED) 8 mg in 5% dextrose 250mL (32 mcg/mL) infusion  0.5-150 mcg/min IntraVENous TITRATE    0.9% sodium chloride infusion 250 mL  250 mL IntraVENous PRN    pantoprazole (PROTONIX) 40 mg in 0.9% sodium chloride 10 mL injection  40 mg IntraVENous Q12H    0.9% sodium chloride infusion 250 mL  250 mL IntraVENous PRN    vasopressin (VASOSTRICT) 20 Units in 0.9% sodium chloride 100 mL infusion  0.03 Units/min IntraVENous CONTINUOUS    piperacillin-tazobactam (ZOSYN) 3.375 g in 0.9% sodium chloride (MBP/ADV) 100 mL MBP  3.375 g IntraVENous Q8H    dexmedeTOMidine in 0.9 % NaCl (PRECEDEX) 400 mcg/100 mL (4 mcg/mL) infusion soln  0.1-1.5 mcg/kg/hr IntraVENous TITRATE    bicarbonate dialysis (PRISMASOL) BG K 2/Ca 3.5 5000 ml solution   Extracorporeal DIALYSIS CONTINUOUS    0.9% sodium chloride infusion 250 mL  250 mL IntraVENous PRN    0.9% sodium chloride infusion 250 mL  250 mL IntraVENous PRN    0.9% sodium chloride infusion 250 mL  250 mL IntraVENous PRN    traMADoL (ULTRAM) tablet 50 mg  50 mg Oral Q6H PRN    [Held by provider] enoxaparin (LOVENOX) injection 100 mg  100 mg SubCUTAneous H46B    folic acid (FOLVITE) tablet 1 mg  1 mg Oral DAILY    thiamine mononitrate (B-1) tablet 100 mg  100 mg Oral DAILY    multivitamin, tx-iron-ca-min (THERA-M w/ IRON) tablet 1 Tablet  1 Tablet Oral DAILY    magnesium oxide (MAG-OX) tablet 400 mg  400 mg Oral DAILY    sodium chloride (NS) flush 5-40 mL  5-40 mL IntraVENous Q8H    sodium chloride (NS) flush 5-40 mL  5-40 mL IntraVENous PRN    acetaminophen (TYLENOL) tablet 650 mg  650 mg Oral Q6H PRN    Or    acetaminophen (TYLENOL) suppository 650 mg  650 mg Rectal Q6H PRN    polyethylene glycol (MIRALAX) packet 17 g  17 g Oral DAILY PRN    ondansetron (ZOFRAN ODT) tablet 4 mg  4 mg Oral Q8H PRN    Or    ondansetron (ZOFRAN) injection 4 mg  4 mg IntraVENous Q6H PRN        Katlin Larios MD  1/3/2023

## 2023-01-03 NOTE — PROGRESS NOTES
Comprehensive Nutrition Assessment    Type and Reason for Visit: Initial, RD nutrition re-screen/LOS    Nutrition Recommendations/Plan:   Advance diet as medically able once pt more hemodynamically stable  If unable to take PO once pressor needs are down, consider TF initiation via NGT  Continue supplemental thiamine and folate      Malnutrition Assessment:  Malnutrition Status:  No malnutrition (01/03/23 1324)        Nutrition Assessment:  Pt admitted with PE.  PMH: HTN, ETOH abuse. Chart reviewed for LOS, case discussed during CCU rounds. Pt developed a spontaneous retroperitoneal bleed overnight which was embolized. He is on CVVHD. Levo at 11 and vaso at 0.03. NGT in place, clamped. Appetite was good prior to bleed per RN flowsheet's below. MST negative for recent poor appetite or wt loss. Pt sleeping soundly at time of attempted visit. He is on supplemental thiamine and folate, appropriate given ETOH abuse.  K 5.8 and phos 4.8. Will monitor readiness for PO diet vs TF via NGT as he becomes more hemodynamically stable. Patient Vitals for the past 168 hrs:   % Diet Eaten   01/03/23 1200 0%   01/03/23 0730 0%   12/28/22 1806 76 - 100%   12/28/22 1200 76 - 100%   12/28/22 0830 76 - 100%     Wt Readings from Last 10 Encounters:   01/02/23 99.8 kg (220 lb)   09/28/21 84.1 kg (185 lb 6.5 oz)   10/18/19 84.4 kg (186 lb)   08/19/19 84.5 kg (186 lb 4.6 oz)   11/13/17 94.3 kg (208 lb)   10/25/17 90.9 kg (200 lb 6.4 oz)   12/07/15 98.9 kg (218 lb)   08/18/15 96.6 kg (213 lb)   08/14/15 94 kg (207 lb 3.7 oz)   06/05/11 95.6 kg (210 lb 11.2 oz)            Nutrition Related Findings:    Meds: folvite, magox, MVI, protonix, zosyn, thiamine; Drips: levo, vaso.   BM 1/1 Wound Type: Surgical incision    Current Nutrition Intake & Therapies:  Average Meal Intake: NPO     DIET NPO    Anthropometric Measures:  Height: 5' 9\" (175.3 cm)  Ideal Body Weight (IBW): 160 lbs (73 kg)     Current Body Wt:  99.8 kg (220 lb 0.3 oz), 137.5 % IBW. Not specified  Current BMI (kg/m2): 32.5  Usual Body Weight: 84.4 kg (186 lb)  % Weight Change (Calculated): 18.3                    BMI Category: Obese class 1 (BMI 30.0-34. 9)    Estimated Daily Nutrient Needs:  Energy Requirements Based On: Formula  Weight Used for Energy Requirements: Current  Energy (kcal/day): MSJ 2150 (1784 x 1.2)  Weight Used for Protein Requirements: Current  Protein (g/day): 100-120g (1-1.2gPro/kg)  Method Used for Fluid Requirements: Standard renal  Fluid (ml/day): per MD    Nutrition Diagnosis:   Inadequate protein-energy intake related to altered GI function, impaired nutrient utilization as evidenced by NPO or clear liquid status due to medical condition    Nutrition Interventions:   Food and/or Nutrient Delivery: Start oral diet, Start tube feeding  Nutrition Education/Counseling: No recommendations at this time  Coordination of Nutrition Care: Continue to monitor while inpatient, Interdisciplinary rounds       Goals:     Goals: Initiate PO diet, Initiate nutrition support, by next RD assessment       Nutrition Monitoring and Evaluation:   Behavioral-Environmental Outcomes: None identified  Food/Nutrient Intake Outcomes: Diet advancement/tolerance, IVF intake  Physical Signs/Symptoms Outcomes: Biochemical data, Nutrition focused physical findings, Skin, Weight, Fluid status or edema, Hemodynamic status    Discharge Planning:     Too soon to determine    Ronald Phoenix RD, CNSC  Contact: ext [phone]

## 2023-01-03 NOTE — PROCEDURES
[facility]      Procedure Note - Central Venous Access:   Performed by Tk Camacho MD    Obtained informed Consent. Immediately prior to the procedure, the patient was reevaluated and found suitable for the planned procedure and any planned medications. Immediately prior to the procedure a time out was called to verify the correct patient, procedure, equipment, staff, and marking as appropriate. The site was prepped with ChloraPrep and Sterile draping. Using Seldinger technique a Hemodialysis Catheter was placed in the Right, Internal Jugular Vein via direct cannulation with 1 number of attempts for Dialysis. Ultrasound Guidance was utilized. There was good blood return. The following complications were encountered: None. A follow-up chest x-ray was ordered post procedure. The procedure was tolerated well.

## 2023-01-03 NOTE — DIALYSIS
RT / [phone]    Orders   Mode: CVVHD   Blood Flow Rate: 200 ml/min   Prismasol Dose: 2000 ml/hr Dialysate   Prismasol Concentrate: 2K/3.5Ca   Blood Warmer Temp: 37*C   Net Fluid Removal: 25 ml/hr     Metrics   BP: 82/64   HR: 108   Access Pressure: -63   Filter Pressure: 102   Return Pressure: 27   TMP: 18   Pressure Drop: 39     Access   Type & Location: RIJ temporary CVC   Comments: Dressing CDI dated 1/2/23. Labs   HBsAg (Antigen) / date: Negative 1/3/23                                              HBsAb (Antibody) / date: Susceptible 1/3/23   Source: [facility]     Safety:   Time Out Done:   (Time) 1015   Consent obtained/signed: Verified   Education: CVC infection prevention & control. Primary Nurse Rpt Pre: EVA Vasquez RN   Primary Nurse Rpt Post: EVA Vasquez RN     Comments / Plan:      [id number] filter running well with no indication for change at this time. Consents, patient, code status, labs and orders verified. RIJ temporary CVC, dressing CDI with bio-patch dated 1/2/23. No signs of redness, drainage, or infection visualized. Lines visible and connections secure with blood warmer to return line at 37*C. Education & pre/post report with primary RN. Discussed with primary RN access pressure alarms will continue due to pt being awake & moving around. If she has any issues resolving alarms she will contact Maida for filter change if needed.

## 2023-01-03 NOTE — PROCEDURES
Interventional Radiology  Procedure Note        1/2/2023 9:14 PM    Patient: Barby Betancur     Informed consent obtained    Diagnosis: Left retroperitoneal hemorrhage     Procedure(s): Active hemorrhage off of left L2 lumbar artery which was embolized with coils.      Specimens removed:  None     Complications: None    Primary Physician: Nicole Kaye MD    Recomendations: N/A    Discharge Disposition: ICU    Full dictated report to follow    Signed By: Nicole Kaye MD

## 2023-01-04 ENCOUNTER — APPOINTMENT (OUTPATIENT)
Dept: CT IMAGING | Age: 63
DRG: 950 | End: 2023-01-04
Attending: INTERNAL MEDICINE
Payer: COMMERCIAL

## 2023-01-04 LAB
ALBUMIN SERPL-MCNC: 2.3 G/DL (ref 3.5–5)
ALBUMIN SERPL-MCNC: 2.3 G/DL (ref 3.5–5)
ALBUMIN/GLOB SERPL: 0.7 (ref 1.1–2.2)
ALP SERPL-CCNC: 96 U/L (ref 45–117)
ALT SERPL-CCNC: 109 U/L (ref 12–78)
ANION GAP SERPL CALC-SCNC: 6 MMOL/L (ref 5–15)
ANION GAP SERPL CALC-SCNC: 8 MMOL/L (ref 5–15)
AST SERPL-CCNC: 239 U/L (ref 15–37)
BILIRUB DIRECT SERPL-MCNC: 0.3 MG/DL (ref 0–0.2)
BILIRUB SERPL-MCNC: 0.9 MG/DL (ref 0.2–1)
BLD PROD TYP BPU: NORMAL
BLD PROD TYP BPU: NORMAL
BPU ID: NORMAL
BPU ID: NORMAL
BUN SERPL-MCNC: 10 MG/DL (ref 6–20)
BUN SERPL-MCNC: 9 MG/DL (ref 6–20)
BUN/CREAT SERPL: 5 (ref 12–20)
BUN/CREAT SERPL: 6 (ref 12–20)
CALCIUM SERPL-MCNC: 9.1 MG/DL (ref 8.5–10.1)
CALCIUM SERPL-MCNC: 9.8 MG/DL (ref 8.5–10.1)
CHLORIDE SERPL-SCNC: 102 MMOL/L (ref 97–108)
CHLORIDE SERPL-SCNC: 102 MMOL/L (ref 97–108)
CO2 SERPL-SCNC: 28 MMOL/L (ref 21–32)
CO2 SERPL-SCNC: 29 MMOL/L (ref 21–32)
CREAT SERPL-MCNC: 1.78 MG/DL (ref 0.7–1.3)
CREAT SERPL-MCNC: 1.85 MG/DL (ref 0.7–1.3)
ERYTHROCYTE [DISTWIDTH] IN BLOOD BY AUTOMATED COUNT: 17.1 % (ref 11.5–14.5)
GLOBULIN SER CALC-MCNC: 3.4 G/DL (ref 2–4)
GLUCOSE BLD STRIP.AUTO-MCNC: 126 MG/DL (ref 65–117)
GLUCOSE SERPL-MCNC: 120 MG/DL (ref 65–100)
GLUCOSE SERPL-MCNC: 154 MG/DL (ref 65–100)
HCT VFR BLD AUTO: 21.7 % (ref 36.6–50.3)
HCT VFR BLD AUTO: 22.3 % (ref 36.6–50.3)
HCT VFR BLD AUTO: 22.6 % (ref 36.6–50.3)
HGB BLD-MCNC: 7.4 G/DL (ref 12.1–17)
HGB BLD-MCNC: 7.4 G/DL (ref 12.1–17)
HGB BLD-MCNC: 7.7 G/DL (ref 12.1–17)
INR PPP: 1.4 (ref 0.9–1.1)
LACTATE SERPL-SCNC: 1.7 MMOL/L (ref 0.4–2)
LACTATE SERPL-SCNC: 2.1 MMOL/L (ref 0.4–2)
MAGNESIUM SERPL-MCNC: 2 MG/DL (ref 1.6–2.4)
MCH RBC QN AUTO: 32.1 PG (ref 26–34)
MCHC RBC AUTO-ENTMCNC: 34.1 G/DL (ref 30–36.5)
MCV RBC AUTO: 94.2 FL (ref 80–99)
NRBC # BLD: 0.92 K/UL (ref 0–0.01)
NRBC BLD-RTO: 4.8 PER 100 WBC
PHOSPHATE SERPL-MCNC: 1.9 MG/DL (ref 2.6–4.7)
PHOSPHATE SERPL-MCNC: 2.8 MG/DL (ref 2.6–4.7)
PLATELET # BLD AUTO: 214 K/UL (ref 150–400)
PMV BLD AUTO: 11.3 FL (ref 8.9–12.9)
POTASSIUM SERPL-SCNC: 3.6 MMOL/L (ref 3.5–5.1)
POTASSIUM SERPL-SCNC: 4.1 MMOL/L (ref 3.5–5.1)
PROT SERPL-MCNC: 5.7 G/DL (ref 6.4–8.2)
PROTHROMBIN TIME: 14 SEC (ref 9–11.1)
RBC # BLD AUTO: 2.4 M/UL (ref 4.1–5.7)
SERVICE CMNT-IMP: ABNORMAL
SODIUM SERPL-SCNC: 137 MMOL/L (ref 136–145)
SODIUM SERPL-SCNC: 138 MMOL/L (ref 136–145)
STATUS OF UNIT,%ST: NORMAL
STATUS OF UNIT,%ST: NORMAL
UNIT DIVISION, %UDIV: 0
UNIT DIVISION, %UDIV: 0
WBC # BLD AUTO: 19.2 K/UL (ref 4.1–11.1)

## 2023-01-04 PROCEDURE — 74011000250 HC RX REV CODE- 250: Performed by: INTERNAL MEDICINE

## 2023-01-04 PROCEDURE — 80069 RENAL FUNCTION PANEL: CPT

## 2023-01-04 PROCEDURE — 74011000250 HC RX REV CODE- 250: Performed by: STUDENT IN AN ORGANIZED HEALTH CARE EDUCATION/TRAINING PROGRAM

## 2023-01-04 PROCEDURE — 85014 HEMATOCRIT: CPT

## 2023-01-04 PROCEDURE — 74011250637 HC RX REV CODE- 250/637: Performed by: STUDENT IN AN ORGANIZED HEALTH CARE EDUCATION/TRAINING PROGRAM

## 2023-01-04 PROCEDURE — 77010033678 HC OXYGEN DAILY

## 2023-01-04 PROCEDURE — 84100 ASSAY OF PHOSPHORUS: CPT

## 2023-01-04 PROCEDURE — 74011000258 HC RX REV CODE- 258: Performed by: INTERNAL MEDICINE

## 2023-01-04 PROCEDURE — 82962 GLUCOSE BLOOD TEST: CPT

## 2023-01-04 PROCEDURE — 36415 COLL VENOUS BLD VENIPUNCTURE: CPT

## 2023-01-04 PROCEDURE — 85027 COMPLETE CBC AUTOMATED: CPT

## 2023-01-04 PROCEDURE — 65610000006 HC RM INTENSIVE CARE

## 2023-01-04 PROCEDURE — 83735 ASSAY OF MAGNESIUM: CPT

## 2023-01-04 PROCEDURE — 85610 PROTHROMBIN TIME: CPT

## 2023-01-04 PROCEDURE — 80048 BASIC METABOLIC PNL TOTAL CA: CPT

## 2023-01-04 PROCEDURE — 74174 CTA ABD&PLVS W/CONTRAST: CPT

## 2023-01-04 PROCEDURE — 80076 HEPATIC FUNCTION PANEL: CPT

## 2023-01-04 PROCEDURE — 90945 DIALYSIS ONE EVALUATION: CPT

## 2023-01-04 PROCEDURE — 74011250636 HC RX REV CODE- 250/636: Performed by: INTERNAL MEDICINE

## 2023-01-04 PROCEDURE — 83605 ASSAY OF LACTIC ACID: CPT

## 2023-01-04 PROCEDURE — 74011000636 HC RX REV CODE- 636: Performed by: INTERNAL MEDICINE

## 2023-01-04 PROCEDURE — 74011250637 HC RX REV CODE- 250/637: Performed by: INTERNAL MEDICINE

## 2023-01-04 RX ORDER — SODIUM,POTASSIUM PHOSPHATES 280-250MG
2 POWDER IN PACKET (EA) ORAL ONCE
Status: COMPLETED | OUTPATIENT
Start: 2023-01-04 | End: 2023-01-04

## 2023-01-04 RX ADMIN — CALCIUM CHLORIDE, MAGNESIUM CHLORIDE, DEXTROSE MONOHYDRATE, LACTIC ACID, SODIUM CHLORIDE, SODIUM BICARBONATE AND POTASSIUM CHLORIDE: 5.15; 2.03; 22; 5.4; 6.46; 3.09; .157 INJECTION INTRAVENOUS at 23:28

## 2023-01-04 RX ADMIN — IOPAMIDOL 100 ML: 755 INJECTION, SOLUTION INTRAVENOUS at 09:38

## 2023-01-04 RX ADMIN — CALCIUM CHLORIDE, MAGNESIUM CHLORIDE, DEXTROSE MONOHYDRATE, LACTIC ACID, SODIUM CHLORIDE, SODIUM BICARBONATE AND POTASSIUM CHLORIDE: 5.15; 2.03; 22; 5.4; 6.46; 3.09; .157 INJECTION INTRAVENOUS at 17:01

## 2023-01-04 RX ADMIN — CALCIUM CHLORIDE, MAGNESIUM CHLORIDE, DEXTROSE MONOHYDRATE, LACTIC ACID, SODIUM CHLORIDE, SODIUM BICARBONATE AND POTASSIUM CHLORIDE: 5.15; 2.03; 22; 5.4; 6.46; 3.09; .157 INJECTION INTRAVENOUS at 04:46

## 2023-01-04 RX ADMIN — NOREPINEPHRINE BITARTRATE 13 MCG/MIN: 1 SOLUTION INTRAVENOUS at 10:07

## 2023-01-04 RX ADMIN — CALCIUM CHLORIDE, MAGNESIUM CHLORIDE, DEXTROSE MONOHYDRATE, LACTIC ACID, SODIUM CHLORIDE, SODIUM BICARBONATE AND POTASSIUM CHLORIDE: 5.15; 2.03; 22; 5.4; 6.46; 3.09; .157 INJECTION INTRAVENOUS at 15:00

## 2023-01-04 RX ADMIN — POTASSIUM & SODIUM PHOSPHATES POWDER PACK 280-160-250 MG 2 PACKET: 280-160-250 PACK at 18:59

## 2023-01-04 RX ADMIN — CALCIUM CHLORIDE, MAGNESIUM CHLORIDE, DEXTROSE MONOHYDRATE, LACTIC ACID, SODIUM CHLORIDE, SODIUM BICARBONATE AND POTASSIUM CHLORIDE: 5.15; 2.03; 22; 5.4; 6.46; 3.09; .157 INJECTION INTRAVENOUS at 06:51

## 2023-01-04 RX ADMIN — CALCIUM CHLORIDE, MAGNESIUM CHLORIDE, DEXTROSE MONOHYDRATE, LACTIC ACID, SODIUM CHLORIDE, SODIUM BICARBONATE AND POTASSIUM CHLORIDE: 5.15; 2.03; 22; 5.4; 6.46; 3.09; .157 INJECTION INTRAVENOUS at 02:32

## 2023-01-04 RX ADMIN — VASOPRESSIN 0.03 UNITS/MIN: 20 INJECTION INTRAVENOUS at 01:59

## 2023-01-04 RX ADMIN — Medication 1 AMPULE: at 20:10

## 2023-01-04 RX ADMIN — Medication 1 AMPULE: at 11:21

## 2023-01-04 RX ADMIN — CALCIUM CHLORIDE, MAGNESIUM CHLORIDE, DEXTROSE MONOHYDRATE, LACTIC ACID, SODIUM CHLORIDE, SODIUM BICARBONATE AND POTASSIUM CHLORIDE: 5.15; 2.03; 22; 5.4; 6.46; 3.09; .157 INJECTION INTRAVENOUS at 19:23

## 2023-01-04 RX ADMIN — FOLIC ACID 1 MG: 1 TABLET ORAL at 11:04

## 2023-01-04 RX ADMIN — THIAMINE HCL TAB 100 MG 100 MG: 100 TAB at 11:04

## 2023-01-04 RX ADMIN — CALCIUM CHLORIDE, MAGNESIUM CHLORIDE, DEXTROSE MONOHYDRATE, LACTIC ACID, SODIUM CHLORIDE, SODIUM BICARBONATE AND POTASSIUM CHLORIDE: 5.15; 2.03; 22; 5.4; 6.46; 3.09; .157 INJECTION INTRAVENOUS at 00:25

## 2023-01-04 RX ADMIN — SODIUM CHLORIDE, PRESERVATIVE FREE 10 ML: 5 INJECTION INTRAVENOUS at 15:07

## 2023-01-04 RX ADMIN — CALCIUM CHLORIDE, MAGNESIUM CHLORIDE, DEXTROSE MONOHYDRATE, LACTIC ACID, SODIUM CHLORIDE, SODIUM BICARBONATE AND POTASSIUM CHLORIDE: 5.15; 2.03; 22; 5.4; 6.46; 3.09; .157 INJECTION INTRAVENOUS at 10:26

## 2023-01-04 RX ADMIN — CALCIUM CHLORIDE, MAGNESIUM CHLORIDE, DEXTROSE MONOHYDRATE, LACTIC ACID, SODIUM CHLORIDE, SODIUM BICARBONATE AND POTASSIUM CHLORIDE: 5.15; 2.03; 22; 5.4; 6.46; 3.09; .157 INJECTION INTRAVENOUS at 12:34

## 2023-01-04 RX ADMIN — MAGNESIUM OXIDE TAB 400 MG (241.3 MG ELEMENTAL MG) 400 MG: 400 (241.3 MG) TAB at 11:05

## 2023-01-04 RX ADMIN — MULTIPLE VITAMINS W/ MINERALS TAB 1 TABLET: TAB at 11:04

## 2023-01-04 RX ADMIN — CALCIUM CHLORIDE, MAGNESIUM CHLORIDE, DEXTROSE MONOHYDRATE, LACTIC ACID, SODIUM CHLORIDE, SODIUM BICARBONATE AND POTASSIUM CHLORIDE: 5.15; 2.03; 22; 5.4; 6.46; 3.09; .157 INJECTION INTRAVENOUS at 21:21

## 2023-01-04 RX ADMIN — SODIUM CHLORIDE, PRESERVATIVE FREE 10 ML: 5 INJECTION INTRAVENOUS at 11:22

## 2023-01-04 RX ADMIN — SODIUM CHLORIDE, PRESERVATIVE FREE 10 ML: 5 INJECTION INTRAVENOUS at 22:35

## 2023-01-04 NOTE — PROGRESS NOTES
0800  patient alert, cooperative. No NGT noted in patient. Patient states he pulled it out last night because he didn't want it. Will notify MD.    1931  Dr. Yamila Magana at bedside. Cvvh discontinued as instructed  prior to trip to CT scan. 1000  return from CT scan. 1300  vasopressin now weaned off. Levo down to 12. /79.     1500  levo down to 9. Remains tachycardic 113 to 120's. Dr. Yamila Magana aware. 1620  levo 8.     1725  labs sent. Levo at 6.     1845  Dr. Jarred Messina notified of K+ 3.6. and phos 1.9. order received for 2 packs neutraphos.

## 2023-01-04 NOTE — PROGRESS NOTES
Surgery NP Progress Note    Talha Nassarr  [id number]  male  60 y.o.  1960    Admitted for Active Problems:    Pulmonary embolism ([address] (2022)      Retroperitoneal bleed (2023)    Pt seen with Dr. Jay Mortensen:     Patient Hgb dropped again this morning. Plan/Recommendations/Medical Decision Making:     - NGT removed by patient. Leave out as not currently nauseous or vomiting. Discussed with patient that reinsertion would be needed if he is vomiting.   - Monitor Hgb- CTA abd pending read this morning.   - Care per medical team  - Patient bedrest while on CRRT as per dialysis nurse. Subjective:     Patient discouraged that \"I am still bleeding somewhere\". Has had some flatus but no bowel movement. Abd with some pain. Objective:     Blood pressure (!) 133/117, pulse 98, temperature 98.7 °F (37.1 °C), resp. rate 13, height 5' 9\" (1.753 m), weight 99.8 kg (220 lb), SpO2 94 %. Temp (24hrs), Av.5 °F (36.9 °C), Min:98 °F (36.7 °C), Max:98.7 °F (37.1 °C)      Pt resting in bed. NAD   SCDs for mechanical DVT proph while in bed  Abd distended and tender. Body mass index is 32.49 kg/m². Reference: BMI greater than 30 is classified as obesity and greater than 40 is classified as morbid obesity.      Last 3 Recorded Weights in this Encounter    22 1537 23 1514   Weight: 99.8 kg (220 lb) 99.8 kg (220 lb)         Nya Hayes NP   MSN, APRN, FNP-C, CWOCN-AP, RNFA    23

## 2023-01-04 NOTE — PROGRESS NOTES
1900 Bedside shift change report given to Ayaz Burton RN (oncoming nurse) by Venancio Barbosa RN (offgoing nurse). Report included the following information SBAR, Kardex, ED Summary, Procedure Summary, Intake/Output, MAR, Accordion, Recent Results, Med Rec Status, Cardiac Rhythm ST, and Alarm Parameters . 2000 Assessment complete. 0000 Assessment complete.     0400 Assessment complete.    0700 Bedside report given to [facility]

## 2023-01-04 NOTE — PROGRESS NOTES
Physical Therapy Note    Patient remains on CVVHD. \"Patient bedrest while on CRRT as per dialysis nurse. \" Discussed patient with RN and OT. Also noted Hgb dropped again this morning. Per discussion with RN and the fact that this is the third deferral - will sign off on patient for now. Please re-consult if/when patient becomes appropriate for skilled PT.     Meghan Muñoz, PT, DPT

## 2023-01-04 NOTE — PROGRESS NOTES
[facility]    ICU TEAM Progress Note    Name: Nam Tipton   : 1960   MRN: [id number]   Date: 2023           ICU Assessment & Plan of Care       Nam Tipton Is a 60 y.o. male with h/o EtOH abuse who is admitted for RML PE. #. Massive PE on lovenox  #. Spontaneous RP bleed   #. L L2 artery bleed s/p IR embolization   #. Hemorrhagic shock  #. Mildly elevated LFTs    N.  - no acute    C.  - have been unable to significantly wean pressors  - cont for MAP goal >65    P.  - pulm hygiene -- tolerating NC    G.  - NPO while on high-dose pressors (ok for sips/chips with meds)  - LFTs likely ischemic hepatopathy -- monitor    R.  - CRRT as per Renal    H.  - aggressively resuscitated s/p 4u RBC, 2u FFP  - Hb drifting again    -- repeat CTA-A/P now   -- spoke with IR -- if continues to be unstable with Hb drift / pressor need, will plan to go straight to angio   -- H/H q6h  - at this point, very unlikely to be able to tolerate AC  - Dopplers negative on admission  - plan to repeat during hospitalization (if prolonged) / at discharge to eval for need for IVC filter    I.  - low suspicion of infection     E.  - insulin prn    SCDs only  SUP not indicated  FULL      Subjective:   Progress Note: 2023      Reason for ICU Admission: PE     HPI: 60year old with h/o ETOH abuse admitted  for shortness of breath and chest pain who was found to have RML PE. He was started on lovenox then coumadin was added (due to cost). Early this a.m. RRT was called for decreased responsiveness. He was then found to be hypotensive. He was started on NE and ICU was consulted. On initial assessment, he was lethargic but arousable, tachycardic and cold clammy extremities. He was on NE at 52SQY with systolics in 25D. Patient denies N/V/ abdominal pain. Nursing reports no signs of bleeding. Lovenox last given last night.  INR 1.4    Overnight Events:   1/3 - development of spont RP bleed, now s/p IR embol  1/4 - Hb drifting again today; unable to sig wean pressors    POD:  1 Day Post-Op    S/P:   Procedure(s):  LAPAROTOMY EXPLORATORY    Home Medications:     Prior to Admission medications    Medication Sig Start Date End Date Taking? Authorizing Provider   apixaban (ELIQUIS) 5 mg tablet Take 1 Tablet by mouth two (2) times a day. 1/2/23  Yes Jodee Jin MD   folic acid (FOLVITE) 1 mg tablet Take 1 Tablet by mouth daily. 12/29/22  Yes Jodee Jin MD   aspirin 81 mg chewable tablet Take 2 Tabs by mouth daily. 8/28/18  Yes Lina Cannon MD   diphenhydrAMINE (BENADRYL) 25 mg capsule Take 25 mg by mouth as needed for Itching. Yes Provider, Historical   metoprolol succinate (TOPROL-XL) 50 mg XL tablet Take 1 Tab by mouth daily. In place of metoprolol tartrate. 10/18/19   Jr Whitlock MD   losartan (COZAAR) 25 mg tablet Take 1 Tab by mouth daily. Patient not taking: Reported on 12/25/2022 10/18/19   Jr Whitlock MD   therapeutic multivitamin [facility]) tablet Take 1 Tab by mouth daily. Patient not taking: Reported on 12/25/2022 10/27/17   Goldy Bonilla MD   thiamine (B-1) 100 mg tablet Take 1 Tab by mouth daily. 10/27/17   Goldy Bonilla MD   ibuprofen (MOTRIN) 200 mg tablet Take 200 mg by mouth as needed for Pain.   Patient not taking: Reported on 12/25/2022    Provider, Historical       Current Meds:     Current Facility-Administered Medications   Medication Dose Route Frequency    alcohol 62% (NOZIN) nasal  1 Ampule  1 Ampule Topical Q12H    NOREPINephrine (LEVOPHED) 8 mg in 5% dextrose 250mL (32 mcg/mL) infusion  0.5-150 mcg/min IntraVENous TITRATE    0.9% sodium chloride infusion 250 mL  250 mL IntraVENous PRN    pantoprazole (PROTONIX) 40 mg in 0.9% sodium chloride 10 mL injection  40 mg IntraVENous Q12H    vasopressin (VASOSTRICT) 20 Units in 0.9% sodium chloride 100 mL infusion  0.03 Units/min IntraVENous CONTINUOUS    piperacillin-tazobactam (ZOSYN) 3.375 g in 0.9% sodium chloride (MBP/ADV) 100 mL MBP  3.375 g IntraVENous Q8H    dexmedeTOMidine in 0.9 % NaCl (PRECEDEX) 400 mcg/100 mL (4 mcg/mL) infusion soln  0.1-1.5 mcg/kg/hr IntraVENous TITRATE    bicarbonate dialysis (PRISMASOL) BG K 2/Ca 3.5 5000 ml solution   Extracorporeal DIALYSIS CONTINUOUS    traMADoL (ULTRAM) tablet 50 mg  50 mg Oral Q6H PRN    [Held by provider] enoxaparin (LOVENOX) injection 100 mg  100 mg SubCUTAneous B43X    folic acid (FOLVITE) tablet 1 mg  1 mg Oral DAILY    thiamine mononitrate (B-1) tablet 100 mg  100 mg Oral DAILY    multivitamin, tx-iron-ca-min (THERA-M w/ IRON) tablet 1 Tablet  1 Tablet Oral DAILY    magnesium oxide (MAG-OX) tablet 400 mg  400 mg Oral DAILY    sodium chloride (NS) flush 5-40 mL  5-40 mL IntraVENous Q8H    sodium chloride (NS) flush 5-40 mL  5-40 mL IntraVENous PRN    acetaminophen (TYLENOL) tablet 650 mg  650 mg Oral Q6H PRN    Or    acetaminophen (TYLENOL) suppository 650 mg  650 mg Rectal Q6H PRN    polyethylene glycol (MIRALAX) packet 17 g  17 g Oral DAILY PRN    ondansetron (ZOFRAN ODT) tablet 4 mg  4 mg Oral Q8H PRN    Or    ondansetron (ZOFRAN) injection 4 mg  4 mg IntraVENous Q6H PRN       Objective:   Vital Signs:  Visit Vitals  /76   Pulse 91   Temp 98.6 °F (37 °C)   Resp 13   Ht 5' 9\" (1.753 m)   Wt 99.8 kg (220 lb)   SpO2 94%   BMI 32.49 kg/m²    O2 Flow Rate (L/min): 2 l/min O2 Device: Nasal cannula Temp (24hrs), Av.4 °F (36.9 °C), Min:98 °F (36.7 °C), Max:98.7 °F (37.1 °C)           Intake/Output:     Intake/Output Summary (Last 24 hours) at 2023 0803  Last data filed at 2023 0400  Gross per 24 hour   Intake 1407.39 ml   Output 1755 ml   Net -347.61 ml         Physical Exam:  Awake, alert, lying flat in bed (quad-lumen in fem), NAD  Resps even and unlabored, symmetric chest rise on NC, clear throughout  Slightly tachy rate, no heave/rub  Peripheral pulses intact   Abd soft, nontender  ROSE  Calm and cooperative    LABS AND  DATA: Personally reviewed  Recent Labs     01/04/23  0419 01/03/23  1255   WBC 19.2* 16.9*   HGB 7.7* 9.3*   HCT 22.6* 26.6*    195       Recent Labs     01/04/23 0419 01/03/23 1736 01/02/23 1806 01/02/23  1412    137   < > 133*   K 4.1 4.8   < > 5.9*    100   < > 101   CO2 28 24   < > 13*   BUN 10 13   < > 16   CREA 1.78* 2.06*   < > 2.36*   * 167*   < > 253*   CA 9.1 9.0   < > 8.5  8.3*   MG 2.0  --   --  2.1   PHOS 2.8 3.8   < >  --     < > = values in this interval not displayed. Recent Labs     01/04/23 0419 01/03/23 1736 01/02/23 1806 01/02/23  1412   AP 96  --   --  122*   TP 5.7*  --   --  5.8*   ALB 2.3* 2.6*   < > 2.4*   GLOB 3.4  --   --  3.4    < > = values in this interval not displayed. Recent Labs     01/04/23 0419 01/03/23  0215   INR 1.4* 2.0*   PTP 14.0* 20.4*        No results for input(s): PHI, PCO2I, PO2I, FIO2I in the last 72 hours. No results for input(s): CPK, CKMB, TROIQ, BNPP in the last 72 hours. Hemodynamics:   PAP:   CO:     Wedge:   CI:     CVP:    SVR:       PVR:       Ventilator Settings:  Mode Rate Tidal Volume Pressure FiO2 PEEP                    Peak airway pressure:      Minute ventilation:          MEDS: Reviewed    Chest X-Ray:  CXR Results  (Last 48 hours)                 01/02/23 1946  XR CHEST PORT Final result    Impression:  Right IJ catheter satisfactory position without pneumothorax. Narrative: Indication: Line placement. Comparison to earlier the same day. Portable exam obtained at [place] [id number] demonstrates   placement of a right IJ catheter with the tip projecting over the SVC. There is   no pneumothorax. 01/02/23 1636  XR CHEST PORT Final result    Impression:      No acute process on portable chest.           Narrative:  EXAM:  XR CHEST PORT       INDICATION: Short of breath       COMPARISON: 1/2/2023 at 0312       TECHNIQUE: portable chest AP view       FINDINGS: A nasogastric tube terminates in the gastric body.  The heart is normal   size. No focal consolidation, pleural effusion, or pneumothorax. Multidisciplinary Rounds Completed:  Pending    SPECIAL EQUIPMENT  CRRT    DISPOSITION  Stay in ICU    CRITICAL CARE CONSULTANT NOTE  I had a face to face encounter with the patient, reviewed and interpreted patient data including clinical events, labs, images, vital signs, I/O's, and examined patient. I have discussed the case and the plan and management of the patient's care with the consulting services, the bedside nurses and the respiratory therapist.      NOTE OF PERSONAL INVOLVEMENT IN CARE   This patient has a high probability of imminent, clinically significant deterioration, which requires the highest level of preparedness to intervene urgently. I participated in the decision-making and personally managed or directed the management of the following life and organ supporting interventions that required my frequent assessment to treat or prevent imminent deterioration. I personally spent 50 minutes of critical care time. This is time spent at this critically ill patient's bedside actively involved in patient care as well as the coordination of care. This does not include any procedural time which has been billed separately.     Macrina Richey MD  Intensivist  1/4/2023

## 2023-01-04 NOTE — DIALYSIS
CRRT / [phone]    Orders   Mode: CVVHD restarted @ 1015   Blood Flow Rate: 220 ml/min   Prismasol Dose: 2500 ml/hr Dialysate   Prismasol Concentrate: 2K/3.5Ca   Blood Warmer Temp: 37*C   Net Fluid Removal: 0 ml/hr     Metrics   BP: 133/117   HR: 98   Access Pressure: -35   Filter Pressure: 132   Return Pressure: 49   TMP: 14   Pressure Drop: 40     Access   Type & Location: RIJ temporary CVC   Comments: Dressing CDI dated 1/2/23. +aspiration/+flush x2 ports                                       Labs   HBsAg (Antigen) / date: Negative 1/3/23                                              HBsAb (Antibody) / date: Susceptible 1/3/23   Source: [facility]     Safety:   Time Out Done:   (Time) 1000   Consent obtained/signed: Verified   Education: CVC infection prevention & control. Primary Nurse Rpt Pre: ABDIEL Rebolledo RN   Primary Nurse Rpt Post: ABDIEL Rebolledo RN     Comments / Plan:      Filter changed secondary to CT scan. All possible blood (186 ml) returned by primary RN. Consents, patient, code status, labs and orders verified. Old set discarded in red bio-hazard bag. New [id number] filter set-up, primed, tested and running well at this time. RIJ temporary CVC, dressing CDI with bio-patch dated 1/2/23. No signs of redness, drainage, or infection visualized. Each catheter limb disinfected for 60 seconds per limb with alcohol swabs. Caps removed, dialysis CVC hub scrubbed per Hospital P&P. +aspiration/+flush x 2 ports. Lines visible and connections secure with blood warmer to return line at 37*C. Education & pre/post report with primary RN.

## 2023-01-04 NOTE — PROGRESS NOTES
Occupational Therapy    Patient remains on CVVHD. \"Patient bedrest while on CRRT as per dialysis nurse. \" Discussed patient with RN and PT. Also noted Hgb dropped again this morning. OT will sign off on patient for now. Please re-consult if/when patient becomes appropriate for skilled OT.     Milan Ledezma, OTR/L

## 2023-01-04 NOTE — DIALYSIS
CRRT / [phone]    Orders   Mode: CVVHD rounding   Blood Flow Rate: 220 ml/min   Prismasol Dose: 2500 ml/hr   Prismasol Concentrate: 2K / 3.5Ca   Blood Warmer Temp: 37*C   Net Fluid Removal: 0 ml/hr     Metrics   BP: 139/83   HR: 103   Access Pressure: -62   Filter Pressure: 118   Return Pressure: 34   TMP: 17   Pressure Drop: 41     Access   Type & Location: RIJ non-tunneled CVC C/D/I with transparent dressing and biopatch in place dated 01/02/23. Labs   HBsAg (Antigen) / date: Negative  01/03/23                                              HBsAb (Antibody) / date: Susceptible  01/03/23   Source: Epic     Safety:   Time Out Done:   (Time) 0120   Consent obtained/signed: Verified   Education: Access/Site Care   Primary Nurse Rpt Pre: HANSEL Saeed   Primary Nurse Rpt Post: Warren Shane RN     Comments / Plan:   Patient, orders, line and consent verified. Labs, notes and code status reviewed. [id number] filter running well with no indications for change at this time. Lines visible/secure with blood warmer attached to the return line and set to 37C*. Education and Pre/Post with primary RN.

## 2023-01-04 NOTE — PROGRESS NOTES
Nephrology Progress Note  [facility] / [address] [facility], [address]  Phone - [phone]  Fax - [phone]                 Patient: Cici Ramos                   YOB: 1960        Date- 1/4/2023                      Admit Date: 12/25/2022  CC: Follow up for gilmar          IMPRESSION & PLAN:   GILMAR  (secondary to ischemic ATN from hemorrhagic shock)  Hyperkalemia  Anion gap metabolic acidosis  Lactic acidosis  Acute blood loss anemia  Acute RP hematoma status post embolization of lumbar artery  Massive PE   Hemorrhagic/cardiogenic shock      PLAN-  Restart CRRT this pm  Continue vasopressor support  Check bmp bid  Factor ZERO    Subjective: Interval History:   1-4-23----patient is off CRRT- he is going for CT ABDO-- --k 4.1 --he is anuric    Objective:   Vitals:    01/04/23 0700 01/04/23 0730 01/04/23 0745 01/04/23 0800   BP: (!) 144/68 127/69 111/70 (!) 160/84   Pulse: 88 85 93 91   Resp: 13 13 15 13   Temp:    98.7 °F (37.1 °C)   SpO2:       Weight:       Height:          01/03 0701 - 01/04 0700  In: 1568.4 [I.V.:1228.4]  Out: 1955 [Urine:5]    Last 3 Recorded Weights in this Encounter    12/25/22 1537 01/02/23 1514   Weight: 99.8 kg (220 lb) 99.8 kg (220 lb)        Physical exam:    GEN:  NAD  NECK:  Supple, no thyromegaly  RESP: Clear  b/l, no  wheezing,   CVS: RRR,S1,S2   NEURO: non focal, normal speech  EXT:no Edema +nt     HD access: Right IJ Timo    Chart reviewed. Pertinent Notes reviewed.      Data Review :  Recent Labs     01/04/23  0419 01/03/23  1736 01/03/23  1255 01/03/23  0215 01/02/23  1806 01/02/23  1412 01/02/23  0311 01/01/23  1126    137 137 136  136   < > 133*   < >  --    K 4.1 4.8 4.9 5.8*  5.7*   < > 5.9*   < >  --     100 101 101  100   < > 101   < >  --    CO2 28 24 27 24  25   < > 13*   < >  --    BUN 10 13 15 16  18   < > 16   < >  --    CREA 1.78* 2.06* 2.10* 2.43*  2.37*   < > 2.36*   < >  --    * 167* 180* 225*  280*   < > 253*   < >  --    CA 9.1 9.0 8.9 8.1*  7.7*   < > 8.5  8.3*   < >  --    MG 2.0  --   --   --   --  2.1  --   --    PHOS 2.8 3.8  --  4.8*   < >  --   --   --    URICA  --   --   --   --   --   --   --  5.6    < > = values in this interval not displayed. Recent Labs     01/04/23  0419 01/03/23  1255 01/03/23  0820   WBC 19.2* 16.9* 15.7*   HGB 7.7* 9.3* 9.3*   HCT 22.6* 26.6* 26.6*    195 184       No results for input(s): FE, TIBC, PSAT, FERR in the last 72 hours.    No results found for: HBA1C, VJC3BGEA, TMJ4DEQZ   No results found for: MCACR, MCA1, MCA2, MCA3, MCAU, MCAU2, MCALPOCT  US Results (most recent):  Medication list  reviewed  Current Facility-Administered Medications   Medication Dose Route Frequency    alcohol 62% (NOZIN) nasal  1 Ampule  1 Ampule Topical Q12H    NOREPINephrine (LEVOPHED) 8 mg in 5% dextrose 250mL (32 mcg/mL) infusion  0.5-150 mcg/min IntraVENous TITRATE    0.9% sodium chloride infusion 250 mL  250 mL IntraVENous PRN    pantoprazole (PROTONIX) 40 mg in 0.9% sodium chloride 10 mL injection  40 mg IntraVENous Q12H    vasopressin (VASOSTRICT) 20 Units in 0.9% sodium chloride 100 mL infusion  0.03 Units/min IntraVENous CONTINUOUS    dexmedeTOMidine in 0.9 % NaCl (PRECEDEX) 400 mcg/100 mL (4 mcg/mL) infusion soln  0.1-1.5 mcg/kg/hr IntraVENous TITRATE    bicarbonate dialysis (PRISMASOL) BG K 2/Ca 3.5 5000 ml solution   Extracorporeal DIALYSIS CONTINUOUS    [Held by provider] enoxaparin (LOVENOX) injection 100 mg  100 mg SubCUTAneous C51G    folic acid (FOLVITE) tablet 1 mg  1 mg Oral DAILY    thiamine mononitrate (B-1) tablet 100 mg  100 mg Oral DAILY    multivitamin, tx-iron-ca-min (THERA-M w/ IRON) tablet 1 Tablet  1 Tablet Oral DAILY    magnesium oxide (MAG-OX) tablet 400 mg  400 mg Oral DAILY    sodium chloride (NS) flush 5-40 mL  5-40 mL IntraVENous Q8H    sodium chloride (NS) flush 5-40 mL  5-40 mL IntraVENous PRN    acetaminophen (TYLENOL) tablet 650 mg  650 mg Oral Q6H PRN    Or    acetaminophen (TYLENOL) suppository 650 mg  650 mg Rectal Q6H PRN    polyethylene glycol (MIRALAX) packet 17 g  17 g Oral DAILY PRN    ondansetron (ZOFRAN ODT) tablet 4 mg  4 mg Oral Q8H PRN    Or    ondansetron (ZOFRAN) injection 4 mg  4 mg IntraVENous Q6H PRN        Christie Morrison MD  1/4/2023

## 2023-01-05 ENCOUNTER — APPOINTMENT (OUTPATIENT)
Dept: VASCULAR SURGERY | Age: 63
DRG: 950 | End: 2023-01-05
Attending: INTERNAL MEDICINE
Payer: COMMERCIAL

## 2023-01-05 PROBLEM — M54.16 LUMBAR BACK PAIN WITH RADICULOPATHY AFFECTING LEFT LOWER EXTREMITY: Status: ACTIVE | Noted: 2023-01-05

## 2023-01-05 PROBLEM — R29.898 LEFT LEG WEAKNESS: Status: ACTIVE | Noted: 2023-01-05

## 2023-01-05 PROBLEM — G54.1: Status: ACTIVE | Noted: 2023-01-05

## 2023-01-05 PROBLEM — I67.89 CEREBRAL MICROVASCULAR DISEASE: Status: ACTIVE | Noted: 2023-01-05

## 2023-01-05 PROBLEM — I65.23 BILATERAL CAROTID ARTERY STENOSIS: Status: ACTIVE | Noted: 2023-01-05

## 2023-01-05 LAB
ALBUMIN SERPL-MCNC: 2.2 G/DL (ref 3.5–5)
ANION GAP SERPL CALC-SCNC: 6 MMOL/L (ref 5–15)
BUN SERPL-MCNC: 10 MG/DL (ref 6–20)
BUN/CREAT SERPL: 5 (ref 12–20)
CALCIUM SERPL-MCNC: 9.5 MG/DL (ref 8.5–10.1)
CHLORIDE SERPL-SCNC: 103 MMOL/L (ref 97–108)
CO2 SERPL-SCNC: 29 MMOL/L (ref 21–32)
CREAT SERPL-MCNC: 1.98 MG/DL (ref 0.7–1.3)
ERYTHROCYTE [DISTWIDTH] IN BLOOD BY AUTOMATED COUNT: 16.8 % (ref 11.5–14.5)
GLUCOSE BLD STRIP.AUTO-MCNC: 120 MG/DL (ref 65–117)
GLUCOSE BLD STRIP.AUTO-MCNC: 90 MG/DL (ref 65–117)
GLUCOSE SERPL-MCNC: 110 MG/DL (ref 65–100)
HCT VFR BLD AUTO: 21.1 % (ref 36.6–50.3)
HCT VFR BLD AUTO: 26.6 % (ref 36.6–50.3)
HGB BLD-MCNC: 6.9 G/DL (ref 12.1–17)
HGB BLD-MCNC: 8.6 G/DL (ref 12.1–17)
HISTORY CHECKED?,CKHIST: NORMAL
INR PPP: 1.1 (ref 0.9–1.1)
LACTATE SERPL-SCNC: 1.5 MMOL/L (ref 0.4–2)
MCH RBC QN AUTO: 32.5 PG (ref 26–34)
MCHC RBC AUTO-ENTMCNC: 32.7 G/DL (ref 30–36.5)
MCV RBC AUTO: 99.5 FL (ref 80–99)
NRBC # BLD: 0.9 K/UL (ref 0–0.01)
NRBC BLD-RTO: 6.2 PER 100 WBC
PHOSPHATE SERPL-MCNC: 2.1 MG/DL (ref 2.6–4.7)
PLATELET # BLD AUTO: 166 K/UL (ref 150–400)
PMV BLD AUTO: 10.7 FL (ref 8.9–12.9)
POTASSIUM SERPL-SCNC: 3.4 MMOL/L (ref 3.5–5.1)
PROTHROMBIN TIME: 11.9 SEC (ref 9–11.1)
RBC # BLD AUTO: 2.12 M/UL (ref 4.1–5.7)
SERVICE CMNT-IMP: ABNORMAL
SERVICE CMNT-IMP: NORMAL
SODIUM SERPL-SCNC: 138 MMOL/L (ref 136–145)
WBC # BLD AUTO: 14.5 K/UL (ref 4.1–11.1)

## 2023-01-05 PROCEDURE — 93926 LOWER EXTREMITY STUDY: CPT

## 2023-01-05 PROCEDURE — 74011250637 HC RX REV CODE- 250/637: Performed by: STUDENT IN AN ORGANIZED HEALTH CARE EDUCATION/TRAINING PROGRAM

## 2023-01-05 PROCEDURE — 80069 RENAL FUNCTION PANEL: CPT

## 2023-01-05 PROCEDURE — 74011000250 HC RX REV CODE- 250: Performed by: STUDENT IN AN ORGANIZED HEALTH CARE EDUCATION/TRAINING PROGRAM

## 2023-01-05 PROCEDURE — 99223 1ST HOSP IP/OBS HIGH 75: CPT | Performed by: PSYCHIATRY & NEUROLOGY

## 2023-01-05 PROCEDURE — 85610 PROTHROMBIN TIME: CPT

## 2023-01-05 PROCEDURE — 85027 COMPLETE CBC AUTOMATED: CPT

## 2023-01-05 PROCEDURE — 74011250636 HC RX REV CODE- 250/636: Performed by: INTERNAL MEDICINE

## 2023-01-05 PROCEDURE — 74011250637 HC RX REV CODE- 250/637: Performed by: INTERNAL MEDICINE

## 2023-01-05 PROCEDURE — 74011000250 HC RX REV CODE- 250: Performed by: INTERNAL MEDICINE

## 2023-01-05 PROCEDURE — 36415 COLL VENOUS BLD VENIPUNCTURE: CPT

## 2023-01-05 PROCEDURE — 36430 TRANSFUSION BLD/BLD COMPNT: CPT

## 2023-01-05 PROCEDURE — 65610000006 HC RM INTENSIVE CARE

## 2023-01-05 PROCEDURE — P9016 RBC LEUKOCYTES REDUCED: HCPCS

## 2023-01-05 PROCEDURE — 82962 GLUCOSE BLOOD TEST: CPT

## 2023-01-05 PROCEDURE — 77010033678 HC OXYGEN DAILY

## 2023-01-05 PROCEDURE — 85018 HEMOGLOBIN: CPT

## 2023-01-05 PROCEDURE — 83605 ASSAY OF LACTIC ACID: CPT

## 2023-01-05 PROCEDURE — 90945 DIALYSIS ONE EVALUATION: CPT

## 2023-01-05 RX ORDER — SODIUM,POTASSIUM PHOSPHATES 280-250MG
2 POWDER IN PACKET (EA) ORAL 4 TIMES DAILY
Status: COMPLETED | OUTPATIENT
Start: 2023-01-05 | End: 2023-01-05

## 2023-01-05 RX ORDER — SIMETHICONE 80 MG
80 TABLET,CHEWABLE ORAL
Status: DISCONTINUED | OUTPATIENT
Start: 2023-01-05 | End: 2023-01-20 | Stop reason: HOSPADM

## 2023-01-05 RX ORDER — SODIUM CHLORIDE 9 MG/ML
250 INJECTION, SOLUTION INTRAVENOUS AS NEEDED
Status: DISCONTINUED | OUTPATIENT
Start: 2023-01-05 | End: 2023-01-06

## 2023-01-05 RX ORDER — MIDODRINE HYDROCHLORIDE 5 MG/1
10 TABLET ORAL
Status: DISCONTINUED | OUTPATIENT
Start: 2023-01-05 | End: 2023-01-06

## 2023-01-05 RX ADMIN — MULTIPLE VITAMINS W/ MINERALS TAB 1 TABLET: TAB at 10:02

## 2023-01-05 RX ADMIN — EPOETIN ALFA-EPBX 10000 UNITS: 10000 INJECTION, SOLUTION INTRAVENOUS; SUBCUTANEOUS at 21:00

## 2023-01-05 RX ADMIN — Medication 1 AMPULE: at 21:00

## 2023-01-05 RX ADMIN — CALCIUM CHLORIDE, MAGNESIUM CHLORIDE, DEXTROSE MONOHYDRATE, LACTIC ACID, SODIUM CHLORIDE, SODIUM BICARBONATE AND POTASSIUM CHLORIDE: 5.15; 2.03; 22; 5.4; 6.46; 3.09; .157 INJECTION INTRAVENOUS at 06:16

## 2023-01-05 RX ADMIN — POTASSIUM & SODIUM PHOSPHATES POWDER PACK 280-160-250 MG 2 PACKET: 280-160-250 PACK at 13:55

## 2023-01-05 RX ADMIN — MIDODRINE HYDROCHLORIDE 10 MG: 5 TABLET ORAL at 13:10

## 2023-01-05 RX ADMIN — MAGNESIUM OXIDE TAB 400 MG (241.3 MG ELEMENTAL MG) 400 MG: 400 (241.3 MG) TAB at 10:02

## 2023-01-05 RX ADMIN — Medication 1 AMPULE: at 10:07

## 2023-01-05 RX ADMIN — SIMETHICONE 80 MG: 80 TABLET, CHEWABLE ORAL at 17:47

## 2023-01-05 RX ADMIN — THIAMINE HCL TAB 100 MG 100 MG: 100 TAB at 10:02

## 2023-01-05 RX ADMIN — SODIUM CHLORIDE, PRESERVATIVE FREE 10 ML: 5 INJECTION INTRAVENOUS at 13:46

## 2023-01-05 RX ADMIN — POTASSIUM & SODIUM PHOSPHATES POWDER PACK 280-160-250 MG 2 PACKET: 280-160-250 PACK at 10:02

## 2023-01-05 RX ADMIN — FOLIC ACID 1 MG: 1 TABLET ORAL at 10:02

## 2023-01-05 RX ADMIN — SODIUM CHLORIDE, PRESERVATIVE FREE 10 ML: 5 INJECTION INTRAVENOUS at 06:00

## 2023-01-05 RX ADMIN — MIDODRINE HYDROCHLORIDE 10 MG: 5 TABLET ORAL at 10:01

## 2023-01-05 RX ADMIN — IRON SUCROSE 200 MG: 20 INJECTION, SOLUTION INTRAVENOUS at 10:02

## 2023-01-05 RX ADMIN — CALCIUM CHLORIDE, MAGNESIUM CHLORIDE, DEXTROSE MONOHYDRATE, LACTIC ACID, SODIUM CHLORIDE, SODIUM BICARBONATE AND POTASSIUM CHLORIDE: 5.15; 2.03; 22; 5.4; 6.46; 3.09; .157 INJECTION INTRAVENOUS at 04:11

## 2023-01-05 RX ADMIN — SODIUM CHLORIDE, PRESERVATIVE FREE 10 ML: 5 INJECTION INTRAVENOUS at 21:01

## 2023-01-05 NOTE — PROGRESS NOTES
Nephrology Progress Note  [facility] / [address], [address]  [place], [address]  Phone - [phone]  Fax - [phone]                 Patient: Joahn France                   YOB: 1960        Date- 1/5/2023                      Admit Date: 12/25/2022  CC: Follow up for GILMAR        IMPRESSION & PLAN:   GILMAR (secondary to ischemic ATN from hemorrhagic shock)  Hypophosphatemia  Hyperkalemia  Anion gap metabolic acidosis  Lactic acidosis  Acute blood loss anemia  Acute RP hematoma status post embolization of lumbar artery  Massive PE   Hemorrhagic/cardiogenic shock      PLAN-  Stop CRRT  Avoid hypotension  Remove olson cath  Give PRBC tx  Epogen for anemia  He will need HD tomorrow  Neutraphos po   Subjective: Interval History:   1-5-23----patient pulled his femoral line and olson overnight- he is not happy with having multiple lines. .. he is anuric-- seen on CRRT today  1-4-23----patient is off CRRT- he is going for CT ABDO-- --k 4.1 --he is anuric    Objective:   Vitals:    01/05/23 0600 01/05/23 0700 01/05/23 0800 01/05/23 0815   BP: 94/66 123/70 (!) 142/85 125/79   Pulse: 100 95 96 97   Resp: 12 12 12 12   Temp:    98.5 °F (36.9 °C)   SpO2: 96%   100%   Weight:       Height:          01/04 0701 - 01/05 0700  In: 767.9 [P.O.:200; I.V.:567.9]  Out: 432.1     Last 3 Recorded Weights in this Encounter    12/25/22 1537 01/02/23 1514   Weight: 99.8 kg (220 lb) 99.8 kg (220 lb)        Physical exam:    GEN:  NAD  NECK:  Supple, no thyromegaly  RESP: Clear  b/l, no  wheezing,   CVS: RRR,S1,S2   NEURO: non focal, normal speech  EXT:no Edema +nt     Olson +  HD access: Right IJ Timo    Chart reviewed. Pertinent Notes reviewed.      Data Review :  Recent Labs     01/05/23  0424 01/04/23  1710 01/04/23  0419 01/02/23  1806 01/02/23  1412    137 138   < > 133*   K 3.4* 3.6 4.1   < > 5.9*    102 102 < > 101   CO2 29 29 28   < > 13*   BUN 10 9 10   < > 16   CREA 1.98* 1.85* 1.78*   < > 2.36*   * 120* 154*   < > 253*   CA 9.5 9.8 9.1   < > 8.5  8.3*   MG  --   --  2.0  --  2.1   PHOS 2.1* 1.9* 2.8   < >  --     < > = values in this interval not displayed. Recent Labs     01/05/23  0424 01/04/23  1710 01/04/23  1206 01/04/23  0419 01/03/23  1255   WBC 14.5*  --   --  19.2* 16.9*   HGB 6.9* 7.4* 7.4* 7.7* 9.3*   HCT 21.1* 22.3* 21.7* 22.6* 26.6*     --   --  214 195       No results for input(s): FE, TIBC, PSAT, FERR in the last 72 hours.    No results found for: HBA1C, VTA5HXGN, FLK2SSCZ   No results found for: MCACR, MCA1, MCA2, MCA3, MCAU, MCAU2, MCALPOCT  US Results (most recent):  Medication list  reviewed  Current Facility-Administered Medications   Medication Dose Route Frequency    0.9% sodium chloride infusion 250 mL  250 mL IntraVENous PRN    midodrine (PROAMATINE) tablet 10 mg  10 mg Oral TID WITH MEALS    alcohol 62% (NOZIN) nasal  1 Ampule  1 Ampule Topical Q12H    NOREPINephrine (LEVOPHED) 8 mg in 5% dextrose 250mL (32 mcg/mL) infusion  0.5-150 mcg/min IntraVENous TITRATE    0.9% sodium chloride infusion 250 mL  250 mL IntraVENous PRN    bicarbonate dialysis (PRISMASOL) BG K 2/Ca 3.5 5000 ml solution   Extracorporeal DIALYSIS CONTINUOUS    folic acid (FOLVITE) tablet 1 mg  1 mg Oral DAILY    thiamine mononitrate (B-1) tablet 100 mg  100 mg Oral DAILY    multivitamin, tx-iron-ca-min (THERA-M w/ IRON) tablet 1 Tablet  1 Tablet Oral DAILY    magnesium oxide (MAG-OX) tablet 400 mg  400 mg Oral DAILY    sodium chloride (NS) flush 5-40 mL  5-40 mL IntraVENous Q8H    sodium chloride (NS) flush 5-40 mL  5-40 mL IntraVENous PRN    acetaminophen (TYLENOL) tablet 650 mg  650 mg Oral Q6H PRN    Or    acetaminophen (TYLENOL) suppository 650 mg  650 mg Rectal Q6H PRN    polyethylene glycol (MIRALAX) packet 17 g  17 g Oral DAILY PRN    ondansetron (ZOFRAN ODT) tablet 4 mg  4 mg Oral Q8H PRN    Or    ondansetron (ZOFRAN) injection 4 mg  4 mg IntraVENous Q6H PRN        Allison Bernal MD  1/5/2023

## 2023-01-05 NOTE — PROGRESS NOTES
0800 noted that patient pulled out his femoral central line during the night with some blood loss. patient CVVH also clotted off during night shift , also with some blood loss in the circuit. Hgb this AM 6.9. will transfuse 1 unit PRBC as ordered. 0830  PRBC infusing. CRRT discontinued. Blood returned. Levo stopped. Dr. Juan Kaur at bedside. 1230  abdomen is distended. Has no appetite. No lunch. 1300  attempted to get patient up to chair. Patient c/o left leg completely numb from groin to foot. Able to move foot, but unable to move leg. Dr. Stevenson Tinoco notified and at bedside. 1330 arterial doppler of left leg done at bedside. 1730  patient vomited mod amount green liquid. Returned patient to bed.

## 2023-01-05 NOTE — PROGRESS NOTES
[facility]    ICU TEAM Progress Note    Name: Krishna Camejo   : 1960   MRN: [id number]   Date: 2023           ICU Assessment & Plan of Care       Krishna Camejo Is a 60 y.o. male with h/o EtOH abuse who is admitted for RML PE. #. Massive PE on lovenox  #. Spontaneous RP bleed   #. L L2 artery bleed s/p IR embolization   #. Hemorrhagic shock  #. Mildly elevated LFTs    N.  - no acute  - pt pulling out lines over the last 2 nights -- NGT, fem quad, Olson -- putting himself at risk   -- unclear reason as pt doesn't appear to be delirious or agitated/angry, but he states he just \"doesn't want it anymore\"    C.  - levo weaned nearly off this AM  - will add midodrine in an effort to wean IV pressors since pt is actively dc'g lines  - cont for MAP goal >65    P.  - pulm hygiene -- tolerating NC    G.  - will advance diet to clears while on low-dose pressors -- may help with pt's ?frustration  - LFTs likely ischemic hepatopathy -- monitor    R.  - will rinse back CRRT and plan for HD     H.  - aggressively resuscitated s/p 4u RBC, 2u FFP  - Hb down to 6.9 this AM but after pt pulled out CVC and circuit clotted (no rinse-back)  - 1u this morning  - at this point, pt still very unlikely to be able to tolerate AC   -- Dopplers negative on admission   -- plan to repeat during hospitalization (if prolonged) / at discharge to eval for need for IVC filter    I.  - monitor off abx    E.  - insulin prn    SCDs only  SUP not indicated  FULL      Subjective:   Progress Note: 2023      Reason for ICU Admission: PE     HPI: 60year old with h/o ETOH abuse admitted  for shortness of breath and chest pain who was found to have RML PE. He was started on lovenox then coumadin was added (due to cost). Early this a.m. RRT was called for decreased responsiveness. He was then found to be hypotensive. He was started on NE and ICU was consulted.  On initial assessment, he was lethargic but arousable, tachycardic and cold clammy extremities. He was on NE at 20BJB with systolics in 19V. Patient denies N/V/ abdominal pain. Nursing reports no signs of bleeding. Lovenox last given last night. INR 1.4    Overnight Events:   1/3 - development of spont RP bleed, now s/p IR embol  1/4 - Hb drifting again today; unable to sig wean pressors  1/5 - pt pulled out his NGT yesterday, pulled out his fem quadlumen and disconnected his Olson last night. Pt not delirious and not overly angry/agitated, just stating \"doesn't want this anymore. \"  When speaking with patient, he is clear that his goals are still aggressive full code/full therapy. .. POD:  1 Day Post-Op    S/P:   Procedure(s):  LAPAROTOMY EXPLORATORY    Home Medications:     Prior to Admission medications    Medication Sig Start Date End Date Taking? Authorizing Provider   apixaban (ELIQUIS) 5 mg tablet Take 1 Tablet by mouth two (2) times a day. 1/2/23  Yes Divya Maldonado MD   folic acid (FOLVITE) 1 mg tablet Take 1 Tablet by mouth daily. 12/29/22  Yes Divya Maldonado MD   aspirin 81 mg chewable tablet Take 2 Tabs by mouth daily. 8/28/18  Yes Arsalan Gomez MD   diphenhydrAMINE (BENADRYL) 25 mg capsule Take 25 mg by mouth as needed for Itching. Yes Provider, Historical   metoprolol succinate (TOPROL-XL) 50 mg XL tablet Take 1 Tab by mouth daily. In place of metoprolol tartrate. 10/18/19   Kennedi Reese MD   losartan (COZAAR) 25 mg tablet Take 1 Tab by mouth daily. Patient not taking: Reported on 12/25/2022 10/18/19   Kennedi Reese MD   therapeutic multivitamin [facility]) tablet Take 1 Tab by mouth daily. Patient not taking: Reported on 12/25/2022 10/27/17   Chiara Allen MD   thiamine (B-1) 100 mg tablet Take 1 Tab by mouth daily. 10/27/17   Chiara Allen MD   ibuprofen (MOTRIN) 200 mg tablet Take 200 mg by mouth as needed for Pain.   Patient not taking: Reported on 12/25/2022    Provider, Historical       Current Meds:     Current Facility-Administered Medications   Medication Dose Route Frequency    0.9% sodium chloride infusion 250 mL  250 mL IntraVENous PRN    midodrine (PROAMATINE) tablet 10 mg  10 mg Oral TID WITH MEALS    alcohol 62% (NOZIN) nasal  1 Ampule  1 Ampule Topical Q12H    NOREPINephrine (LEVOPHED) 8 mg in 5% dextrose 250mL (32 mcg/mL) infusion  0.5-150 mcg/min IntraVENous TITRATE    0.9% sodium chloride infusion 250 mL  250 mL IntraVENous PRN    vasopressin (VASOSTRICT) 20 Units in 0.9% sodium chloride 100 mL infusion  0.03 Units/min IntraVENous CONTINUOUS    bicarbonate dialysis (PRISMASOL) BG K 2/Ca 3.5 5000 ml solution   Extracorporeal DIALYSIS CONTINUOUS    [Held by provider] enoxaparin (LOVENOX) injection 100 mg  100 mg SubCUTAneous N93N    folic acid (FOLVITE) tablet 1 mg  1 mg Oral DAILY    thiamine mononitrate (B-1) tablet 100 mg  100 mg Oral DAILY    multivitamin, tx-iron-ca-min (THERA-M w/ IRON) tablet 1 Tablet  1 Tablet Oral DAILY    magnesium oxide (MAG-OX) tablet 400 mg  400 mg Oral DAILY    sodium chloride (NS) flush 5-40 mL  5-40 mL IntraVENous Q8H    sodium chloride (NS) flush 5-40 mL  5-40 mL IntraVENous PRN    acetaminophen (TYLENOL) tablet 650 mg  650 mg Oral Q6H PRN    Or    acetaminophen (TYLENOL) suppository 650 mg  650 mg Rectal Q6H PRN    polyethylene glycol (MIRALAX) packet 17 g  17 g Oral DAILY PRN    ondansetron (ZOFRAN ODT) tablet 4 mg  4 mg Oral Q8H PRN    Or    ondansetron (ZOFRAN) injection 4 mg  4 mg IntraVENous Q6H PRN       Objective:   Vital Signs:  Visit Vitals  BP 94/66   Pulse 100   Temp 99 °F (37.2 °C)   Resp 12   Ht 5' 9\" (1.753 m)   Wt 99.8 kg (220 lb)   SpO2 96%   BMI 32.49 kg/m²    O2 Flow Rate (L/min): 2 l/min O2 Device: Nasal cannula Temp (24hrs), Av °F (37.2 °C), Min:98.7 °F (37.1 °C), Max:99.3 °F (37.4 °C)           Intake/Output:     Intake/Output Summary (Last 24 hours) at 2023 0744  Last data filed at 2023 0735  Gross per 24 hour   Intake 767.92 ml Output 452.1 ml   Net 315.82 ml         Physical Exam:  Awake, alert, lying in bed (Gbaby Blackman in fem), NAD  Somewhat flat affect, denying pain or complaints  Resps even and unlabored, symmetric chest rise on NC, clear throughout  Mod tachy rate, no heave/rub  Peripheral pulses intact   Abd soft, nontender, distended  ROSE    LABS AND  DATA: Personally reviewed  Recent Labs     01/05/23 0424 01/04/23 1710 01/04/23 1206 01/04/23 0419   WBC 14.5*  --   --  19.2*   HGB 6.9* 7.4*   < > 7.7*   HCT 21.1* 22.3*   < > 22.6*     --   --  214    < > = values in this interval not displayed. Recent Labs     01/05/23 0424 01/04/23 1710 01/04/23 0419 01/02/23 1806 01/02/23  1412    137 138   < > 133*   K 3.4* 3.6 4.1   < > 5.9*    102 102   < > 101   CO2 29 29 28   < > 13*   BUN 10 9 10   < > 16   CREA 1.98* 1.85* 1.78*   < > 2.36*   * 120* 154*   < > 253*   CA 9.5 9.8 9.1   < > 8.5  8.3*   MG  --   --  2.0  --  2.1   PHOS 2.1* 1.9* 2.8   < >  --     < > = values in this interval not displayed. Recent Labs     01/05/23 0424 01/04/23 1710 01/04/23 0419 01/02/23 1806 01/02/23  1412   AP  --   --  96  --  122*   TP  --   --  5.7*  --  5.8*   ALB 2.2* 2.3* 2.3*   < > 2.4*   GLOB  --   --  3.4  --  3.4    < > = values in this interval not displayed. Recent Labs     01/05/23 0424 01/04/23 0419   INR 1.1 1.4*   PTP 11.9* 14.0*        No results for input(s): PHI, PCO2I, PO2I, FIO2I in the last 72 hours. No results for input(s): CPK, CKMB, TROIQ, BNPP in the last 72 hours.     Hemodynamics:   PAP:   CO:     Wedge:   CI:     CVP:    SVR:       PVR:       Ventilator Settings:  Mode Rate Tidal Volume Pressure FiO2 PEEP                    Peak airway pressure:      Minute ventilation:          MEDS: Reviewed    Chest X-Ray:  CXR Results  (Last 48 hours)      None          Multidisciplinary Rounds Completed:  Yes    SPECIAL EQUIPMENT  None today -- removed CRRT    DISPOSITION  Stay in ICU    CRITICAL CARE CONSULTANT NOTE  I had a face to face encounter with the patient, reviewed and interpreted patient data including clinical events, labs, images, vital signs, I/O's, and examined patient. I have discussed the case and the plan and management of the patient's care with the consulting services, the bedside nurses and the respiratory therapist.      NOTE OF PERSONAL INVOLVEMENT IN CARE   This patient has a high probability of imminent, clinically significant deterioration, which requires the highest level of preparedness to intervene urgently. I participated in the decision-making and personally managed or directed the management of the following life and organ supporting interventions that required my frequent assessment to treat or prevent imminent deterioration. I personally spent 40 minutes of critical care time. This is time spent at this critically ill patient's bedside actively involved in patient care as well as the coordination of care. This does not include any procedural time which has been billed separately.     Aleisha Bahena MD  Intensivist  1/5/2023

## 2023-01-05 NOTE — DIALYSIS
DIALYSIS CVVH NOTE:    This nurse came by the pts. Room, CVVH discontinued by floor nurse @ 0840 this morning. Patient will transition to HD tomorrow. morning. Machine cleaned and kept inside the room. Will collect after successful HD.

## 2023-01-05 NOTE — DIALYSIS
CRRT / [phone]    Orders   Mode: CVVHD   Blood Flow Rate: 220 ml/min   Prismasol Dose: 2500 ml/hr   Prismasol Concentrate: 2K 3.5 Ca   Blood Warmer Temp: 37C   Net Fluid Removal: 0     Metrics   BP: 110/72   HR: 108   Access Pressure: -52   Filter Pressure: 122   Return Pressure: 35   TMP: 17   Pressure Drop: 43     Access   Type & Location: RIJ temporary non-tunneled CVC, dressing C/D/I with bio-patch dated 1/2/2023. No signs of redness, drainage, or infection visualized. Each catheter limb disinfected for 60 seconds per limb with alcohol swabs. Caps removed, dialysis CVC hub scrubbed with Prevantics for 15 seconds, followed by a 5 second dry time per Hospital P&P. +asp/+flush x 2 ports. Comments:                                        Labs   HBsAg (Antigen) / date: Negative 1/3/2023                                             HBsAb (Antibody) / date: Susceptible 1/3/2023   Source: Epic     Safety:   Time Out Done:   (Time) 6756   Consent obtained/signed: Verified   Education: Access/Site Care   Primary Nurse Rpt Pre: Tarik Shoemaker RN   Primary Nurse Rpt Post: Tarik Shoemaker RN     Comments / Plan:      RN at bedside to change filter d/t \"pt clotted. \" The primary RN states that he was unable to return the patient's blood. Patient, code status, labs, and orders verified. Consents on chart. Time out completed. Old set discarded in red biohazard bin. HF-1000 filter set-up, primed w/ 1L NS, tested and running well. Lines visible and connections secure with blood warmer to return line at 37*C. Education, pre and post to primary RN.

## 2023-01-05 NOTE — CONSULTS
Consult  REFERRED BY:  None    CHIEF COMPLAINT: Left leg weakness for 3 days      Subjective: Devang Sadler is a 60 y.o. right-handed -American male we are asked to evaluate by the intensivist for new problem of left leg weakness has been present involving the whole leg for about the last 3 days. The patient was admitted on 12/25/2022 with a pulmonary embolus was started on anticoagulation, and then around January 2 at the sudden onset of hypotension abdominal pain and left leg weakness and was found to have a left retroperitoneal hemorrhage that measured about 10 x 10 x 20 mm, and was found to have a bleeding L2 lumbar artery that was stented and coiled by radiology on January 2, 2023 successfully. He had a  Into his left femoral artery for arterial access, and pulled the sheath out last night we are asked to evaluate because of his left leg weakness to see whether he has a femoral neuropathy from trauma to his femoral nerve from pulling the catheter out or some other cause of his left leg weakness seems to involve the whole leg. The patient never had this from before, does not have that much new back pain or other causes for his symptoms, he has no right leg symptoms or arm symptoms. Patient is not having that much pain with all of this either and his bowel and bladder seem to be working fine. He is off all anticoagulation since the retroperitoneal hematoma. Had a repeat scan done yesterday that showed slight decrease in size of the hematoma and no progressive enlargement. The patient has not had any change in his leg. His exam is somewhat unreliable due to variable effort and symptoms.     Past Medical History:   Diagnosis Date    Hypercholesterolemia     Hypertension       Past Surgical History:   Procedure Laterality Date    HX ORTHOPAEDIC      rotator cuff repair    IR OCCL TXCATH HEMMORAGE W SI  1/2/2023     Family History   Problem Relation Age of Onset    Stroke Father     Hypertension Father     Diabetes Sister       Social History     Tobacco Use    Smoking status: Former     Years: 10.00     Types: Cigarettes     Quit date: 2014     Years since quittin.9    Smokeless tobacco: Never   Substance Use Topics    Alcohol use: Yes     Comment: pint a day         Current Facility-Administered Medications:     0.9% sodium chloride infusion 250 mL, 250 mL, IntraVENous, PRN, Lindalee Goodpasture, Selma Knock, LINDSEY    midodrine (PROAMATINE) tablet 10 mg, 10 mg, Oral, TID WITH MEALS, Barbra Crews MD, 10 mg at 23 1310    epoetin ji-epbx (RETACRIT) injection 10,000 Units, 10,000 Units, SubCUTAneous, Q TUE, THU & SAT, Bart Leon MD    alcohol 62% (NOZIN) nasal  1 Ampule, 1 Ampule, Topical, Q12H, Barbra Crews MD, 1 Ampule at 23 1007    NOREPINephrine (LEVOPHED) 8 mg in 5% dextrose 250mL (32 mcg/mL) infusion, 0.5-150 mcg/min, IntraVENous, TITRATE, Hazel Jean Baptiste MD, Stopped at 23 0820    0.9% sodium chloride infusion 250 mL, 250 mL, IntraVENous, PRN, Hazel Jean Baptiste MD    folic acid (FOLVITE) tablet 1 mg, 1 mg, Oral, DAILY, Randa Fuentes DO, 1 mg at 23 1002    thiamine mononitrate (B-1) tablet 100 mg, 100 mg, Oral, DAILY, Randa Fuentes DO, 100 mg at 23 1002    multivitamin, tx-iron-ca-min (THERA-M w/ IRON) tablet 1 Tablet, 1 Tablet, Oral, DAILY, Randa Fuentes DO, 1 Tablet at 23 1002    magnesium oxide (MAG-OX) tablet 400 mg, 400 mg, Oral, DAILY, Randa Fuentes DO, 400 mg at 23 1002    sodium chloride (NS) flush 5-40 mL, 5-40 mL, IntraVENous, Q8H, Randa Fuentes DO, 10 mL at 23 1346    sodium chloride (NS) flush 5-40 mL, 5-40 mL, IntraVENous, PRN, Randa Fuentes,     acetaminophen (TYLENOL) tablet 650 mg, 650 mg, Oral, Q6H PRN **OR** acetaminophen (TYLENOL) suppository 650 mg, 650 mg, Rectal, Q6H PRN, Randa Fuentes,     polyethylene glycol (MIRALAX) packet 17 g, 17 g, Oral, DAILY PRN, Janeane Never, DO    ondansetron (ZOFRAN ODT) tablet 4 mg, 4 mg, Oral, Q8H PRN **OR** ondansetron (ZOFRAN) injection 4 mg, 4 mg, IntraVENous, Q6H PRN, Janeane Never, DO        No Known Allergies   MRI Results (most recent):  Results from Hospital Encounter encounter on 09/28/21    MRI BRAIN WO CONT    Narrative  PRELIMINARY REPORT:    No acute infarct. No acute intracranial abnormality    Preliminary report was provided by Dr. Bi Adam, the on-call radiologist, at 6130    Final report to follow. Clinical indication: Syncope. Possible stroke. Technical factors sagittal T1-weighted, diffusion imaging, axial T1-weighted  T2-weighted FLAIR gradient echo coronal T2-weighted. No prior. Diffusion imaging does not show acute ischemic changes. Prominent atrophy for age. Mild nonspecific white matter changes. No extra-axial fluid collection hemorrhage or shift. Flow voids in major vessels at the base of the brain are present. No mass. Impression  1. No acute findings no mass  2, prominent atrophy for age. Results from [place] encounter on 09/28/21    MRI BRAIN WO CONT    Narrative  PRELIMINARY REPORT:    No acute infarct. No acute intracranial abnormality    Preliminary report was provided by Dr. Bi Adam, the on-call radiologist, at 3697    Final report to follow. Clinical indication: Syncope. Possible stroke. Technical factors sagittal T1-weighted, diffusion imaging, axial T1-weighted  T2-weighted FLAIR gradient echo coronal T2-weighted. No prior. Diffusion imaging does not show acute ischemic changes. Prominent atrophy for age. Mild nonspecific white matter changes. No extra-axial fluid collection hemorrhage or shift. Flow voids in major vessels at the base of the brain are present. No mass. Impression  1. No acute findings no mass  2, prominent atrophy for age.     Review of Systems:  A comprehensive review of systems was negative except for: Constitutional: positive for fatigue and malaise  Respiratory: positive for dyspnea on exertion or shortness of breath and chest pain  Cardiovascular: positive for chest pain and shortness of breath  Musculoskeletal: positive for back pain and muscle weakness  Neurological: positive for paresthesia and weakness   Vitals:    01/05/23 1100 01/05/23 1200 01/05/23 1300 01/05/23 1400   BP: 124/87 128/88  100/73   Pulse: (!) 102 (!) 104 (!) 105 (!) 104   Resp: 14 15 14 20   Temp:       SpO2: (!) 78% 100%     Weight:       Height:         Objective:     I      NEUROLOGICAL EXAM:    Appearance: The patient is well developed, well nourished, provides a coherent history and is in no acute distress. Mental Status: Oriented to time, place and person, and the president, cognitive function is normal and speech is fluent and no aphasia or dysarthria. Mood and affect appropriate. Cranial Nerves:   Intact visual fields. Fundi are benign, disc are flat, no lesions seen on funduscopy. SHELTON, EOM's full, no nystagmus, no ptosis. Facial sensation is normal. Corneal reflexes are not tested. Facial movement is symmetric. Hearing is normal bilaterally. Palate is midline with normal sternocleidomastoid and trapezius muscles are normal. Tongue is midline. Neck without meningismus or bruits  Temporal arteries are not tender or enlarged  TMJ areas are not tender on palpation   Motor:  5/5 strength in upper and lower right proximal and distal muscles. The left leg shows almost a flaccid quadriceps, iliopsoas, and abductor emily, and moderate weakness of his gluteus and mild weakness of his plantar flexors and toe flexors and hamstrings. Normal bulk and tone. No fasciculations. Rapid alternating movement is symmetric and intact bilaterally   Reflexes:   Deep tendon reflexes 1+/4 and symmetrical, except that the left knee jerk and ankle jerks seem to be absent.   No babinski or clonus present   Sensory:   Normal to touch, pinprick and vibration and temperature in both arms and right leg but decreased in the left leg along the anterior lateral upper and lower leg. DSS is intact   Gait:  Not testable   Tremor:   No tremor noted. Cerebellar:  Not testable abnormal cerebellar signs present on Romberg and tandem testing and finger-nose-finger exam.   Neurovascular:  Normal heart sounds and regular rhythm, peripheral pulses decreased, and no carotid bruits. Assessment:       ICD-10-CM ICD-9-CM    1. Alcoholic ketoacidosis  S82.76 276.2       2. Other acute pulmonary embolism without acute cor pulmonale (HCC)  I26.99 415.19         Active Problems:    Pulmonary embolism ([address] (12/25/2022)      Retroperitoneal bleed (1/2/2023)      Left leg weakness (1/5/2023)      Nontraumatic lumbosacral plexopathy (1/5/2023)      Lumbar back pain with radiculopathy affecting left lower extremity (1/5/2023)      Bilateral carotid artery stenosis (1/5/2023)      Cerebral microvascular disease (1/5/2023)      Plan:     Patient with left leg weakness that looks most like an upper lumbar plexopathy probably from compression from his retroperitoneal hematoma near the L2 level. He does have a 10 x 10 x 20 mm hematoma that was slightly smaller yesterday on repeat studies we will repeat the studies again tomorrow check a CT of the lumbar spine of this does not look like a lumbar radiculopathy due to the lack of pain. Patient does have some symptom overlay, we usually give him to give maximal effort, he clearly mainly is weak in his upper lumbar plexus sparing his hamstrings and calf muscles and toe flexors to a fair degree. Discussed this with the patient and advised him that he will take 3 months sometimes up to 12 months to get recovery, and it may only be partial, and that we will repeat his scans tomorrow follow this but there really is no need for surgical decompression as the risk of surgery far outweigh any benefits at this point.   If possible try to continue off anticoagulation to prevent any enlargement of the hematoma. PT and OT need to see the patient and further treatment evaluation probably including EMG study in 2 to 3 weeks when denervation potentials should be present. Continue excellent medical care as you are, and advised the patient of all of this in detail and he seemed to understand, and his exam at time testing functional component making it a little difficult to be definitive about his findings because they vary some.     Signed By: Branden Ballesteros MD     January 5, 2023       CC: None  FAX: None

## 2023-01-05 NOTE — PROGRESS NOTES
1900 Bedside shift change report given to Ayaz Burton RN (oncoming nurse) by Sydni Gamino RN (offgoing nurse). Report included the following information SBAR, Kardex, ED Summary, Procedure Summary, Intake/Output, MAR, Accordion, Recent Results, Med Rec Status, Cardiac Rhythm ST, and Alarm Parameters . 2000 Assessment complete. 0000 Assessment complete. 0022 CRRT filter clotted off. Kimberlyn Cazares called to restart the CRRT.    0400 Assessment complete. 0430 Patient pulled his femoral central line. RN noted blood all over his groin area. RN educated patient on what will happen when he pulled of his lines specially the one on his neck.

## 2023-01-05 NOTE — INTERDISCIPLINARY ROUNDS
Interdisciplinary team rounds were held 1/5/2023 with the following team members:Care Management, Diabetes Treatment Specialist, Nursing, Nutrition, Pharmacy, Physician, and Clinical Coordinator. Plan of care discussed. See clinical pathway and/or care plan for interventions and desired outcomes. Goals of the Day: CRRT now off. Will continue to monitor closely.

## 2023-01-05 NOTE — PROGRESS NOTES
SURGERY PROGRESS NOTE      Admit Date: 2022    POD 3 Days Post-Op    Procedure: Procedure(s):  LAPAROTOMY EXPLORATORY      Subjective:     Patient states he feels better. Has bloating but, pain is better. Denies nausea. Objective:     Visit Vitals  /82   Pulse 98   Temp 98.4 °F (36.9 °C)   Resp 12   Ht 5' 9\" (1.753 m)   Wt 99.8 kg (220 lb)   SpO2 97%   BMI 32.49 kg/m²        Temp (24hrs), Av.9 °F (37.2 °C), Min:98.4 °F (36.9 °C), Max:99.3 °F (37.4 °C)      701 - 1900  In: -   Out: 20 [Urine:20]  1901 -  07  In: 1135.1 [P.O.:200; I.V.:935.1]  Out: 913.1     Physical Exam:    General:  alert, cooperative, no distress, appears stated age   Abdomen: Very distended and tympanic, no real tenderness. Hypoactive BS           Lab Results   Component Value Date/Time    WBC 14.5 (H) 2023 04:24 AM    HGB 6.9 (L) 2023 04:24 AM    HCT 21.1 (L) 2023 04:24 AM    PLATELET 094  04:24 AM    MCV 99.5 (H) 2023 04:24 AM     Lab Results   Component Value Date/Time    GFR est non-AA 59 (L) 2021 09:41 AM    GFR est AA >60 2021 09:41 AM    Creatinine 1.98 (H) 2023 04:24 AM    Creatinine, POC 1.2 2022 06:38 PM    BUN 10 2023 04:24 AM    Sodium 138 2023 04:24 AM    Sodium,  2022 06:38 PM    Potassium 3.4 (L) 2023 04:24 AM    Potassium, POC 5.2 2022 06:38 PM    Chloride 103 2023 04:24 AM    Chloride,  (H) 2022 06:38 PM    CO2 29 2023 04:24 AM    Magnesium 2.0 2023 04:19 AM    Phosphorus 2.1 (L) 2023 04:24 AM       Assessment/plan: Active Problems:    Pulmonary embolism ([facility] [address] (2022)      Retroperitoneal bleed (2023)    Hgb drift is to be expected. I don't think his is bleeding. He is de distended and I am concerned he jigar not tolerate PO and will likely vomit. He does not want the NG reinserted. Would go very slow on PO until distention improves.

## 2023-01-06 ENCOUNTER — APPOINTMENT (OUTPATIENT)
Dept: GENERAL RADIOLOGY | Age: 63
DRG: 950 | End: 2023-01-06
Attending: NURSE PRACTITIONER
Payer: COMMERCIAL

## 2023-01-06 ENCOUNTER — HOSPITAL ENCOUNTER (INPATIENT)
Dept: INTERVENTIONAL RADIOLOGY/VASCULAR | Age: 63
Discharge: HOME | DRG: 950 | End: 2023-01-06
Attending: INTERNAL MEDICINE
Payer: COMMERCIAL

## 2023-01-06 ENCOUNTER — APPOINTMENT (OUTPATIENT)
Dept: GENERAL RADIOLOGY | Age: 63
DRG: 950 | End: 2023-01-06
Attending: INTERNAL MEDICINE
Payer: COMMERCIAL

## 2023-01-06 LAB
ABO + RH BLD: NORMAL
ALBUMIN SERPL-MCNC: 2.4 G/DL (ref 3.5–5)
ANION GAP SERPL CALC-SCNC: 4 MMOL/L (ref 5–15)
ANION GAP SERPL CALC-SCNC: 8 MMOL/L (ref 5–15)
BLD PROD TYP BPU: NORMAL
BLOOD GROUP ANTIBODIES SERPL: NORMAL
BPU ID: NORMAL
BUN SERPL-MCNC: 19 MG/DL (ref 6–20)
BUN SERPL-MCNC: 19 MG/DL (ref 6–20)
BUN/CREAT SERPL: 5 (ref 12–20)
BUN/CREAT SERPL: 6 (ref 12–20)
CALCIUM SERPL-MCNC: 9.1 MG/DL (ref 8.5–10.1)
CALCIUM SERPL-MCNC: 9.1 MG/DL (ref 8.5–10.1)
CHLORIDE SERPL-SCNC: 101 MMOL/L (ref 97–108)
CHLORIDE SERPL-SCNC: 102 MMOL/L (ref 97–108)
CO2 SERPL-SCNC: 29 MMOL/L (ref 21–32)
CO2 SERPL-SCNC: 30 MMOL/L (ref 21–32)
CREAT SERPL-MCNC: 3.44 MG/DL (ref 0.7–1.3)
CREAT SERPL-MCNC: 3.54 MG/DL (ref 0.7–1.3)
CROSSMATCH RESULT,%XM: NORMAL
ERYTHROCYTE [DISTWIDTH] IN BLOOD BY AUTOMATED COUNT: 17.4 % (ref 11.5–14.5)
GLUCOSE BLD STRIP.AUTO-MCNC: 80 MG/DL (ref 65–117)
GLUCOSE BLD STRIP.AUTO-MCNC: 80 MG/DL (ref 65–117)
GLUCOSE BLD STRIP.AUTO-MCNC: 83 MG/DL (ref 65–117)
GLUCOSE SERPL-MCNC: 104 MG/DL (ref 65–100)
GLUCOSE SERPL-MCNC: 106 MG/DL (ref 65–100)
HCT VFR BLD AUTO: 25.4 % (ref 36.6–50.3)
HGB BLD-MCNC: 8.2 G/DL (ref 12.1–17)
INR PPP: 1.1 (ref 0.9–1.1)
LEFT CFA PROX SYS PSV: 94.4 CM/S
LEFT PROX PFA A PSV: 75.7 CM/S
LEFT SFA PROX VEL RATIO: 0.78
LEFT SUPER FEMORAL PROX SYS PSV: 73.5 CM/S
MAGNESIUM SERPL-MCNC: 2.4 MG/DL (ref 1.6–2.4)
MCH RBC QN AUTO: 32 PG (ref 26–34)
MCHC RBC AUTO-ENTMCNC: 32.3 G/DL (ref 30–36.5)
MCV RBC AUTO: 99.2 FL (ref 80–99)
NRBC # BLD: 0.91 K/UL (ref 0–0.01)
NRBC BLD-RTO: 7 PER 100 WBC
PHOSPHATE SERPL-MCNC: 3 MG/DL (ref 2.6–4.7)
PLATELET # BLD AUTO: 198 K/UL (ref 150–400)
PMV BLD AUTO: 11 FL (ref 8.9–12.9)
POTASSIUM SERPL-SCNC: 3.6 MMOL/L (ref 3.5–5.1)
POTASSIUM SERPL-SCNC: 3.8 MMOL/L (ref 3.5–5.1)
PROTHROMBIN TIME: 11.3 SEC (ref 9–11.1)
RBC # BLD AUTO: 2.56 M/UL (ref 4.1–5.7)
SERVICE CMNT-IMP: NORMAL
SODIUM SERPL-SCNC: 136 MMOL/L (ref 136–145)
SODIUM SERPL-SCNC: 138 MMOL/L (ref 136–145)
SPECIMEN EXP DATE BLD: NORMAL
STATUS OF UNIT,%ST: NORMAL
UNIT DIVISION, %UDIV: 0
WBC # BLD AUTO: 12.9 K/UL (ref 4.1–11.1)

## 2023-01-06 PROCEDURE — 5A1D70Z PERFORMANCE OF URINARY FILTRATION, INTERMITTENT, LESS THAN 6 HOURS PER DAY: ICD-10-PCS | Performed by: INTERNAL MEDICINE

## 2023-01-06 PROCEDURE — 82962 GLUCOSE BLOOD TEST: CPT

## 2023-01-06 PROCEDURE — 77010033678 HC OXYGEN DAILY

## 2023-01-06 PROCEDURE — 74011000250 HC RX REV CODE- 250: Performed by: STUDENT IN AN ORGANIZED HEALTH CARE EDUCATION/TRAINING PROGRAM

## 2023-01-06 PROCEDURE — 99231 SBSQ HOSP IP/OBS SF/LOW 25: CPT | Performed by: SURGERY

## 2023-01-06 PROCEDURE — 74018 RADEX ABDOMEN 1 VIEW: CPT

## 2023-01-06 PROCEDURE — 74011250637 HC RX REV CODE- 250/637: Performed by: STUDENT IN AN ORGANIZED HEALTH CARE EDUCATION/TRAINING PROGRAM

## 2023-01-06 PROCEDURE — 74011000250 HC RX REV CODE- 250: Performed by: RADIOLOGY

## 2023-01-06 PROCEDURE — 74011250637 HC RX REV CODE- 250/637: Performed by: INTERNAL MEDICINE

## 2023-01-06 PROCEDURE — 85610 PROTHROMBIN TIME: CPT

## 2023-01-06 PROCEDURE — 65610000006 HC RM INTENSIVE CARE

## 2023-01-06 PROCEDURE — 36415 COLL VENOUS BLD VENIPUNCTURE: CPT

## 2023-01-06 PROCEDURE — 36556 INSERT NON-TUNNEL CV CATH: CPT

## 2023-01-06 PROCEDURE — 71045 X-RAY EXAM CHEST 1 VIEW: CPT

## 2023-01-06 PROCEDURE — 80048 BASIC METABOLIC PNL TOTAL CA: CPT

## 2023-01-06 PROCEDURE — 90935 HEMODIALYSIS ONE EVALUATION: CPT

## 2023-01-06 PROCEDURE — 99232 SBSQ HOSP IP/OBS MODERATE 35: CPT | Performed by: PSYCHIATRY & NEUROLOGY

## 2023-01-06 PROCEDURE — 74011250636 HC RX REV CODE- 250/636: Performed by: RADIOLOGY

## 2023-01-06 PROCEDURE — 83735 ASSAY OF MAGNESIUM: CPT

## 2023-01-06 PROCEDURE — 85027 COMPLETE CBC AUTOMATED: CPT

## 2023-01-06 PROCEDURE — 80069 RENAL FUNCTION PANEL: CPT

## 2023-01-06 RX ORDER — HEPARIN 100 UNIT/ML
300 SYRINGE INTRAVENOUS ONCE
Status: COMPLETED | OUTPATIENT
Start: 2023-01-06 | End: 2023-01-06

## 2023-01-06 RX ORDER — MIDODRINE HYDROCHLORIDE 5 MG/1
10 TABLET ORAL
Status: DISCONTINUED | OUTPATIENT
Start: 2023-01-06 | End: 2023-01-17

## 2023-01-06 RX ORDER — DEXTROSE 50 % IN WATER (D50W) INTRAVENOUS SYRINGE
12.5-25 AS NEEDED
Status: DISCONTINUED | OUTPATIENT
Start: 2023-01-06 | End: 2023-01-13

## 2023-01-06 RX ORDER — INSULIN LISPRO 100 [IU]/ML
INJECTION, SOLUTION INTRAVENOUS; SUBCUTANEOUS EVERY 6 HOURS
Status: DISCONTINUED | OUTPATIENT
Start: 2023-01-06 | End: 2023-01-17 | Stop reason: ALTCHOICE

## 2023-01-06 RX ORDER — LIDOCAINE HYDROCHLORIDE 20 MG/ML
18 INJECTION, SOLUTION INFILTRATION; PERINEURAL ONCE
Status: COMPLETED | OUTPATIENT
Start: 2023-01-06 | End: 2023-01-06

## 2023-01-06 RX ORDER — ALBUMIN HUMAN 250 G/1000ML
25 SOLUTION INTRAVENOUS
Status: DISCONTINUED | OUTPATIENT
Start: 2023-01-06 | End: 2023-01-06 | Stop reason: SDUPTHER

## 2023-01-06 RX ORDER — HEPARIN SODIUM 200 [USP'U]/100ML
400 INJECTION, SOLUTION INTRAVENOUS ONCE
Status: COMPLETED | OUTPATIENT
Start: 2023-01-06 | End: 2023-01-06

## 2023-01-06 RX ORDER — IBUPROFEN 200 MG
4 TABLET ORAL AS NEEDED
Status: DISCONTINUED | OUTPATIENT
Start: 2023-01-06 | End: 2023-01-20 | Stop reason: HOSPADM

## 2023-01-06 RX ADMIN — Medication 1 AMPULE: at 22:49

## 2023-01-06 RX ADMIN — LIDOCAINE HYDROCHLORIDE 3 ML: 20 INJECTION, SOLUTION INFILTRATION; PERINEURAL at 13:00

## 2023-01-06 RX ADMIN — Medication 1 AMPULE: at 08:07

## 2023-01-06 RX ADMIN — SODIUM CHLORIDE, PRESERVATIVE FREE 300 UNITS: 5 INJECTION INTRAVENOUS at 13:00

## 2023-01-06 RX ADMIN — MAGNESIUM OXIDE TAB 400 MG (241.3 MG ELEMENTAL MG) 400 MG: 400 (241.3 MG) TAB at 08:06

## 2023-01-06 RX ADMIN — THIAMINE HCL TAB 100 MG 100 MG: 100 TAB at 08:06

## 2023-01-06 RX ADMIN — HEPARIN SODIUM 10 ML: 200 INJECTION, SOLUTION INTRAVENOUS at 13:00

## 2023-01-06 RX ADMIN — SODIUM CHLORIDE, PRESERVATIVE FREE 10 ML: 5 INJECTION INTRAVENOUS at 22:49

## 2023-01-06 RX ADMIN — SODIUM CHLORIDE, PRESERVATIVE FREE 10 ML: 5 INJECTION INTRAVENOUS at 14:00

## 2023-01-06 RX ADMIN — SODIUM CHLORIDE, PRESERVATIVE FREE 10 ML: 5 INJECTION INTRAVENOUS at 05:28

## 2023-01-06 RX ADMIN — MULTIPLE VITAMINS W/ MINERALS TAB 1 TABLET: TAB at 08:06

## 2023-01-06 RX ADMIN — FOLIC ACID 1 MG: 1 TABLET ORAL at 08:06

## 2023-01-06 NOTE — PROGRESS NOTES
Occupational Therapy   Attempted again to see patient for OT evaluation, but he is currently having a procedure performed at bedside. Will defer OT and follow back this weekend.      Milan Ledezma, OTR/L

## 2023-01-06 NOTE — PROGRESS NOTES
Consult  REFERRED BY:  None    CHIEF COMPLAINT: Left leg weakness for 3 days      Subjective: Megan Watkins is a 60 y.o. right-handed -American male we are asked to evaluate by the intensivist for new problem of left leg weakness has been present involving the whole leg for about the last 3 days. The patient was admitted on 12/25/2022 with a pulmonary embolus was started on anticoagulation, and then around January 2 at the sudden onset of hypotension abdominal pain and left leg weakness and was found to have a left retroperitoneal hemorrhage that measured about 10 x 10 x 20 mm, and was found to have a bleeding L2 lumbar artery that was stented and coiled by radiology on January 2, 2023 successfully. He had a  Into his left femoral artery for arterial access, and pulled the sheath out last night we are asked to evaluate because of his left leg weakness to see whether he has a femoral neuropathy from trauma to his femoral nerve from pulling the catheter out or some other cause of his left leg weakness seems to involve the whole leg. The patient never had this from before, does not have that much new back pain or other causes for his symptoms, he has no right leg symptoms or arm symptoms. Patient is not having that much pain with all of this either and his bowel and bladder seem to be working fine. He is off all anticoagulation since the retroperitoneal hematoma. Had a repeat scan done yesterday that showed slight decrease in size of the hematoma and no progressive enlargement. The patient has not had any change in his leg. His exam is somewhat unreliable due to variable effort and symptoms. Patient remained stable today, no new complaints, and leg strength about the same, mainly involving the femoral nerve and upper lumbosacral plexus from his retroperitoneal hematoma.   Follow-up CT scans being done today if they are unremarkable we will follow as needed, he will just need good physical therapy and to see us in about 4 to 8 weeks as outpatient to do EMG studies to follow his reinnervation.   I will follow as needed for now, call if I can help    Past Medical History:   Diagnosis Date    Hypercholesterolemia     Hypertension       Past Surgical History:   Procedure Laterality Date    HX ORTHOPAEDIC      rotator cuff repair    IR INSERT NON TUNL CVC OVER 5 YRS  2023    IR OCCL TXCATH HEMMORAGE W SI  2023     Family History   Problem Relation Age of Onset    Stroke Father     Hypertension Father     Diabetes Sister       Social History     Tobacco Use    Smoking status: Former     Years: 10.00     Types: Cigarettes     Quit date: 2014     Years since quittin.9    Smokeless tobacco: Never   Substance Use Topics    Alcohol use: Yes     Comment: pint a day         Current Facility-Administered Medications:     midodrine (PROAMATINE) tablet 10 mg, 10 mg, Oral, TID PRN, Barbra Crews MD    insulin lispro (HUMALOG) injection, , SubCUTAneous, Q6H, Barbra Crews MD    glucose chewable tablet 16 g, 4 Tablet, Oral, PRN, Barbra Crews MD    dextrose (D50W) injection syrg 12.5-25 g, 12.5-25 g, IntraVENous, PRN, Barbra Crews MD    glucagon (GLUCAGEN) injection 1 mg, 1 mg, IntraMUSCular, PRN, CrewsSarah marrero MD    TPN ADULT - CENTRAL, , IntraVENous, CONTINUOUS, Michelle Marcelo MD    epoetin ji-epbx (RETACRIT) injection 10,000 Units, 10,000 Units, SubCUTAneous, Q TUE, THU & SAT, Giovanni CESPEDES MD, 10,000 Units at 23 2100    simethicone (MYLICON) tablet 80 mg, 80 mg, Oral, QID PRN, Garrett Manzano MD, 80 mg at 23 1747    alcohol 62% (NOZIN) nasal  1 Ampule, 1 Ampule, Topical, Q12H, Barbra Crews MD, 1 Ampule at  1901    folic acid (FOLVITE) tablet 1 mg, 1 mg, Oral, DAILY, Si Masker, DO, 1 mg at 23 1302    thiamine mononitrate (B-1) tablet 100 mg, 100 mg, Oral, DAILY, Si Masker, DO, 100 mg at 23 0806    multivitamin, tx-iron-ca-min (THERA-M w/ IRON) tablet 1 Tablet, 1 Tablet, Oral, DAILY, Annamae Lather, DO, 1 Tablet at 01/06/23 0806    magnesium oxide (MAG-OX) tablet 400 mg, 400 mg, Oral, DAILY, Annamae Lather, DO, 400 mg at 01/06/23 5435    sodium chloride (NS) flush 5-40 mL, 5-40 mL, IntraVENous, Q8H, Annamae Lather, DO, 10 mL at 01/06/23 0528    sodium chloride (NS) flush 5-40 mL, 5-40 mL, IntraVENous, PRN, Annamae Lather, DO    acetaminophen (TYLENOL) tablet 650 mg, 650 mg, Oral, Q6H PRN **OR** acetaminophen (TYLENOL) suppository 650 mg, 650 mg, Rectal, Q6H PRN, Annamae Lather, DO    polyethylene glycol (MIRALAX) packet 17 g, 17 g, Oral, DAILY PRN, Annamae Lather, DO    ondansetron (ZOFRAN ODT) tablet 4 mg, 4 mg, Oral, Q8H PRN **OR** ondansetron (ZOFRAN) injection 4 mg, 4 mg, IntraVENous, Q6H PRN, Annamae Lather, DO        No Known Allergies   MRI Results (most recent):  Results from [place] encounter on 09/28/21    MRI BRAIN WO CONT    Narrative  PRELIMINARY REPORT:    No acute infarct. No acute intracranial abnormality    Preliminary report was provided by Dr. Karol Cruz, the on-call radiologist, at 7916    Final report to follow. Clinical indication: Syncope. Possible stroke. Technical factors sagittal T1-weighted, diffusion imaging, axial T1-weighted  T2-weighted FLAIR gradient echo coronal T2-weighted. No prior. Diffusion imaging does not show acute ischemic changes. Prominent atrophy for age. Mild nonspecific white matter changes. No extra-axial fluid collection hemorrhage or shift. Flow voids in major vessels at the base of the brain are present. No mass. Impression  1. No acute findings no mass  2, prominent atrophy for age. Results from [place] encounter on 09/28/21    MRI BRAIN WO CONT    Narrative  PRELIMINARY REPORT:    No acute infarct.  No acute intracranial abnormality    Preliminary report was provided by Dr. Karol Cruz, the on-call radiologist, at 2193    Final report to follow. Clinical indication: Syncope. Possible stroke. Technical factors sagittal T1-weighted, diffusion imaging, axial T1-weighted  T2-weighted FLAIR gradient echo coronal T2-weighted. No prior. Diffusion imaging does not show acute ischemic changes. Prominent atrophy for age. Mild nonspecific white matter changes. No extra-axial fluid collection hemorrhage or shift. Flow voids in major vessels at the base of the brain are present. No mass. Impression  1. No acute findings no mass  2, prominent atrophy for age. Review of Systems:  A comprehensive review of systems was negative except for: Constitutional: positive for fatigue and malaise  Respiratory: positive for dyspnea on exertion or shortness of breath and chest pain  Cardiovascular: positive for chest pain and shortness of breath  Musculoskeletal: positive for back pain and muscle weakness  Neurological: positive for paresthesia and weakness   Vitals:    01/06/23 0900 01/06/23 1000 01/06/23 1100 01/06/23 1200   BP: (!) 163/97 (!) 164/93 (!) 145/100 132/86   Pulse: 86 83 91 91   Resp: 14 13 17 14   Temp:    98 °F (36.7 °C)   SpO2:  99% 100% 98%   Weight:       Height:         Objective:     I      NEUROLOGICAL EXAM:    Appearance: The patient is well developed, well nourished, provides a coherent history and is in no acute distress. Mental Status: Oriented to time, place and person, and the president, cognitive function is normal and speech is fluent and no aphasia or dysarthria. Mood and affect appropriate. Cranial Nerves:   Intact visual fields. Fundi are benign, disc are flat, no lesions seen on funduscopy. SHELTON, EOM's full, no nystagmus, no ptosis. Facial sensation is normal. Corneal reflexes are not tested. Facial movement is symmetric. Hearing is normal bilaterally. Palate is midline with normal sternocleidomastoid and trapezius muscles are normal. Tongue is midline.   Neck without meningismus or bruits  Temporal arteries are not tender or enlarged  TMJ areas are not tender on palpation   Motor:  5/5 strength in upper and lower right proximal and distal muscles. The left leg shows almost a flaccid quadriceps, iliopsoas, and abductor emily, and moderate weakness of his gluteus and mild weakness of his plantar flexors and toe flexors and hamstrings. Normal bulk and tone. No fasciculations. Rapid alternating movement is symmetric and intact bilaterally   Reflexes:   Deep tendon reflexes 1+/4 and symmetrical, except that the left knee jerk and ankle jerks seem to be absent. No babinski or clonus present   Sensory:   Normal to touch, pinprick and vibration and temperature in both arms and right leg but decreased in the left leg along the anterior lateral upper and lower leg. DSS is intact   Gait:  Not testable   Tremor:   No tremor noted. Cerebellar:  Not testable abnormal cerebellar signs present on Romberg and tandem testing and finger-nose-finger exam.   Neurovascular:  Normal heart sounds and regular rhythm, peripheral pulses decreased, and no carotid bruits. Assessment:       ICD-10-CM ICD-9-CM    1. Alcoholic ketoacidosis  Z07.34 276.2       2. Other acute pulmonary embolism without acute cor pulmonale (HCC)  I26.99 415.19         Active Problems:    Pulmonary embolism ([address] (12/25/2022)      Retroperitoneal bleed (1/2/2023)      Left leg weakness (1/5/2023)      Nontraumatic lumbosacral plexopathy (1/5/2023)      Lumbar back pain with radiculopathy affecting left lower extremity (1/5/2023)      Bilateral carotid artery stenosis (1/5/2023)      Cerebral microvascular disease (1/5/2023)      Plan:     Patient with left leg weakness that looks most like an upper lumbar plexopathy probably from compression from his retroperitoneal hematoma near the L2 level.   He does have a 10 x 10 x 20 mm hematoma that was slightly smaller yesterday on repeat studies we will repeat the studies again tomorrow check a CT of the lumbar spine of this does not look like a lumbar radiculopathy due to the lack of pain. Patient does have some symptom overlay, we usually give him to give maximal effort, he clearly mainly is weak in his upper lumbar plexus sparing his hamstrings and calf muscles and toe flexors to a fair degree. Discussed this with the patient and advised him that he will take 3 months sometimes up to 12 months to get recovery, and it may only be partial, and that we will repeat his scans tomorrow follow this but there really is no need for surgical decompression as the risk of surgery far outweigh any benefits at this point. If possible try to continue off anticoagulation to prevent any enlargement of the hematoma. PT and OT need to see the patient and further treatment evaluation probably including EMG study in 2 to 3 weeks when denervation potentials should be present. Continue excellent medical care as you are, and advised the patient of all of this in detail and he seemed to understand, and his exam at time testing functional component making it a little difficult to be definitive about his findings because they vary some. Patient remained stable today, no new complaints, and leg strength about the same, mainly involving the femoral nerve and upper lumbosacral plexus from his retroperitoneal hematoma. Follow-up CT scans being done today if they are unremarkable we will follow as needed, he will just need good physical therapy and to see us in about 4 to 8 weeks as outpatient to do EMG studies to follow his reinnervation.   I will follow as needed for now, call if I can help  Signed By: Zaria Ko MD     January 6, 2023       CC: None  FAX: None

## 2023-01-06 NOTE — PROGRESS NOTES
Pharmacy TPN Management Note    TPN indication: NPO    TPN type: Central     Macronutrients:  Protein: 5%  Dextrose: 20%  Lipids: pending TG result; none 1/6    Rate: 42mL/hr    Labs:  Recent Labs     01/06/23  0416 01/06/23  0404 01/05/23  0424 01/04/23  1710 01/04/23  0419    136 138 137 138   K 3.8 3.6 3.4* 3.6 4.1    102 103 102 102   CO2 29 30 29 29 28   MG  --  2.4  --   --  2.0   PHOS  --  3.0 2.1* 1.9* 2.8   CA 9.1 9.1 9.5 9.8 9.1   CREA 3.44* 3.54* 1.98* 1.85* 1.78*   BUN 19 19 10 9 10     Recent Labs     01/04/23  0419   *   *   AP 96     Recent Labs     01/06/23  0404   WBC 12.9*   HGB 8.2*   HCT 25.4*   INR 1.1     Electrolytes:  Electrolytes per LITER:  Sodium 35mEq, Calcium 4.5mEq    Other additives in TPN: none    Impression/Plan:   HD pt  Nutrition Note:  start D20, 5% AA @ 42mL/h   If BG <200mg/dL and lytes WNL after first 24h, would advance to 63mL/h   If BG <200mg/dL and lytes WNL after first 48h, would advance to Goal of 83mL/h (provides 1753kcals/100gPro/398gDextrose)  RD to check TG level, if <300, would start 250mL lipids 3 x week by Monday 1/9   TPN macronutrient/rate changes: -  TPN electrolyte changes: -  TPN insulin changes: none presently     Pharmacy will continue to monitor enteral nutrition plan, electrolytes, renal function, and dietician recommendations and adjust parenteral nutrition as needed.     Thank you,  Fabian Bello, [facility]

## 2023-01-06 NOTE — PROGRESS NOTES
Physical therapy:    Attempted PT evaluation. HD at bedside. Will defer and continue to follow. Thank you. Attempted at 14:00 and RN at bedside attempting to place an IV. Attempted at 14:45 and pt now undergoing procedure at bedside.      Fan Lowery, PT, DPT

## 2023-01-06 NOTE — PROGRESS NOTES
Occupational Therapy    Attempted OT evaluation. HD at bedside. Will defer and continue to follow. Thank you.     Milan Ledezma, OTR/L

## 2023-01-06 NOTE — PROGRESS NOTES
Nephrology Progress Note  Formerly [facility] / [address], [place], [address]  Phone - [phone]  Fax - [phone]                 Patient: Hui Weaver                   YOB: 1960        Date- 1/6/2023                      Admit Date: 12/25/2022  CC: Follow up for sanjiv       IMPRESSION & PLAN:   Sanjiv (secondary to ischemic ATN from hemorrhagic shock)  Hypophosphatemia  Hyperkalemia  Anion gap metabolic acidosis  Lactic acidosis  Acute blood loss anemia  Acute RP hematoma status post embolization of lumbar artery  Massive PE   Hemorrhagic/cardiogenic shock      PLAN-  Hd today  Epogen for anemia  Follow bmp   Subjective: Interval History:   1-6-23--bp high-- no sob-- cr increased  1-5-23----patient pulled his femoral line and gabriel overnight- he is not happy with having multiple lines. .. he is anuric-- seen on CRRT today  1-4-23----patient is off CRRT- he is going for CT ABDO-- --k 4.1 --he is anuric    Objective:   Vitals:    01/06/23 0430 01/06/23 0500 01/06/23 0530 01/06/23 0600   BP:   (!) 154/103 (!) 160/106   Pulse: 87 89 67 85   Resp: 11 17 20 13   Temp:       SpO2: 93% 96%     Weight:       Height:          01/05 0701 - 01/06 0700  In: 2610 [P.O.:875]  Out: 20 [Urine:20]    Last 3 Recorded Weights in this Encounter    12/25/22 1537 01/02/23 1514   Weight: 99.8 kg (220 lb) 99.8 kg (220 lb)        Physical exam:    GEN:  NAD  NECK:  Supple, no thyromegaly  RESP: Clear  b/l, no  wheezing,   CVS: RRR,S1,S2   NEURO: non focal, normal speech  HD access: Right IJ Timo    Chart reviewed. Pertinent Notes reviewed.      Data Review :  Recent Labs     01/06/23  0416 01/06/23  0404 01/05/23  0424 01/04/23  1710 01/04/23  0419    136 138 137 138   K 3.8 3.6 3.4* 3.6 4.1    102 103 102 102   CO2 29 30 29 29 28   BUN 19 19 10 9 10   CREA 3.44* 3.54* 1.98* 1.85* 1.78*   * 106* 110* 120* 154*   CA 9.1 9.1 9.5 9.8 9.1   MG  --  2.4  --   --  2.0   PHOS  --  3.0 2.1* 1.9* 2.8       Recent Labs     01/06/23  0404 01/05/23  1705 01/05/23  0424 01/04/23  1206 01/04/23  0419   WBC 12.9*  --  14.5*  --  19.2*   HGB 8.2* 8.6* 6.9*   < > 7.7*   HCT 25.4* 26.6* 21.1*   < > 22.6*     --  166  --  214    < > = values in this interval not displayed. No results for input(s): FE, TIBC, PSAT, FERR in the last 72 hours.    No results found for: HBA1C, MNA3KCGL, WCW3LRMF   No results found for: MCACR, MCA1, MCA2, MCA3, MCAU, MCAU2, MCALPOCT  US Results (most recent):  Medication list  reviewed  Current Facility-Administered Medications   Medication Dose Route Frequency    midodrine (PROAMATINE) tablet 10 mg  10 mg Oral TID WITH MEALS    epoetin ji-epbx (RETACRIT) injection 10,000 Units  10,000 Units SubCUTAneous Q TUE, THU & SAT    simethicone (MYLICON) tablet 80 mg  80 mg Oral QID PRN    alcohol 62% (NOZIN) nasal  1 Ampule  1 Ampule Topical A51S    folic acid (FOLVITE) tablet 1 mg  1 mg Oral DAILY    thiamine mononitrate (B-1) tablet 100 mg  100 mg Oral DAILY    multivitamin, tx-iron-ca-min (THERA-M w/ IRON) tablet 1 Tablet  1 Tablet Oral DAILY    magnesium oxide (MAG-OX) tablet 400 mg  400 mg Oral DAILY    sodium chloride (NS) flush 5-40 mL  5-40 mL IntraVENous Q8H    sodium chloride (NS) flush 5-40 mL  5-40 mL IntraVENous PRN    acetaminophen (TYLENOL) tablet 650 mg  650 mg Oral Q6H PRN    Or    acetaminophen (TYLENOL) suppository 650 mg  650 mg Rectal Q6H PRN    polyethylene glycol (MIRALAX) packet 17 g  17 g Oral DAILY PRN    ondansetron (ZOFRAN ODT) tablet 4 mg  4 mg Oral Q8H PRN    Or    ondansetron (ZOFRAN) injection 4 mg  4 mg IntraVENous Q6H PRN        Akhil Webber MD  1/6/2023

## 2023-01-06 NOTE — INTERDISCIPLINARY ROUNDS
Interdisciplinary team rounds were held 1/6/2023 with the following team members:Care Management, Diabetes Treatment Specialist, Nursing, Nutrition, Pharmacy, and Physician. Plan of care discussed. See clinical pathway and/or care plan for interventions and desired outcomes. Goals of the Day: To move out of CCU after HD.

## 2023-01-06 NOTE — PROCEDURES
Hemodialysis / [phone]    Vitals Pre Post Assessment Pre Post   BP BP: 132/85 (resting, eyes closed) (01/06/23 1815) 108/70 LOC A/O x 3 A/O x 3   HR Pulse (Heart Rate): 95 (01/06/23 1815) 98   Lungs CTA CTA   Resp Resp Rate: 12 (01/06/23 1715) 13 Cardiac s1s2 RRR   Temp Temp: 98.2 °F (36.8 °C) (01/06/23 1715) 97.5 Skin Intact catheter Intact catheter   Weight  Not obtained Bed not tared Edema None None   Tele status Yes Yes Pain Pain Intensity 1: 0 (01/06/23 1600) 0/10     Orders   Duration: Start: 5774 End: 2045 Total: 3.5 hours   Dialyzer: Dialyzer/Set Up Inspection: Kristofer Gardner (01/06/23 1715)   K Bath: Dialysate K (mEq/L): 3 (01/06/23 1715)   Ca Bath: Dialysate CA (mEq/L): 2.5 (01/06/23 1715)   Na: Dialysate NA (mEq/L): 140 (01/06/23 1715)   Bicarb: Dialysate HCO3 (mEq/L): 40 (01/06/23 1715)   Target Fluid Removal: Goal/Amount of Fluid to Remove (mL): 1500 mL (01/06/23 1715)     Access   Type & Location: Right non-tunneled catheter   Comments:  Catheter exchanged today. Good pulls and flushes.  Dressing C/D/I                                       Labs   HBsAg (Antigen) / date: Negative on 1/03/2023                                              HBsAb (Antibody) / date: Susceptible on 1/03/2023   Source: EPIC   Obtained/Reviewed  Critical Results Called HGB   Date Value Ref Range Status   01/06/2023 8.2 (L) 12.1 - 17.0 g/dL Final     Potassium   Date Value Ref Range Status   01/06/2023 3.8 3.5 - 5.1 mmol/L Final     Calcium   Date Value Ref Range Status   01/06/2023 9.1 8.5 - 10.1 MG/DL Final     BUN   Date Value Ref Range Status   01/06/2023 19 6 - 20 MG/DL Final     Creatinine   Date Value Ref Range Status   01/06/2023 3.44 (H) 0.70 - 1.30 MG/DL Final        Meds Given   Name Dose Route   None given                 Adequacy / Fluid    Total Liters Process: 73.7   Net Fluid Removed: 2000-176=8448 ml      Comments   Time Out Done:   (Time) 1645   Admitting Diagnosis: Alcoholic Ketoacidosis; Pulmonary embolism Consent obtained/signed: Informed Consent Verified: Yes (01/06/23 8648)   Machine / RO # Machine Number: [id number] (01/06/23 0160)   Primary Nurse Rpt Pre: Pool Vences RN   Primary Nurse Rpt Post: Patrick Bose RN   Pt Education: Procedure   Care Plan: Continue HD as prescribed   Pts outpatient clinic: TBD     Tx Summary   Comments:  Treatment initiated via new catheter placed today. Tolerated treatment. All possible blood returned. Catheter ports flushed with NS; sterile caps applied.  Endorsed to primary, Ilene Ojeda RN

## 2023-01-06 NOTE — PROGRESS NOTES
0700: Bedside shift change report given to Calixto Patelamber Ochoa (oncoming nurse) by Dagmar Riggs (offgoing nurse). Report included the following information SBAR, Kardex, ED Summary, Procedure Summary, Intake/Output, MAR, Recent Results, Cardiac Rhythm NSR/tach, and Alarm Parameters . 0800: AM assessment complete, see flowsheet for details. Pt is in bed, alert and oriented x4. Intermittently confused but redirectable. S1S2 heart sounds, in NSR, hypertensive. Lung sounds diminished at the bases, on 2L NC. Bowel sounds hypoactive, very distended abdomen, semi-soft. Very poor appetite. Pulses palpable in all extremities, no edema. 0930: Interdisciplinary team rounds were held 1/6/2023 with the following team members:Nursing, Nutrition, Pharmacy, Physician, and Respiratory Therapy. Plan of care discussed. See clinical pathway and/or care plan for interventions and desired outcomes. Goals of the Day: HD today    1200: reassessment complete, no significant changes. 1600: reassessment complete, see flowsheet for details. Pt vomited large brown emesis. NGT placed in R nare. Drained 1000ml of brown fluid. Hooked up to low cont suction. 1900: Bedside shift change report given to [facility] (oncoming nurse) by Shirlene Hackett RN (offgoing nurse). Report included the following information SBAR, Kardex, ED Summary, Intake/Output, MAR, Recent Results, Cardiac Rhythm NSR, and Alarm Parameters .

## 2023-01-06 NOTE — PROGRESS NOTES
[facility]    ICU TEAM Progress Note    Name: Kimber Young   : 1960   MRN: [id number]   Date: 2023           ICU Assessment & Plan of Care       Kimber Young Is a 60 y.o. male with h/o EtOH abuse who is admitted for RML PE.    N.  #. L2 plexopathy with LLE weakness and sensory loss  - aggressive PT/OT for LLE -- have discussed case with team and developed plan to prevent muscle and neuronal atrophy as much as possible  - pt reassured that Dopplers of L fem CVC site show no injury or thrombus    C. #. Massive PE on lovenox  #. Hemorrhagic shock, resolved  - pressors off yesterday  - weaning midodrine today, but will see how pt tolerates HD  - MAP goal >65    P.  - pulm hygiene -- tolerating NC    G. #. Ischemic hepatopathy  - NPO with N/V  - monitor LFTs q3d    R. #. Acute anuric renal failure, likely ATN  - Renal following  - plan for HD today  - Timo exchanged by IR -- greatly appreciate assistance    H. #. Spontaneous RP bleed   #. L L2 artery bleed s/p IR embolization 1/2  - aggressively resuscitated s/p 5u RBC, 2u FFP  - Hb relatively stable -- cont HH q12  - at this point, pt very unlikely to be able to tolerate AC on long-term basis   -- Dopplers negative on admission   -- plan on IVC filter at discharge    I.  - monitor off abx    E.  - insulin prn    SCDs only  SUP not indicated  FULL      Subjective:   Progress Note: 2023      Reason for ICU Admission: PE     HPI: 60year old with h/o ETOH abuse admitted  for shortness of breath and chest pain who was found to have RML PE. He was started on lovenox then coumadin was added (due to cost). Early this a.m. RRT was called for decreased responsiveness. He was then found to be hypotensive. He was started on NE and ICU was consulted. On initial assessment, he was lethargic but arousable, tachycardic and cold clammy extremities. He was on NE at 30OHH with systolics in 27I. Patient denies N/V/ abdominal pain. Nursing reports no signs of bleeding. Lovenox last given last night. INR 1.4    Overnight Events:   1/3 - development of spont RP bleed, now s/p IR embol  1/4 - Hb drifting again today; unable to sig wean pressors  1/5 - pt pulled out his NGT yesterday, pulled out his fem quadlumen and disconnected his Olson last night. Pt not delirious and not overly angry/agitated, just stating \"doesn't want this anymore. \"  When speaking with patient, he is clear that his goals are still aggressive full code/full therapy. .. 1/6 - off pressors, pressures improved. CRRT down with plans for HD today. New LLE weakness -- likely L2 plexopathy from hematoma pressure. POD:  1 Day Post-Op    S/P:   Procedure(s):  LAPAROTOMY EXPLORATORY    Home Medications:     Prior to Admission medications    Medication Sig Start Date End Date Taking? Authorizing Provider   apixaban (ELIQUIS) 5 mg tablet Take 1 Tablet by mouth two (2) times a day. 1/2/23  Yes Lia Padilla MD   folic acid (FOLVITE) 1 mg tablet Take 1 Tablet by mouth daily. 12/29/22  Yes Lia Padilla MD   aspirin 81 mg chewable tablet Take 2 Tabs by mouth daily. 8/28/18  Yes Hay Nash MD   diphenhydrAMINE (BENADRYL) 25 mg capsule Take 25 mg by mouth as needed for Itching. Yes Provider, Historical   metoprolol succinate (TOPROL-XL) 50 mg XL tablet Take 1 Tab by mouth daily. In place of metoprolol tartrate. 10/18/19   Celia Lombardo MD   losartan (COZAAR) 25 mg tablet Take 1 Tab by mouth daily. Patient not taking: Reported on 12/25/2022 10/18/19   Celia Lombardo MD   therapeutic multivitamin [facility]) tablet Take 1 Tab by mouth daily. Patient not taking: Reported on 12/25/2022 10/27/17   Maral Allen MD   thiamine (B-1) 100 mg tablet Take 1 Tab by mouth daily. 10/27/17   Maral Allen MD   ibuprofen (MOTRIN) 200 mg tablet Take 200 mg by mouth as needed for Pain.   Patient not taking: Reported on 12/25/2022    Provider, Historical       Current Meds:     Current Facility-Administered Medications   Medication Dose Route Frequency    midodrine (PROAMATINE) tablet 10 mg  10 mg Oral TID WITH MEALS    epoetin ji-epbx (RETACRIT) injection 10,000 Units  10,000 Units SubCUTAneous Q TUE, THU & SAT    simethicone (MYLICON) tablet 80 mg  80 mg Oral QID PRN    alcohol 62% (NOZIN) nasal  1 Ampule  1 Ampule Topical F57L    folic acid (FOLVITE) tablet 1 mg  1 mg Oral DAILY    thiamine mononitrate (B-1) tablet 100 mg  100 mg Oral DAILY    multivitamin, tx-iron-ca-min (THERA-M w/ IRON) tablet 1 Tablet  1 Tablet Oral DAILY    magnesium oxide (MAG-OX) tablet 400 mg  400 mg Oral DAILY    sodium chloride (NS) flush 5-40 mL  5-40 mL IntraVENous Q8H    sodium chloride (NS) flush 5-40 mL  5-40 mL IntraVENous PRN    acetaminophen (TYLENOL) tablet 650 mg  650 mg Oral Q6H PRN    Or    acetaminophen (TYLENOL) suppository 650 mg  650 mg Rectal Q6H PRN    polyethylene glycol (MIRALAX) packet 17 g  17 g Oral DAILY PRN    ondansetron (ZOFRAN ODT) tablet 4 mg  4 mg Oral Q8H PRN    Or    ondansetron (ZOFRAN) injection 4 mg  4 mg IntraVENous Q6H PRN       Objective:   Vital Signs:  Visit Vitals  BP (!) 169/99   Pulse 80   Temp 98.6 °F (37 °C)   Resp 12   Ht 5' 9\" (1.753 m)   Wt 99.8 kg (220 lb)   SpO2 93%   BMI 32.49 kg/m²    O2 Flow Rate (L/min): 2 l/min O2 Device: Nasal cannula Temp (24hrs), Av.6 °F (37 °C), Min:98.4 °F (36.9 °C), Max:98.7 °F (37.1 °C)           Intake/Output:     Intake/Output Summary (Last 24 hours) at 2023 0816  Last data filed at 2023 2245  Gross per 24 hour   Intake 1175 ml   Output --   Net 1175 ml         Physical Exam:  Sleeping in bed comfortably, NAD  Resps even and unlabored, symmetric chest rise on RA, clear throughout  Reg rate, +ectopy  Abd soft, +distension  +trialysis in place, other lines/gabriel removed    LABS AND  DATA: Personally reviewed  Recent Labs     23  0404 23  1705 23  0424   WBC 12.9*  --  14.5*   HGB 8.2* 8.6* 6.9*   HCT 25.4* 26.6* 21.1*     --  166       Recent Labs     01/06/23  0416 01/06/23  0404 01/05/23 0424 01/04/23 1710 01/04/23 0419    136 138   < > 138   K 3.8 3.6 3.4*   < > 4.1    102 103   < > 102   CO2 29 30 29   < > 28   BUN 19 19 10   < > 10   CREA 3.44* 3.54* 1.98*   < > 1.78*   * 106* 110*   < > 154*   CA 9.1 9.1 9.5   < > 9.1   MG  --  2.4  --   --  2.0   PHOS  --  3.0 2.1*   < > 2.8    < > = values in this interval not displayed. Recent Labs     01/06/23 0404 01/05/23  0424 01/04/23 1710 01/04/23 0419   AP  --   --   --  96   TP  --   --   --  5.7*   ALB 2.4* 2.2*   < > 2.3*   GLOB  --   --   --  3.4    < > = values in this interval not displayed. Recent Labs     01/06/23 0404 01/05/23 0424   INR 1.1 1.1   PTP 11.3* 11.9*        No results for input(s): PHI, PCO2I, PO2I, FIO2I in the last 72 hours. No results for input(s): CPK, CKMB, TROIQ, BNPP in the last 72 hours. Hemodynamics:   PAP:   CO:     Wedge:   CI:     CVP:    SVR:       PVR:       Ventilator Settings:  Mode Rate Tidal Volume Pressure FiO2 PEEP                    Peak airway pressure:      Minute ventilation:          MEDS: Reviewed    Chest X-Ray:  CXR Results  (Last 48 hours)      None          Multidisciplinary Rounds Completed:  Yes    SPECIAL EQUIPMENT  None    DISPOSITION  Transfer out of ICU after HD    CRITICAL CARE CONSULTANT NOTE  I had a face to face encounter with the patient, reviewed and interpreted patient data including clinical events, labs, images, vital signs, I/O's, and examined patient. I have discussed the case and the plan and management of the patient's care with the consulting services, the bedside nurses and the respiratory therapist.      NOTE OF PERSONAL INVOLVEMENT IN CARE   This patient has a high probability of imminent, clinically significant deterioration, which requires the highest level of preparedness to intervene urgently.  I participated in the decision-making and personally managed or directed the management of the following life and organ supporting interventions that required my frequent assessment to treat or prevent imminent deterioration. I personally spent 40 minutes of critical care time. This is time spent at this critically ill patient's bedside actively involved in patient care as well as the coordination of care. This does not include any procedural time which has been billed separately.     Alexandre Ramos MD  Intensivist  1/6/2023

## 2023-01-06 NOTE — PROGRESS NOTES
Comprehensive Nutrition Assessment    Type and Reason for Visit: Reassess    Nutrition Recommendations/Plan:   Advance diet as medically able per MD  TPN recommendations (mention of starting this in surgeons notes:  Would start D20, 5% AA @ 42mL/h   If BG <200mg/dL and lytes WNL after first 24h, would advance to 63mL/h   If BG <200mg/dL and lytes WNL after first 48h, would advance to Goal of 83mL/h (provides 1753kcals/100gPro/398gDextrose)  RD to check TG level, if <300, would start 250mL lipids 3 x week by Monday 1/9 (provides an additional 214kcals/day on average)      Malnutrition Assessment:  Malnutrition Status:  No malnutrition (01/03/23 1324)        Nutrition Assessment:  Chart reviewed, case discussed during CCU rounds. Pt off of pressors, plans to transition to HD. He was on clears but this was discontinued by surgeon due to bloating and nausea. Per their note plan is for NGT placement and TPN initiation. Provided TPN recommendations above. TG level was >200 in 2015 so will check a level in the morning should he remain NPO by next week and need lipids. Nutrition Related Findings:    Meds: folvite, magox, MVI, thiamine. BM 1/6 Wound Type: Surgical incision    Current Nutrition Intake & Therapies:  Average Meal Intake: NPO     DIET NPO    Anthropometric Measures:  Height: 5' 9\" (175.3 cm)  Ideal Body Weight (IBW): 160 lbs (73 kg)     Current Body Wt:  99.8 kg (220 lb 0.3 oz), 137.5 % IBW. Not specified  Current BMI (kg/m2): 32.5  Usual Body Weight: 84.4 kg (186 lb)  % Weight Change (Calculated): 18.3                    BMI Category: Obese class 1 (BMI 30.0-34. 9)    Estimated Daily Nutrient Needs:  Energy Requirements Based On: Formula  Weight Used for Energy Requirements: Current  Energy (kcal/day): MSJ 2150 (1784 x 1.2)  Weight Used for Protein Requirements: Current  Protein (g/day): 100-120g (1-1.2gPro/kg)  Method Used for Fluid Requirements: Standard renal  Fluid (ml/day): per MD    Nutrition Diagnosis:   Inadequate protein-energy intake related to altered GI function, impaired nutrient utilization as evidenced by NPO or clear liquid status due to medical condition  Previous dx continues, pt made NPO this morning. Nutrition Interventions:   Food and/or Nutrient Delivery: Start parenteral nutrition, Start oral diet  Nutrition Education/Counseling: No recommendations at this time  Coordination of Nutrition Care: Continue to monitor while inpatient, Interdisciplinary rounds       Goals:  Previous Goal Met: Progressing toward goal(s)  Goals: Initiate PO diet, Initiate nutrition support, by next RD assessment       Nutrition Monitoring and Evaluation:   Behavioral-Environmental Outcomes: None identified  Food/Nutrient Intake Outcomes: Diet advancement/tolerance, Parenteral nutrition intake/tolerance  Physical Signs/Symptoms Outcomes: Biochemical data, Nutrition focused physical findings, Skin, Weight, Fluid status or edema, Hemodynamic status    Discharge Planning:     Too soon to determine    Pete Babinski, RD, CNSC  Contact: ext [phone]

## 2023-01-06 NOTE — PROGRESS NOTES
Spiritual Care Assessment/Progress Note  [facility]      NAME: Cici Ramos      MRN: [id number]  AGE: 60 y.o.  SEX: male  Buddhism Affiliation: Methodist   Language: English     1/6/2023     Total Time (in minutes): 25     Spiritual Assessment begun in [facility] 1 through conversation with:         [x]Patient        [] Family    [] Friend(s)        Reason for Consult: Initial/Spiritual assessment, patient floor     Spiritual beliefs: (Please include comment if needed)     [x] Identifies with a miah tradition:         [] Supported by a miah community:            [] Claims no spiritual orientation:           [] Seeking spiritual identity:                [] Adheres to an individual form of spirituality:           [] Not able to assess:                           Identified resources for coping:      [] Prayer                               [] Music                  [] Guided Imagery     [x] Family/friends                 [] Pet visits     [] Devotional reading                         [] Unknown     [] Other:                                                Interventions offered during this visit: (See comments for more details)    Patient Interventions: Affirmation of emotions/emotional suffering, Coping skills reviewed/reinforced, Initial/Spiritual assessment, patient floor, Life review/legacy, Normalization of emotional/spiritual concerns, Prayer (assurance of), Catharsis/review of pertinent events in supportive environment, Affirmation of miah, Iconic (affirming the presence of God/Higher Power), Bible or other spiritual literature provided, Integration of medical assessment with existing values and beliefs, Buddhism beliefs/image of God discussed           Plan of Care:     [x] Support spiritual and/or cultural needs    [] Support AMD and/or advance care planning process      [] Support grieving process   [] Coordinate Rites and/or Rituals    [] Coordination with community clergy   [] No spiritual needs identified at this time   [] Detailed Plan of Care below (See Comments)  [] Make referral to Music Therapy  [] Make referral to Pet Therapy     [] Make referral to Addiction services  [] Make referral to [facility]  [] Make referral to Spiritual Care Partner  [] No future visits requested        [x] Contact Spiritual Care for further referrals     Comments:  Visit was in the CCU for critical care initial spiritual assessment. Patient received visit kindly and shared about his hospitalization, stating he was doing much better than when he first arrived. He came in on [place] day and also spent the new year here. He has a great support system and his miah also helps in coping.  listened actively with affirmation and encouraged continuous self care. Patient indicated he would like to have a devotional, and was brought a copy of Our Daily [employer]. He was grateful for the visit.        Visited by: Lc jackson: [phone])

## 2023-01-06 NOTE — PROGRESS NOTES
TRANSITION OF CARE PLAN:   RUR: 15%; LOW  DISPOSITION: TBD - possibly own home  TRANSPORT: TBD - possibly family  [facility] - [facility] re-eval's pending  BARRIERS: Medical Progression       Pt remains in CCU. PT/OT are attempting to eval to assist with disposition. CM will continue to follow.     Yarely Benson, MSW  Care Management, [facility]

## 2023-01-06 NOTE — DIALYSIS
Unable to aspirate or flush trialysis. New robert needed for hemodialysis per Dr. Corrina Patel. Orders placed.    Matias Short RN

## 2023-01-06 NOTE — PROGRESS NOTES
SURGERY PROGRESS NOTE      Admit Date: 2022    POD 4 Days Post-Op    Procedure: Procedure(s):  LAPAROTOMY EXPLORATORY      Subjective:     Patient complains of bloating and nausea. He also complains of numbness and weakness of the LLE      Objective:     Visit Vitals  BP (!) 169/99 (BP 1 Location: Right upper arm, BP Patient Position: At rest)   Pulse 80   Temp 98.1 °F (36.7 °C)   Resp 12   Ht 5' 9\" (1.753 m)   Wt 99.8 kg (220 lb)   SpO2 93%   BMI 32.49 kg/m²        Temp (24hrs), Av.5 °F (36.9 °C), Min:98.1 °F (36.7 °C), Max:98.7 °F (37.1 °C)      701 -  1900  In: 50 [P.O.:50]  Out: -    190 -  0700  In: 1231.5 [P.O.:875;  I.V.:56.5]  Out: 201.1 [Urine:20]    Physical Exam:    General:  alert, cooperative, no distress, appears stated age   Abdomen: Distended, tympanic, tender           Lab Results   Component Value Date/Time    WBC 12.9 (H) 2023 04:04 AM    HGB 8.2 (L) 2023 04:04 AM    HCT 25.4 (L) 2023 04:04 AM    PLATELET 906  04:04 AM    MCV 99.2 (H) 2023 04:04 AM     Lab Results   Component Value Date/Time    GFR est non-AA 59 (L) 2021 09:41 AM    GFR est AA >60 2021 09:41 AM    Creatinine 3.44 (H) 2023 04:16 AM    Creatinine, POC 1.2 2022 06:38 PM    BUN 19 2023 04:16 AM    Sodium 138 2023 04:16 AM    Sodium,  2022 06:38 PM    Potassium 3.8 2023 04:16 AM    Potassium, POC 5.2 2022 06:38 PM    Chloride 101 2023 04:16 AM    Chloride,  (H) 2022 06:38 PM    CO2 29 2023 04:16 AM    Magnesium 2.4 2023 04:04 AM    Phosphorus 3.0 2023 04:04 AM       Assessment:     Active Problems:    Pulmonary embolism ([address] (2022)      Retroperitoneal bleed (2023)      Left leg weakness (2023)      Nontraumatic lumbosacral plexopathy (2023)      Lumbar back pain with radiculopathy affecting left lower extremity (2023)      Bilateral carotid artery stenosis (1/5/2023)      Cerebral microvascular disease (1/5/2023)    Patient has a significant ileus from the hematoma. In general it is better to avoid surgical hematoma evacuation. It is a complex clot that is tense in the retroperitoneal space. Attempting to scoop it out will likely result is some significant venous bleeding. Repeat CT planned for tomorrow. If some of the clot is liquifed  we can consider drainage in attempt to relieve ileus. I doubt it would make any difference on the neurologic symptoms.       Plan:        Agree with NG  reinsertion  Interval CTtomorrow  TPN  Supportive care

## 2023-01-07 ENCOUNTER — APPOINTMENT (OUTPATIENT)
Dept: GENERAL RADIOLOGY | Age: 63
DRG: 950 | End: 2023-01-07
Attending: NURSE PRACTITIONER
Payer: COMMERCIAL

## 2023-01-07 ENCOUNTER — APPOINTMENT (OUTPATIENT)
Dept: CT IMAGING | Age: 63
DRG: 950 | End: 2023-01-07
Attending: PSYCHIATRY & NEUROLOGY
Payer: COMMERCIAL

## 2023-01-07 LAB
ALBUMIN SERPL-MCNC: 2.7 G/DL (ref 3.5–5)
ALBUMIN/GLOB SERPL: 0.6 (ref 1.1–2.2)
ALP SERPL-CCNC: 107 U/L (ref 45–117)
ALT SERPL-CCNC: 79 U/L (ref 12–78)
ANION GAP SERPL CALC-SCNC: 6 MMOL/L (ref 5–15)
AST SERPL-CCNC: 150 U/L (ref 15–37)
BILIRUB DIRECT SERPL-MCNC: 0.4 MG/DL (ref 0–0.2)
BILIRUB SERPL-MCNC: 1.1 MG/DL (ref 0.2–1)
BUN SERPL-MCNC: 13 MG/DL (ref 6–20)
BUN/CREAT SERPL: 5 (ref 12–20)
CALCIUM SERPL-MCNC: 9 MG/DL (ref 8.5–10.1)
CHLORIDE SERPL-SCNC: 99 MMOL/L (ref 97–108)
CO2 SERPL-SCNC: 35 MMOL/L (ref 21–32)
CREAT SERPL-MCNC: 2.77 MG/DL (ref 0.7–1.3)
ERYTHROCYTE [DISTWIDTH] IN BLOOD BY AUTOMATED COUNT: 17.7 % (ref 11.5–14.5)
GLOBULIN SER CALC-MCNC: 4.3 G/DL (ref 2–4)
GLUCOSE BLD STRIP.AUTO-MCNC: 76 MG/DL (ref 65–117)
GLUCOSE BLD STRIP.AUTO-MCNC: 88 MG/DL (ref 65–117)
GLUCOSE BLD STRIP.AUTO-MCNC: 91 MG/DL (ref 65–117)
GLUCOSE BLD STRIP.AUTO-MCNC: 94 MG/DL (ref 65–117)
GLUCOSE SERPL-MCNC: 94 MG/DL (ref 65–100)
HCT VFR BLD AUTO: 28.2 % (ref 36.6–50.3)
HGB BLD-MCNC: 9.2 G/DL (ref 12.1–17)
INR PPP: 1.1 (ref 0.9–1.1)
MAGNESIUM SERPL-MCNC: 2.4 MG/DL (ref 1.6–2.4)
MCH RBC QN AUTO: 32.7 PG (ref 26–34)
MCHC RBC AUTO-ENTMCNC: 32.6 G/DL (ref 30–36.5)
MCV RBC AUTO: 100.4 FL (ref 80–99)
NRBC # BLD: 0.38 K/UL (ref 0–0.01)
NRBC BLD-RTO: 3.4 PER 100 WBC
PHOSPHATE SERPL-MCNC: 3 MG/DL (ref 2.6–4.7)
PLATELET # BLD AUTO: 253 K/UL (ref 150–400)
PMV BLD AUTO: 10.2 FL (ref 8.9–12.9)
POTASSIUM SERPL-SCNC: 3.5 MMOL/L (ref 3.5–5.1)
PROT SERPL-MCNC: 7 G/DL (ref 6.4–8.2)
PROTHROMBIN TIME: 11.4 SEC (ref 9–11.1)
RBC # BLD AUTO: 2.81 M/UL (ref 4.1–5.7)
SERVICE CMNT-IMP: NORMAL
SODIUM SERPL-SCNC: 140 MMOL/L (ref 136–145)
TRIGL SERPL-MCNC: 100 MG/DL (ref ?–150)
WBC # BLD AUTO: 11.1 K/UL (ref 4.1–11.1)

## 2023-01-07 PROCEDURE — 74011000250 HC RX REV CODE- 250: Performed by: STUDENT IN AN ORGANIZED HEALTH CARE EDUCATION/TRAINING PROGRAM

## 2023-01-07 PROCEDURE — 83735 ASSAY OF MAGNESIUM: CPT

## 2023-01-07 PROCEDURE — 65610000006 HC RM INTENSIVE CARE

## 2023-01-07 PROCEDURE — 74176 CT ABD & PELVIS W/O CONTRAST: CPT

## 2023-01-07 PROCEDURE — 84100 ASSAY OF PHOSPHORUS: CPT

## 2023-01-07 PROCEDURE — 82962 GLUCOSE BLOOD TEST: CPT

## 2023-01-07 PROCEDURE — 74011000250 HC RX REV CODE- 250: Performed by: NURSE PRACTITIONER

## 2023-01-07 PROCEDURE — 77010033678 HC OXYGEN DAILY

## 2023-01-07 PROCEDURE — 97168 OT RE-EVAL EST PLAN CARE: CPT

## 2023-01-07 PROCEDURE — 97164 PT RE-EVAL EST PLAN CARE: CPT

## 2023-01-07 PROCEDURE — 85027 COMPLETE CBC AUTOMATED: CPT

## 2023-01-07 PROCEDURE — 97535 SELF CARE MNGMENT TRAINING: CPT

## 2023-01-07 PROCEDURE — 85610 PROTHROMBIN TIME: CPT

## 2023-01-07 PROCEDURE — 72131 CT LUMBAR SPINE W/O DYE: CPT

## 2023-01-07 PROCEDURE — 97530 THERAPEUTIC ACTIVITIES: CPT

## 2023-01-07 PROCEDURE — 99232 SBSQ HOSP IP/OBS MODERATE 35: CPT | Performed by: SURGERY

## 2023-01-07 PROCEDURE — 74011250636 HC RX REV CODE- 250/636: Performed by: NURSE PRACTITIONER

## 2023-01-07 PROCEDURE — 74011000258 HC RX REV CODE- 258: Performed by: NURSE PRACTITIONER

## 2023-01-07 PROCEDURE — 74011250637 HC RX REV CODE- 250/637: Performed by: INTERNAL MEDICINE

## 2023-01-07 PROCEDURE — 80076 HEPATIC FUNCTION PANEL: CPT

## 2023-01-07 PROCEDURE — 74011250636 HC RX REV CODE- 250/636: Performed by: INTERNAL MEDICINE

## 2023-01-07 PROCEDURE — 36415 COLL VENOUS BLD VENIPUNCTURE: CPT

## 2023-01-07 PROCEDURE — 84478 ASSAY OF TRIGLYCERIDES: CPT

## 2023-01-07 PROCEDURE — 80048 BASIC METABOLIC PNL TOTAL CA: CPT

## 2023-01-07 PROCEDURE — 74011250637 HC RX REV CODE- 250/637: Performed by: STUDENT IN AN ORGANIZED HEALTH CARE EDUCATION/TRAINING PROGRAM

## 2023-01-07 RX ORDER — DIAZEPAM 10 MG/2ML
5 INJECTION INTRAMUSCULAR
Status: DISCONTINUED | OUTPATIENT
Start: 2023-01-07 | End: 2023-01-20 | Stop reason: HOSPADM

## 2023-01-07 RX ADMIN — SODIUM CHLORIDE, PRESERVATIVE FREE 10 ML: 5 INJECTION INTRAVENOUS at 06:24

## 2023-01-07 RX ADMIN — MULTIPLE VITAMINS W/ MINERALS TAB 1 TABLET: TAB at 08:18

## 2023-01-07 RX ADMIN — MAGNESIUM OXIDE TAB 400 MG (241.3 MG ELEMENTAL MG) 400 MG: 400 (241.3 MG) TAB at 08:18

## 2023-01-07 RX ADMIN — SODIUM CHLORIDE, PRESERVATIVE FREE 5 ML: 5 INJECTION INTRAVENOUS at 13:56

## 2023-01-07 RX ADMIN — Medication 1 AMPULE: at 21:48

## 2023-01-07 RX ADMIN — FOLIC ACID 1 MG: 1 TABLET ORAL at 08:18

## 2023-01-07 RX ADMIN — FAMOTIDINE: 10 INJECTION, SOLUTION INTRAVENOUS at 18:38

## 2023-01-07 RX ADMIN — THIAMINE HCL TAB 100 MG 100 MG: 100 TAB at 08:18

## 2023-01-07 RX ADMIN — Medication 1 AMPULE: at 08:18

## 2023-01-07 RX ADMIN — SODIUM CHLORIDE, PRESERVATIVE FREE 10 ML: 5 INJECTION INTRAVENOUS at 21:49

## 2023-01-07 RX ADMIN — EPOETIN ALFA-EPBX 10000 UNITS: 10000 INJECTION, SOLUTION INTRAVENOUS; SUBCUTANEOUS at 21:53

## 2023-01-07 NOTE — PROGRESS NOTES
Problem: Mobility Impaired (Adult and Pediatric)  Goal: *Acute Goals and Plan of Care (Insert Text)  Description: FUNCTIONAL STATUS PRIOR TO ADMISSION: Patient was independent and active without use of DME.    HOME SUPPORT PRIOR TO ADMISSION: The patient lived with family but did not require assist.    Physical Therapy Goals  Initiated 1/7/2023  1. Patient will move from supine to sit and sit to supine , scoot up and down, and roll side to side in bed with modified independence within 7 day(s). 2.  Patient will transfer from bed to chair and chair to bed with minimal assistance/contact guard assist using the least restrictive device within 7 day(s). 3.  Patient will perform sit to stand with minimal assistance/contact guard assist within 7 day(s). 4.  Patient will ambulate with minimal assistance/contact guard assist for 25 feet with the least restrictive device within 7 day(s). Outcome: Not Met  PHYSICAL THERAPY REEVALUATION  Patient: Darrin James (85 y.o. male)  Date: 1/7/2023  Primary Diagnosis: Alcoholic ketoacidosis [Z66.09]  Pulmonary embolism (HCC) [I26.99]  Procedure(s) (LRB):  LAPAROTOMY EXPLORATORY (N/A) 5 Days Post-Op   Precautions: Fall      ASSESSMENT  Based on the objective data described below, the patient presents with grossly decreased LLE ROM, strength and sensation, impaired balance, mild decreased safety awareness and decreased activity tolerance/endurance. He tolerated sitting EOB, instruction in LE exercises, and lateral scooting transfers. Noted muscle activation with all LLE movement attempts. Patient on 2L NC, difficult to obtain consistent SpO2 readings but appeared to desaturate into the 80's with activity. Patient tachycardic with HR increasing to 126 bpm with seated exercises and scooting. Deferred attempts to stand this date secondary to VS. Expect he will be able to progress with continued medical improvements.  He currently does not have quad control, so bracing/blocking and AD will likely be needed for standing attempts. He is very motivated. This is a significant decline for him and he will benefit from continued skilled therapy. Recommending the intensity of and medical management at inpatient rehab. Current Level of Function Impacting Discharge (mobility/balance): min-mod A bed mobility, Mod A lateral scooting, unable to attempt standing    Functional Outcome Measure: The patient scored 45/100 on the Barthel Index outcome measure which is indicative of partially dependent. Other factors to consider for discharge: fall risk, independent at baseline      Patient will benefit from skilled therapy intervention to address the above noted impairments. PLAN :  Recommendations and Planned Interventions: bed mobility training, transfer training, gait training, therapeutic exercises, neuromuscular re-education, patient and family training/education, and therapeutic activities      Frequency/Duration: Patient will be followed by physical therapy:  5 times a week to address goals. Recommendation for discharge: (in order for the patient to meet his/her long term goals)  Therapy 3 hours per day 5-7 days per week    This discharge recommendation:  Has not yet been discussed the attending provider and/or case management    Equipment recommendations for successful discharge (if) home: TBD       SUBJECTIVE:   Patient stated This leg is still useless.     OBJECTIVE DATA SUMMARY:   HISTORY:    Past Medical History:   Diagnosis Date    Hypercholesterolemia     Hypertension      Past Surgical History:   Procedure Laterality Date    HX ORTHOPAEDIC      rotator cuff repair    IR INSERT NON TUNL CVC OVER 5 YRS  1/6/2023    IR [facility]  1/2/2023     Hospital course since last seen and reason for reevaluation: Decline in medical status and mobility     Personal factors and/or comorbidities impacting plan of care:     Home Situation  Home Environment: Private residence  # Steps to Enter: 3  Rails to Enter: Yes  Hand Rails : Bilateral  One/Two Story Residence: One story  Living Alone: No  Support Systems: Child(heron), Friend/Neighbor (Pt's son and friend are his support system)  Patient Expects to be Discharged to[de-identified] Home (Pt is returning home with follow up apts)  Current DME Used/Available at Home: None  Tub or Shower Type: Tub/Shower combination    EXAMINATION/PRESENTATION/DECISION MAKING:   Critical Behavior:  Neurologic State: Alert  Orientation Level: Oriented X4  Cognition: Poor safety awareness, Follows commands  Safety/Judgement: Awareness of environment, Insight into deficits  Hearing: Auditory  Auditory Impairment: None  Range Of Motion:  AROM: Generally decreased, functional (except L LE grossly decreased, non-functional)                       Strength:    Strength: Generally decreased, functional (grossly decreased LLE, non-functional)                    Sensation:                  Sensation: Impaired (L LE)               Coordination:   Grossly decreased, non-functional LLE    Functional Mobility:  Bed Mobility:  Rolling: Minimum assistance  Supine to Sit: Minimum assistance  Sit to Supine: Moderate assistance  Scooting: Minimum assistance  Transfers:  Sit to Stand:  (unsafe to attempt due to tachycardia and SpO2 desaturation. Noted L LE weakness. No active quad control)                          Balance:   Sitting: Intact  Unable to safely attempt standing balance  Ambulation/Gait Training:         Unable to safely attempt      Therapeutic Exercises:   Patient educated in muscle activation noted even during failed ROM attempts. Encouraged patient in continued and frequent active use of LLE. Educated on neuroplasticity and muscle re-education. Educated on need to attention to positioning while attempting exercises due to decreased sensation and proprioception.      Functional Measure:  Barthel Index:    Bathin  Bladder: 10  Bowels: 10  Grooming: 5  Dressing: 10  Feedin  Mobility: 0  Stairs: 0  Toilet Use: 0  Transfer (Bed to Chair and Back): 10  Total: 45/100     The Barthel ADL Index: Guidelines  1. The index should be used as a record of what a patient does, not as a record of what a patient could do. 2. The main aim is to establish degree of independence from any help, physical or verbal, however minor and for whatever reason. 3. The need for supervision renders the patient not independent. 4. A patient's performance should be established using the best available evidence. Asking the patient, friends/relatives and nurses are the usual sources, but direct observation and common sense are also important. However direct testing is not needed. 5. Usually the patient's performance over the preceding 24-48 hours is important, but occasionally longer periods will be relevant. 6. Middle categories imply that the patient supplies over 50 per cent of the effort. 7. Use of aids to be independent is allowed. Score Interpretation (from [address] [facility] 83)    Independent   60-79 Minimally independent   40-59 Partially dependent   20-39 Very dependent   <20 Totally dependent     -Max De La Garza., Barthel, D.W. (1965). Functional evaluation: the Barthel Index. [address] [facility] ([address] (). The Barthel activities of daily living index: self-reporting versus actual performance in the old (> or = 75 years). Journal of [address] [place] [address] 45(7), [facility], J.TAMIKO, Belkis Johnson., Lawrence Ken. (1999). Measuring the change in disability after inpatient rehabilitation; comparison of the responsiveness of the Barthel Index and Functional [place] Measure. Journal of Neurology, Neurosurgery, and Psychiatry, 66(4), 690-123. Asia Ortiz, N.J.A, CAMILLE Aceves.JASSON, & Naresh Martino M.A. (2004) Assessment of post-stroke quality of life in cost-effectiveness studies:  The usefulness of the Barthel Index and the EuroQoL-5D. Quality of Life Research, 13, 581-55      Pain Rating:  No c/o pain     Activity Tolerance:   Fair and Tachycardic    After treatment patient left in no apparent distress:   Supine in bed, Heels elevated for pressure relief, Call bell within reach, and Side rails x 3    COMMUNICATION/EDUCATION:   The patients plan of care was discussed with: Occupational therapist and Registered nurse. Fall prevention education was provided and the patient/caregiver indicated understanding., Patient/family have participated as able in goal setting and plan of care. , and Patient/family agree to work toward stated goals and plan of care.     Thank you for this referral.  Rodolfo Mcfarland, PT, DPT   Time Calculation: 20 mins

## 2023-01-07 NOTE — PROGRESS NOTES
Hospitalist Progress Note    Subjective:   Daily Progress Note: 1/7/2023 3:01 PM    Hospital Course: Stephan Chowdhury is a 60 y.o.  male with pertinent past medical history of essential hypertension, alcoholism   (0.5-1.0 L gin per day) without history of delirium tremens who presents with complaints of progressively worsening chest pain and shortness of breath of 1 days duration. Patient describes persistent aching pain in his side as well as subjective shortness of breath. He denies cough or wheeze. He denies preceding illness and has no known sick contacts. Patient reports he has been drinking heavily for quite some time averaging 0.5-1 L of gin per day and reports if he goes greater than 1 day without a drink he becomes very tremulous, but has no history of withdrawal seizures or delirium tremens. Patient acknowledges he drinks too much and wants to cut back/quit but reports that when discharged he plans to resume drinking. Patient denies tobacco use, but reports occasional marijuana and denies any other illicit substance. He presented with tachycardia, CT chest showing large left retroperitoneal hematoma with extravasation, right renal infarction and small left pleura effusion with left lower lobe atelectasis. He was originally on the floor, received 4 units of PRBCs and FFP. He underwent a left embolization with coils for active hemorrhage of left retroperitoneal hematoma on 1/2/23, he was transferred to ICU on 1/3/23 for IV vasopressor support. He initially placed on levophed and subsequently turned off. He developed an ileus, NGT placed to suction and was placed on TPN via central line. He developed GILMAR/ATN from hemorrhagic shock and had temporary HD catheter placed , started on hemodialysis. He had NGT placed d/t nausea/vomiting. 1/7/2023: NGT to suction,  CT abdomen/pelvis showing early SBO. Start PPN , pt removed central line this morning. Subjective:Pt seen in the ICU, no complaints. Current Facility-Administered Medications   Medication Dose Route Frequency    diazePAM (VALIUM) injection 5 mg  5 mg IntraVENous Q6H PRN    TPN ADULT - PERIPHERAL   IntraVENous CONTINUOUS    midodrine (PROAMATINE) tablet 10 mg  10 mg Oral TID PRN    insulin lispro (HUMALOG) injection   SubCUTAneous Q6H    glucose chewable tablet 16 g  4 Tablet Oral PRN    dextrose (D50W) injection syrg 12.5-25 g  12.5-25 g IntraVENous PRN    glucagon (GLUCAGEN) injection 1 mg  1 mg IntraMUSCular PRN    albumin, human-kjda 25% (ALBUMINEX) intravenous solution 25 g  25 g IntraVENous DIALYSIS PRN    epoetin ji-epbx (RETACRIT) injection 10,000 Units  10,000 Units SubCUTAneous Q TUE, THU & SAT    simethicone (MYLICON) tablet 80 mg  80 mg Oral QID PRN    alcohol 62% (NOZIN) nasal  1 Ampule  1 Ampule Topical Q12H    [Held by provider] folic acid (FOLVITE) tablet 1 mg  1 mg Oral DAILY    [Held by provider] thiamine mononitrate (B-1) tablet 100 mg  100 mg Oral DAILY    [Held by provider] multivitamin, tx-iron-ca-min (THERA-M w/ IRON) tablet 1 Tablet  1 Tablet Oral DAILY    [Held by provider] magnesium oxide (MAG-OX) tablet 400 mg  400 mg Oral DAILY    sodium chloride (NS) flush 5-40 mL  5-40 mL IntraVENous Q8H    sodium chloride (NS) flush 5-40 mL  5-40 mL IntraVENous PRN    acetaminophen (TYLENOL) tablet 650 mg  650 mg Oral Q6H PRN    Or    acetaminophen (TYLENOL) suppository 650 mg  650 mg Rectal Q6H PRN    polyethylene glycol (MIRALAX) packet 17 g  17 g Oral DAILY PRN    ondansetron (ZOFRAN ODT) tablet 4 mg  4 mg Oral Q8H PRN    Or    ondansetron (ZOFRAN) injection 4 mg  4 mg IntraVENous Q6H PRN        Review of Systems:    Review of Systems   Constitutional:  Negative for chills and fever. Respiratory:  Negative for cough and shortness of breath. Cardiovascular:  Negative for chest pain and palpitations. Gastrointestinal:  Negative for heartburn, nausea and vomiting.    Genitourinary:  Negative for dysuria and urgency. Neurological:  Negative for dizziness, tingling and headaches. Objective:     Visit Vitals  BP (!) 121/91 (BP 1 Location: Left upper arm)   Pulse 98   Temp 98.5 °F (36.9 °C)   Resp 15   Ht 5' 9\" (1.753 m)   Wt 99.8 kg (220 lb)   SpO2 100%   BMI 32.49 kg/m²    O2 Flow Rate (L/min): 2 l/min O2 Device: Nasal cannula    Temp (24hrs), Av.3 °F (36.8 °C), Min:97.5 °F (36.4 °C), Max:98.9 °F (37.2 °C)      701 - 1900  In: -   Out: 536   1901 - 700  In: 175 [P.O.:175]  Out: 4300     PHYSICAL EXAM:    Physical Exam  Constitutional:       General: He is not in acute distress. Cardiovascular:      Rate and Rhythm: Normal rate and regular rhythm. Pulses: Normal pulses. Heart sounds: Normal heart sounds. Pulmonary:      Effort: Pulmonary effort is normal.      Breath sounds: Normal breath sounds. Abdominal:      General: There is distension. Comments: Firm, hypo bowel sounds, NGT to low suction   Musculoskeletal:         General: Normal range of motion. Skin:     General: Skin is warm and dry. Comments: Right IJ HD catheter   Neurological:      Mental Status: He is oriented to person, place, and time.    Psychiatric:         Mood and Affect: Mood normal.         Behavior: Behavior normal.          Data Review    Recent Results (from the past 24 hour(s))   GLUCOSE, POC    Collection Time: 23  6:07 PM   Result Value Ref Range    Glucose (POC) 80 65 - 117 mg/dL    Performed by Mikael Lambert RN    GLUCOSE, POC    Collection Time: 23 10:44 PM   Result Value Ref Range    Glucose (POC) 83 65 - 117 mg/dL    Performed by Manjit Andersen RN    GLUCOSE, POC    Collection Time: 23 10:46 PM   Result Value Ref Range    Glucose (POC) 67 65 - 117 mg/dL    Performed by Manjit Andersen RN    GLUCOSE, POC    Collection Time: 23 10:49 PM   Result Value Ref Range    Glucose (POC) 80 65 - 117 mg/dL    Performed by [address] [place] [address], POC    Collection Time: 01/07/23 12:00 AM   Result Value Ref Range    Glucose (POC) 88 65 - 117 mg/dL    Performed by Erwin Ellis    PROTHROMBIN TIME + INR    Collection Time: 01/07/23  5:29 AM   Result Value Ref Range    INR 1.1 0.9 - 1.1      Prothrombin time 11.4 (H) 9.0 - 11.1 sec   CBC W/O DIFF    Collection Time: 01/07/23  5:29 AM   Result Value Ref Range    WBC 11.1 4.1 - 11.1 K/uL    RBC 2.81 (L) 4.10 - 5.70 M/uL    HGB 9.2 (L) 12.1 - 17.0 g/dL    HCT 28.2 (L) 36.6 - 50.3 %    .4 (H) 80.0 - 99.0 FL    MCH 32.7 26.0 - 34.0 PG    MCHC 32.6 30.0 - 36.5 g/dL    RDW 17.7 (H) 11.5 - 14.5 %    PLATELET 549 914 - 494 K/uL    MPV 10.2 8.9 - 12.9 FL    NRBC 3.4 (H) 0  WBC    ABSOLUTE NRBC 0.38 (H) 0.00 - 0.01 K/uL   HEPATIC FUNCTION PANEL    Collection Time: 01/07/23  5:29 AM   Result Value Ref Range    Protein, total 7.0 6.4 - 8.2 g/dL    Albumin 2.7 (L) 3.5 - 5.0 g/dL    Globulin 4.3 (H) 2.0 - 4.0 g/dL    A-G Ratio 0.6 (L) 1.1 - 2.2      Bilirubin, total 1.1 (H) 0.2 - 1.0 MG/DL    Bilirubin, direct 0.4 (H) 0.0 - 0.2 MG/DL    Alk.  phosphatase 107 45 - 117 U/L    AST (SGOT) 150 (H) 15 - 37 U/L    ALT (SGPT) 79 (H) 12 - 78 U/L   TRIGLYCERIDE    Collection Time: 01/07/23  5:29 AM   Result Value Ref Range    Triglyceride 100 <150 MG/DL   PHOSPHORUS    Collection Time: 01/07/23  5:29 AM   Result Value Ref Range    Phosphorus 3.0 2.6 - 4.7 MG/DL   MAGNESIUM    Collection Time: 01/07/23  5:29 AM   Result Value Ref Range    Magnesium 2.4 1.6 - 2.4 mg/dL   METABOLIC PANEL, BASIC    Collection Time: 01/07/23  5:29 AM   Result Value Ref Range    Sodium 140 136 - 145 mmol/L    Potassium 3.5 3.5 - 5.1 mmol/L    Chloride 99 97 - 108 mmol/L    CO2 35 (H) 21 - 32 mmol/L    Anion gap 6 5 - 15 mmol/L    Glucose 94 65 - 100 mg/dL    BUN 13 6 - 20 MG/DL    Creatinine 2.77 (H) 0.70 - 1.30 MG/DL    BUN/Creatinine ratio 5 (L) 12 - 20      eGFR 25 (L) >60 ml/min/1.73m2    Calcium 9.0 8.5 - 10.1 MG/DL   GLUCOSE, POC    Collection Time: 01/07/23  6:23 AM   Result Value Ref Range    Glucose (POC) 94 65 - 117 mg/dL    Performed by Torres Muir, POC    Collection Time: 01/07/23 11:33 AM   Result Value Ref Range    Glucose (POC) 91 65 - 117 mg/dL    Performed by Mary Kay Bautista PCT        CT SPINE LUMB WO CONT   Final Result    Degenerative changes in the lumbar spine as described. Moderate to severe canal   stenosis at L3-4. Severe bilateral foraminal narrowing at L5-S1. CT ABD PELV WO CONT   Final Result   1. Left retroperitoneal hematoma grossly stable. 2.  Interval development of a small bowel obstruction. Transition point in the   right upper quadrant. No definite obstructing lesions identified. 3.  Left effusion and left lower lobe volume loss without significant change. XR ABD PORT  1 V   Final Result   Interval placement of NG tube               IR INSERT NON TUNL CVC OVER 5 YRS   Final Result   Technically successful right internal jugular vein non-tunneled dialysis   catheter exchange. A post procedure chest x-ray is pending. DUPLEX LOWER EXT ARTERY LEFT   Final Result      CTA ABDOMEN PELV W CONT   Final Result   1. Stable to mild provement in 9.1 x 11.5 x 18.6 cm left retroperitoneal   collection, previously 9.9 x 11.4 x 20.5 cm.   2.  High density contrast material within the collection likely represents   residual contrast material from embolization procedure. No definitive evidence   of active extravasation. 3.  Evolving right upper pole renal hypodensity may represent renal infarction   versus pyelonephritis. Evaluation for embolic phenomenon may be considered. 4.  Evolving peripheral splenic hypodensities are nonspecific, possibly infarct   versus infectious foci. 5.  Additional findings as above. XR ABD (KUB)   Final Result   New small bowel dilation may represent ileus.       IR OCCL TXCATH HEMMORAGE W SI   Final Result      Abdominal arteriogram demonstrating active hemorrhage off of the distal left L2   lumbar artery. Coil embolization of the lumbar artery was performed. XR CHEST PORT   Final Result   Right IJ catheter satisfactory position without pneumothorax. CT CHEST W CONT   Final Result   Large left retroperitoneal hematoma with evidence of active extravasation. Right   renal infarct. Small left pleural effusion with left lower lobe atelectasis. CT ABD PELV W CONT   Final Result   Large left retroperitoneal hematoma with evidence of active extravasation. Right   renal infarct. Small left pleural effusion with left lower lobe atelectasis. XR CHEST PORT   Final Result      No acute process on portable chest.         DUPLEX LOWER EXT VENOUS BILAT   Final Result      CT HEAD WO CONT   Final Result   No evidence of acute process. XR CHEST PORT   Final Result   No acute cardiopulmonary process. XR HIP LT W OR WO PELV  1 VW   Final Result   No acute bony abnormality. CTA CHEST W OR W WO CONT   Final Result      Single right middle lobe pulmonary embolism. No evidence for right heart strain   or pulmonary infarction. Clear lungs   Incidental hepatic steatosis   This result was reportedly relieved by me to Dr. Waqas Solano at 1937 hours   789      XR CHEST PA LAT   Final Result      No acute cardiopulmonary process. Active Problems:    Pulmonary embolism ([address] (12/25/2022)      Retroperitoneal bleed (1/2/2023)      Left leg weakness (1/5/2023)      Nontraumatic lumbosacral plexopathy (1/5/2023)      Lumbar back pain with radiculopathy affecting left lower extremity (1/5/2023)      Bilateral carotid artery stenosis (1/5/2023)      Cerebral microvascular disease (1/5/2023)        Assessment/Plan:   Left retroperitoneal hemorrhage- s/p embolization with coils, monitor H/H, today 9.2, repeat in am.    2. GILMAR/ATN-  d/t hemorrhagic shock- on HD , nephrology following,   No HD today.     3. Shock liver- d/t hemorrhagic shock, enzymes trending down, continue to monitor. 4. SBO- seen on CT abdomen/pelvis- continue NGT for decompression, low suction, monitor output, start PPN today. Labs in am.     5. Hx of alcohol abuse- valium prn for CIWA scale. 6. Generalized weakness- OOB to chair, OT/PT consults.        DVT Prophylaxis:sequential compression  Code Status:  Full Code  POA: [address] [facility] [address] discussed with:   _____patient, staff nurse__________________________________________________________    Xi Sanchez NP

## 2023-01-07 NOTE — PROGRESS NOTES
[facility]    ICU TEAM Progress Note    Name: Guanaco Terrazas   : 1960   MRN: [id number]   Date: 2023           ICU Assessment & Plan of Care       Guanaco Terrazas Is a 60 y.o. male with h/o EtOH abuse who is admitted for RML PE.    N.  #. L2 plexopathy with LLE weakness and sensory loss  - aggressive PT/OT for LLE -- have discussed case with team and developed plan to prevent muscle and neuronal atrophy as much as possible  - pt reassured that Dopplers of L fem CVC site show no injury or thrombus    C. #. Massive PE on lovenox  #. Hemorrhagic shock, resolved  - pressors off   - midodrine prn HD --  hasn't needed  - MAP goal >65    P.  - pulm hygiene -- tolerating NC    G. #. Ischemic hepatopathy, improving  #. Ileus due to RP bleed mass effect  - NPO with N/V  - NGT in place to LIS  - may consider TPN, but for now doesn't have CVC access and pt has been self-dc'ing lines -- would not replace for safety reasons as this time  - monitor LFTs q3d    R. #. Acute anuric renal failure, likely ATN  - Renal following  - tolerated HD well yesterday    H. #. Spontaneous RP bleed   #. L L2 artery bleed s/p IR embolization 1/2  - aggressively resuscitated s/p 5u RBC, 2u FFP  - Hb stable  - at this point, pt very unlikely to be able to tolerate AC on long-term basis   -- Dopplers negative on admission   -- plan on IVC filter at discharge    I.  - monitor off abx    E.  - insulin prn    SCDs only  SUP not indicated  FULL    Dispo: transfer out of ICU today    Subjective:   Progress Note: 2023      Reason for ICU Admission: PE     HPI: 60year old with h/o ETOH abuse admitted  for shortness of breath and chest pain who was found to have RML PE. He was started on lovenox then coumadin was added (due to cost). Early this a.m. RRT was called for decreased responsiveness. He was then found to be hypotensive. He was started on NE and ICU was consulted.  On initial assessment, he was lethargic but arousable, tachycardic and cold clammy extremities. He was on NE at 17YUQ with systolics in 67J. Patient denies N/V/ abdominal pain. Nursing reports no signs of bleeding. Lovenox last given last night. INR 1.4    Overnight Events:   1/3 - development of spont RP bleed, now s/p IR embol  1/4 - Hb drifting again today; unable to sig wean pressors  1/5 - pt pulled out his NGT yesterday, pulled out his fem quadlumen and disconnected his Olson last night. Pt not delirious and not overly angry/agitated, just stating \"doesn't want this anymore. \"  When speaking with patient, he is clear that his goals are still aggressive full code/full therapy. .. 1/6 - off pressors, pressures improved. CRRT down with plans for HD today. New LLE weakness -- likely L2 plexopathy from hematoma pressure. 1/7 - Timo exchanged by IR yesterday (nonfunctional). Tolerated HD well -- no midodrine req'd. Ongoing N/V; pt allowed NGT to be replaced. POD:  1 Day Post-Op    S/P:   Procedure(s):  LAPAROTOMY EXPLORATORY    Home Medications:     Prior to Admission medications    Medication Sig Start Date End Date Taking? Authorizing Provider   apixaban (ELIQUIS) 5 mg tablet Take 1 Tablet by mouth two (2) times a day. 1/2/23  Yes Tonny Burdick MD   folic acid (FOLVITE) 1 mg tablet Take 1 Tablet by mouth daily. 12/29/22  Yes Tonny Burdick MD   aspirin 81 mg chewable tablet Take 2 Tabs by mouth daily. 8/28/18  Yes Carlotta Deluca MD   diphenhydrAMINE (BENADRYL) 25 mg capsule Take 25 mg by mouth as needed for Itching. Yes Provider, Historical   metoprolol succinate (TOPROL-XL) 50 mg XL tablet Take 1 Tab by mouth daily. In place of metoprolol tartrate. 10/18/19   Frankie Quevedo MD   losartan (COZAAR) 25 mg tablet Take 1 Tab by mouth daily. Patient not taking: Reported on 12/25/2022 10/18/19   Frankie Quevedo MD   therapeutic multivitamin [facility]) tablet Take 1 Tab by mouth daily.   Patient not taking: Reported on 12/25/2022 10/27/17   Gris Stanton MD   thiamine (B-1) 100 mg tablet Take 1 Tab by mouth daily. 10/27/17   Gris Stanton MD   ibuprofen (MOTRIN) 200 mg tablet Take 200 mg by mouth as needed for Pain.   Patient not taking: Reported on 12/25/2022    Provider, Historical       Current Meds:     Current Facility-Administered Medications   Medication Dose Route Frequency    midodrine (PROAMATINE) tablet 10 mg  10 mg Oral TID PRN    insulin lispro (HUMALOG) injection   SubCUTAneous Q6H    glucose chewable tablet 16 g  4 Tablet Oral PRN    dextrose (D50W) injection syrg 12.5-25 g  12.5-25 g IntraVENous PRN    glucagon (GLUCAGEN) injection 1 mg  1 mg IntraMUSCular PRN    TPN ADULT - CENTRAL   IntraVENous CONTINUOUS    albumin, human-kjda 25% (ALBUMINEX) intravenous solution 25 g  25 g IntraVENous DIALYSIS PRN    epoetin ji-epbx (RETACRIT) injection 10,000 Units  10,000 Units SubCUTAneous Q TUE, THU & SAT    simethicone (MYLICON) tablet 80 mg  80 mg Oral QID PRN    alcohol 62% (NOZIN) nasal  1 Ampule  1 Ampule Topical H57V    folic acid (FOLVITE) tablet 1 mg  1 mg Oral DAILY    thiamine mononitrate (B-1) tablet 100 mg  100 mg Oral DAILY    multivitamin, tx-iron-ca-min (THERA-M w/ IRON) tablet 1 Tablet  1 Tablet Oral DAILY    magnesium oxide (MAG-OX) tablet 400 mg  400 mg Oral DAILY    sodium chloride (NS) flush 5-40 mL  5-40 mL IntraVENous Q8H    sodium chloride (NS) flush 5-40 mL  5-40 mL IntraVENous PRN    acetaminophen (TYLENOL) tablet 650 mg  650 mg Oral Q6H PRN    Or    acetaminophen (TYLENOL) suppository 650 mg  650 mg Rectal Q6H PRN    polyethylene glycol (MIRALAX) packet 17 g  17 g Oral DAILY PRN    ondansetron (ZOFRAN ODT) tablet 4 mg  4 mg Oral Q8H PRN    Or    ondansetron (ZOFRAN) injection 4 mg  4 mg IntraVENous Q6H PRN       Objective:   Vital Signs:  Visit Vitals  /87   Pulse 88   Temp 98.3 °F (36.8 °C)   Resp 13   Ht 5' 9\" (1.753 m)   Wt 99.8 kg (220 lb)   SpO2 100%   BMI 32.49 kg/m²    O2 Flow Rate (L/min): 2 l/min O2 Device: Nasal cannula Temp (24hrs), Av.2 °F (36.8 °C), Min:97.5 °F (36.4 °C), Max:98.9 °F (37.2 °C)           Intake/Output:     Intake/Output Summary (Last 24 hours) at 2023 0736  Last data filed at 2023 0500  Gross per 24 hour   Intake 100 ml   Output 3700 ml   Net -3600 ml         Physical Exam:  Older man, sitting up in bed, NAD   Timo in place, dressing c/d/i  Resps even and unlabored, symmetric chest rise on RA, clear throughout  Reg rate, +ectopy  Abd soft, distension improved  Flat affect, but answering questions    LABS AND  DATA: Personally reviewed  Recent Labs     23  0523  040   WBC 11.1 12.9*   HGB 9.2* 8.2*   HCT 28.2* 25.4*    198       Recent Labs     23  0529 23  0416 23  0404    138 136   K 3.5 3.8 3.6   CL 99 101 102   CO2 35* 29 30   BUN 13 19 19   CREA 2.77* 3.44* 3.54*   GLU 94 104* 106*   CA 9.0 9.1 9.1   MG 2.4  --  2.4   PHOS 3.0  --  3.0       Recent Labs     23  0529 23  0404     --    TP 7.0  --    ALB 2.7* 2.4*   GLOB 4.3*  --        Recent Labs     23  0529 23  0404   INR 1.1 1.1   PTP 11.4* 11.3*        No results for input(s): PHI, PCO2I, PO2I, FIO2I in the last 72 hours. No results for input(s): CPK, CKMB, TROIQ, BNPP in the last 72 hours. Hemodynamics:   PAP:   CO:     Wedge:   CI:     CVP:    SVR:       PVR:       Ventilator Settings:  Mode Rate Tidal Volume Pressure FiO2 PEEP                    Peak airway pressure:      Minute ventilation:          MEDS: Reviewed    Chest X-Ray:  CXR Results  (Last 48 hours)                 23 1519  XR CHEST PORT Final result    Impression:      Status post line exchange without pneumothorax.  Low position of tip   Interval removal of NG tube with marked gastric distention       Narrative:  EXAM:  XR CHEST PORT       INDICATION: Timo line exchange       COMPARISON: 2022       TECHNIQUE: portable chest AP view obtained portably at 1514 hours       FINDINGS: The cardiac silhouette is stable. The pulmonary vasculature is within   normal limits. Dual lumen right CVP line terminates in the right atrium. The NG   tube has been removed with marked gastric distention. The lungs and pleural spaces are remarkable for minimal left basilar   subsegmental atelectasis. The visualized bones and upper abdomen are   age-appropriate. SPECIAL EQUIPMENT  None    CRITICAL CARE CONSULTANT NOTE  I had a face to face encounter with the patient, reviewed and interpreted patient data including clinical events, labs, images, vital signs, I/O's, and examined patient. I have discussed the case and the plan and management of the patient's care with the consulting services, the bedside nurses and the respiratory therapist.      NOTE OF PERSONAL INVOLVEMENT IN CARE   This patient has a high probability of imminent, clinically significant deterioration, which requires the highest level of preparedness to intervene urgently. I participated in the decision-making and personally managed or directed the management of the following life and organ supporting interventions that required my frequent assessment to treat or prevent imminent deterioration. I personally spent 40 minutes of critical care time. This is time spent at this critically ill patient's bedside actively involved in patient care as well as the coordination of care. This does not include any procedural time which has been billed separately.     Thao Zarate MD  Intensivist  1/7/2023

## 2023-01-07 NOTE — PROGRESS NOTES
Nephrology Progress Note  [facility]  [address], [place], [address]  Phone - [phone]  Fax - [phone]                 Patient: Henri Childers                   YOB: 1960        Date- 1/7/2023                      Admit Date: 12/25/2022  CC: Follow up for fatou       IMPRESSION & PLAN:   Lindbergh Landau (secondary to ischemic ATN from hemorrhagic shock): anuric,HD yest  Hypophosphatemia: better  Hyperkalemia: better  Anion gap metabolic acidosis: better  Lactic acidosis  Acute blood loss anemia  Acute RP hematoma status post embolization of lumbar artery  Massive PE   Hemorrhagic/cardiogenic shock      PLAN-  No Hd today  Epogen for anemia  Follow bmp   Subjective: Interval History:   1 7 23: HD yest, 1.5 lit off, cr better. Await recovery  1-6-23--bp high-- no sob-- cr increased  1-5-23----patient pulled his femoral line and gabriel overnight- he is not happy with having multiple lines. .. he is anuric-- seen on CRRT today  1-4-23----patient is off CRRT- he is going for CT ABDO-- --k 4.1 --he is anuric    Objective:   Vitals:    01/07/23 0529 01/07/23 0600 01/07/23 0609 01/07/23 0700   BP: 117/74 124/87  131/89   Pulse: 91 93 88 90   Resp: 16 13 13 11   Temp:   98.3 °F (36.8 °C)    TempSrc:       SpO2: 100%      Weight:       Height:          01/06 0701 - 01/07 0700  In: 100 [P.O.:100]  Out: 3700     Last 3 Recorded Weights in this Encounter    12/25/22 1537 01/02/23 1514   Weight: 99.8 kg (220 lb) 99.8 kg (220 lb)        Physical exam:    GEN:  NAD  NECK:  Supple, no thyromegaly  RESP: Clear  b/l, no  wheezing,   CVS: RRR,S1,S2   NEURO: non focal, normal speech  HD access: Right [facility]    Chart reviewed. Pertinent Notes reviewed.      Data Review :  Recent Labs     01/07/23  0529 01/06/23  0416 01/06/23  0404 01/05/23  0424    138 136 138   K 3.5 3.8 3.6 3.4*   CL 99 101 102 103   CO2 35* 29 30 29   BUN 13 19 19 10   CREA 2.77* 3.44* 3.54* 1.98*   GLU 94 104* 106* 110*   CA 9.0 9.1 9.1 9.5   MG 2.4  --  2.4  --    PHOS 3.0  --  3.0 2.1*       Recent Labs     01/07/23  0529 01/06/23  0404 01/05/23  1705 01/05/23  0424   WBC 11.1 12.9*  --  14.5*   HGB 9.2* 8.2* 8.6* 6.9*   HCT 28.2* 25.4* 26.6* 21.1*    198  --  166       No results for input(s): FE, TIBC, PSAT, FERR in the last 72 hours.    No results found for: HBA1C, BFY7TPWZ, OVR1TGTK   No results found for: MCACR, MCA1, MCA2, MCA3, MCAU, MCAU2, MCALPOCT  US Results (most recent):  Medication list  reviewed  Current Facility-Administered Medications   Medication Dose Route Frequency    diazePAM (VALIUM) injection 5 mg  5 mg IntraVENous Q6H PRN    midodrine (PROAMATINE) tablet 10 mg  10 mg Oral TID PRN    insulin lispro (HUMALOG) injection   SubCUTAneous Q6H    glucose chewable tablet 16 g  4 Tablet Oral PRN    dextrose (D50W) injection syrg 12.5-25 g  12.5-25 g IntraVENous PRN    glucagon (GLUCAGEN) injection 1 mg  1 mg IntraMUSCular PRN    TPN ADULT - CENTRAL   IntraVENous CONTINUOUS    albumin, human-kjda 25% (ALBUMINEX) intravenous solution 25 g  25 g IntraVENous DIALYSIS PRN    epoetin ji-epbx (RETACRIT) injection 10,000 Units  10,000 Units SubCUTAneous Q TUE, THU & SAT    simethicone (MYLICON) tablet 80 mg  80 mg Oral QID PRN    alcohol 62% (NOZIN) nasal  1 Ampule  1 Ampule Topical D98F    folic acid (FOLVITE) tablet 1 mg  1 mg Oral DAILY    thiamine mononitrate (B-1) tablet 100 mg  100 mg Oral DAILY    multivitamin, tx-iron-ca-min (THERA-M w/ IRON) tablet 1 Tablet  1 Tablet Oral DAILY    magnesium oxide (MAG-OX) tablet 400 mg  400 mg Oral DAILY    sodium chloride (NS) flush 5-40 mL  5-40 mL IntraVENous Q8H    sodium chloride (NS) flush 5-40 mL  5-40 mL IntraVENous PRN    acetaminophen (TYLENOL) tablet 650 mg  650 mg Oral Q6H PRN    Or    acetaminophen (TYLENOL) suppository 650 mg  650 mg Rectal Q6H PRN    polyethylene glycol (MIRALAX) packet 17 g  17 g Oral DAILY PRN    ondansetron (ZOFRAN ODT) tablet 4 mg  4 mg Oral Q8H PRN    Or    ondansetron (ZOFRAN) injection 4 mg  4 mg IntraVENous Q6H PRN        Tameka Aviles MD  1/7/2023

## 2023-01-07 NOTE — PROGRESS NOTES
Problem: Self Care Deficits Care Plan (Adult)  Goal: *Acute Goals and Plan of Care (Insert Text)  Description: FUNCTIONAL STATUS PRIOR TO ADMISSION: Patient was independent and active without use of DME. Patient was independent for basic and instrumental ADLs. Recently eval and discharged at independent, now re-evaluation due to ICU admission. HOME SUPPORT: The patient lived with friend (need to confirm PLOF/support). Occupational Therapy Goals  Initiated 1/7/2023  1. Patient will perform standing grooming with supervision/set-up within 7 day(s). 2.  Patient will perform bathing with supervision/set-up within 7 day(s). 3.  Patient will perform lower body dressing with minimal assistance/contact guard assist within 7 day(s). 4.  Patient will perform toilet transfers with minimal assistance/contact guard assist within 7 day(s). 5.  Patient will perform all aspects of toileting with minimal assistance/contact guard assist within 7 day(s). 6.  Patient will participate in upper extremity therapeutic exercise/activities with supervision/set-up for 10 minutes within 7 day(s). 7.  Patient will utilize energy conservation techniques during functional activities with verbal cues within 7 day(s). Outcome: Progressing Towards Goal   OCCUPATIONAL THERAPY RE-EVALUATION  Patient: Mihaela Urrutia (44 y.o. male)  Date: 1/7/2023  Diagnosis: Alcoholic ketoacidosis [V15.54]  Pulmonary embolism (HCC) [I26.99] <principal problem not specified>  Procedure(s) (LRB):  LAPAROTOMY EXPLORATORY (N/A) 5 Days Post-Op  Precautions:    Chart, occupational therapy assessment, plan of care, and goals were reviewed.     ASSESSMENT  Based on the objective data described below, patient with decreased functional mobility and ADL independence due to ICU admission with new retroperitoneal hematoma with L2 lumbar artery embolization now with L LE weakness with upper lumbosacral plexus, with aggressive OT/PT recommended, currently tolerated up to EOB with min A for A with L LE and EOB with then gradual elevated HR up to 128 with minimal exercises and conversation, and fluctuating SPO2. NG tube currently due to ileus. Tolerated ~5min sitting with S and lateral scooting to HOB. Mod A for sit to supine. At this time patient below baseline with significant decline for ADL and functional mobility independence. Recommend inpatient rehab at discharge. Current Level of Function Impacting Discharge (ADLs): up to mod A for bed mobility, inability to stand on L LE, up to total A for LB ADLs, and NPO    Other factors to consider for discharge: reevaluation due to decline from independent on 12/27 evaluation         PLAN :  Recommendations and Planned Interventions: self care training, functional mobility training, therapeutic exercise, balance training, therapeutic activities, endurance activities, neuromuscular re-education, patient education, and home safety training    Frequency/Duration: Patient will be followed by occupational therapy 4 times a week to address goals. Recommend with staff: repositioning of LLE for skin/nerve protection    Recommend next OT session: if LLE able to WB then [facility] trial    Recommendation for discharge: (in order for the patient to meet his/her long term goals)  Therapy 3 hours per day 5-7 days per week    This discharge recommendation:  A follow-up discussion with the attending provider and/or case management is planned    Equipment recommendations for successful discharge (if) home: AE: long handled bathing, AE: long handled dressing, bedside commode, shower chair, and wheelchair       SUBJECTIVE:   Patient stated I have been working today on my leg.     OBJECTIVE DATA SUMMARY:   Hospital course since last seen and reason for reevaluation: recent retroperitoneal bleed with hematoma and L2 lumbar artery emobolization no with lumbosacral plexus    Cognitive/Behavioral Status:  Neurologic State: Alert  Orientation Level: Oriented X4  Cognition: Poor safety awareness; Follows commands        Safety/Judgement: Awareness of environment; Insight into deficits    Skin: intact    Edema: none noted    Hearing: Auditory  Auditory Impairment: None    Vision/Perceptual:       intact                              Range of Motion:  B UE intact  AROM: Generally decreased, functional (except L LE)                         Strength:  B UE intact  Strength: Generally decreased, functional (decreased LLE)                Coordination:     Fine Motor Skills-Upper: Right Intact; Left Intact         Tone & Sensation:  B UE intact     Sensation: Impaired (L LE)                        Functional Mobility and Transfers for ADLs:  Bed Mobility:  Rolling: Minimum assistance  Supine to Sit: Minimum assistance  Sit to Supine: Moderate assistance  Scooting: Minimum assistance    Transfers:  Sit to Stand:  (unable, L LE weakness)          Balance:  Sitting: Intact    ADL Assessment:  Feeding: Total assistance (NPO, TPN)    Oral Facial Hygiene/Grooming: Setup    Bathing: Maximum assistance         Upper Body Dressing: Setup    Lower Body Dressing: Total assistance    Toileting: Total assistance         Completed OT evaluation and ADLs seated EOB only with trace hip flexion and ankle flexion, and mild control with WB scooting attempt with Yissel to SBA for balance. Educated on safety and endurance training with encouragement for full participation in ADLs while in hospital. Good understanding noted. ADL Intervention and task modifications:                  Patient instructed and indicated understanding the benefits of maintaining activity tolerance, functional mobility, and independence with self care tasks during acute stay  to ensure safe return home and to baseline.  Encouraged patient to increase frequency and duration OOB, be out of bed for all meals, perform daily ADLs (as approved by RN/MD regarding bathing etc), and performing functional mobility to/from bathroom. Lower Body Dressing Assistance  Socks: Total assistance (dependent)    Toileting  Bowel Hygiene: Total assistance (dependent)  Clothing Management: Total assistance (dependent)    Cognitive Retraining  Safety/Judgement: Awareness of environment; Insight into deficits    Therapeutic Exercises:   Encouraged B UE as able    Functional Measure:    Barthel Index:  Bathin  Bladder: 10  Bowels: 10  Groomin  Dressing: 10  Feedin  Mobility: 0  Stairs: 0  Toilet Use: 0  Transfer (Bed to Chair and Back): 10  Total: 45/100      The Barthel ADL Index: Guidelines  1. The index should be used as a record of what a patient does, not as a record of what a patient could do. 2. The main aim is to establish degree of independence from any help, physical or verbal, however minor and for whatever reason. 3. The need for supervision renders the patient not independent. 4. A patient's performance should be established using the best available evidence. Asking the patient, friends/relatives and nurses are the usual sources, but direct observation and common sense are also important. However direct testing is not needed. 5. Usually the patient's performance over the preceding 24-48 hours is important, but occasionally longer periods will be relevant. 6. Middle categories imply that the patient supplies over 50 per cent of the effort. 7. Use of aids to be independent is allowed. Score Interpretation (from [address] [address])    Independent   60-79 Minimally independent   40-59 Partially dependent   20-39 Very dependent   <20 Totally dependent     -Max De La Garza., Barthel, D.W. (1965). Functional evaluation: the Barthel Index. [facility] ([address] (1997). The Barthel activities of daily living index: self-reporting versus actual performance in the old (> or = 75 years).  Journal of [address] [place] [address] 45(7), [facility], J.J.[email or web address].F, Sonya Davis., Nate Bazzi. (1999). Measuring the change in disability after inpatient rehabilitation; comparison of the responsiveness of the Barthel Index and Functional [place] Measure. Journal of Neurology, Neurosurgery, and Psychiatry, 66(4), 102-385. ALONA Navarrete, KEENAN Aceves, & Jair De Leon M.A. (2004) Assessment of post-stroke quality of life in cost-effectiveness studies: The usefulness of the Barthel Index and the EuroQoL-5D. Quality of Life Research, 13, 427-43         Pain:  None noted    Activity Tolerance:   Fair, desaturates with exertion and requires oxygen, and requires rest breaks    After treatment patient left in no apparent distress:   Supine in bed, Heels elevated for pressure relief, Call bell within reach, and Side rails x 3    COMMUNICATION/COLLABORATION:   The patients plan of care was discussed with: Physical therapist and Registered nurse.      Hemanth Salinas OT  Time Calculation: 20 mins

## 2023-01-07 NOTE — PROGRESS NOTES
Pharmacy TPN Management Note    TPN indication: NPO    TPN type: Central     Macronutrients:  Protein: 5%  Dextrose: 20%  Lipids: pending TG result; none 1/6    Rate: 42mL/hr    Labs:  Recent Labs     01/07/23  0529 01/06/23  0416 01/06/23  0404 01/05/23  0424    138 136 138   K 3.5 3.8 3.6 3.4*   CL 99 101 102 103   CO2 35* 29 30 29   MG 2.4  --  2.4  --    PHOS 3.0  --  3.0 2.1*   CA 9.0 9.1 9.1 9.5   CREA 2.77* 3.44* 3.54* 1.98*   BUN 13 19 19 10     Recent Labs     01/07/23 0529   *   ALT 79*        Recent Labs     01/07/23 0529   WBC 11.1   HGB 9.2*   HCT 28.2*   INR 1.1     Electrolytes:  Electrolytes per LITER:  Sodium 35mEq, Calcium 4.5mEq    Other additives in TPN: none    Impression/Plan:   HD pt  Pt has been self removing lines and now no central venous access, thus peripheral TPN to be ordered 1/7  NP added Famotidine, Thiamine and Folate daily to TPN    1/8 check TG level, if <300, per Nutrition note would start 250mL lipids 3 x week by Monday 1/9   TPN macronutrient/rate changes: peripheral concentrtion  TPN electrolyte changes: removed K, Phos and Mg since HD patient  TPN insulin changes: BG wnl, none presently     Pharmacy will continue to monitor enteral nutrition plan, electrolytes, renal function, and dietician recommendations and adjust parenteral nutrition as needed.     Thank you,  Roselyn Rodriguez, [facility]

## 2023-01-07 NOTE — PROGRESS NOTES
Admit Date: 2022    POD 5 Days Post-Op    Procedure: L2 Lumbar artery embolization by IR    Subjective:     Patient has no new complaints. Still with some left leg numbness. Motor function intact. No abdominal pain. Feels less distended. Objective:     Blood pressure (!) 121/91, pulse 98, temperature 98.5 °F (36.9 °C), resp. rate 15, height 5' 9\" (1.753 m), weight 220 lb (99.8 kg), SpO2 100 %.     Temp (24hrs), Av.3 °F (36.8 °C), Min:97.5 °F (36.4 °C), Max:98.9 °F (37.2 °C)      Physical Exam:  GENERAL: alert, cooperative, no distress, appears stated age, LUNG: clear to auscultation bilaterally, HEART: regular rate and rhythm, ABDOMEN: very soft, NT, distended, EXTREMITIES:  extremities normal, atraumatic, no cyanosis or edema    Labs:   Recent Results (from the past 24 hour(s))   GLUCOSE, POC    Collection Time: 23  6:07 PM   Result Value Ref Range    Glucose (POC) 80 65 - 117 mg/dL    Performed by Leah Lopez RN    GLUCOSE, POC    Collection Time: 23 10:44 PM   Result Value Ref Range    Glucose (POC) 83 65 - 117 mg/dL    Performed by Niall Dickinson RN    GLUCOSE, POC    Collection Time: 23 10:46 PM   Result Value Ref Range    Glucose (POC) 67 65 - 117 mg/dL    Performed by Niall Dickinson RN    GLUCOSE, POC    Collection Time: 23 10:49 PM   Result Value Ref Range    Glucose (POC) 80 65 - 117 mg/dL    Performed by Niall Dickinson RN    GLUCOSE, POC    Collection Time: 23 12:00 AM   Result Value Ref Range    Glucose (POC) 88 65 - 117 mg/dL    Performed by Bassam Randall    PROTHROMBIN TIME + INR    Collection Time: 23  5:29 AM   Result Value Ref Range    INR 1.1 0.9 - 1.1      Prothrombin time 11.4 (H) 9.0 - 11.1 sec   CBC W/O DIFF    Collection Time: 23  5:29 AM   Result Value Ref Range    WBC 11.1 4.1 - 11.1 K/uL    RBC 2.81 (L) 4.10 - 5.70 M/uL    HGB 9.2 (L) 12.1 - 17.0 g/dL    HCT 28.2 (L) 36.6 - 50.3 %    .4 (H) 80.0 - 99.0 FL    MCH 32.7 26.0 - 34.0 PG    MCHC 32.6 30.0 - 36.5 g/dL    RDW 17.7 (H) 11.5 - 14.5 %    PLATELET 802 008 - 399 K/uL    MPV 10.2 8.9 - 12.9 FL    NRBC 3.4 (H) 0  WBC    ABSOLUTE NRBC 0.38 (H) 0.00 - 0.01 K/uL   HEPATIC FUNCTION PANEL    Collection Time: 01/07/23  5:29 AM   Result Value Ref Range    Protein, total 7.0 6.4 - 8.2 g/dL    Albumin 2.7 (L) 3.5 - 5.0 g/dL    Globulin 4.3 (H) 2.0 - 4.0 g/dL    A-G Ratio 0.6 (L) 1.1 - 2.2      Bilirubin, total 1.1 (H) 0.2 - 1.0 MG/DL    Bilirubin, direct 0.4 (H) 0.0 - 0.2 MG/DL    Alk.  phosphatase 107 45 - 117 U/L    AST (SGOT) 150 (H) 15 - 37 U/L    ALT (SGPT) 79 (H) 12 - 78 U/L   TRIGLYCERIDE    Collection Time: 01/07/23  5:29 AM   Result Value Ref Range    Triglyceride 100 <150 MG/DL   PHOSPHORUS    Collection Time: 01/07/23  5:29 AM   Result Value Ref Range    Phosphorus 3.0 2.6 - 4.7 MG/DL   MAGNESIUM    Collection Time: 01/07/23  5:29 AM   Result Value Ref Range    Magnesium 2.4 1.6 - 2.4 mg/dL   METABOLIC PANEL, BASIC    Collection Time: 01/07/23  5:29 AM   Result Value Ref Range    Sodium 140 136 - 145 mmol/L    Potassium 3.5 3.5 - 5.1 mmol/L    Chloride 99 97 - 108 mmol/L    CO2 35 (H) 21 - 32 mmol/L    Anion gap 6 5 - 15 mmol/L    Glucose 94 65 - 100 mg/dL    BUN 13 6 - 20 MG/DL    Creatinine 2.77 (H) 0.70 - 1.30 MG/DL    BUN/Creatinine ratio 5 (L) 12 - 20      eGFR 25 (L) >60 ml/min/1.73m2    Calcium 9.0 8.5 - 10.1 MG/DL   GLUCOSE, POC    Collection Time: 01/07/23  6:23 AM   Result Value Ref Range    Glucose (POC) 94 65 - 117 mg/dL    Performed by Manuel Blair    GLUCOSE, POC    Collection Time: 01/07/23 11:33 AM   Result Value Ref Range    Glucose (POC) 91 65 - 117 mg/dL    Performed by Paul Jones PCT        Data Review images and reports reviewed    Assessment:     Active Problems:    Pulmonary embolism ([facility] [address] (12/25/2022)      Retroperitoneal bleed (1/2/2023)      Left leg weakness (1/5/2023)      Nontraumatic lumbosacral plexopathy (1/5/2023)      Lumbar back pain with radiculopathy affecting left lower extremity (1/5/2023)      Bilateral carotid artery stenosis (1/5/2023)      Cerebral microvascular disease (1/5/2023)        Plan/Recommendations/Medical Decision Making:     Continue present treatment  Remains NPO for now, started on TPN today  Pt at significant risk for increased morbidity with surgical evacuation of retroperitoneal hematoma. Clinically improving. Stay the course. Hematoma typically takes about 2 weeks to liquefy and if still symptomatic at that point, could undergo percutaneous drainage.     Milena Babin MD  [facility]

## 2023-01-07 NOTE — PROGRESS NOTES
0700: Bedside report from Betito Sharp RN.    0800: Assessment complete. Pt on 2L, follow commands, no complaints of sweats, dizziness, nausea/vomiting. Pt still has numbness/tingling in LLE. Pt comfortable A/Ox4.    1145: Angie Luna, NP notified that we will start TPN PIV today. Strict NPO.     1200: Reassessment complete. No changes. 1600: Reassessment complete. No changes. Retaped NGT. Family member bedside. 1838: Pt started on TPN. 1900: Bedside report given to Betito Sharp RN.

## 2023-01-08 LAB
ALBUMIN SERPL-MCNC: 3 G/DL (ref 3.5–5)
ANION GAP SERPL CALC-SCNC: 4 MMOL/L (ref 5–15)
BUN SERPL-MCNC: 24 MG/DL (ref 6–20)
BUN/CREAT SERPL: 6 (ref 12–20)
CALCIUM SERPL-MCNC: 9 MG/DL (ref 8.5–10.1)
CHLORIDE SERPL-SCNC: 99 MMOL/L (ref 97–108)
CO2 SERPL-SCNC: 38 MMOL/L (ref 21–32)
CREAT SERPL-MCNC: 4.02 MG/DL (ref 0.7–1.3)
ERYTHROCYTE [DISTWIDTH] IN BLOOD BY AUTOMATED COUNT: 17.2 % (ref 11.5–14.5)
GLUCOSE BLD STRIP.AUTO-MCNC: 102 MG/DL (ref 65–117)
GLUCOSE BLD STRIP.AUTO-MCNC: 110 MG/DL (ref 65–117)
GLUCOSE BLD STRIP.AUTO-MCNC: 111 MG/DL (ref 65–117)
GLUCOSE BLD STRIP.AUTO-MCNC: 135 MG/DL (ref 65–117)
GLUCOSE BLD STRIP.AUTO-MCNC: 98 MG/DL (ref 65–117)
GLUCOSE SERPL-MCNC: 115 MG/DL (ref 65–100)
HCT VFR BLD AUTO: 30.6 % (ref 36.6–50.3)
HGB BLD-MCNC: 9.6 G/DL (ref 12.1–17)
INR PPP: 1.1 (ref 0.9–1.1)
MAGNESIUM SERPL-MCNC: 2.6 MG/DL (ref 1.6–2.4)
MCH RBC QN AUTO: 32.4 PG (ref 26–34)
MCHC RBC AUTO-ENTMCNC: 31.4 G/DL (ref 30–36.5)
MCV RBC AUTO: 103.4 FL (ref 80–99)
NRBC # BLD: 0.12 K/UL (ref 0–0.01)
NRBC BLD-RTO: 1.1 PER 100 WBC
PHOSPHATE SERPL-MCNC: 3.2 MG/DL (ref 2.6–4.7)
PLATELET # BLD AUTO: 324 K/UL (ref 150–400)
PMV BLD AUTO: 10.4 FL (ref 8.9–12.9)
POTASSIUM SERPL-SCNC: 3.2 MMOL/L (ref 3.5–5.1)
PROTHROMBIN TIME: 11.1 SEC (ref 9–11.1)
RBC # BLD AUTO: 2.96 M/UL (ref 4.1–5.7)
SERVICE CMNT-IMP: ABNORMAL
SERVICE CMNT-IMP: NORMAL
SODIUM SERPL-SCNC: 141 MMOL/L (ref 136–145)
TRIGL SERPL-MCNC: 114 MG/DL (ref ?–150)
WBC # BLD AUTO: 11.3 K/UL (ref 4.1–11.1)

## 2023-01-08 PROCEDURE — 36415 COLL VENOUS BLD VENIPUNCTURE: CPT

## 2023-01-08 PROCEDURE — 74011250636 HC RX REV CODE- 250/636: Performed by: STUDENT IN AN ORGANIZED HEALTH CARE EDUCATION/TRAINING PROGRAM

## 2023-01-08 PROCEDURE — 74011250637 HC RX REV CODE- 250/637: Performed by: INTERNAL MEDICINE

## 2023-01-08 PROCEDURE — 74011000250 HC RX REV CODE- 250: Performed by: STUDENT IN AN ORGANIZED HEALTH CARE EDUCATION/TRAINING PROGRAM

## 2023-01-08 PROCEDURE — 83735 ASSAY OF MAGNESIUM: CPT

## 2023-01-08 PROCEDURE — 65270000046 HC RM TELEMETRY

## 2023-01-08 PROCEDURE — 82962 GLUCOSE BLOOD TEST: CPT

## 2023-01-08 PROCEDURE — 84478 ASSAY OF TRIGLYCERIDES: CPT

## 2023-01-08 PROCEDURE — 85610 PROTHROMBIN TIME: CPT

## 2023-01-08 PROCEDURE — 74011000258 HC RX REV CODE- 258: Performed by: STUDENT IN AN ORGANIZED HEALTH CARE EDUCATION/TRAINING PROGRAM

## 2023-01-08 PROCEDURE — 77010033678 HC OXYGEN DAILY

## 2023-01-08 PROCEDURE — 80069 RENAL FUNCTION PANEL: CPT

## 2023-01-08 PROCEDURE — 85027 COMPLETE CBC AUTOMATED: CPT

## 2023-01-08 RX ORDER — HYDRALAZINE HYDROCHLORIDE 20 MG/ML
10 INJECTION INTRAMUSCULAR; INTRAVENOUS
Status: DISCONTINUED | OUTPATIENT
Start: 2023-01-08 | End: 2023-01-20 | Stop reason: HOSPADM

## 2023-01-08 RX ADMIN — Medication 1 AMPULE: at 12:21

## 2023-01-08 RX ADMIN — Medication 1 AMPULE: at 21:30

## 2023-01-08 RX ADMIN — SODIUM CHLORIDE, PRESERVATIVE FREE 10 ML: 5 INJECTION INTRAVENOUS at 14:39

## 2023-01-08 RX ADMIN — SODIUM CHLORIDE, PRESERVATIVE FREE 10 ML: 5 INJECTION INTRAVENOUS at 21:30

## 2023-01-08 RX ADMIN — FAMOTIDINE: 10 INJECTION, SOLUTION INTRAVENOUS at 17:29

## 2023-01-08 RX ADMIN — SODIUM CHLORIDE, PRESERVATIVE FREE 10 ML: 5 INJECTION INTRAVENOUS at 05:53

## 2023-01-08 NOTE — PROGRESS NOTES
Hospitalist Progress Note    NAME: Shay Davis   :  1960   MRN:  [id number]            Subjective:     Chief Complaint / Reason for Physician Visit  Discussed with RN events overnight. Pt c/o orders to not eat or drink. Understands the reasons for these orders. Otherwise looking forward to getting more use of his left leg. Review of Systems:  Symptom Y/N Comments  Symptom Y/N Comments   Fever/Chills n   Chest Pain n    Poor Appetite n   Edema n    Cough n   Abdominal Pain n    Sputum n   Joint Pain n    SOB/PAINTING n   Pruritis/Rash n    Nausea/vomit n   Tolerating PT/OT y    Diarrhea n   Tolerating Diet na    Constipation n   Other       Could NOT obtain due to:      Objective:     VITALS:   Last 24hrs VS reviewed since prior progress note.  Most recent are:  Patient Vitals for the past 24 hrs:   Temp Pulse Resp BP SpO2   23 1200 98.4 °F (36.9 °C) 90 18 -- 94 %   23 1100 -- 89 15 (!) 133/120 96 %   23 1000 -- 92 14 (!) 151/98 --   23 0900 -- 90 17 (!) 158/104 100 %   23 0826 -- 90 11 (!) 150/96 --   23 0800 98.6 °F (37 °C) 96 14 (!) 147/103 100 %   23 0700 -- 94 14 (!) 150/96 --   23 0600 -- 93 11 (!) 140/95 98 %   23 0500 -- 95 13 133/89 100 %   23 0401 -- 94 13 116/78 98 %   23 0400 -- 96 14 116/78 100 %   23 0300 98.4 °F (36.9 °C) 84 13 (!) 155/99 98 %   23 0203 -- 86 12 (!) 143/97 100 %   23 0100 -- 96 17 (!) 155/88 --   23 0000 98.1 °F (36.7 °C) 87 13 (!) 143/99 100 %   23 2300 -- 91 14 (!) 140/95 --   23 2200 -- 94 14 (!) 140/92 99 %   23 2100 98.5 °F (36.9 °C) 99 17 (!) 139/94 96 %   23 2000 -- 97 13 126/85 98 %   23 1900 -- 99 14 127/88 99 %   23 1800 -- (!) 103 17 136/70 96 %   23 1700 -- 99 15 128/87 100 %   23 1600 98.8 °F (37.1 °C) (!) 106 14 125/84 98 %   23 1500 -- 97 14 126/78 --   23 1400 -- 100 13 129/86 --   23 1300 -- 100 14 (!) 138/94 --       Intake/Output Summary (Last 24 hours) at 1/8/2023 1231  Last data filed at 1/8/2023 0400  Gross per 24 hour   Intake 351.4 ml   Output 990 ml   Net -638.6 ml        PHYSICAL EXAM:  General: WD, WN. Alert, cooperative, no acute distress    EENT:  EOMI. Anicteric sclerae. MMM  Resp:  CTA bilaterally, no wheezing or rales. No accessory muscle use  CV:  Regular  rhythm,  No edema  GI:  Soft, Non distended, Non tender. +Bowel sounds. NGT and TPN present. Neurologic:  Alert and oriented X 3, normal speech,   Psych:   Good insight. Not anxious nor agitated  Skin:  No rashes. No jaundice    Procedures: see electronic medical records for all procedures/Xrays and details which were not copied into this note but were reviewed prior to creation of Plan. LABS:  I reviewed today's most current labs and imaging studies.   Pertinent labs include:  Recent Labs     01/08/23  0401 01/07/23  0529 01/06/23  0404   WBC 11.3* 11.1 12.9*   HGB 9.6* 9.2* 8.2*   HCT 30.6* 28.2* 25.4*    253 198     Recent Labs     01/08/23  0401 01/07/23  0529 01/06/23  0416 01/06/23  0404    140 138 136   K 3.2* 3.5 3.8 3.6   CL 99 99 101 102   CO2 38* 35* 29 30   * 94 104* 106*   BUN 24* 13 19 19   CREA 4.02* 2.77* 3.44* 3.54*   CA 9.0 9.0 9.1 9.1   MG 2.6* 2.4  --  2.4   PHOS 3.2 3.0  --  3.0   ALB 3.0* 2.7*  --  2.4*   TBILI  --  1.1*  --   --    ALT  --  79*  --   --    INR 1.1 1.1  --  1.1       Signed: Chance Jones MD        Reviewed most current lab test results and cultures  YES  Reviewed most current radiology test results   YES  Review and summation of old records today    NO  Reviewed patient's current orders and MAR    YES  PMH/SH reviewed - no change compared to H&P      Assessment / Plan:  Left retroperitoneal hemorrhage  L2 plexopathy with LLE weakness and sensory loss  - Spontaneous retroperitoneal bleed now status post L2 artery bleed embolization.  - S/p 5U RBC and 2U FFP this admission.  - s/p embolization with coils  Plan  Daily labs  PT/OT  Surgery following, greatly appreciate expertise    Ileus  - retroperitoneal bleed mass effect seen on CT abdomen/pelvis  Plan  Continue NPO and NGT for decompression  Low suction  Monitor output  TPN per surgery  Daily labs    Massive PE  - S/p treatment with lovenox. Anticoagulation discontinued after spontaneous retroperitoneal bleed with hemorrhagic shock. Plan  SCDs for now-likely will not be able to tolerate long term anticoagulation  O2 NC  Will need IVC filter prior to DC     GILMAR/ATN  -  d/t hemorrhagic shock  Plan  HD per nephrology     Shock liver  - d/t hemorrhagic shock  Plan  Continue to monitor enzymes trending down       Hx of alcohol abuse  Plan  Valium prn for CIWA scale. 18.5 - 24.9 Normal weight / Body mass index is 23.02 kg/m². Code status: Full  Prophylaxis: SCD's  Recommended Disposition: SAH/Rehab  FAROOQ: 1/11? Barriers: Specialty clearance, medical stability, ileus resolution     ________________________________________________________________________  Care Plan discussed with:    Comments   Patient x    Family      RN     Care Manager     Consultant                        Multidiciplinary team rounds were held today with , nursing, pharmacist and clinical coordinator. Patient's plan of care was discussed; medications were reviewed and discharge planning was addressed.      ________________________________________________________________________  Total NON critical care TIME:  35   Minutes    Total CRITICAL CARE TIME Spent:   Minutes non procedure based      Comments   >50% of visit spent in counseling and coordination of care     ________________________________________________________________________  Leland Sinclair MD

## 2023-01-08 NOTE — PROGRESS NOTES
1900 - 2000  Bedside and Verbal shift change report given to manny (oncoming nurse) by Joya Schlatter (offgoing nurse). Report included the following information SBAR, Kardex, Procedure Summary, Intake/Output, MAR, Recent Results, and Cardiac Rhythm SR .   2000 - 0800  End of Shift Note    Bedside shift change report given to Edilma Lopez (oncoming nurse) by Nimco Chapa RN (offgoing nurse). Report included the following information SBAR, Kardex, Procedure Summary, Intake/Output, MAR, Recent Results, and Cardiac Rhythm SR    Shift worked:  Night     Shift summary and any significant changes:     Message sent to provider on call Dinorah Geovanniovidio regarding K level 3.2. stated to address with nephrology. Concerns for physician to address:  See Lab     Zone phone for oncoming shift:   [phone]       Activity:  Activity Level: Bed Rest  Number times ambulated in hallways past shift: 0  Number of times OOB to chair past shift: 0    Cardiac:   Cardiac Monitoring: Yes      Cardiac Rhythm: Sinus Rhythm    Access:  Current line(s): PIV     Genitourinary:   Urinary status: anuric    Respiratory:   O2 Device: Nasal cannula  Chronic home O2 use?: NO  Incentive spirometer at bedside: NO       GI:  Last Bowel Movement Date: 01/06/23  Current diet:  DIET NPO  TPN ADULT - PERIPHERAL  Passing flatus: YES  Tolerating current diet: NO       Pain Management:   Patient states pain is manageable on current regimen: YES    Skin:  Oneil Score: 15  Interventions: increase time out of bed and PT/OT consult    Patient Safety:  Fall Score:  Total Score: 5  Interventions: gripper socks  High Fall Risk: Yes    Length of Stay:  Expected LOS: 2d 12h  Actual LOS: [address], [place]

## 2023-01-08 NOTE — PROGRESS NOTES
7197  patient alert,cooperative. States he has sensation in foot now,but still not in rest of leg. No c/o pain. NGT remains in place. BP elevated,150/96.     1230  lift team at bedside. Moving patient to chair using key lift. Discovered patient hiding large water cup from [facility] with straw . Reinforced to patient strict npo orders. NGT clamped while in chair. 1245  on bedpan. Hd loose BM. 1415  returned to bed via key lift. . Had small amount loose blackish brown liquid stool. 1515  states he feels like he needs his oxygen. O2 sat on RA 95%. Placed back on 2 L NC for comfort. 1815 complete chg bath given.

## 2023-01-08 NOTE — PROGRESS NOTES
Pharmacy TPN Management Note    TPN indication: NPO    TPN type: Peripheral (changed from central 1/7 due to pt removal of central line)    Macronutrients:  Protein: 5%  Dextrose: 20%  Lipids: pending TG and re-eval 1/9 per Nutrition note    Current Rate: 42mL/hr    Labs:  Recent Labs     01/08/23  0401 01/07/23  0529 01/06/23  0416 01/06/23  0404    140 138 136   K 3.2* 3.5 3.8 3.6   CL 99 99 101 102   CO2 38* 35* 29 30   MG 2.6* 2.4  --  2.4   PHOS 3.2 3.0  --  3.0   CA 9.0 9.0 9.1 9.1   CREA 4.02* 2.77* 3.44* 3.54*   BUN 24* 13 19 19     Recent Labs     01/07/23  0529   *   ALT 79*        Recent Labs     01/08/23  0401   WBC 11.3*   HGB 9.6*   HCT 30.6*   INR 1.1     Electrolytes:  Electrolytes per LITER:  Sodium 35mEq, Calcium 4.5mEq    Other additives in TPN: none    Impression/Plan:   HD pt  Pt has been self removing lines and now no central venous access, thus peripheral TPN ordered 1/7  NP added Famotidine, Thiamine and Folate daily to TPN    1/8 check TG level, if <300, per Nutrition note would start 250mL lipids 3 x week by Monday 1/9 (TG 1/7 = 100)  TPN macronutrient/rate changes: peripheral concentration and will advance to 63mL/hr 1/8  TPN electrolyte changes: removed K, Phos and Mg since HD patient; plan to replete K per Nephrology outside of TPN; rate advancement will increase daily lytes in TPN by approx half  TPN insulin changes: BG wnl, none presently in TPN     Pharmacy will continue to monitor enteral nutrition plan, electrolytes, renal function, and dietician recommendations and adjust parenteral nutrition as needed.     Thank you,  Jacinto Tadeo, [facility]

## 2023-01-09 ENCOUNTER — APPOINTMENT (OUTPATIENT)
Dept: GENERAL RADIOLOGY | Age: 63
DRG: 950 | End: 2023-01-09
Attending: STUDENT IN AN ORGANIZED HEALTH CARE EDUCATION/TRAINING PROGRAM
Payer: COMMERCIAL

## 2023-01-09 LAB
ALBUMIN SERPL-MCNC: 2.5 G/DL (ref 3.5–5)
ANION GAP SERPL CALC-SCNC: 5 MMOL/L (ref 5–15)
BUN SERPL-MCNC: 33 MG/DL (ref 6–20)
BUN/CREAT SERPL: 9 (ref 12–20)
CALCIUM SERPL-MCNC: 8.8 MG/DL (ref 8.5–10.1)
CHLORIDE SERPL-SCNC: 103 MMOL/L (ref 97–108)
CO2 SERPL-SCNC: 35 MMOL/L (ref 21–32)
CREAT SERPL-MCNC: 3.81 MG/DL (ref 0.7–1.3)
ERYTHROCYTE [DISTWIDTH] IN BLOOD BY AUTOMATED COUNT: 16.9 % (ref 11.5–14.5)
GLUCOSE BLD STRIP.AUTO-MCNC: 115 MG/DL (ref 65–117)
GLUCOSE BLD STRIP.AUTO-MCNC: 76 MG/DL (ref 65–117)
GLUCOSE BLD STRIP.AUTO-MCNC: 88 MG/DL (ref 65–117)
GLUCOSE BLD STRIP.AUTO-MCNC: 97 MG/DL (ref 65–117)
GLUCOSE BLD STRIP.AUTO-MCNC: NORMAL MG/DL (ref 65–117)
GLUCOSE SERPL-MCNC: 113 MG/DL (ref 65–100)
HCT VFR BLD AUTO: 26.2 % (ref 36.6–50.3)
HGB BLD-MCNC: 8.2 G/DL (ref 12.1–17)
INR PPP: 1.1 (ref 0.9–1.1)
MAGNESIUM SERPL-MCNC: 2.4 MG/DL (ref 1.6–2.4)
MCH RBC QN AUTO: 32 PG (ref 26–34)
MCHC RBC AUTO-ENTMCNC: 31.3 G/DL (ref 30–36.5)
MCV RBC AUTO: 102.3 FL (ref 80–99)
NRBC # BLD: 0.02 K/UL (ref 0–0.01)
NRBC BLD-RTO: 0.2 PER 100 WBC
PHOSPHATE SERPL-MCNC: 2.7 MG/DL (ref 2.6–4.7)
PLATELET # BLD AUTO: 304 K/UL (ref 150–400)
PMV BLD AUTO: 10.1 FL (ref 8.9–12.9)
POTASSIUM SERPL-SCNC: 3 MMOL/L (ref 3.5–5.1)
PROTHROMBIN TIME: 11.3 SEC (ref 9–11.1)
RBC # BLD AUTO: 2.56 M/UL (ref 4.1–5.7)
SERVICE CMNT-IMP: NORMAL
SODIUM SERPL-SCNC: 143 MMOL/L (ref 136–145)
WBC # BLD AUTO: 11.7 K/UL (ref 4.1–11.1)

## 2023-01-09 PROCEDURE — 85027 COMPLETE CBC AUTOMATED: CPT

## 2023-01-09 PROCEDURE — 74011000250 HC RX REV CODE- 250: Performed by: STUDENT IN AN ORGANIZED HEALTH CARE EDUCATION/TRAINING PROGRAM

## 2023-01-09 PROCEDURE — 65270000046 HC RM TELEMETRY

## 2023-01-09 PROCEDURE — 74011250636 HC RX REV CODE- 250/636: Performed by: PHYSICIAN ASSISTANT

## 2023-01-09 PROCEDURE — 82962 GLUCOSE BLOOD TEST: CPT

## 2023-01-09 PROCEDURE — 74011250637 HC RX REV CODE- 250/637: Performed by: INTERNAL MEDICINE

## 2023-01-09 PROCEDURE — 85610 PROTHROMBIN TIME: CPT

## 2023-01-09 PROCEDURE — 77010033678 HC OXYGEN DAILY

## 2023-01-09 PROCEDURE — 83735 ASSAY OF MAGNESIUM: CPT

## 2023-01-09 PROCEDURE — 74011250636 HC RX REV CODE- 250/636: Performed by: STUDENT IN AN ORGANIZED HEALTH CARE EDUCATION/TRAINING PROGRAM

## 2023-01-09 PROCEDURE — 74018 RADEX ABDOMEN 1 VIEW: CPT

## 2023-01-09 PROCEDURE — 74011000258 HC RX REV CODE- 258: Performed by: STUDENT IN AN ORGANIZED HEALTH CARE EDUCATION/TRAINING PROGRAM

## 2023-01-09 PROCEDURE — 80069 RENAL FUNCTION PANEL: CPT

## 2023-01-09 PROCEDURE — 36415 COLL VENOUS BLD VENIPUNCTURE: CPT

## 2023-01-09 RX ORDER — POTASSIUM CHLORIDE 750 MG/1
40 TABLET, FILM COATED, EXTENDED RELEASE ORAL
Status: COMPLETED | OUTPATIENT
Start: 2023-01-09 | End: 2023-01-09

## 2023-01-09 RX ORDER — POTASSIUM CHLORIDE 20 MEQ/1
20 TABLET, EXTENDED RELEASE ORAL 2 TIMES DAILY
Status: DISCONTINUED | OUTPATIENT
Start: 2023-01-09 | End: 2023-01-09

## 2023-01-09 RX ADMIN — HYDRALAZINE HYDROCHLORIDE 10 MG: 20 INJECTION INTRAMUSCULAR; INTRAVENOUS at 17:13

## 2023-01-09 RX ADMIN — Medication 1 AMPULE: at 20:31

## 2023-01-09 RX ADMIN — SODIUM CHLORIDE, PRESERVATIVE FREE 10 ML: 5 INJECTION INTRAVENOUS at 15:07

## 2023-01-09 RX ADMIN — FAMOTIDINE: 10 INJECTION, SOLUTION INTRAVENOUS at 19:42

## 2023-01-09 RX ADMIN — HYDRALAZINE HYDROCHLORIDE 10 MG: 20 INJECTION INTRAMUSCULAR; INTRAVENOUS at 04:04

## 2023-01-09 RX ADMIN — SODIUM CHLORIDE, PRESERVATIVE FREE 10 ML: 5 INJECTION INTRAVENOUS at 10:15

## 2023-01-09 RX ADMIN — Medication 1 AMPULE: at 10:03

## 2023-01-09 RX ADMIN — POTASSIUM CHLORIDE 40 MEQ: 750 TABLET, FILM COATED, EXTENDED RELEASE ORAL at 10:06

## 2023-01-09 RX ADMIN — I.V. FAT EMULSION 250 ML: 20 EMULSION INTRAVENOUS at 20:27

## 2023-01-09 RX ADMIN — SODIUM CHLORIDE, PRESERVATIVE FREE 10 ML: 5 INJECTION INTRAVENOUS at 22:05

## 2023-01-09 NOTE — PROGRESS NOTES
Transition of Care Plan:    RUR: 15% - Moderate  Disposition: Possibly home vs Rehab - OT/PT currently recommending IPR -   If SNF or IPR: Date FOC offered:  Date FOC received:  Date authorization started with reference number:  Date authorization received and expires:  Accepting facility:  Follow up appointments:  DME needed: TBD  Transportation at [address] or means to access home:        IM Medicare Letter: [address] [place]  Is patient a [place] and connected with the [place]? N/A  If yes, was [place] transfer form completed and [facility] notified? Caregiver Contact: Pt's son Julita Moe [phone]  Discharge Caregiver contacted prior to discharge? Care Conference needed?:                   CM attended IDR's in CCU - patient remains NPO - currently on TPN, Nephrology following for HD. Surgery following for retroperitoneal hematoma management. Patient currently on CIWA scale and valium PRN for Alcohol Abuse and will need IVC filter prior to D/C for a PE. CM will continue to follow.     Yovana Chang RN, BSN, [address] [place] [address]  Manager of Case Management  [phone]

## 2023-01-09 NOTE — PROGRESS NOTES
Comprehensive Nutrition Assessment    Type and Reason for Visit: Reassess    Nutrition Recommendations/Plan:   Advance PPN rate to 83mL/h tonight  If BG <200mg/dL and lytes WNL tomorrow, recommend advancing to Goal of D10, 4.25% AA @ 100mL/h, continue lipids as ordered (provides 1438kcals/102gPro/240gDextrose)      Malnutrition Assessment:  Malnutrition Status:  No malnutrition (01/03/23 1324)        Nutrition Assessment:  Chart reviewed. Pt remains disoriented. He lost central line access over the weekend and was switched to PPN on 1/7. He also pulled out his NGT, had 800mL OP over last 24h before it was pulled. Imaging over the weekend still showed SBO.  K 3.0 today, but phos and Mag WNL. BG . As Phos is the hallmark indicator of refeeding syndrome, he is likely safe for rate advancement to 83mL/h, provides 17kcals/kg. Provided goal rate recommendations above with new PPN formulation, could potentially reach this tomorrow night if lytes WNL in the morning. Deferring diet advancement to surgeon, it looks like he did have a BM within the last 24h. TPN at goal will provide 20kcals/kg. Nutrition Related Findings:    Meds: humalog. BM 1/8 Wound Type: None    Current Nutrition Intake & Therapies:  Average Meal Intake: NPO     Current Parenteral Nutrition Orders:  Type and Formula: D10, 4.25% AA   Lipids: 250ml, Three times weekly  Duration: Continuous  Rate/Volume: 63mL/h  Goal PN Orders Provides: 1438kcals/102gPro/240gDextrose    Anthropometric Measures:  Height: 5' 9\" (175.3 cm)  Ideal Body Weight (IBW): 160 lbs (73 kg)     Current Body Wt:  70.7 kg (155 lb 13.8 oz), 137.5 % IBW. Not specified  Current BMI (kg/m2): 23  Usual Body Weight: 84.4 kg (186 lb)  % Weight Change (Calculated): 18.3                    BMI Category: Normal weight (BMI 18.5-24. 9)    Estimated Daily Nutrient Needs:  Energy Requirements Based On: Formula  Weight Used for Energy Requirements: Current  Energy (kcal/day): AURELIO 2150 (1784 x 1.2)  Weight Used for Protein Requirements: Current  Protein (g/day): 92-106g (1.3-1.5gPro/kg)  Method Used for Fluid Requirements: Standard renal  Fluid (ml/day): per MD    Nutrition Diagnosis:   Inadequate protein-energy intake related to altered GI function, impaired nutrient utilization as evidenced by NPO or clear liquid status due to medical condition  Previous dx resolving slowly. Nutrition Interventions:   Food and/or Nutrient Delivery: Modify parenteral nutrition  Nutrition Education/Counseling: No recommendations at this time  Coordination of Nutrition Care: Continue to monitor while inpatient, Interdisciplinary rounds       Goals:  Previous Goal Met: Progressing toward goal(s)  Goals: Tolerate nutrition support at goal rate, by next RD assessment (with BG <200mg/dL and lytes WNL)       Nutrition Monitoring and Evaluation:   Behavioral-Environmental Outcomes: None identified  Food/Nutrient Intake Outcomes: Parenteral nutrition intake/tolerance  Physical Signs/Symptoms Outcomes: Biochemical data, Nutrition focused physical findings, Skin, Weight, Fluid status or edema, Hemodynamic status    Discharge Planning:     Too soon to determine    Gracia Carmona RD, [facility]  Contact: ext [phone]

## 2023-01-09 NOTE — PROGRESS NOTES
Code fall:    Evaluated patient at bedside after a ground-level fall onto his buttocks. Patient has no current complaints and on physical exam no obvious tenderness appreciated. There is no lumbar spine back pain or bilateral hip pain. Patient flexes and extends right hip without difficulty. Patient was admitted with left lower extremity weakness and that continues.

## 2023-01-09 NOTE — PROGRESS NOTES
Nephrology Progress Note  [facility] / [address] [facility] [place], [address]  Phone - [phone]  Fax - [phone]                 Patient: Shiv Carty                   YOB: 1960        Date- 1/9/2023                      Admit Date: 12/25/2022  CC: Follow up for SANJIV     IMPRESSION & PLAN:   Sanjiv (secondary to ischemic ATN from hemorrhagic shock): anuric,HD yest  Hypokalemia  Hypophosphatemia:  Hyperkalemia: resolved  Anion gap metabolic acidosis: better  Lactic acidosis  Acute blood loss anemia  Acute RP hematoma status post embolization of lumbar artery  Massive PE   Hemorrhagic/cardiogenic shock      PLAN-  Hold hd today  Kcl 40 meq po  Epogen for anemia  Follow bmp   Subjective: Interval History:   1-9-23---cr 3.8 with k 3.0-low--last hd on Friday. 1 7 23: HD yest, 1.5 lit off, cr better. Await recovery  1-6-23--bp high-- no sob-- cr increased  1-5-23----patient pulled his femoral line and gabriel overnight- he is not happy with having multiple lines. .. he is anuric-- seen on CRRT today  1-4-23----patient is off CRRT- he is going for CT ABDO-- --k 4.1 --he is anuric    Objective:   Vitals:    01/09/23 0600 01/09/23 0700 01/09/23 0755 01/09/23 0800   BP: 121/76  (!) 140/94 125/84   Pulse: 96 100  (!) 109   Resp: 14 13  17   Temp:   97.6 °F (36.4 °C)    TempSrc:       SpO2: 96%      Weight:       Height:          01/08 0701 - 01/09 0700  In: 1507.9 [I.V.:1207.9]  Out: 900 [Urine:85]    Last 3 Recorded Weights in this Encounter    12/25/22 1537 01/02/23 1514 01/08/23 0000   Weight: 99.8 kg (220 lb) 99.8 kg (220 lb) 70.7 kg (155 lb 13.8 oz)        Physical exam:    GEN:  nad  NECK:  Supple, no thyromegaly  RESP: clear  b/l, no  wheezing,   CVS: RRR,S1,S2   NEURO: non focal, normal speech  HD access: Right IJ [place]    Chart reviewed. Pertinent Notes reviewed.      Data Review :  Recent Labs 01/09/23  0436 01/08/23  0401 01/07/23  0529    141 140   K 3.0* 3.2* 3.5    99 99   CO2 35* 38* 35*   BUN 33* 24* 13   CREA 3.81* 4.02* 2.77*   * 115* 94   CA 8.8 9.0 9.0   MG 2.4 2.6* 2.4   PHOS 2.7 3.2 3.0       Recent Labs     01/09/23  0436 01/08/23  0401 01/07/23  0529   WBC 11.7* 11.3* 11.1   HGB 8.2* 9.6* 9.2*   HCT 26.2* 30.6* 28.2*    324 253       No results for input(s): FE, TIBC, PSAT, FERR in the last 72 hours.    No results found for: HBA1C, FVH8FYYO, JUH8ZARG   No results found for: MCACR, MCA1, MCA2, MCA3, MCAU, MCAU2, MCALPOCT  US Results (most recent):  Medication list  reviewed  Current Facility-Administered Medications   Medication Dose Route Frequency    potassium chloride SR (KLOR-CON 10) tablet 40 mEq  40 mEq Oral NOW    TPN ADULT - PERIPHERAL   IntraVENous CONTINUOUS    hydrALAZINE (APRESOLINE) 20 mg/mL injection 10 mg  10 mg IntraVENous Q6H PRN    diazePAM (VALIUM) injection 5 mg  5 mg IntraVENous Q6H PRN    midodrine (PROAMATINE) tablet 10 mg  10 mg Oral TID PRN    insulin lispro (HUMALOG) injection   SubCUTAneous Q6H    glucose chewable tablet 16 g  4 Tablet Oral PRN    dextrose (D50W) injection syrg 12.5-25 g  12.5-25 g IntraVENous PRN    glucagon (GLUCAGEN) injection 1 mg  1 mg IntraMUSCular PRN    albumin, human-kjda 25% (ALBUMINEX) intravenous solution 25 g  25 g IntraVENous DIALYSIS PRN    epoetin ji-epbx (RETACRIT) injection 10,000 Units  10,000 Units SubCUTAneous Q TUE, THU & SAT    simethicone (MYLICON) tablet 80 mg  80 mg Oral QID PRN    alcohol 62% (NOZIN) nasal  1 Ampule  1 Ampule Topical Q12H    [Held by provider] folic acid (FOLVITE) tablet 1 mg  1 mg Oral DAILY    [Held by provider] thiamine mononitrate (B-1) tablet 100 mg  100 mg Oral DAILY    [Held by provider] multivitamin, tx-iron-ca-min (THERA-M w/ IRON) tablet 1 Tablet  1 Tablet Oral DAILY    [Held by provider] magnesium oxide (MAG-OX) tablet 400 mg  400 mg Oral DAILY    sodium chloride (NS) flush 5-40 mL  5-40 mL IntraVENous Q8H    sodium chloride (NS) flush 5-40 mL  5-40 mL IntraVENous PRN    acetaminophen (TYLENOL) tablet 650 mg  650 mg Oral Q6H PRN    Or    acetaminophen (TYLENOL) suppository 650 mg  650 mg Rectal Q6H PRN    polyethylene glycol (MIRALAX) packet 17 g  17 g Oral DAILY PRN    ondansetron (ZOFRAN ODT) tablet 4 mg  4 mg Oral Q8H PRN    Or    ondansetron (ZOFRAN) injection 4 mg  4 mg IntraVENous Q6H PRN        Crow Heath MD  1/9/2023

## 2023-01-09 NOTE — PROGRESS NOTES
Hospitalist Progress Note    NAME: Stiven Anders   :  1960   MRN:  [id number]            Subjective:     Chief Complaint / Reason for Physician Visit  Discussed with RN events overnight. Pt feels better this morning. Did have a GLF onto buttocks last night. No acute injury. Pt did remove his NGT this morning and doesn't recall. Will trial it out at this time. Review of Systems:  Symptom Y/N Comments  Symptom Y/N Comments   Fever/Chills n   Chest Pain n    Poor Appetite n   Edema n    Cough n   Abdominal Pain n    Sputum n   Joint Pain n    SOB/PAINTING n   Pruritis/Rash n    Nausea/vomit n   Tolerating PT/OT y    Diarrhea n   Tolerating Diet na    Constipation n   Other       Could NOT obtain due to:      Objective:     VITALS:   Last 24hrs VS reviewed since prior progress note.  Most recent are:  Patient Vitals for the past 24 hrs:   Temp Pulse Resp BP SpO2   23 0755 97.6 °F (36.4 °C) -- -- (!) 140/94 --   23 0700 -- 100 13 -- --   23 0600 -- 96 14 121/76 96 %   23 0400 97.5 °F (36.4 °C) 90 13 (!) 179/100 92 %   23 0300 -- 85 12 -- 93 %   23 0200 -- 85 12 (!) 156/94 95 %   23 0007 -- -- -- -- 98 %   23 0000 98.5 °F (36.9 °C) 87 14 (!) 162/99 98 %   23 2200 -- 91 12 (!) 157/103 (!) 85 %   23 2000 98.4 °F (36.9 °C) 89 13 (!) 155/91 (!) 86 %   23 1900 -- 91 17 -- --   23 1800 -- 94 16 (!) 163/102 (!) 87 %   23 1700 -- 90 14 -- 94 %   23 1603 98.7 °F (37.1 °C) 93 15 (!) 167/106 98 %   23 1500 -- 89 11 -- 96 %   23 1433 -- 94 13 (!) 150/104 95 %   23 1415 -- 98 14 -- --   23 1300 -- (!) 112 13 -- --   23 1230 -- (!) 103 24 -- 98 %   23 1215 -- 87 15 (!) 161/91 --   23 1200 98.4 °F (36.9 °C) 90 18 -- 94 %   23 1100 -- 89 15 (!) 133/120 96 %   23 1000 -- 92 14 (!) 151/98 --   23 0900 -- 90 17 (!) 158/104 100 %   23 0826 -- 90 11 (!) 150/96 --   23 0800 98.6 °F (37 °C) 96 14 (!) 147/103 100 %         Intake/Output Summary (Last 24 hours) at 1/9/2023 0759  Last data filed at 1/9/2023 0755  Gross per 24 hour   Intake 1507.85 ml   Output 1000 ml   Net 507.85 ml          PHYSICAL EXAM:  General: WD, WN. Alert, cooperative, no acute distress    EENT:  EOMI. Anicteric sclerae. MMM  Resp:  CTA bilaterally, no wheezing or rales. No accessory muscle use  CV:  Regular  rhythm,  No edema  GI:  Soft, Non distended, Non tender. +Bowel sounds. NGT and TPN present. Neurologic:  Alert and oriented X 3, normal speech,   Psych:   Good insight. Not anxious nor agitated  Skin:  No rashes. No jaundice    Procedures: see electronic medical records for all procedures/Xrays and details which were not copied into this note but were reviewed prior to creation of Plan. LABS:  I reviewed today's most current labs and imaging studies.   Pertinent labs include:  Recent Labs     01/09/23  0436 01/08/23  0401 01/07/23  0529   WBC 11.7* 11.3* 11.1   HGB 8.2* 9.6* 9.2*   HCT 26.2* 30.6* 28.2*    324 253       Recent Labs     01/09/23  0436 01/08/23  0401 01/07/23  0529    141 140   K 3.0* 3.2* 3.5    99 99   CO2 35* 38* 35*   * 115* 94   BUN 33* 24* 13   CREA 3.81* 4.02* 2.77*   CA 8.8 9.0 9.0   MG 2.4 2.6* 2.4   PHOS 2.7 3.2 3.0   ALB 2.5* 3.0* 2.7*   TBILI  --   --  1.1*   ALT  --   --  79*   INR 1.1 1.1 1.1         Signed: Coco Escobar MD        Reviewed most current lab test results and cultures  YES  Reviewed most current radiology test results   YES  Review and summation of old records today    NO  Reviewed patient's current orders and MAR    YES  PMH/SH reviewed - no change compared to H&P      Assessment / Plan:  Left retroperitoneal hemorrhage  L2 plexopathy with LLE weakness and sensory loss  - Spontaneous retroperitoneal bleed now status post L2 artery bleed embolization.  - S/p 5U RBC and 2U FFP this admission.  - s/p embolization with coils  Plan  Daily labs  PT/OT  Surgery following, greatly appreciate expertise    Ileus  - retroperitoneal bleed mass effect seen on CT abdomen/pelvis  Plan  Continue NPO   Pt pulled NGT, will trial without, if nausea or vomiting may have to replace to low suction  Monitor output  TPN per surgery  Daily labs    Massive PE  - S/p treatment with lovenox. Anticoagulation discontinued after spontaneous retroperitoneal bleed with hemorrhagic shock. Plan  SCDs for now-likely will not be able to tolerate long term anticoagulation  O2 NC  IVC filter ordered with IR     GILMAR/ATN  -  d/t hemorrhagic shock  Plan  HD per nephrology     Shock liver  - d/t hemorrhagic shock  Plan  Continue to monitor enzymes trending down     Hx of alcohol abuse  Plan  Valium prn for CIWA scale. 18.5 - 24.9 Normal weight / Body mass index is 23.02 kg/m². Code status: Full  Prophylaxis: SCD's  Recommended Disposition: SAH/Rehab  FAROOQ: 1/11? Barriers: Specialty clearance, medical stability, ileus resolution     ________________________________________________________________________  Care Plan discussed with:    Comments   Patient x    Family      RN     Care Manager     Consultant                        Multidiciplinary team rounds were held today with , nursing, pharmacist and clinical coordinator. Patient's plan of care was discussed; medications were reviewed and discharge planning was addressed.      ________________________________________________________________________  Total NON critical care TIME:  35   Minutes    Total CRITICAL CARE TIME Spent:   Minutes non procedure based      Comments   >50% of visit spent in counseling and coordination of care     ________________________________________________________________________  Sherrell Harada, MD

## 2023-01-09 NOTE — PROGRESS NOTES
0810  TPN restarted after endurance catheter manipulated and flushed. Will monitor. States he doesn't recall pulling out his NGT. Remains out until further orders. 0815  perfect serve message sent to Dr. Jennifer Boles regarding continued  low potassium level. 3.0 today. 0820  patient c/o new occasional shooting pains down his left leg.   1225  states he has some feeling going up left shin, which is more than yesterday. 1500  Lift team assisted patient up to chair using [employer] lift. No PT available today. Patient asking for ice chips. Abdomen still appears distended. 1815  nasogastric tube re-inserted by RN as instructed by dr. Elizabeth James. KUB ordered.

## 2023-01-09 NOTE — PROGRESS NOTES
1900 - 2000  Bedside and Verbal shift change report given to Julissa Shields (oncoming nurse) by Roselyn Laird (offgoing nurse). Report included the following information SBAR, Kardex, Procedure Summary, Intake/Output, MAR, Recent Results, and Cardiac Rhythm SR .   2000 - 0800  End of Shift Note    Bedside shift change report given to Roselyn Laird (oncoming nurse) by Carmela Thomas RN (offgoing nurse). Report included the following information SBAR, Kardex, Intake/Output, MAR, Recent Results, and Cardiac Rhythm SR    Shift worked:  Night     Shift summary and any significant changes: The patient  has a history of falls. I did complete a risk assessment. Post Fall Documentation      Hui Weaver witnessed/unwitnessed fall occurred on 1/8 (Date) at 2135 (Time). The answers to the following questions summarize the fall: In the patient's own words,:  What were you attempting to do when you fell? Find my wallet  Do you know why you fell? Left leg gave out  Do you have any pain/discomfort or any other complaints? No  Which part of your body made contact with the floor or other object? buttocks    Nurse:  Was this an assisted fall? no  Was fall witnessed? No  If witnessed, what part of the body made contact with the floor or other object? He was seen by nurses sitting on the floor. Patients mental status after the fall/when found: Alert and oriented  Any apparent injury:  No apparent injury  Immediate interventions for injury/suspected injury? No interventions needed  Patient assisted back to bed? Assist X2  Name of provider notified and time, any comments? [address]       Name of family member notified and time: Patient called his brother on his cell phone. Immediate VS and physical assessment documented in flow sheets. Neuro assessment every hour x 4 (for potential head injury or unwitnessed fall) documented in flow sheets.       Carmela Thomas, HANSEL       Concerns for physician to address:  Patient stated he is tired being in the hospital.     Zone phone for oncoming shift:   [phone]       Activity:  Activity Level: Chair, Bed Rest  Number times ambulated in hallways past shift: 0  Number of times OOB to chair past shift: 1    Cardiac:   Cardiac Monitoring: Yes      Cardiac Rhythm: Sinus Rhythm    Access:  Current line(s): midline     Genitourinary:   Urinary status: voiding    Respiratory:   O2 Device: None (Room air)  Chronic home O2 use?: NO  Incentive spirometer at bedside: NO       GI:  Last Bowel Movement Date: 01/08/23  Current diet:  DIET NPO  TPN ADULT - PERIPHERAL  Passing flatus: YES  Tolerating current diet: YES       Pain Management:   Patient states pain is manageable on current regimen: N/A    Skin:  Oneil Score: 13  Interventions: turn team, increase time out of bed, and PT/OT consult    Patient Safety:  Fall Score:  Total Score: 3  Interventions: bed/chair alarm  High Fall Risk: Yes    Length of Stay:  Expected LOS: 2d 12h  Actual LOS: [address], RN

## 2023-01-10 LAB
ALBUMIN SERPL-MCNC: 2.5 G/DL (ref 3.5–5)
ALBUMIN/GLOB SERPL: 0.6 (ref 1.1–2.2)
ALP SERPL-CCNC: 104 U/L (ref 45–117)
ALT SERPL-CCNC: 44 U/L (ref 12–78)
ANION GAP SERPL CALC-SCNC: 5 MMOL/L (ref 5–15)
AST SERPL-CCNC: 76 U/L (ref 15–37)
BILIRUB DIRECT SERPL-MCNC: 0.3 MG/DL (ref 0–0.2)
BILIRUB SERPL-MCNC: 0.7 MG/DL (ref 0.2–1)
BUN SERPL-MCNC: 40 MG/DL (ref 6–20)
BUN/CREAT SERPL: 12 (ref 12–20)
CALCIUM SERPL-MCNC: 8.9 MG/DL (ref 8.5–10.1)
CHLORIDE SERPL-SCNC: 102 MMOL/L (ref 97–108)
CO2 SERPL-SCNC: 34 MMOL/L (ref 21–32)
CREAT SERPL-MCNC: 3.21 MG/DL (ref 0.7–1.3)
ERYTHROCYTE [DISTWIDTH] IN BLOOD BY AUTOMATED COUNT: 16.3 % (ref 11.5–14.5)
GLOBULIN SER CALC-MCNC: 4.1 G/DL (ref 2–4)
GLUCOSE BLD STRIP.AUTO-MCNC: 105 MG/DL (ref 65–117)
GLUCOSE BLD STRIP.AUTO-MCNC: 110 MG/DL (ref 65–117)
GLUCOSE BLD STRIP.AUTO-MCNC: 94 MG/DL (ref 65–117)
GLUCOSE BLD STRIP.AUTO-MCNC: 95 MG/DL (ref 65–117)
GLUCOSE SERPL-MCNC: 109 MG/DL (ref 65–100)
HCT VFR BLD AUTO: 26.4 % (ref 36.6–50.3)
HGB BLD-MCNC: 8.2 G/DL (ref 12.1–17)
INR PPP: 1 (ref 0.9–1.1)
MAGNESIUM SERPL-MCNC: 2.2 MG/DL (ref 1.6–2.4)
MCH RBC QN AUTO: 31.3 PG (ref 26–34)
MCHC RBC AUTO-ENTMCNC: 31.1 G/DL (ref 30–36.5)
MCV RBC AUTO: 100.8 FL (ref 80–99)
NRBC # BLD: 0 K/UL (ref 0–0.01)
NRBC BLD-RTO: 0 PER 100 WBC
PHOSPHATE SERPL-MCNC: 2.1 MG/DL (ref 2.6–4.7)
PLATELET # BLD AUTO: 342 K/UL (ref 150–400)
PMV BLD AUTO: 10.5 FL (ref 8.9–12.9)
POTASSIUM SERPL-SCNC: 3.2 MMOL/L (ref 3.5–5.1)
PROT SERPL-MCNC: 6.6 G/DL (ref 6.4–8.2)
PROTHROMBIN TIME: 10.7 SEC (ref 9–11.1)
RBC # BLD AUTO: 2.62 M/UL (ref 4.1–5.7)
SERVICE CMNT-IMP: NORMAL
SODIUM SERPL-SCNC: 141 MMOL/L (ref 136–145)
WBC # BLD AUTO: 10.1 K/UL (ref 4.1–11.1)

## 2023-01-10 PROCEDURE — 97530 THERAPEUTIC ACTIVITIES: CPT | Performed by: OCCUPATIONAL THERAPIST

## 2023-01-10 PROCEDURE — 80048 BASIC METABOLIC PNL TOTAL CA: CPT

## 2023-01-10 PROCEDURE — 97530 THERAPEUTIC ACTIVITIES: CPT

## 2023-01-10 PROCEDURE — 65270000046 HC RM TELEMETRY

## 2023-01-10 PROCEDURE — 74011250636 HC RX REV CODE- 250/636: Performed by: PHYSICIAN ASSISTANT

## 2023-01-10 PROCEDURE — 74011000258 HC RX REV CODE- 258: Performed by: STUDENT IN AN ORGANIZED HEALTH CARE EDUCATION/TRAINING PROGRAM

## 2023-01-10 PROCEDURE — 74011000250 HC RX REV CODE- 250: Performed by: STUDENT IN AN ORGANIZED HEALTH CARE EDUCATION/TRAINING PROGRAM

## 2023-01-10 PROCEDURE — 85610 PROTHROMBIN TIME: CPT

## 2023-01-10 PROCEDURE — 84100 ASSAY OF PHOSPHORUS: CPT

## 2023-01-10 PROCEDURE — 5A1D70Z PERFORMANCE OF URINARY FILTRATION, INTERMITTENT, LESS THAN 6 HOURS PER DAY: ICD-10-PCS | Performed by: INTERNAL MEDICINE

## 2023-01-10 PROCEDURE — 74011250637 HC RX REV CODE- 250/637: Performed by: INTERNAL MEDICINE

## 2023-01-10 PROCEDURE — 97535 SELF CARE MNGMENT TRAINING: CPT | Performed by: OCCUPATIONAL THERAPIST

## 2023-01-10 PROCEDURE — 83735 ASSAY OF MAGNESIUM: CPT

## 2023-01-10 PROCEDURE — 74011250636 HC RX REV CODE- 250/636: Performed by: INTERNAL MEDICINE

## 2023-01-10 PROCEDURE — 90935 HEMODIALYSIS ONE EVALUATION: CPT

## 2023-01-10 PROCEDURE — 77010033678 HC OXYGEN DAILY

## 2023-01-10 PROCEDURE — 74011250636 HC RX REV CODE- 250/636: Performed by: STUDENT IN AN ORGANIZED HEALTH CARE EDUCATION/TRAINING PROGRAM

## 2023-01-10 PROCEDURE — 80076 HEPATIC FUNCTION PANEL: CPT

## 2023-01-10 PROCEDURE — 85027 COMPLETE CBC AUTOMATED: CPT

## 2023-01-10 PROCEDURE — 82962 GLUCOSE BLOOD TEST: CPT

## 2023-01-10 PROCEDURE — 36415 COLL VENOUS BLD VENIPUNCTURE: CPT

## 2023-01-10 RX ORDER — POTASSIUM CHLORIDE 750 MG/1
40 TABLET, FILM COATED, EXTENDED RELEASE ORAL
Status: COMPLETED | OUTPATIENT
Start: 2023-01-10 | End: 2023-01-10

## 2023-01-10 RX ADMIN — FAMOTIDINE: 10 INJECTION, SOLUTION INTRAVENOUS at 18:00

## 2023-01-10 RX ADMIN — SODIUM CHLORIDE, PRESERVATIVE FREE 20 ML: 5 INJECTION INTRAVENOUS at 13:41

## 2023-01-10 RX ADMIN — EPOETIN ALFA-EPBX 10000 UNITS: 10000 INJECTION, SOLUTION INTRAVENOUS; SUBCUTANEOUS at 20:32

## 2023-01-10 RX ADMIN — HYDRALAZINE HYDROCHLORIDE 10 MG: 20 INJECTION INTRAMUSCULAR; INTRAVENOUS at 04:07

## 2023-01-10 RX ADMIN — SODIUM CHLORIDE, PRESERVATIVE FREE 10 ML: 5 INJECTION INTRAVENOUS at 05:59

## 2023-01-10 RX ADMIN — POTASSIUM CHLORIDE 40 MEQ: 750 TABLET, FILM COATED, EXTENDED RELEASE ORAL at 12:14

## 2023-01-10 RX ADMIN — SODIUM CHLORIDE, PRESERVATIVE FREE 10 ML: 5 INJECTION INTRAVENOUS at 20:33

## 2023-01-10 RX ADMIN — Medication 1 AMPULE: at 09:59

## 2023-01-10 RX ADMIN — Medication 1 AMPULE: at 20:33

## 2023-01-10 NOTE — PROGRESS NOTES
TRANSFER - OUT REPORT:    Verbal report given to Bobby Gomez RN(name) on Guanaco Terrazas  being transferred to CCU(unit) for ordered procedure       Report consisted of patients Situation, Background, Assessment and   Recommendations(SBAR). Information from the following report(s) Kardex was reviewed with the receiving nurse. Opportunity for questions and clarification was provided.       Patient transported with:   HD at bedside

## 2023-01-10 NOTE — PROGRESS NOTES
TRANSFER - IN REPORT:    Verbal report received from Katja Grace RN(name) on Maggi Anderson  being received from CCU(unit) for ordered procedure      Report consisted of patients Situation, Background, Assessment and   Recommendations(SBAR). Information from the following report(s) Kardex was reviewed with the receiving nurse. Opportunity for questions and clarification was provided. Assessment completed upon patients arrival to unit and care assumed.

## 2023-01-10 NOTE — PROGRESS NOTES
Problem: Mobility Impaired (Adult and Pediatric)  Goal: *Acute Goals and Plan of Care (Insert Text)  Description: FUNCTIONAL STATUS PRIOR TO ADMISSION: Patient was independent and active without use of DME.    HOME SUPPORT PRIOR TO ADMISSION: The patient lived with family but did not require assist.    Physical Therapy Goals  Initiated 1/7/2023  1. Patient will move from supine to sit and sit to supine , scoot up and down, and roll side to side in bed with modified independence within 7 day(s). 2.  Patient will transfer from bed to chair and chair to bed with minimal assistance/contact guard assist using the least restrictive device within 7 day(s). 3.  Patient will perform sit to stand with minimal assistance/contact guard assist within 7 day(s). 4.  Patient will ambulate with minimal assistance/contact guard assist for 25 feet with the least restrictive device within 7 day(s). Outcome: Progressing Towards Goal     PHYSICAL THERAPY TREATMENT  Patient: Nicole Pérez (88 y.o. 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 ASSESSMENT  Patient continues with skilled PT services and is progressing towards goals. Able to progress to standing today but elevated HR with minimal activity up to 148 with standing and required frequent seated rest breaks. Poor activity tolerance. L LE remains quite weak and with L knee softening with attempt to take steps along edge of bed. Difficulty following commands to take steps laterally rather than forward. To note, he had just completed dialysis prior to session and fatigued quickly. Recommend IPR. Acute PT will continue to follow and progress as able.     Current Level of Function Impacting Discharge (mobility/balance): moderate to maximal assist x 2 bed to chair    Other factors to consider for discharge: dialysis, ETOH, tachycardia         PLAN :  Patient continues to benefit from skilled intervention to address the above impairments. Continue treatment per established plan of care. to address goals. Recommendation for discharge: (in order for the patient to meet his/her long term goals)  Therapy 3 hours per day 5-7 days per week    This discharge recommendation:  Has been made in collaboration with the attending provider and/or case management    IF patient discharges home will need the following DME: to be determined (TBD)     SUBJECTIVE:   Patient stated I just got dialysis and now we doing therapy?     OBJECTIVE DATA SUMMARY:   Critical Behavior:  Neurologic State: Alert  Orientation Level: Oriented X4  Cognition: Follows commands  Safety/Judgement: Awareness of environment, Insight into deficits  Functional Mobility Training:  Bed Mobility:     Supine to Sit: Minimum assistance  Sit to Supine: Moderate assistance  Scooting: Minimum assistance        Transfers:  Sit to Stand: Moderate assistance;Assist x2 (cues)  Stand to Sit: Moderate assistance;Assist x2        Bed to Chair: Moderate assistance;Maximum assistance;Assist x2 (stand-pivot, unable to safely take steps to chair - L LE buckling)                    Balance:  Sitting: Intact  Standing: Impaired  Standing - Static: Fair;Poor;Constant support  Standing - Dynamic : Poor;Constant support  Ambulation/Gait Training:  Distance (ft): 3 Feet (ft) (lateral steps)  Assistive Device: Gait belt;Walker, rolling  Ambulation - Level of Assistance: Moderate assistance;Assist x2        Gait Abnormalities: Decreased step clearance; Path deviations (patient with difficulty following commands to take side steps, L knee buckling)        Base of Support: Widened  Stance: Left decreased (L knee buckling)  Speed/Melanie: Pace decreased (<100 feet/min)     Pain Rating:  Did not interfere    Activity Tolerance:   Fair, Poor, and requires frequent rest breaks   at rest, 120s with seated activity and up to 148 with standing activities    After treatment patient left in no apparent distress:   Sitting in chair, Call bell within reach, and Bed / chair alarm activated    COMMUNICATION/COLLABORATION:   The patients plan of care was discussed with: Occupational therapist and Registered nurse.      Roosevelt Naqvi, PT   Time Calculation: 38 mins

## 2023-01-10 NOTE — PROGRESS NOTES
Hospitalist Progress Note    NAME: Pamela Oquendo   :  1960   MRN:  [id number]            Subjective:     Chief Complaint / Reason for Physician Visit  Discussed with RN events overnight. Pt feels better this morning. Did tolerate replacement of NGT. Abdomen still distended. BM x3 yesterday. Surgery did not see yesterday. Review of Systems:  Symptom Y/N Comments  Symptom Y/N Comments   Fever/Chills n   Chest Pain n    Poor Appetite n   Edema n    Cough n   Abdominal Pain n    Sputum n   Joint Pain n    SOB/PAINTING n   Pruritis/Rash n    Nausea/vomit n   Tolerating PT/OT y    Diarrhea n   Tolerating Diet na    Constipation n   Other       Could NOT obtain due to:      Objective:     VITALS:   Last 24hrs VS reviewed since prior progress note.  Most recent are:  Patient Vitals for the past 24 hrs:   Temp Pulse Resp BP SpO2   01/10/23 1200 98.4 °F (36.9 °C) (!) 108 10 99/71 98 %   01/10/23 1100 98.4 °F (36.9 °C) (!) 105 16 (!) 142/98 97 %   01/10/23 1045 -- (!) 110 15 (!) 126/104 99 %   01/10/23 1030 -- (!) 103 13 (!) 161/111 100 %   01/10/23 1015 -- (!) 102 15 (!) 156/108 99 %   01/10/23 1000 -- (!) 102 14 (!) 168/102 100 %   01/10/23 0945 -- (!) 109 14 (!) 159/104 98 %   01/10/23 0930 -- (!) 101 -- (!) 153/110 98 %   01/10/23 0915 -- (!) 103 -- (!) 155/106 100 %   01/10/23 0900 -- (!) 106 -- (!) 159/103 100 %   01/10/23 0845 -- (!) 105 -- (!) 168/108 96 %   01/10/23 0830 -- (!) 104 -- (!) 180/109 100 %   01/10/23 0815 -- (!) 102 -- (!) 166/107 100 %   01/10/23 0800 98.4 °F (36.9 °C) (!) 101 (!) 102 (!) 168/120 100 %   01/10/23 0745 -- -- (!) 101 (!) 174/109 100 %   01/10/23 0730 98.4 °F (36.9 °C) (!) 104 15 (!) 170/101 100 %   01/10/23 0400 98.4 °F (36.9 °C) 90 12 (!) 185/101 98 %   01/10/23 0000 98.3 °F (36.8 °C) 92 11 (!) 167/103 99 %   23 2200 -- 97 12 (!) 156/104 98 %   23 2000 98.1 °F (36.7 °C) 98 14 (!) 172/103 96 %   23 1900 -- 99 17 -- --   23 1800 -- (!) 105 16 136/87 -- Intake/Output Summary (Last 24 hours) at 1/10/2023 1614  Last data filed at 1/10/2023 1200  Gross per 24 hour   Intake 2645.59 ml   Output 1600 ml   Net 1045.59 ml          PHYSICAL EXAM:  General: WD, WN. Alert, cooperative, no acute distress    EENT:  EOMI. Anicteric sclerae. MMM  Resp:  CTA bilaterally, no wheezing or rales. No accessory muscle use  CV:  Regular  rhythm,  No edema  GI:  Soft, Non distended, Non tender. +Bowel sounds. NGT and TPN present. Neurologic:  Alert and oriented X 3, normal speech,   Psych:   Good insight. Not anxious nor agitated  Skin:  No rashes. No jaundice    Procedures: see electronic medical records for all procedures/Xrays and details which were not copied into this note but were reviewed prior to creation of Plan. LABS:  I reviewed today's most current labs and imaging studies.   Pertinent labs include:  Recent Labs     01/10/23  0413 01/09/23  0436 01/08/23  0401   WBC 10.1 11.7* 11.3*   HGB 8.2* 8.2* 9.6*   HCT 26.4* 26.2* 30.6*    304 324       Recent Labs     01/10/23  0413 01/09/23  0436 01/08/23  0401    143 141   K 3.2* 3.0* 3.2*    103 99   CO2 34* 35* 38*   * 113* 115*   BUN 40* 33* 24*   CREA 3.21* 3.81* 4.02*   CA 8.9 8.8 9.0   MG 2.2 2.4 2.6*   PHOS 2.1* 2.7 3.2   ALB 2.5* 2.5* 3.0*   TBILI 0.7  --   --    ALT 44  --   --    INR 1.0 1.1 1.1         Signed: Kb Hauser MD        Reviewed most current lab test results and cultures  YES  Reviewed most current radiology test results   YES  Review and summation of old records today    NO  Reviewed patient's current orders and MAR    YES  PMH/SH reviewed - no change compared to H&P      Assessment / Plan:  Left retroperitoneal hemorrhage  L2 plexopathy with LLE weakness and sensory loss  - Spontaneous retroperitoneal bleed now status post L2 artery bleed embolization.  - S/p 5U RBC and 2U FFP this admission.  - s/p embolization with coils  Plan  Daily labs  PT/OT  Surgery following, greatly appreciate expertise    Ileus  - retroperitoneal bleed mass effect seen on CT abdomen/pelvis  Plan  Continue NPO   NGT  Monitor output  TPN per surgery  Daily labs    Massive PE  - S/p treatment with lovenox. Anticoagulation discontinued after spontaneous retroperitoneal bleed with hemorrhagic shock. Plan  SCDs for now-likely will not be able to tolerate long term anticoagulation  O2 NC  IVC filter ordered with IR tomorrow     GILMAR/ATN  -  d/t hemorrhagic shock  Plan  HD per nephrology     Shock liver  - d/t hemorrhagic shock  Plan  Continue to monitor enzymes trending down     Hx of alcohol abuse  Plan  Valium prn for CIWA scale. 18.5 - 24.9 Normal weight / Body mass index is 23.02 kg/m². Code status: Full  Prophylaxis: SCD's  Recommended Disposition: SAH/Rehab  FAROOQ: 1/12-1/13  Barriers: Specialty clearance, medical stability, ileus resolution     ________________________________________________________________________  Care Plan discussed with:    Comments   Patient x    Family      RN     Care Manager     Consultant                        Multidiciplinary team rounds were held today with , nursing, pharmacist and clinical coordinator. Patient's plan of care was discussed; medications were reviewed and discharge planning was addressed.      ________________________________________________________________________  Total NON critical care TIME:  35   Minutes    Total CRITICAL CARE TIME Spent:   Minutes non procedure based      Comments   >50% of visit spent in counseling and coordination of care     ________________________________________________________________________  Ivonne Merino MD

## 2023-01-10 NOTE — PROGRESS NOTES
Nephrology Progress Note  [facility] / [address], [address]  [place], [address]  Phone - [phone]  Fax - [phone]                 Patient: Evelyn Michelle                   YOB: 1960        Date- 1/10/2023                      Admit Date: 12/25/2022  CC: Follow up for GILMAR  IMPRESSION & PLAN:   GILMAR (secondary to ischemic ATN from hemorrhagic shock): anuric,HD yest  Hypokalemia  Hypophosphatemia:  Hyperkalemia: resolved  Anion gap metabolic acidosis: better  Lactic acidosis  Acute blood loss anemia  Acute RP hematoma status post embolization of lumbar artery  Massive PE   Hemorrhagic/cardiogenic shock      PLAN-  Seen on hd today  Kcl 40 meq po  Epogen for anemia  Follow bmp  Watch for renal recovery   Subjective: Interval History:   1-10-23-- urine out put low- bun increased- cr 3.2 -- on hd at present  1-9-23---cr 3.8 with k 3.0-low--last hd on Friday. 1 7 23: HD yest, 1.5 lit off, cr better. Await recovery  1-6-23--bp high-- no sob-- cr increased  1-5-23----patient pulled his femoral line and gabriel overnight- he is not happy with having multiple lines. .. he is anuric-- seen on CRRT today  1-4-23----patient is off CRRT- he is going for CT ABDO-- --k 4.1 --he is anuric    Objective:   Vitals:    01/10/23 0800 01/10/23 0815 01/10/23 0830 01/10/23 0845   BP: (!) 168/120 (!) 166/107 (!) 180/109    Pulse: (!) 101      Resp: (!) 102 (!) 102 (!) 104 (!) 105   Temp: (P) 98.4 °F (36.9 °C)      TempSrc:       SpO2: 100% 100% 100% 96%   Weight:       Height:          01/09 0701 - 01/10 0700  In: 2208 [I.V.:2208]  Out: 225 [Urine:225]    Last 3 Recorded Weights in this Encounter    12/25/22 1537 01/02/23 1514 01/08/23 0000   Weight: 99.8 kg (220 lb) 99.8 kg (220 lb) 70.7 kg (155 lb 13.8 oz)        Physical exam:    GEN:  NAD  NECK:  Supple, no thyromegaly  RESP: Clear  b/l, no  wheezing,   CVS: RRR,S1,S2   NEURO: non focal, normal speech  HD access: Right IJ Timo    Chart reviewed. Pertinent Notes reviewed. Data Review :  Recent Labs     01/10/23  0413 01/09/23  0436 01/08/23  0401    143 141   K 3.2* 3.0* 3.2*    103 99   CO2 34* 35* 38*   BUN 40* 33* 24*   CREA 3.21* 3.81* 4.02*   * 113* 115*   CA 8.9 8.8 9.0   MG 2.2 2.4 2.6*   PHOS 2.1* 2.7 3.2       Recent Labs     01/10/23  0413 01/09/23  0436 01/08/23  0401   WBC 10.1 11.7* 11.3*   HGB 8.2* 8.2* 9.6*   HCT 26.4* 26.2* 30.6*    304 324       No results for input(s): FE, TIBC, PSAT, FERR in the last 72 hours.    No results found for: HBA1C, AJN5JQDL, XJD2GXNY   No results found for: MCACR, MCA1, MCA2, MCA3, MCAU, MCAU2, MCALPOCT  US Results (most recent):  Medication list  reviewed  Current Facility-Administered Medications   Medication Dose Route Frequency    TPN ADULT - PERIPHERAL   IntraVENous CONTINUOUS    fat emulsion 20% (LIPOSYN, INTRAlipid) infusion 250 mL  250 mL IntraVENous Q MON, WED & FRI    hydrALAZINE (APRESOLINE) 20 mg/mL injection 10 mg  10 mg IntraVENous Q6H PRN    diazePAM (VALIUM) injection 5 mg  5 mg IntraVENous Q6H PRN    midodrine (PROAMATINE) tablet 10 mg  10 mg Oral TID PRN    insulin lispro (HUMALOG) injection   SubCUTAneous Q6H    glucose chewable tablet 16 g  4 Tablet Oral PRN    dextrose (D50W) injection syrg 12.5-25 g  12.5-25 g IntraVENous PRN    glucagon (GLUCAGEN) injection 1 mg  1 mg IntraMUSCular PRN    albumin, human-kjda 25% (ALBUMINEX) intravenous solution 25 g  25 g IntraVENous DIALYSIS PRN    epoetin ji-epbx (RETACRIT) injection 10,000 Units  10,000 Units SubCUTAneous Q TUE, THU & SAT    simethicone (MYLICON) tablet 80 mg  80 mg Oral QID PRN    alcohol 62% (NOZIN) nasal  1 Ampule  1 Ampule Topical Q12H    [Held by provider] folic acid (FOLVITE) tablet 1 mg  1 mg Oral DAILY    [Held by provider] thiamine mononitrate (B-1) tablet 100 mg  100 mg Oral DAILY    [Held by provider] multivitamin, tx-iron-ca-min (THERA-M w/ IRON) tablet 1 Tablet  1 Tablet Oral DAILY    [Held by provider] magnesium oxide (MAG-OX) tablet 400 mg  400 mg Oral DAILY    sodium chloride (NS) flush 5-40 mL  5-40 mL IntraVENous Q8H    sodium chloride (NS) flush 5-40 mL  5-40 mL IntraVENous PRN    acetaminophen (TYLENOL) tablet 650 mg  650 mg Oral Q6H PRN    Or    acetaminophen (TYLENOL) suppository 650 mg  650 mg Rectal Q6H PRN    polyethylene glycol (MIRALAX) packet 17 g  17 g Oral DAILY PRN    ondansetron (ZOFRAN ODT) tablet 4 mg  4 mg Oral Q8H PRN    Or    ondansetron (ZOFRAN) injection 4 mg  4 mg IntraVENous Q6H PRN        Abi Maradiaga MD  1/10/2023

## 2023-01-10 NOTE — PROGRESS NOTES
1900: Report received from [facility]: assessment completed, pt's abdomen is slightly distended but is not painful to touch. When asked if he felt his abdomen was distended, he replied \" this is the way its looked my whole life\"    2300: CHG bath completed, new linens and gown provided. Pt declined to take off pajama pants to complete domenic care. NG tube advanced to 55cm per KUB report. 0400: Unable to obtain weight on bed scale, malfunction code appears. Will pass along to day team when he gets up with lift team. NG tube cannister has less than 50ml of clear secretions. 6879: New adhesive españa placed on NG tube. Minimal clear secretions visualized in cannister at this time.     0700: Report given to Snehal Delgado PA initiated.

## 2023-01-10 NOTE — PROGRESS NOTES
0730 - Report received from Regino Eddy RN.    0800 - Assessment completed. Patient alert and oriented x 4, some weakness and numbness noted in left leg. Patient reports he has intermittent sharp pain in left leg but not reporting any current pain. On 2L nasal cannula. Sinus tachycardia. Right IJ robert currently being utilized by hemodialysis nurse. Left upper arm PIV infusing PPN at 83 ml/hr and lipids. NGT at 55cm at the nare on suction. Skin is clean, dry, and intact. 1145 - Patient up to chair with PT/OT. 1200 - Reassessment completed. Patient on room air, SpO2 98%. 1230 - Spoke with IR. They report the IR Doctor has not approved procedure yet so IVC filter still pending. 1400 - Patient back to bed per patient request. Heavy 1 assist.    6043 - Spoke with IR team and they report IVC filter will be placed sometime tomorrow. 1600 - Reassessment completed. Asked patient if he would like a wash cloth to bathe and he reports he will do it later. Reports he brushed his teeth this morning.

## 2023-01-10 NOTE — PROCEDURES
Hemodialysis / [phone]    Vitals Pre Post Assessment Pre Post   BP BP: (!) 174/109 (01/10/23 0745)   142/98 LOC A & O x 4 A &  O X x4   HR Pulse (Heart Rate): (!) 104 (01/10/23 0730)   18 Lungs clear clear   Resp Resp Rate: (!) 101 (01/10/23 0745)   105 Cardiac S1S2 S1S2   Temp Temp: 98.4 °F (36.9 °C) (01/10/23 0730) 98.4 Skin Warm dry & intact Warm dry & intact   Weight  N/a  Edema none None    Tele status yes  Pain Pain Intensity 1: 0 (01/10/23 0400)      Orders   Duration: Start: 0730 End: 1100 Total: 3.5   Dialyzer: Dialyzer/Set Up Inspection: Revaclear (01/10/23 0730)   K Bath: Dialysate K (mEq/L): 3 (01/10/23 0730)   Ca Bath: Dialysate CA (mEq/L): 2.5 (01/10/23 0730)   Na: Dialysate NA (mEq/L): 140 (01/10/23 0730)   Bicarb: Dialysate HCO3 (mEq/L): 140 (01/10/23 0730)   Target Fluid Removal: Goal/Amount of Fluid to Remove (mL): 1500 mL (01/10/23 0730)     Access   Type & Location: Right IJ cvc   Comments:   no problems note. Good blood flow 350.                                      Labs   HBsAg (Antigen) / date: Neg 01/03/23                                              HBsAb (Antibody) / date: Susc 01/03/23   Source: epic   Obtained/Reviewed  Critical Results Called HGB   Date Value Ref Range Status   01/10/2023 8.2 (L) 12.1 - 17.0 g/dL Final     Potassium   Date Value Ref Range Status   01/10/2023 3.2 (L) 3.5 - 5.1 mmol/L Final     Calcium   Date Value Ref Range Status   01/10/2023 8.9 8.5 - 10.1 MG/DL Final     BUN   Date Value Ref Range Status   01/10/2023 40 (H) 6 - 20 MG/DL Final     Creatinine   Date Value Ref Range Status   01/10/2023 3.21 (H) 0.70 - 1.30 MG/DL Final        Meds Given   Name Dose Route                    Adequacy / Fluid    Total Liters Process: 63.4   Net Fluid Removed: 1500      Comments   Time Out Done:   (Time) 0700   Admitting Diagnosis: Sanjiv (secondary to ischemic ATN from hemorrhagic shock): anuric,HD yest  Hypokalemia  Hypophosphatemia:  Hyperkalemia: resolved  Anion gap metabolic acidosis: better  Lactic acidosis  Acute blood loss anemia  Acute RP hematoma status post embolization of lumbar artery  Massive PE   Hemorrhagic/cardiogenic shock   Consent obtained/signed: Informed Consent Verified: Yes (01/10/23 0730)   Machine / RO # Machine Number: [id number] (01/10/23 0730)   Primary Nurse Rpt Pre: Bobby Gomez RN   Primary Nurse Rpt Post: Bobby Gomez RN   Pt Education: Access care   Care Plan: Continue HD   Pts outpatient clinic: acute     Tx Summary right IJ PC : Pre- Assessment: Dressing CDI. No s/s of infection. Both lumens aspirate & flush well. Running well at . Post assessment: Dressing CDI. No changes. SBAR received from Primary RN. Pt arrived to HD suite A&Ox4. Consent signed & on file OR Chronic consent applies. 0730: Each catheter limb disinfected per p&p, caps removed, hubs disinfected per p&p. Each lumen aspirated for blood return and flushed with Normal Saline per policy. Labs drawn per request/ order. VSS. Dialysis Tx initiated. 0800: pt. Stable,lines secure and visible  0830: pt. Resting well. Lines secure and visible. Dr. Kristopher Lloyd rounding  0900: pt. Caren. Hd well. Lines secure. 0930: Pt. Stable, lines secure and visible  1000: pt. Resting well. 1030: Pt. Resting well. 1100 Tx ended. VSS. Each dialysis catheter limb disinfected per p&p, all possible blood returned per p&p, and each dialysis hub disinfected per p&p. Each lumen flushed, post dialysis catheter Heparin dwell instilled per order, and caps applied. Bed locked and in the lowest position, call bell and belongings in reach. SBAR given to Primary, RN. Patient is stable at time of their/ my departure. All Dialysis related medications have been reviewed. Comments:   RN reviewed LPN assessment and completed RN assessment. RN completed patient assessment. RN reviewed technicians vital signs and procedure note. Tx completed.  Reviewed by HANSEL Cabello

## 2023-01-10 NOTE — PROGRESS NOTES
Problem: Self Care Deficits Care Plan (Adult)  Goal: *Acute Goals and Plan of Care (Insert Text)  Description: FUNCTIONAL STATUS PRIOR TO ADMISSION: Patient was independent and active without use of DME. Patient was independent for basic and instrumental ADLs. Recently eval and discharged at independent, now re-evaluation due to ICU admission. HOME SUPPORT: The patient lived with friend (need to confirm PLOF/support). Occupational Therapy Goals  Initiated 1/7/2023  1. Patient will perform standing grooming with supervision/set-up within 7 day(s). 2.  Patient will perform bathing with supervision/set-up within 7 day(s). 3.  Patient will perform lower body dressing with minimal assistance/contact guard assist within 7 day(s). 4.  Patient will perform toilet transfers with minimal assistance/contact guard assist within 7 day(s). 5.  Patient will perform all aspects of toileting with minimal assistance/contact guard assist within 7 day(s). 6.  Patient will participate in upper extremity therapeutic exercise/activities with supervision/set-up for 10 minutes within 7 day(s). 7.  Patient will utilize energy conservation techniques during functional activities with verbal cues within 7 day(s). Outcome: Progressing Towards Goal     OCCUPATIONAL THERAPY TREATMENT  Patient: Krishna Camejo (78 y.o. 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 ASSESSMENT  Patient making slow progress, demonstrating continued LLE weakness, impaired motor planning, decreased standing balance, decreased safety awareness and impulsive behavior.  Overall he was min to mod A for bed mobility, mod A of 2 to mod/max A of 2 for transfers and he was SBA to min A for dressing ADLs. Activity tolerance limited by tachycardia with activity and hypotension s/p transfer. At this time the patient continues to benefit from acute and will need inpatient rehab after discharge. PLAN :  Patient continues to benefit from skilled intervention to address the above impairments. Continue treatment per established plan of care. to address goals. Recommendation for discharge: (in order for the patient to meet his/her long term goals)  Therapy 3 hours per day 5-7 days per week             OBJECTIVE DATA SUMMARY:   Cognitive/Behavioral Status:  Neurologic State: Alert  Orientation Level: Oriented X4  Cognition: Appropriate for age attention/concentration        Safety/Judgement: Decreased awareness of need for assistance;Decreased awareness of need for safety;Decreased insight into deficits    Functional Mobility and Transfers for ADLs:  Bed Mobility:  Supine to Sit: Minimum assistance  Sit to Supine: Moderate assistance  Scooting: Minimum assistance    Transfers:  Sit to Stand: Moderate assistance;Assist x2 (cues)     Bed to Chair: Moderate assistance;Maximum assistance;Assist x2 (stand-pivot, unable to safely take steps to chair - L LE buckling)    Balance:  Sitting: Intact  Standing: Impaired  Standing - Static: Fair;Poor;Constant support  Standing - Dynamic : Poor;Constant support    ADL Intervention:  Upper Body Dressing Assistance  Shirt simulation with hospital gown: Minimum  assistance; Compensatory technique training (performed seated EOB)  Cues: Tactile cues provided;Verbal cues provided;Visual cues provided    Lower Body Dressing Assistance  Socks: Stand-by assistance  Leg Crossed Method Used: Yes  Position Performed: Seated edge of bed  Cues: Tactile cues provided;Verbal cues provided;Visual cues provided    Cognitive Retraining  Attention to Task: Distractibility; Single task  Safety/Judgement: Decreased awareness of need for assistance;Decreased awareness of need for safety;Decreased insight into deficits    Ther  Activity Tolerance:   Poor, requires frequent rest breaks, and observed SOB with activity     at rest, 120s with seated activity and up to 148 with standing activities   BP seated in chair s/p transfer:71/50   BP 90/58 with BLE elevated.      After treatment patient left in no apparent distress:   Sitting in chair, Call bell within reach, and Bed / chair alarm activated    COMMUNICATION/COLLABORATION:   The patients plan of care was discussed with: Physical Therapist and Registered Nurse    Annabella Kam OTR/L  Time Calculation: 38 mins

## 2023-01-11 ENCOUNTER — APPOINTMENT (OUTPATIENT)
Dept: INTERVENTIONAL RADIOLOGY/VASCULAR | Age: 63
DRG: 950 | End: 2023-01-11
Attending: STUDENT IN AN ORGANIZED HEALTH CARE EDUCATION/TRAINING PROGRAM
Payer: COMMERCIAL

## 2023-01-11 LAB
ALBUMIN SERPL-MCNC: 2.5 G/DL (ref 3.5–5)
ANION GAP SERPL CALC-SCNC: 6 MMOL/L (ref 5–15)
BUN SERPL-MCNC: 30 MG/DL (ref 6–20)
BUN/CREAT SERPL: 16 (ref 12–20)
CALCIUM SERPL-MCNC: 9 MG/DL (ref 8.5–10.1)
CHLORIDE SERPL-SCNC: 100 MMOL/L (ref 97–108)
CO2 SERPL-SCNC: 32 MMOL/L (ref 21–32)
CREAT SERPL-MCNC: 1.88 MG/DL (ref 0.7–1.3)
ERYTHROCYTE [DISTWIDTH] IN BLOOD BY AUTOMATED COUNT: 16.2 % (ref 11.5–14.5)
GLUCOSE BLD STRIP.AUTO-MCNC: 102 MG/DL (ref 65–117)
GLUCOSE BLD STRIP.AUTO-MCNC: 83 MG/DL (ref 65–117)
GLUCOSE BLD STRIP.AUTO-MCNC: 97 MG/DL (ref 65–117)
GLUCOSE SERPL-MCNC: 91 MG/DL (ref 65–100)
HCT VFR BLD AUTO: 27.8 % (ref 36.6–50.3)
HGB BLD-MCNC: 8.8 G/DL (ref 12.1–17)
INR PPP: 1.1 (ref 0.9–1.1)
MAGNESIUM SERPL-MCNC: 1.9 MG/DL (ref 1.6–2.4)
MCH RBC QN AUTO: 31.8 PG (ref 26–34)
MCHC RBC AUTO-ENTMCNC: 31.7 G/DL (ref 30–36.5)
MCV RBC AUTO: 100.4 FL (ref 80–99)
NRBC # BLD: 0 K/UL (ref 0–0.01)
NRBC BLD-RTO: 0 PER 100 WBC
PHOSPHATE SERPL-MCNC: 1.2 MG/DL (ref 2.6–4.7)
PLATELET # BLD AUTO: 380 K/UL (ref 150–400)
PMV BLD AUTO: 10.5 FL (ref 8.9–12.9)
POTASSIUM SERPL-SCNC: 3.2 MMOL/L (ref 3.5–5.1)
PROTHROMBIN TIME: 11 SEC (ref 9–11.1)
RBC # BLD AUTO: 2.77 M/UL (ref 4.1–5.7)
SERVICE CMNT-IMP: NORMAL
SODIUM SERPL-SCNC: 138 MMOL/L (ref 136–145)
WBC # BLD AUTO: 9.4 K/UL (ref 4.1–11.1)

## 2023-01-11 PROCEDURE — 74011250636 HC RX REV CODE- 250/636: Performed by: INTERNAL MEDICINE

## 2023-01-11 PROCEDURE — 85027 COMPLETE CBC AUTOMATED: CPT

## 2023-01-11 PROCEDURE — 80069 RENAL FUNCTION PANEL: CPT

## 2023-01-11 PROCEDURE — 74011000250 HC RX REV CODE- 250: Performed by: INTERNAL MEDICINE

## 2023-01-11 PROCEDURE — 65270000046 HC RM TELEMETRY

## 2023-01-11 PROCEDURE — 74011250637 HC RX REV CODE- 250/637: Performed by: STUDENT IN AN ORGANIZED HEALTH CARE EDUCATION/TRAINING PROGRAM

## 2023-01-11 PROCEDURE — 74011000250 HC RX REV CODE- 250: Performed by: STUDENT IN AN ORGANIZED HEALTH CARE EDUCATION/TRAINING PROGRAM

## 2023-01-11 PROCEDURE — 74011250636 HC RX REV CODE- 250/636: Performed by: PHYSICIAN ASSISTANT

## 2023-01-11 PROCEDURE — 36415 COLL VENOUS BLD VENIPUNCTURE: CPT

## 2023-01-11 PROCEDURE — 74011000258 HC RX REV CODE- 258: Performed by: INTERNAL MEDICINE

## 2023-01-11 PROCEDURE — 83735 ASSAY OF MAGNESIUM: CPT

## 2023-01-11 PROCEDURE — 85610 PROTHROMBIN TIME: CPT

## 2023-01-11 PROCEDURE — 74011250637 HC RX REV CODE- 250/637: Performed by: INTERNAL MEDICINE

## 2023-01-11 PROCEDURE — 82962 GLUCOSE BLOOD TEST: CPT

## 2023-01-11 RX ORDER — LIDOCAINE HYDROCHLORIDE 20 MG/ML
18 INJECTION, SOLUTION INFILTRATION; PERINEURAL ONCE
Status: ACTIVE | OUTPATIENT
Start: 2023-01-11 | End: 2023-01-12

## 2023-01-11 RX ORDER — POTASSIUM CHLORIDE 7.45 MG/ML
10 INJECTION INTRAVENOUS
Status: DISCONTINUED | OUTPATIENT
Start: 2023-01-11 | End: 2023-01-11 | Stop reason: SDUPTHER

## 2023-01-11 RX ORDER — POTASSIUM CHLORIDE 7.45 MG/ML
10 INJECTION INTRAVENOUS
Status: COMPLETED | OUTPATIENT
Start: 2023-01-11 | End: 2023-01-12

## 2023-01-11 RX ORDER — PSEUDOEPHED/ACETAMINOPHEN/CPM 30-500-2MG
2 TABLET ORAL
Status: DISCONTINUED | OUTPATIENT
Start: 2023-01-11 | End: 2023-01-17

## 2023-01-11 RX ORDER — HEPARIN SODIUM 200 [USP'U]/100ML
400 INJECTION, SOLUTION INTRAVENOUS ONCE
Status: ACTIVE | OUTPATIENT
Start: 2023-01-11 | End: 2023-01-12

## 2023-01-11 RX ADMIN — SODIUM CHLORIDE, PRESERVATIVE FREE 10 ML: 5 INJECTION INTRAVENOUS at 13:00

## 2023-01-11 RX ADMIN — SODIUM CHLORIDE, PRESERVATIVE FREE 10 ML: 5 INJECTION INTRAVENOUS at 06:06

## 2023-01-11 RX ADMIN — Medication 1 AMPULE: at 20:28

## 2023-01-11 RX ADMIN — POTASSIUM CHLORIDE 10 MEQ: 7.46 INJECTION, SOLUTION INTRAVENOUS at 15:51

## 2023-01-11 RX ADMIN — POTASSIUM CHLORIDE 10 MEQ: 7.46 INJECTION, SOLUTION INTRAVENOUS at 13:00

## 2023-01-11 RX ADMIN — POTASSIUM CHLORIDE 10 MEQ: 7.46 INJECTION, SOLUTION INTRAVENOUS at 11:26

## 2023-01-11 RX ADMIN — ACETAMINOPHEN 650 MG: 325 TABLET ORAL at 01:05

## 2023-01-11 RX ADMIN — FAMOTIDINE: 10 INJECTION, SOLUTION INTRAVENOUS at 18:25

## 2023-01-11 RX ADMIN — Medication 1 AMPULE: at 08:52

## 2023-01-11 RX ADMIN — SODIUM CHLORIDE, PRESERVATIVE FREE 10 ML: 5 INJECTION INTRAVENOUS at 21:05

## 2023-01-11 RX ADMIN — POTASSIUM PHOSPHATE, MONOBASIC POTASSIUM PHOSPHATE, DIBASIC: 224; 236 INJECTION, SOLUTION, CONCENTRATE INTRAVENOUS at 12:37

## 2023-01-11 RX ADMIN — HYDRALAZINE HYDROCHLORIDE 10 MG: 20 INJECTION INTRAMUSCULAR; INTRAVENOUS at 15:51

## 2023-01-11 RX ADMIN — I.V. FAT EMULSION 250 ML: 20 EMULSION INTRAVENOUS at 20:36

## 2023-01-11 RX ADMIN — POTASSIUM CHLORIDE 10 MEQ: 7.46 INJECTION, SOLUTION INTRAVENOUS at 09:55

## 2023-01-11 NOTE — PROGRESS NOTES
Hospitalist Progress Note    NAME: Shiv Carty   :  1960   MRN:  [id number]            Subjective:     Patient was seen and examined. No acute events overnight. Discussed with RN overnight events. All patient's questions were answered. \"Feeling fine\"    Review of Systems:  Symptom Y/N Comments  Symptom Y/N Comments   Fever/Chills n   Chest Pain n    Poor Appetite    Edema     Cough n   Abdominal Pain n    Sputum    Joint Pain     SOB/PAINTING n   Pruritis/Rash     Nausea/vomit n   Tolerating PT/OT     Diarrhea    Tolerating Diet y    Constipation    Other       Could NOT obtain due to:          Objective:     VITALS:   Last 24hrs VS reviewed since prior progress note. Most recent are:  Patient Vitals for the past 24 hrs:   Temp Pulse Resp BP SpO2   23 1551 -- -- -- (!) 184/114 --   23 1200 98.8 °F (37.1 °C) 95 15 (!) 154/98 97 %   23 1021 -- 95 21 (!) 166/107 90 %   23 0800 98.7 °F (37.1 °C) 93 15 (!) 169/96 95 %   23 0400 98.8 °F (37.1 °C) 91 15 (!) 149/96 97 %   23 0000 98.4 °F (36.9 °C) 98 16 (!) 149/96 92 %   01/10/23 2000 98.6 °F (37 °C) 100 29 (!) 155/101 96 %         Intake/Output Summary (Last 24 hours) at 2023 1635  Last data filed at 2023 0800  Gross per 24 hour   Intake 660 ml   Output 800 ml   Net -140 ml          PHYSICAL EXAM:  General: WD, WN. Alert, cooperative, no acute distress    EENT:  EOMI. Anicteric sclerae. MMM  Resp:  CTA bilaterally, no wheezing or rales. No accessory muscle use  CV:  Regular  rhythm,  No edema  GI:  Soft, Non distended, Non tender. +Bowel sounds. NGT and TPN present. Neurologic:  EOMs intact. No facial asymmetry. No aphasia or slurred speech. Symmetrical strength, Sensation grossly intact. Alert and oriented X 4.     Psych:   Good insight. Not anxious nor agitated  Skin:  No rashes.   No jaundice    Procedures: see electronic medical records for all procedures/Xrays and details which were not copied into this note but were reviewed prior to creation of Plan. LABS:  I reviewed today's most current labs and imaging studies. Pertinent labs include:  Recent Labs     01/11/23  0436 01/10/23  0413 01/09/23  0436   WBC 9.4 10.1 11.7*   HGB 8.8* 8.2* 8.2*   HCT 27.8* 26.4* 26.2*    342 304       Recent Labs     01/11/23  0436 01/10/23  0413 01/09/23  0436    141 143   K 3.2* 3.2* 3.0*    102 103   CO2 32 34* 35*   GLU 91 109* 113*   BUN 30* 40* 33*   CREA 1.88* 3.21* 3.81*   CA 9.0 8.9 8.8   MG 1.9 2.2 2.4   PHOS 1.2* 2.1* 2.7   ALB 2.5* 2.5* 2.5*   TBILI  --  0.7  --    ALT  --  44  --    INR 1.1 1.0 1.1         Signed: Yuan Moe MD        Reviewed most current lab test results and cultures  YES  Reviewed most current radiology test results   YES  Review and summation of old records today    NO  Reviewed patient's current orders and MAR    YES  PMH/SH reviewed - no change compared to H&P      Assessment / Plan:  Left retroperitoneal hemorrhage  L2 plexopathy with LLE weakness and sensory loss  - Spontaneous retroperitoneal bleed now status post L2 artery bleed embolization.  - S/p 5U RBC and 2U FFP this admission.  - s/p embolization with coils  Plan  Daily labs  PT/OT  Surgery following, greatly appreciate expertise    Ileus  - retroperitoneal bleed mass effect seen on CT abdomen/pelvis  Plan  Continue NPO   NGT  Monitor output  TPN per surgery  Daily labs  Had bowel movement early this morning    Massive PE  - S/p treatment with lovenox. Anticoagulation discontinued after spontaneous retroperitoneal bleed with hemorrhagic shock.   Plan  SCDs for now-likely will not be able to tolerate long term anticoagulation  O2 NC  IVC filter possibly today     GILMAR/ATN  Hypokalemia  -  d/t hemorrhagic shock  Plan  HD per nephrology  Replace potassium  No hemodialysis today     Shock liver  - d/t hemorrhagic shock  Plan  Continue to monitor enzymes trending down     Hx of alcohol abuse  Plan  Valium prn for CIWA scale.    18.5 - 24.9 Normal weight / Body mass index is 23.02 kg/m². Code status: Full  Prophylaxis: SCD's  Recommended Disposition: SAH/Rehab  FAROOQ: 1/12-1/13  Barriers: Specialty clearance, medical stability, ileus resolution     ________________________________________________________________________  Care Plan discussed with:    Comments   Patient x    Family      RN x    Care Manager     Consultant                        Multidiciplinary team rounds were held today with , nursing, pharmacist and clinical coordinator. Patient's plan of care was discussed; medications were reviewed and discharge planning was addressed.      ________________________________________________________________________        Comments   >50% of visit spent in counseling and coordination of care     ________________________________________________________________________  Kira Bailey MD

## 2023-01-11 NOTE — PROGRESS NOTES
Physical Therapy Note    Chart reviewed. IVC filter placement today. Not medically appropriate for skilled therapy- will follow-up tomorrow as appropriate/able.     Ayla Brown, PT, DPT

## 2023-01-11 NOTE — PROGRESS NOTES
Nephrology Progress Note  [facility] / [address], [place], [address]  Phone - [phone]  Fax - [phone]                 Patient: Sameer Chowdhury                   YOB: 1960        Date- 1/11/2023                      Admit Date: 12/25/2022  CC: Follow up for fatou  IMPRESSION & PLAN:   FATOU  (secondary to ischemic ATN from hemorrhagic shock): anuric,HD yest  hypokalemia  Hypophosphatemia:  Hyperkalemia: resolved  Anion gap metabolic acidosis: better  Lactic acidosis  Acute blood loss anemia  Acute RP hematoma status post embolization of lumbar artery  Massive PE   Hemorrhagic/cardiogenic shock      PLAN-  NO HD TODAY  Replace kcl iv  Epogen for anemia  Follow bmp  Watch for renal recovery   Subjective: Interval History:   1/11/23--s/p hd yesterday-- he is oliguric-- k low  1-10-23-- urine out put low- bun increased- cr 3.2 -- on hd at present  1-9-23---cr 3.8 with k 3.0-low--last hd on Friday. 1 7 23: HD yest, 1.5 lit off, cr better. Await recovery  1-6-23--bp high-- no sob-- cr increased  1-5-23----patient pulled his femoral line and gabriel overnight- he is not happy with having multiple lines. .. he is anuric-- seen on CRRT today  1-4-23----patient is off CRRT- he is going for CT ABDO-- --k 4.1 --he is anuric    Objective:   Vitals:    01/10/23 2000 01/11/23 0000 01/11/23 0400 01/11/23 0800   BP: (!) 155/101 (!) 149/96 (!) 149/96 (!) 169/96   Pulse: 100 98 91 93   Resp: 29 16 15 15   Temp: 98.6 °F (37 °C) 98.4 °F (36.9 °C) 98.8 °F (37.1 °C) 98.7 °F (37.1 °C)   TempSrc:       SpO2: 96% 92% 97% 95%   Weight:       Height:          01/10 0701 - 01/11 0700  In: 1429.6 [P.O.:200;  I.V.:1199.6]  Out: 2350 [Urine:250]    Last 3 Recorded Weights in this Encounter    12/25/22 1537 01/02/23 1514 01/08/23 0000   Weight: 99.8 kg (220 lb) 99.8 kg (220 lb) 70.7 kg (155 lb 13.8 oz)        Physical exam:    GEN: nad  NECK:  NG TUBE +  RESP: clear  b/l, no  wheezing,   CVS: RRR,S1,S2   NEURO: non focal, normal speech  HD access: Right IJ Timo    Chart reviewed. Pertinent Notes reviewed. Data Review :  Recent Labs     01/11/23  0436 01/10/23  0413 01/09/23  0436    141 143   K 3.2* 3.2* 3.0*    102 103   CO2 32 34* 35*   BUN 30* 40* 33*   CREA 1.88* 3.21* 3.81*   GLU 91 109* 113*   CA 9.0 8.9 8.8   MG 1.9 2.2 2.4   PHOS 1.2* 2.1* 2.7       Recent Labs     01/11/23  0436 01/10/23  0413 01/09/23  0436   WBC 9.4 10.1 11.7*   HGB 8.8* 8.2* 8.2*   HCT 27.8* 26.4* 26.2*    342 304       No results for input(s): FE, TIBC, PSAT, FERR in the last 72 hours.    No results found for: HBA1C, DPA1GLWX, GUG8TWEB   No results found for: MCACR, MCA1, MCA2, MCA3, MCAU, MCAU2, MCALPOCT  US Results (most recent):  Medication list  reviewed  Current Facility-Administered Medications   Medication Dose Route Frequency    TPN ADULT - PERIPHERAL   IntraVENous CONTINUOUS    fat emulsion 20% (LIPOSYN, INTRAlipid) infusion 250 mL  250 mL IntraVENous Q MON, WED & FRI    hydrALAZINE (APRESOLINE) 20 mg/mL injection 10 mg  10 mg IntraVENous Q6H PRN    diazePAM (VALIUM) injection 5 mg  5 mg IntraVENous Q6H PRN    midodrine (PROAMATINE) tablet 10 mg  10 mg Oral TID PRN    insulin lispro (HUMALOG) injection   SubCUTAneous Q6H    glucose chewable tablet 16 g  4 Tablet Oral PRN    dextrose (D50W) injection syrg 12.5-25 g  12.5-25 g IntraVENous PRN    glucagon (GLUCAGEN) injection 1 mg  1 mg IntraMUSCular PRN    albumin, human-kjda 25% (ALBUMINEX) intravenous solution 25 g  25 g IntraVENous DIALYSIS PRN    epoetin ji-epbx (RETACRIT) injection 10,000 Units  10,000 Units SubCUTAneous Q TUE, THU & SAT    simethicone (MYLICON) tablet 80 mg  80 mg Oral QID PRN    alcohol 62% (NOZIN) nasal  1 Ampule  1 Ampule Topical Q12H    [Held by provider] folic acid (FOLVITE) tablet 1 mg  1 mg Oral DAILY    [Held by provider] thiamine mononitrate (B-1) tablet 100 mg  100 mg Oral DAILY    [Held by provider] multivitamin, tx-iron-ca-min (THERA-M w/ IRON) tablet 1 Tablet  1 Tablet Oral DAILY    [Held by provider] magnesium oxide (MAG-OX) tablet 400 mg  400 mg Oral DAILY    sodium chloride (NS) flush 5-40 mL  5-40 mL IntraVENous Q8H    sodium chloride (NS) flush 5-40 mL  5-40 mL IntraVENous PRN    acetaminophen (TYLENOL) tablet 650 mg  650 mg Oral Q6H PRN    Or    acetaminophen (TYLENOL) suppository 650 mg  650 mg Rectal Q6H PRN    polyethylene glycol (MIRALAX) packet 17 g  17 g Oral DAILY PRN    ondansetron (ZOFRAN ODT) tablet 4 mg  4 mg Oral Q8H PRN    Or    ondansetron (ZOFRAN) injection 4 mg  4 mg IntraVENous Q6H PRN        Estelle Zarate MD  1/11/2023

## 2023-01-12 ENCOUNTER — APPOINTMENT (OUTPATIENT)
Dept: INTERVENTIONAL RADIOLOGY/VASCULAR | Age: 63
DRG: 950 | End: 2023-01-12
Attending: STUDENT IN AN ORGANIZED HEALTH CARE EDUCATION/TRAINING PROGRAM
Payer: COMMERCIAL

## 2023-01-12 LAB
ALBUMIN SERPL-MCNC: 2.4 G/DL (ref 3.5–5)
ANION GAP SERPL CALC-SCNC: 6 MMOL/L (ref 5–15)
ANION GAP SERPL CALC-SCNC: 7 MMOL/L (ref 5–15)
BASOPHILS # BLD: 0 K/UL (ref 0–0.1)
BASOPHILS NFR BLD: 0 % (ref 0–1)
BUN SERPL-MCNC: 43 MG/DL (ref 6–20)
BUN SERPL-MCNC: 43 MG/DL (ref 6–20)
BUN/CREAT SERPL: 25 (ref 12–20)
BUN/CREAT SERPL: 25 (ref 12–20)
CALCIUM SERPL-MCNC: 8.9 MG/DL (ref 8.5–10.1)
CALCIUM SERPL-MCNC: 9 MG/DL (ref 8.5–10.1)
CHLORIDE SERPL-SCNC: 101 MMOL/L (ref 97–108)
CHLORIDE SERPL-SCNC: 102 MMOL/L (ref 97–108)
CO2 SERPL-SCNC: 28 MMOL/L (ref 21–32)
CO2 SERPL-SCNC: 29 MMOL/L (ref 21–32)
COMMENT, HOLDF: NORMAL
CREAT SERPL-MCNC: 1.75 MG/DL (ref 0.7–1.3)
CREAT SERPL-MCNC: 1.75 MG/DL (ref 0.7–1.3)
DIFFERENTIAL METHOD BLD: ABNORMAL
EOSINOPHIL # BLD: 0.1 K/UL (ref 0–0.4)
EOSINOPHIL NFR BLD: 1 % (ref 0–7)
ERYTHROCYTE [DISTWIDTH] IN BLOOD BY AUTOMATED COUNT: 16 % (ref 11.5–14.5)
GLUCOSE BLD STRIP.AUTO-MCNC: 109 MG/DL (ref 65–117)
GLUCOSE BLD STRIP.AUTO-MCNC: 85 MG/DL (ref 65–117)
GLUCOSE BLD STRIP.AUTO-MCNC: 92 MG/DL (ref 65–117)
GLUCOSE SERPL-MCNC: 104 MG/DL (ref 65–100)
GLUCOSE SERPL-MCNC: 105 MG/DL (ref 65–100)
HCT VFR BLD AUTO: 27.4 % (ref 36.6–50.3)
HGB BLD-MCNC: 8.9 G/DL (ref 12.1–17)
IMM GRANULOCYTES # BLD AUTO: 0.1 K/UL (ref 0–0.04)
IMM GRANULOCYTES NFR BLD AUTO: 1 % (ref 0–0.5)
INR PPP: 1 (ref 0.9–1.1)
LYMPHOCYTES # BLD: 1.6 K/UL (ref 0.8–3.5)
LYMPHOCYTES NFR BLD: 16 % (ref 12–49)
MCH RBC QN AUTO: 32.4 PG (ref 26–34)
MCHC RBC AUTO-ENTMCNC: 32.5 G/DL (ref 30–36.5)
MCV RBC AUTO: 99.6 FL (ref 80–99)
MONOCYTES # BLD: 1.1 K/UL (ref 0–1)
MONOCYTES NFR BLD: 10 % (ref 5–13)
NEUTS SEG # BLD: 7.6 K/UL (ref 1.8–8)
NEUTS SEG NFR BLD: 72 % (ref 32–75)
NRBC # BLD: 0 K/UL (ref 0–0.01)
NRBC BLD-RTO: 0 PER 100 WBC
PHOSPHATE SERPL-MCNC: 1.8 MG/DL (ref 2.6–4.7)
PHOSPHATE SERPL-MCNC: 1.8 MG/DL (ref 2.6–4.7)
PLATELET # BLD AUTO: 417 K/UL (ref 150–400)
PMV BLD AUTO: 11.2 FL (ref 8.9–12.9)
POTASSIUM SERPL-SCNC: 3.4 MMOL/L (ref 3.5–5.1)
POTASSIUM SERPL-SCNC: 3.4 MMOL/L (ref 3.5–5.1)
PROTHROMBIN TIME: 10.9 SEC (ref 9–11.1)
RBC # BLD AUTO: 2.75 M/UL (ref 4.1–5.7)
SAMPLES BEING HELD,HOLD: NORMAL
SERVICE CMNT-IMP: NORMAL
SODIUM SERPL-SCNC: 136 MMOL/L (ref 136–145)
SODIUM SERPL-SCNC: 137 MMOL/L (ref 136–145)
WBC # BLD AUTO: 10.5 K/UL (ref 4.1–11.1)

## 2023-01-12 PROCEDURE — 97535 SELF CARE MNGMENT TRAINING: CPT | Performed by: OCCUPATIONAL THERAPIST

## 2023-01-12 PROCEDURE — 74011250637 HC RX REV CODE- 250/637: Performed by: INTERNAL MEDICINE

## 2023-01-12 PROCEDURE — 74011250636 HC RX REV CODE- 250/636: Performed by: NURSE PRACTITIONER

## 2023-01-12 PROCEDURE — C1892 INTRO/SHEATH,FIXED,PEEL-AWAY: HCPCS

## 2023-01-12 PROCEDURE — 74011000250 HC RX REV CODE- 250: Performed by: STUDENT IN AN ORGANIZED HEALTH CARE EDUCATION/TRAINING PROGRAM

## 2023-01-12 PROCEDURE — 74011000250 HC RX REV CODE- 250: Performed by: NURSE PRACTITIONER

## 2023-01-12 PROCEDURE — 74011250636 HC RX REV CODE- 250/636: Performed by: INTERNAL MEDICINE

## 2023-01-12 PROCEDURE — 74011250636 HC RX REV CODE- 250/636: Performed by: STUDENT IN AN ORGANIZED HEALTH CARE EDUCATION/TRAINING PROGRAM

## 2023-01-12 PROCEDURE — 2709999900 HC NON-CHARGEABLE SUPPLY

## 2023-01-12 PROCEDURE — 85025 COMPLETE CBC W/AUTO DIFF WBC: CPT

## 2023-01-12 PROCEDURE — 85610 PROTHROMBIN TIME: CPT

## 2023-01-12 PROCEDURE — 84100 ASSAY OF PHOSPHORUS: CPT

## 2023-01-12 PROCEDURE — 97530 THERAPEUTIC ACTIVITIES: CPT | Performed by: PHYSICAL THERAPIST

## 2023-01-12 PROCEDURE — 36415 COLL VENOUS BLD VENIPUNCTURE: CPT

## 2023-01-12 PROCEDURE — 80048 BASIC METABOLIC PNL TOTAL CA: CPT

## 2023-01-12 PROCEDURE — 74011000636 HC RX REV CODE- 636: Performed by: STUDENT IN AN ORGANIZED HEALTH CARE EDUCATION/TRAINING PROGRAM

## 2023-01-12 PROCEDURE — 74011000250 HC RX REV CODE- 250: Performed by: INTERNAL MEDICINE

## 2023-01-12 PROCEDURE — 74011000258 HC RX REV CODE- 258: Performed by: INTERNAL MEDICINE

## 2023-01-12 PROCEDURE — 82962 GLUCOSE BLOOD TEST: CPT

## 2023-01-12 PROCEDURE — 65270000046 HC RM TELEMETRY

## 2023-01-12 PROCEDURE — 80069 RENAL FUNCTION PANEL: CPT

## 2023-01-12 PROCEDURE — C1769 GUIDE WIRE: HCPCS

## 2023-01-12 PROCEDURE — 77030011229 HC DIL VESL COON COOK -A

## 2023-01-12 PROCEDURE — 74011250636 HC RX REV CODE- 250/636: Performed by: PHYSICIAN ASSISTANT

## 2023-01-12 PROCEDURE — 97530 THERAPEUTIC ACTIVITIES: CPT | Performed by: OCCUPATIONAL THERAPIST

## 2023-01-12 PROCEDURE — 76937 US GUIDE VASCULAR ACCESS: CPT

## 2023-01-12 RX ORDER — HEPARIN SODIUM 200 [USP'U]/100ML
400 INJECTION, SOLUTION INTRAVENOUS ONCE
Status: COMPLETED | OUTPATIENT
Start: 2023-01-12 | End: 2023-01-12

## 2023-01-12 RX ORDER — POTASSIUM CHLORIDE 7.45 MG/ML
10 INJECTION INTRAVENOUS
Status: COMPLETED | OUTPATIENT
Start: 2023-01-12 | End: 2023-01-12

## 2023-01-12 RX ORDER — LIDOCAINE HYDROCHLORIDE 20 MG/ML
10 INJECTION, SOLUTION EPIDURAL; INFILTRATION; INTRACAUDAL; PERINEURAL ONCE
Status: DISPENSED | OUTPATIENT
Start: 2023-01-12 | End: 2023-01-12

## 2023-01-12 RX ORDER — HYDRALAZINE HYDROCHLORIDE 20 MG/ML
20 INJECTION INTRAMUSCULAR; INTRAVENOUS ONCE
Status: COMPLETED | OUTPATIENT
Start: 2023-01-12 | End: 2023-01-12

## 2023-01-12 RX ADMIN — SODIUM CHLORIDE, PRESERVATIVE FREE 10 ML: 5 INJECTION INTRAVENOUS at 05:12

## 2023-01-12 RX ADMIN — HYDRALAZINE HYDROCHLORIDE 10 MG: 20 INJECTION INTRAMUSCULAR; INTRAVENOUS at 04:32

## 2023-01-12 RX ADMIN — POTASSIUM CHLORIDE 10 MEQ: 7.46 INJECTION, SOLUTION INTRAVENOUS at 06:00

## 2023-01-12 RX ADMIN — SODIUM CHLORIDE, PRESERVATIVE FREE 10 ML: 5 INJECTION INTRAVENOUS at 21:09

## 2023-01-12 RX ADMIN — IOPAMIDOL 15 ML: 755 INJECTION, SOLUTION INTRAVENOUS at 12:00

## 2023-01-12 RX ADMIN — POTASSIUM CHLORIDE 10 MEQ: 7.46 INJECTION, SOLUTION INTRAVENOUS at 12:08

## 2023-01-12 RX ADMIN — EPOETIN ALFA-EPBX 10000 UNITS: 10000 INJECTION, SOLUTION INTRAVENOUS; SUBCUTANEOUS at 20:33

## 2023-01-12 RX ADMIN — POTASSIUM PHOSPHATE, MONOBASIC AND POTASSIUM PHOSPHATE, DIBASIC: 224; 236 INJECTION, SOLUTION, CONCENTRATE INTRAVENOUS at 07:24

## 2023-01-12 RX ADMIN — SODIUM CHLORIDE, PRESERVATIVE FREE 10 ML: 5 INJECTION INTRAVENOUS at 14:00

## 2023-01-12 RX ADMIN — Medication 1 AMPULE: at 08:58

## 2023-01-12 RX ADMIN — FAMOTIDINE: 10 INJECTION, SOLUTION INTRAVENOUS at 18:37

## 2023-01-12 RX ADMIN — Medication 1 AMPULE: at 20:33

## 2023-01-12 RX ADMIN — POTASSIUM CHLORIDE 10 MEQ: 7.46 INJECTION, SOLUTION INTRAVENOUS at 10:33

## 2023-01-12 RX ADMIN — HEPARIN SODIUM 40 ML: 200 INJECTION, SOLUTION INTRAVENOUS at 12:00

## 2023-01-12 RX ADMIN — HYDRALAZINE HYDROCHLORIDE 20 MG: 20 INJECTION INTRAMUSCULAR; INTRAVENOUS at 08:57

## 2023-01-12 NOTE — ROUTINE PROCESS
1351:    Pt arrived to [facility] IVC filter placement. Pt is A/O x4 with no complaints of pain. Consent and VS to be obtained. Call bell within reach and bed in the lowest position. 1440:    Name of procedure: IVC filter placement    Vital Signs: At pt baseline    Fluids removed: None    Samples sent to lab: None    Any complications related to procedure: No    Post Procedure Care Needed/order sets placed in connect care. Yes      1510:    TRANSFER - OUT REPORT:    Verbal report given to Sonia Garcia RN(name) on Sameer Chowdhury  being transferred to CCU(unit) for routine post - op       Report consisted of patients Situation, Background, Assessment and   Recommendations(SBAR). Information from the following report(s) SBAR and Procedure Summary was reviewed with the receiving nurse. Lines:   Peripheral IV 01/08/23 Left;Upper Arm (Active)   Site Assessment Clean;Dry; Intact 01/12/23 1200   Phlebitis Assessment 0 01/12/23 1200   Infiltration Assessment 0 01/12/23 1200   Dressing Status Intact 01/12/23 1200   Dressing Type Tape;Transparent 01/12/23 1200   Hub Color/Line Status Green; Infusing 01/12/23 1200   Action Taken Open ports on tubing capped 01/12/23 1200   Alcohol Cap Used Yes 01/12/23 1200       Peripheral IV 01/12/23 Right Antecubital (Active)   Site Assessment Clean, dry, & intact 01/12/23 1200   Phlebitis Assessment 0 01/12/23 1200   Infiltration Assessment 0 01/12/23 1200   Dressing Status Clean, dry, & intact 01/12/23 1200   Dressing Type Transparent;Tape 01/12/23 1200   Hub Color/Line Status Pink; Infusing 01/12/23 1200   Action Taken Open ports on tubing capped 01/12/23 1200   Alcohol Cap Used Yes 01/12/23 1200        Opportunity for questions and clarification was provided. Pt transported back to room 2542 via 2 RNs on [employer]. Pt was received at bedside by primary RN where verbal report and post procedural instructions was given.

## 2023-01-12 NOTE — PROGRESS NOTES
Nephrology Progress Note  [facility] / [address], [place], [address]  Phone - [phone]  Fax - [phone]                 Patient: Nicole Pérez                   YOB: 1960        Date- 1/12/2023                      Admit Date: 12/25/2022  CC: Follow up for gilmar  IMPRESSION & PLAN:   GILMAR (secondary to ischemic ATN from hemorrhagic shock): anuric,HD yest  hypokalemia  Hypophosphatemia:  Hyperkalemia: resolved  Anion gap metabolic acidosis: better  Lactic acidosis  Acute blood loss anemia  Acute RP hematoma status post embolization of lumbar artery  Massive PE   Hemorrhagic/cardiogenic shock      PLAN-  NO HD INDICATED  Kcl 20 meq iv  Phos iv  We will remove robert cath tomorrow if cr stable  Epogen for anemia  Follow bmp  Watch for renal recovery   Subjective: Interval History:   1-12-23----cr improving-- k and phos low-- making more urine-- NG tube in place  1/11/23--s/p hd yesterday-- he is oliguric-- k low  1-10-23-- urine out put low- bun increased- cr 3.2 -- on hd at present  1-9-23---cr 3.8 with k 3.0-low--last hd on Friday. 1 7 23: HD yest, 1.5 lit off, cr better. Await recovery  1-6-23--bp high-- no sob-- cr increased  1-5-23----patient pulled his femoral line and gabriel overnight- he is not happy with having multiple lines. .. he is anuric-- seen on CRRT today  1-4-23----patient is off CRRT- he is going for CT ABDO-- --k 4.1 --he is anuric    Objective:   Vitals:    01/12/23 0414 01/12/23 0417 01/12/23 0512 01/12/23 0800   BP: (!) 184/105 (!) 194/104 (!) 164/95 (!) 197/97   Pulse: 99   100   Resp: 21   18   Temp: 98.5 °F (36.9 °C)      TempSrc:       SpO2: 99%   96%   Weight:       Height:          01/11 0701 - 01/12 0700  In: 2252.7 [I.V.:2252.7]  Out: 1075 [Urine:825]    Last 3 Recorded Weights in this Encounter    01/02/23 1514 01/08/23 0000 01/11/23 2028   Weight: 99.8 kg (220 lb) 70.7 kg (155 lb 13.8 oz) 71.8 kg (158 lb 4.6 oz)        Physical exam:    GEN:  NAD  NECK:  NG TUBE +  RESP: clear b/l, no  wheezing,   CVS: RRR,S1,S2   NEURO: non focal, normal speech  HD access: Right IJ Timo    Chart reviewed. Pertinent Notes reviewed. Data Review :  Recent Labs     01/12/23  0418 01/11/23  0436 01/10/23  0413     137 138 141   K 3.4*  3.4* 3.2* 3.2*     101 100 102   CO2 28  29 32 34*   BUN 43*  43* 30* 40*   CREA 1.75*  1.75* 1.88* 3.21*   *  105* 91 109*   CA 9.0  8.9 9.0 8.9   MG  --  1.9 2.2   PHOS 1.8*  1.8* 1.2* 2.1*       Recent Labs     01/12/23  0418 01/11/23  0436 01/10/23  0413   WBC 10.5 9.4 10.1   HGB 8.9* 8.8* 8.2*   HCT 27.4* 27.8* 26.4*   * 380 342       No results for input(s): FE, TIBC, PSAT, FERR in the last 72 hours.    No results found for: HBA1C, XWB7JFVT, FAR4QUFS   No results found for: MCACR, MCA1, MCA2, MCA3, MCAU, MCAU2, MCALPOCT  US Results (most recent):  Medication list  reviewed  Current Facility-Administered Medications   Medication Dose Route Frequency    potassium phosphate 20 mmol in 0.9% sodium chloride 500 mL infusion   IntraVENous ONCE    TPN ADULT - PERIPHERAL   IntraVENous CONTINUOUS    loperamide (IMODIUM) 1 mg/7.5 mL oral solution 2 mg  2 mg Oral QID PRN    fat emulsion 20% (LIPOSYN, INTRAlipid) infusion 250 mL  250 mL IntraVENous Q MON, WED & FRI    hydrALAZINE (APRESOLINE) 20 mg/mL injection 10 mg  10 mg IntraVENous Q6H PRN    diazePAM (VALIUM) injection 5 mg  5 mg IntraVENous Q6H PRN    midodrine (PROAMATINE) tablet 10 mg  10 mg Oral TID PRN    insulin lispro (HUMALOG) injection   SubCUTAneous Q6H    glucose chewable tablet 16 g  4 Tablet Oral PRN    dextrose (D50W) injection syrg 12.5-25 g  12.5-25 g IntraVENous PRN    glucagon (GLUCAGEN) injection 1 mg  1 mg IntraMUSCular PRN    albumin, human-kjda 25% (ALBUMINEX) intravenous solution 25 g  25 g IntraVENous DIALYSIS PRN    epoetin ji-epbx (RETACRIT) injection 10,000 Units  10,000 Units SubCUTAneous Q TUE, THU & SAT    simethicone (MYLICON) tablet 80 mg  80 mg Oral QID PRN    alcohol 62% (NOZIN) nasal  1 Ampule  1 Ampule Topical Q12H    [Held by provider] folic acid (FOLVITE) tablet 1 mg  1 mg Oral DAILY    [Held by provider] thiamine mononitrate (B-1) tablet 100 mg  100 mg Oral DAILY    [Held by provider] multivitamin, tx-iron-ca-min (THERA-M w/ IRON) tablet 1 Tablet  1 Tablet Oral DAILY    [Held by provider] magnesium oxide (MAG-OX) tablet 400 mg  400 mg Oral DAILY    sodium chloride (NS) flush 5-40 mL  5-40 mL IntraVENous Q8H    sodium chloride (NS) flush 5-40 mL  5-40 mL IntraVENous PRN    acetaminophen (TYLENOL) tablet 650 mg  650 mg Oral Q6H PRN    Or    acetaminophen (TYLENOL) suppository 650 mg  650 mg Rectal Q6H PRN    polyethylene glycol (MIRALAX) packet 17 g  17 g Oral DAILY PRN    ondansetron (ZOFRAN ODT) tablet 4 mg  4 mg Oral Q8H PRN    Or    ondansetron (ZOFRAN) injection 4 mg  4 mg IntraVENous Q6H PRN        Jeff Quintero MD  1/12/2023

## 2023-01-12 NOTE — PROGRESS NOTES
0700 - Bedside shift change report given to Inna Littleally Newman (oncoming nurse) by Jessika HAMM (offgoing nurse). Report included the following information SBAR, Kardex, Procedure Summary, Intake/Output, MAR, Recent Results, and Cardiac Rhythm NSR to Sinus Tachy . 1277 - Pt's SBP in the 190s overnight and 160s right now, after getting 10mg PRN hydralazine around 4am. RN sent perfect serve message to Dr. Kim Garrett requesting changing the PRN from q6h to q4h and possible increasing the dose from 10mg to 20mg. Awaiting reply. 0725 - Pt assessment completed, aligns with previous nurse report. Kings Gautam paged to confirm patient's HD for today. [address] [place] with Kings Gautam RN. There are no current orders for HD and patient's labs are improved. Paging Dr. Mikki Watters with nephrology to confirm whether the patient even needs HD today. 0800 - Spoke with Dr. Mikki Watters, pt will not be getting dialysis today, orders to continue monitoring BMP and intake/output. 0818 - Pt's BP is currently 197/97 with no PRNs available at this time. No response from MD yet in regards to earlier message. New message sent informing MD of current BP.    0729 - Per perfect serve, MD has received messages, RN awaiting response. 0276 - Pt's /103, MD paged via hospitalist paging service. RN was able to speak with MD and new orders noted. 6595 - One time order from MD for 20mg IV hydralazine administered. 0926 - /95 at this time. Per MD, possibly resuming home BP meds today. 1010 - BP now 127/85, hydralazine dose was effective. 1050 - PT at bedside working with patient. 1115 - Patient became more tachycardic to the 140s while working with PT. Pt deconditioned but was able to recover well once resting. Pt seems to be recovering some movement in his LLE but it's still very weak, see PT note. 1200 - Reassessment completed, no changes to note at this time. Pt awaiting IVC filter placement this afternoon.      1255 - Surgical team paged to ask about continuing NPO status. [facility] Per Paige Baca NP, the surgical team approves of advancing the diet. Hospitalist consulted about advancing the diet. 1345 - Pt transported to IR to with cardiac monitoring and RN. 1500 - Pt returned to room. IVC filter placement completed, insertion site clean dry and intact. Pt has no complaints of pain at this time. 1600 - Reassessment completed. Pt is now on a clear liquid diet, to be advanced as tolerated. NG tube still in place, to be removed once patient proves to tolerate diet. Appetite very poor today, so good assessment of tolerance was not able to be made, though no nausea or vomiting was noted with the intake that was consumed. 1700 - Pt's Timo catheter still in place, awaiting order from nephrology to remove. 1910 - Bedside shift change report given to Jessika HAMM (oncoming nurse) by El Robles RN (offgoing nurse). Report included the following information SBAR, Kardex, Intake/Output, MAR, Recent Results, and Cardiac Rhythm Sinus tachy to NSR .      Dick Gleason RN

## 2023-01-12 NOTE — PROGRESS NOTES
Problem: Alcohol Withdrawal  Goal: *STG: Participates in treatment plan  Outcome: Progressing Towards Goal  Goal: *STG: Remains safe in hospital  Outcome: Progressing Towards Goal  Goal: *STG: Seeks staff when symptoms of withdrawal increase  Outcome: Progressing Towards Goal  Goal: *STG: Complies with medication therapy  Outcome: Progressing Towards Goal  Goal: *STG: Attends activities and groups  Outcome: Progressing Towards Goal  Goal: *STG: Will identify negative impact of chemical dependency including the use of tobacco, alcohol, and other substances  Outcome: Progressing Towards Goal  Goal: *STG: Verbalizes abstinence as an achievable goal  Outcome: Progressing Towards Goal  Goal: *STG: Agrees to participate in outpatient after care program to support ongoing mental health  Outcome: Progressing Towards Goal  Goal: *STG: Able to indentify relapse triggers including interpersonal/social and familial factors  Outcome: Progressing Towards Goal  Goal: *STG: Identify lifestyle changes to support long term sobriety such as vocation, employment, education, and legal issues  Outcome: Progressing Towards Goal  Goal: *STG: Maintains appropriate nutrition and hydration  Outcome: Progressing Towards Goal  Goal: *STG: Vital signs within defined limits  Outcome: Progressing Towards Goal  Goal: *STG/LTG: Relapse prevention plan in place to include housing/aftercare, leisure activities, and spirituality  Outcome: Progressing Towards Goal  Goal: Interventions  Outcome: Progressing Towards Goal     Problem: Patient Education: Go to Patient Education Activity  Goal: Patient/Family Education  Outcome: Progressing Towards Goal     Problem: Falls - Risk of  Goal: *Absence of Falls  Description: Document Lakshmi Fall Risk and appropriate interventions in the flowsheet.   Outcome: Progressing Towards Goal  Note: Fall Risk Interventions:  Mobility Interventions: Bed/chair exit alarm, Communicate number of staff needed for ambulation/transfer, Mechanical lift, Patient to call before getting OOB    Mentation Interventions: Adequate sleep, hydration, pain control, Bed/chair exit alarm, Reorient patient, More frequent rounding    Medication Interventions: Bed/chair exit alarm, Patient to call before getting OOB    Elimination Interventions: Bed/chair exit alarm, Call light in reach, Patient to call for help with toileting needs, Stay With Me (per policy), Toilet paper/wipes in reach, Toileting schedule/hourly rounds, Urinal in reach    History of Falls Interventions: Bed/chair exit alarm, Investigate reason for fall, Door open when patient unattended         Problem: Patient Education: Go to Patient Education Activity  Goal: Patient/Family Education  Outcome: Progressing Towards Goal     Problem: Pulmonary Embolism Care Plan (Adult)  Goal: *Improvement of existing pulmonary embolism  Outcome: Progressing Towards Goal  Goal: *Absence of bleeding  Outcome: Progressing Towards Goal  Goal: *Labs within defined limits  Outcome: Progressing Towards Goal     Problem: Patient Education: Go to Patient Education Activity  Goal: Patient/Family Education  Outcome: Progressing Towards Goal     Problem: Pressure Injury - Risk of  Goal: *Prevention of pressure injury  Description: Document Oneil Scale and appropriate interventions in the flowsheet. Outcome: Progressing Towards Goal  Note: Pressure Injury Interventions:  Sensory Interventions: Chair cushion, Keep linens dry and wrinkle-free    Moisture Interventions: Absorbent underpads, Minimize layers, Moisture barrier, Check for incontinence Q2 hours and as needed    Activity Interventions: Increase time out of bed, Pressure redistribution bed/mattress(bed type), PT/OT evaluation    Mobility Interventions: HOB 30 degrees or less, Turn and reposition approx.  every two hours(pillow and wedges), PT/OT evaluation    Nutrition Interventions: Document food/fluid/supplement intake    Friction and Shear Interventions: Apply protective barrier, creams and emollients, Lift team/patient mobility team, Minimize layers                Problem: Patient Education: Go to Patient Education Activity  Goal: Patient/Family Education  Outcome: Progressing Towards Goal     Problem: Infection - Risk of, Central Venous Catheter-Associated Bloodstream Infection  Goal: *Absence of infection signs and symptoms  Outcome: Progressing Towards Goal     Problem: Patient Education: Go to Patient Education Activity  Goal: Patient/Family Education  Outcome: Progressing Towards Goal     Problem: Patient Education: Go to Patient Education Activity  Goal: Patient/Family Education  Outcome: Progressing Towards Goal     Problem: Patient Education: Go to Patient Education Activity  Goal: Patient/Family Education  Outcome: Progressing Towards Goal

## 2023-01-12 NOTE — PROGRESS NOTES
Received notification from bedside RN about patient with regards to: Phos 1.8, K+ 3.4  VS: /104, HR 99, RR 21, O2 sat 99% on RA    Intervention given:   - Kphos 20 mmol IV x 1 dose  - KCL 10 meq IV x 1  - Phos added to tomorrow AM's BMP

## 2023-01-12 NOTE — PROGRESS NOTES
Nutrition Note    Chart reviewed, case discussed with RN and PharmD. Surgery contacted re: diet advancement as pt is having BM's. NGT looks moderate over the past few days but pt is suspected to be consuming a large amount of ice chips. He was started on clears today. As BG have been in the 90's, recommend slow wean of PPN down to 42mL/h tonight. As long as pt does not develop hypoglycemia in the next 12-24h, recommend allowing PPN bag from tonight to run out tomorrow afternoon and then DC.       Electronically signed by Teena Ramos RD, [address]  on 1/12/2023 at 2:34 PM    Contact: ext [phone]

## 2023-01-12 NOTE — PROGRESS NOTES
Transition of Care Plan:    RUR: 15%  Disposition: IPR- referrals pending with [address] and [facility]   Patient has [address]  so patient has no SNF benefits    If SNF or IPR: Date FOC offered: 1/12/23  Date FOC received:1/12/23  Date authorization started with reference number:  Date authorization received and expires:  Accepting facility:  Follow up appointments:PCP/Specialist  DME needed:per rehab  Transportation at [address] or means to access home:         Medicare Letter:n/a- has medicaid  Is patient a [place] and connected with the [facility]? N/A          If yes, was [place] transfer form completed and [facility] notified? Caregiver Contact:Pt's son Klaus Bishop [phone]  Discharge Caregiver contacted prior to discharge? Per patient  Care Conference needed?: no      CM met with patient at bedside- ex-wife present - patient agreeable to IPR rehab- offered freedome of choice and referral made to [address] - patient provided his cell phone number if needed at [phone]. CM will await IPR evaluation at this time.  David Mejias MSW

## 2023-01-12 NOTE — PROGRESS NOTES
1920- Bedside shift change report given to ROSE Hamilton, RN (oncoming nurse) by Priti Diaz RN (offgoing nurse). Report included the following information SBAR, Kardex, Intake/Output, MAR, and Cardiac Rhythm NSR .      2000- Shift assessment completed. See flowsheets for details. 0000- Reassessment completed. See flowsheets for details. 0400- Reassessment completed. See flowsheets for details. 4521- PRN hydralazine given for SPB >170.     0450- Second PIV placed at this time. 0530- Potassium 3.4, phosphorus 1.8. NP perfect-served. New orders received. See MAR for details. 4523- Bedside shift change report given to Brien Ayala (oncoming nurse) by ROSE Hamilton, RN (offgoing nurse). Report included the following information SBAR, Kardex, Intake/Output, and Cardiac Rhythm Sinus tach .

## 2023-01-12 NOTE — PROGRESS NOTES
Problem: Mobility Impaired (Adult and Pediatric)  Goal: *Acute Goals and Plan of Care (Insert Text)  Description: FUNCTIONAL STATUS PRIOR TO ADMISSION: Patient was independent and active without use of DME.    HOME SUPPORT PRIOR TO ADMISSION: The patient lived with family but did not require assist.    Physical Therapy Goals  Initiated 1/7/2023  1. Patient will move from supine to sit and sit to supine , scoot up and down, and roll side to side in bed with modified independence within 7 day(s). 2.  Patient will transfer from bed to chair and chair to bed with minimal assistance/contact guard assist using the least restrictive device within 7 day(s). 3.  Patient will perform sit to stand with minimal assistance/contact guard assist within 7 day(s). 4.  Patient will ambulate with minimal assistance/contact guard assist for 25 feet with the least restrictive device within 7 day(s). Outcome: Progressing Towards Goal   PHYSICAL THERAPY TREATMENT  Patient: Darrin James (74 y.o. 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 ASSESSMENT  Patient continues with skilled PT services and is progressing towards goals. Patient demonstrating slow improvements with L LE strength- unable to move against gravity but able to stand requiring only minimal facilitation to prevent L knee buckling. Overall mobility is progressing well. He is able to complete supine to sit with CGA for trunk support and min A to advance LE off bed. Patient completed sit<>stand transfers with CGA of 2 and minimal facilitation at L knee for stability.  Patient able to side step two feet to head of bed today with CGA for balance and min A for LLE control-patient demonstrates poor graded control but is ty to abduct leg to advance laterally. Patient demonstrates poor activity tolerance overall with HR increasing to 146 with activity. Continue to recommend IPR following discharge    Current Level of Function Impacting Discharge (mobility/balance): CGA to min A of 1-2             PLAN :  Patient continues to benefit from skilled intervention to address the above impairments. Continue treatment per established plan of care. to address goals. Recommendation for discharge: (in order for the patient to meet his/her long term goals)  Therapy 3 hours per day 5-7 days per week    This discharge recommendation:  Has been made in collaboration with the attending provider and/or case management    IF patient discharges home will need the following DME: to be determined (TBD)       SUBJECTIVE:   Patient stated My leg is getting stronger.     OBJECTIVE DATA SUMMARY:   Critical Behavior:  Neurologic State: Alert  Orientation Level: Oriented X4  Cognition: Follows commands, Impaired decision making  Safety/Judgement: Decreased insight into deficits  Functional Mobility Training:  Bed Mobility:     Supine to Sit: Minimum assistance (to support L LE and CGA for trunk)  Sit to Supine: Minimum assistance;Assist x2  Scooting: Stand-by assistance        Transfers:  Sit to Stand: Contact guard assistance;Assist x2  Stand to Sit: Contact guard assistance;Minimum assistance;Assist x2                             Balance:  Sitting: Intact  Standing: Impaired  Standing - Static: Fair;Constant support  Standing - Dynamic : Fair;Poor;Constant support  Ambulation/Gait Training:     Assistive Device: Walker, rolling;Gait belt  Ambulation - Level of Assistance: Contact guard assistance;Minimal assistance (CGA for balance and min A at L knee to prevent buckling)                 Base of Support: Widened;Shift to right         Patient able to side step 2 feet              Therapeutic Exercises:   Patient performing L heel slides in supine with min A and requires mod/max A for LAQ in sitting      Activity Tolerance:   requires rest breaks and HR to 146 with activity    After treatment patient left in no apparent distress:   Supine in bed, Call bell within reach, Bed / chair alarm activated, and Caregiver / family present    COMMUNICATION/COLLABORATION:   The patients plan of care was discussed with: Occupational therapist and Registered nurse.      Radha Obregon, PT   Time Calculation: 36 mins

## 2023-01-12 NOTE — PROGRESS NOTES
End of Shift Note    Bedside shift change report given to Jessika RN (oncoming nurse) by Twila Sacks, RN (offgoing nurse). Report included the following information SBAR, Kardex, Procedure Summary, Intake/Output, MAR, Recent Results, and Cardiac Rhythm NSR    Shift worked:  2940-0134     Shift summary and any significant changes:     Pt assessment aligns with previous nurse report. Pt is alert and oriented, but occasionally impulsive with poor situational awareness. Pt deferred IVC filter placement until tomorrow, IR nurse states that they were able to put him on their schedule. Pt still NPO per MD.    Pt had multiple rounds of diarrhea today, MD ordered Imodium PRN. Peripheral TPN continuing, electrolytes replaced today. Pt was hypertensive throughout the shift, PRN hydralazine given once for SBP >170. Pt has telemetry orders, awaiting bed placement. Concerns for physician to address: Hypertension. Zone phone for oncoming shift:   [phone]       Activity:  Activity Level: Chair, Up with Assistance (requires key lift per previous RN report)  Number times ambulated in hallways past shift: 0  Number of times OOB to chair past shift: 0    Cardiac:   Cardiac Monitoring: Yes      Cardiac Rhythm: Sinus Rhythm    Access:  Current line(s): PIV and HD access     Genitourinary:   Urinary status: voiding    Respiratory:   O2 Device: None (Room air)  Chronic home O2 use?: NO  Incentive spirometer at bedside: NO       GI:  Last Bowel Movement Date: 01/11/23  Current diet:  DIET NPO  TPN ADULT - PERIPHERAL  Passing flatus: YES  Tolerating current diet: YES       Pain Management:   Patient states pain is manageable on current regimen: YES    Skin:  Oneil Score: 15  Interventions: float heels, PT/OT consult, and nutritional support     Patient Safety:  Fall Score:  Total Score: 5  Interventions: bed/chair alarm, assistive device (walker, cane, etc), gripper socks, pt to call before getting OOB, and stay with me (per policy)  High Fall Risk: Yes    Length of Stay:  Expected LOS: 8d 12h  Actual LOS: You Salazar RN

## 2023-01-12 NOTE — PROGRESS NOTES
Hospitalist Progress Note    NAME: Rick Parish   :  1960   MRN:  [id number]            Subjective:     Patient was seen and examined. No acute events overnight. Discussed with RN overnight events. All patient's questions were answered. Patient's ex-wife at bedside, all questions were answered. Patient gave permission to share his case and medical information with his ex-wife. He is alert orient x4. \"Doing very well today\"    Review of Systems:  Symptom Y/N Comments  Symptom Y/N Comments   Fever/Chills n   Chest Pain n    Poor Appetite    Edema     Cough n   Abdominal Pain n    Sputum    Joint Pain     SOB/PAINTING n   Pruritis/Rash     Nausea/vomit n   Tolerating PT/OT     Diarrhea    Tolerating Diet y    Constipation    Other       Could NOT obtain due to:          Objective:     VITALS:   Last 24hrs VS reviewed since prior progress note.  Most recent are:  Patient Vitals for the past 24 hrs:   Temp Pulse Resp BP SpO2   23 1440 -- (!) 106 20 (!) 152/104 97 %   23 1435 -- (!) 107 18 (!) 154/95 97 %   23 1430 -- (!) 107 17 (!) 173/99 95 %   23 1425 -- (!) 105 18 (!) 149/106 97 %   23 1351 99.1 °F (37.3 °C) (!) 104 18 (!) 135/97 98 %   23 1200 98.9 °F (37.2 °C) (!) 107 17 (!) 145/94 97 %   23 1100 -- (!) 111 22 133/88 96 %   23 1008 -- (!) 110 21 127/85 95 %   23 0926 -- (!) 106 19 (!) 189/95 95 %   23 0849 -- 94 16 (!) 210/103 95 %   23 0800 98.7 °F (37.1 °C) 100 18 (!) 197/97 96 %   23 0512 -- -- -- (!) 164/95 --   23 0417 -- -- -- (!) 194/104 --   23 0414 98.5 °F (36.9 °C) 99 21 (!) 184/105 99 %   23 0400 -- 89 19 -- 97 %   23 0000 98.5 °F (36.9 °C) (!) 105 25 (!) 162/102 95 %   23 2200 -- (!) 101 12 (!) 129/93 96 %   23 2000 98.5 °F (36.9 °C) (!) 104 16 (!) 136/96 95 %   23 1745 -- -- -- (!) 155/79 --   23 1600 98.6 °F (37 °C) 97 17 (!) 184/114 99 %   23 1551 -- -- -- (!) 184/114 --         Intake/Output Summary (Last 24 hours) at 1/12/2023 1537  Last data filed at 1/12/2023 1300  Gross per 24 hour   Intake 4073.53 ml   Output 1935 ml   Net 2138.53 ml          PHYSICAL EXAM:  General: WD, WN. Alert, cooperative, no acute distress    EENT:  EOMI. Anicteric sclerae. MMM  Resp:  CTA bilaterally, no wheezing or rales. No accessory muscle use  CV:  Regular  rhythm,  No edema  GI:  Soft, Non distended, Non tender. +Bowel sounds. NGT and TPN present. Neurologic:  EOMs intact. No facial asymmetry. No aphasia or slurred speech. Symmetrical strength, Sensation grossly intact. Alert and oriented X 4.     Psych:   Good insight. Not anxious nor agitated  Skin:  No rashes. No jaundice    Procedures: see electronic medical records for all procedures/Xrays and details which were not copied into this note but were reviewed prior to creation of Plan. LABS:  I reviewed today's most current labs and imaging studies.   Pertinent labs include:  Recent Labs     01/12/23  0418 01/11/23  0436 01/10/23  0413   WBC 10.5 9.4 10.1   HGB 8.9* 8.8* 8.2*   HCT 27.4* 27.8* 26.4*   * 380 342       Recent Labs     01/12/23  0418 01/11/23  0436 01/10/23  0413     137 138 141   K 3.4*  3.4* 3.2* 3.2*     101 100 102   CO2 28  29 32 34*   *  105* 91 109*   BUN 43*  43* 30* 40*   CREA 1.75*  1.75* 1.88* 3.21*   CA 9.0  8.9 9.0 8.9   MG  --  1.9 2.2   PHOS 1.8*  1.8* 1.2* 2.1*   ALB 2.4* 2.5* 2.5*   TBILI  --   --  0.7   ALT  --   --  44   INR 1.0 1.1 1.0         Signed: Annemarie Morse MD        Reviewed most current lab test results and cultures  YES  Reviewed most current radiology test results   YES  Review and summation of old records today    NO  Reviewed patient's current orders and MAR    YES  PMH/SH reviewed - no change compared to H&P      Assessment / Plan:  Left retroperitoneal hemorrhage  L2 plexopathy with LLE weakness and sensory loss  - Spontaneous retroperitoneal bleed now status post L2 artery bleed embolization.  - S/p 5U RBC and 2U FFP this admission.  - s/p embolization with coils  Plan  Daily labs  PT/OT plans for inpatient rehab  Surgery following, greatly appreciate expertise    Ileus  - retroperitoneal bleed mass effect seen on CT abdomen/pelvis  Plan  Will initiate diet and advance as tolerated  Monitor output  Surgery input is appreciated  Daily labs  Had bowel movement early this morning    Massive PE  - S/p treatment with lovenox. Anticoagulation discontinued after spontaneous retroperitoneal bleed with hemorrhagic shock. Plan  SCDs for now-likely will not be able to tolerate long term anticoagulation  O2 NC  IVC filter possibly hopefully today     GILMAR/ATN  Hypokalemia  -  d/t hemorrhagic shock  Plan  HD per nephrology  Replace potassium  No hemodialysis today     Shock liver  - d/t hemorrhagic shock  Plan  Continue to monitor enzymes trending down     Hx of alcohol abuse  Plan  Valium prn for CIWA scale. 18.5 - 24.9 Normal weight / Body mass index is 23.38 kg/m². Code status: Full  Prophylaxis: SCD's  Recommended Disposition: [facility]/Rehab  FAROOQ: 1/13  Barriers: IVC filter today     ________________________________________________________________________  Care Plan discussed with:    Comments   Patient x    Family  x    RN x    Care Manager     Consultant                        Multidiciplinary team rounds were held today with , nursing, pharmacist and clinical coordinator. Patient's plan of care was discussed; medications were reviewed and discharge planning was addressed.      ________________________________________________________________________        Comments   >50% of visit spent in counseling and coordination of care     ________________________________________________________________________  Randolph Zamora MD

## 2023-01-12 NOTE — PROGRESS NOTES
Nursing inquiring about patient's ability to have a diet. Discussed with Dr. Lynda Vazquez and with nurse. Patient is having bowel function and can advance diet as tolerated.      Cari Dunn NP

## 2023-01-12 NOTE — H&P
INTERVENTIONAL RADIOLOGY  Preoperative History and Physical      Patient:  Krishna Camejo  :  1960  Age:  60 y.o. MRN:  [id number]  Today's Date:  2023      CC / HPI   Krishna Camejo is a 60 y.o. male with a history of PE and retroperitoneal bleed who presents for IVC filter placement.     PAST MEDICAL HISTORY  Past Medical History:   Diagnosis Date    Hypercholesterolemia     Hypertension        PAST SURGICAL HISTORY  Past Surgical History:   Procedure Laterality Date    HX ORTHOPAEDIC      rotator cuff repair    IR INSERT NON TUNL CVC OVER 5 YRS  2023    IR OCCL TXCATH HEMMORAGE W SI  2023       SOCIAL HISTORY  Social History     Socioeconomic History    Marital status: LEGALLY      Spouse name: Not on file    Number of children: Not on file    Years of education: Not on file    Highest education level: Not on file   Occupational History    Not on file   Tobacco Use    Smoking status: Former     Years: 10.00     Types: Cigarettes     Quit date: 2014     Years since quittin.9    Smokeless tobacco: Never   Vaping Use    Vaping Use: Never used   Substance and Sexual Activity    Alcohol use: Yes     Comment: pint a day    Drug use: Yes     Types: Cocaine     Comment: last used Friday    Sexual activity: Yes     Partners: Female     Birth control/protection: Condom   Other Topics Concern    Not on file   Social History Narrative    Not on file     Social Determinants of Health     Financial Resource Strain: Not on file   Food Insecurity: Not on file   Transportation Needs: Not on file   Physical Activity: Not on file   Stress: Not on file   Social Connections: Not on file   Intimate Partner Violence: Not on file   Housing Stability: Not on file       FAMILY HISTORY  Family History   Problem Relation Age of Onset    Stroke Father     Hypertension Father     Diabetes Sister        CURRENT MEDICATIONS  Current Facility-Administered Medications   Medication Dose Route Frequency Provider Last Rate Last Admin    lidocaine (PF) (XYLOCAINE) 20 mg/mL (2 %) injection 200 mg  10 mL SubCUTAneous ONCE Walterine Babinski, Morrell Pinto, MD        TPN ADULT - PERIPHERAL   IntraVENous CONTINUOUS Silas Gregory MD        TPN ADULT - PERIPHERAL   IntraVENous CONTINUOUS Silas Gregory  mL/hr at 01/11/23 1825 New Bag at 01/11/23 1825    loperamide (IMODIUM) 1 mg/7.5 mL oral solution 2 mg  2 mg Oral QID PRN Silas Gregory MD        fat emulsion 20% (LIPOSYN, INTRAlipid) infusion 250 mL  250 mL IntraVENous Q MON, WED & Deborah Flores MD   IV Completed at 01/12/23 0849    hydrALAZINE (APRESOLINE) 20 mg/mL injection 10 mg  10 mg IntraVENous Q6H PRN Velasquez Hanson PA-C   10 mg at 01/12/23 0432    diazePAM (VALIUM) injection 5 mg  5 mg IntraVENous Q6H PRN Gary August, NP        midodrine (PROAMATINE) tablet 10 mg  10 mg Oral TID PRN Francis Crews MD        insulin lispro (HUMALOG) injection   SubCUTAneous Q6H Francis Crews MD        glucose chewable tablet 16 g  4 Tablet Oral PRN Francis Crews MD        dextrose (D50W) injection syrg 12.5-25 g  12.5-25 g IntraVENous PRN Francis Crews MD        glucagon (GLUCAGEN) injection 1 mg  1 mg IntraMUSCular PRN Francis Crews MD        albumin, human-kjda 25% (ALBUMINEX) intravenous solution 25 g  25 g IntraVENous DIALYSIS PRN Girma CESPEDES MD        epoetin ji-epbx (RETACRIT) injection 10,000 Units  10,000 Units SubCUTAneous Q TUE, THU & SAT Darby Royal MD   10,000 Units at 01/10/23 2032    simethicone (MYLICON) tablet 80 mg  80 mg Oral QID PRN Mala Nazario MD   80 mg at 01/05/23 1747    alcohol 62% (NOZIN) nasal  1 Ampule  1 Ampule Topical Q12H Mala Nazario MD   1 Ampule at 01/12/23 0858    [Held by provider] folic acid (FOLVITE) tablet 1 mg  1 mg Oral DAILY Belkis Ruiz, DO   1 mg at 01/07/23 0818    [Held by provider] thiamine mononitrate (B-1) tablet 100 mg  100 mg Oral DAILY Belkis Joseph, DO   100 mg at 01/07/23 0818    [Held by provider] multivitamin, tx-iron-ca-min (THERA-M w/ IRON) tablet 1 Tablet  1 Tablet Oral DAILY Sari Barthel, DO   1 Tablet at 01/07/23 0818    [Held by provider] magnesium oxide (MAG-OX) tablet 400 mg  400 mg Oral DAILY Sari Barthel, DO   400 mg at 01/07/23 0818    sodium chloride (NS) flush 5-40 mL  5-40 mL IntraVENous Q8H Sari Barthel, DO   10 mL at 01/12/23 5688    sodium chloride (NS) flush 5-40 mL  5-40 mL IntraVENous PRN Sari Barthel, DO        acetaminophen (TYLENOL) tablet 650 mg  650 mg Oral Q6H PRN Sari Barthel, DO   650 mg at 01/11/23 0105    Or    acetaminophen (TYLENOL) suppository 650 mg  650 mg Rectal Q6H PRN Sari Barthel, DO        polyethylene glycol (MIRALAX) packet 17 g  17 g Oral DAILY PRN Sari Barthel, DO        ondansetron (ZOFRAN ODT) tablet 4 mg  4 mg Oral Q8H PRN Sari Barthel, DO        Or    ondansetron [facility]) injection 4 mg  4 mg IntraVENous Q6H PRN Sari Barthel, DO           ALLERGIES  No Known Allergies    DIAGNOSTIC STUDIES   IMAGING STUDIES  Relevant Imaging studies reviewed    LABS  Lab Results   Component Value Date/Time    WBC 10.5 01/12/2023 04:18 AM    HGB 8.9 (L) 01/12/2023 04:18 AM    HCT 27.4 (L) 01/12/2023 04:18 AM    PLATELET 370 (H) 48/26/0971 04:18 AM    MCV 99.6 (H) 01/12/2023 04:18 AM     Lab Results   Component Value Date/Time    Sodium 136 01/12/2023 04:18 AM    Sodium 137 01/12/2023 04:18 AM    Potassium 3.4 (L) 01/12/2023 04:18 AM    Potassium 3.4 (L) 01/12/2023 04:18 AM    Chloride 102 01/12/2023 04:18 AM    Chloride 101 01/12/2023 04:18 AM    CO2 28 01/12/2023 04:18 AM    CO2 29 01/12/2023 04:18 AM    Anion gap 6 01/12/2023 04:18 AM    Anion gap 7 01/12/2023 04:18 AM    Glucose 104 (H) 01/12/2023 04:18 AM    Glucose 105 (H) 01/12/2023 04:18 AM    BUN 43 (H) 01/12/2023 04:18 AM    BUN 43 (H) 01/12/2023 04:18 AM    Creatinine 1.75 (H) 01/12/2023 04:18 AM Creatinine 1.75 (H) 01/12/2023 04:18 AM    BUN/Creatinine ratio 25 (H) 01/12/2023 04:18 AM    BUN/Creatinine ratio 25 (H) 01/12/2023 04:18 AM    GFR est AA >60 09/28/2021 09:41 AM    GFR est non-AA 59 (L) 09/28/2021 09:41 AM    Calcium 9.0 01/12/2023 04:18 AM    Calcium 8.9 01/12/2023 04:18 AM     Lab Results   Component Value Date/Time    INR 1.0 01/12/2023 04:18 AM    Prothrombin time 10.9 01/12/2023 04:18 AM       PHYSICAL EXAM   BP (!) 152/104 (BP 1 Location: Right upper arm, BP Patient Position: Supine)   Pulse (!) 106   Temp 99.1 °F (37.3 °C)   Resp 20   Ht 5' 9\" (1.753 m)   Wt 71.8 kg (158 lb 4.6 oz)   SpO2 97%   BMI 23.38 kg/m²   General:  NAD  Heart:  RRR  Lungs:  NWOB  Neurological:  AAOX3    PLAN   Procedure to be performed:  IVC filter placement   Plan for sedation:  None  NPO status: Patient NPO   Mallampati: Local only   ASA: 3  Post procedure plan:  observation per protocol  Informed consent:  risks, benefits, and alternatives reviewed with the patient / family who agree to proceed  Code status:  Full Code      Carter Samson MD  [facility]

## 2023-01-12 NOTE — PROGRESS NOTES
Problem: Self Care Deficits Care Plan (Adult)  Goal: *Acute Goals and Plan of Care (Insert Text)  Description: FUNCTIONAL STATUS PRIOR TO ADMISSION: Patient was independent and active without use of DME. Patient was independent for basic and instrumental ADLs. Recently eval and discharged at independent, now re-evaluation due to ICU admission. HOME SUPPORT: The patient lived with friend (need to confirm PLOF/support). Occupational Therapy Goals  Initiated 1/7/2023  1. Patient will perform standing grooming with supervision/set-up within 7 day(s). 2.  Patient will perform bathing with supervision/set-up within 7 day(s). 3.  Patient will perform lower body dressing with minimal assistance/contact guard assist within 7 day(s). 4.  Patient will perform toilet transfers with minimal assistance/contact guard assist within 7 day(s). 5.  Patient will perform all aspects of toileting with minimal assistance/contact guard assist within 7 day(s). 6.  Patient will participate in upper extremity therapeutic exercise/activities with supervision/set-up for 10 minutes within 7 day(s). 7.  Patient will utilize energy conservation techniques during functional activities with verbal cues within 7 day(s). Outcome: Progressing Towards Goal    OCCUPATIONAL THERAPY TREATMENT  Patient: Lashawn Alonso (45 y.o. male)  Date: 1/12/2023  Diagnosis: Alcoholic ketoacidosis [L46.07]  Pulmonary embolism (HCC) [I26.99] <principal problem not specified>  Procedure(s) (LRB):  LAPAROTOMY EXPLORATORY (N/A) 10 Days Post-Op  Precautions: Fall  Chart, occupational therapy assessment, plan of care, and goals were reviewed. ASSESSMENT  Patient is making good functional progress, demonstrating improving activity tolerance, standing balance, safety awareness and LLE strength. He is still tachycardic to a HR of 146 during standing ADLs and when side stepping to [facility], but he does slowly recover to the 120s with seated rest breaks. Overall he was SBA to min A of 2 for bed mobility, CGA of 2 to side step to the [facility] and he performed ADLs at a supervision/setup to min A level. Cueing is needed for general safety awareness during standing ADLs and transfers, but the patient was receptive and demonstrating carryover for related teaching. At this time the patient continues to benefit from acute OT and will need inpatient rehab after discharge. PLAN :  Patient continues to benefit from skilled intervention to address the above impairments. Continue treatment per established plan of care. to address goals. Recommendation for discharge: (in order for the patient to meet his/her long term goals)  Therapy 3 hours per day 5-7 days per week      Equipment recommendations for successful discharge (if) home:TBD in rehab         OBJECTIVE DATA SUMMARY:   Cognitive/Behavioral Status:  Neurologic State: Alert  Orientation Level: Oriented X4  Cognition: Follows commands; Impaired decision making        Safety/Judgement: Decreased insight into deficits    Functional Mobility and Transfers for ADLs:  Bed Mobility:  Supine to Sit: Minimum assistance (to support L LE and CGA for trunk)  Sit to Supine: Minimum assistance;Assist x2  Scooting: Stand-by assistance    Transfers:  Sit to Stand: Contact guard assistance;Assist x2          Balance:  Sitting: Intact  Standing: Impaired  Standing - Static: Fair;Constant support  Standing - Dynamic : Fair;Poor;Constant support    ADL Intervention:  Grooming  Position Performed: Seated edge of bed  Washing Hands: Supervision;Set-up    Lower Body Dressing Assistance  Socks: Stand-by assistance  Leg Crossed Method Used: Yes  Position Performed: Seated edge of bed  Cues: Verbal cues provided; Tactile cues provided;Visual cues provided    Toileting  Toileting Assistance: Contact guard assistance;Minimum assistance (performed in standing using RUE, with LUE support on RW.)  Bladder Hygiene: Contact guard assistance  Bowel Hygiene: Minimum assistance    Cognitive Retraining  Safety/Judgement: Decreased insight into deficits        Activity Tolerance:   Fair      After treatment patient left in no apparent distress:   Supine in bed and Call bell within reach    COMMUNICATION/COLLABORATION:   The patients plan of care was discussed with: Physical Therapist and Registered Nurse    Eric Kam, OTR/L  Time Calculation: 32 mins

## 2023-01-13 LAB
ALBUMIN SERPL-MCNC: 2.5 G/DL (ref 3.5–5)
ALBUMIN/GLOB SERPL: 0.5 (ref 1.1–2.2)
ALP SERPL-CCNC: 175 U/L (ref 45–117)
ALT SERPL-CCNC: 38 U/L (ref 12–78)
ANION GAP SERPL CALC-SCNC: 7 MMOL/L (ref 5–15)
AST SERPL-CCNC: 64 U/L (ref 15–37)
BASOPHILS # BLD: 0 K/UL (ref 0–0.1)
BASOPHILS NFR BLD: 0 % (ref 0–1)
BILIRUB DIRECT SERPL-MCNC: 0.4 MG/DL (ref 0–0.2)
BILIRUB SERPL-MCNC: 1.2 MG/DL (ref 0.2–1)
BUN SERPL-MCNC: 39 MG/DL (ref 6–20)
BUN/CREAT SERPL: 24 (ref 12–20)
CALCIUM SERPL-MCNC: 9 MG/DL (ref 8.5–10.1)
CHLORIDE SERPL-SCNC: 102 MMOL/L (ref 97–108)
CO2 SERPL-SCNC: 25 MMOL/L (ref 21–32)
CREAT SERPL-MCNC: 1.62 MG/DL (ref 0.7–1.3)
DIFFERENTIAL METHOD BLD: ABNORMAL
EOSINOPHIL # BLD: 0.1 K/UL (ref 0–0.4)
EOSINOPHIL NFR BLD: 1 % (ref 0–7)
ERYTHROCYTE [DISTWIDTH] IN BLOOD BY AUTOMATED COUNT: 16.4 % (ref 11.5–14.5)
GLOBULIN SER CALC-MCNC: 4.6 G/DL (ref 2–4)
GLUCOSE BLD STRIP.AUTO-MCNC: 76 MG/DL (ref 65–117)
GLUCOSE BLD STRIP.AUTO-MCNC: 87 MG/DL (ref 65–117)
GLUCOSE BLD STRIP.AUTO-MCNC: 98 MG/DL (ref 65–117)
GLUCOSE SERPL-MCNC: 98 MG/DL (ref 65–100)
HCT VFR BLD AUTO: 27.8 % (ref 36.6–50.3)
HGB BLD-MCNC: 8.8 G/DL (ref 12.1–17)
IMM GRANULOCYTES # BLD AUTO: 0 K/UL (ref 0–0.04)
IMM GRANULOCYTES NFR BLD AUTO: 0 % (ref 0–0.5)
INR PPP: 1.1 (ref 0.9–1.1)
LYMPHOCYTES # BLD: 1.6 K/UL (ref 0.8–3.5)
LYMPHOCYTES NFR BLD: 13 % (ref 12–49)
MCH RBC QN AUTO: 31.3 PG (ref 26–34)
MCHC RBC AUTO-ENTMCNC: 31.7 G/DL (ref 30–36.5)
MCV RBC AUTO: 98.9 FL (ref 80–99)
MONOCYTES # BLD: 1.1 K/UL (ref 0–1)
MONOCYTES NFR BLD: 9 % (ref 5–13)
NEUTS SEG # BLD: 8.9 K/UL (ref 1.8–8)
NEUTS SEG NFR BLD: 77 % (ref 32–75)
NRBC # BLD: 0 K/UL (ref 0–0.01)
NRBC BLD-RTO: 0 PER 100 WBC
PHOSPHATE SERPL-MCNC: 2.2 MG/DL (ref 2.6–4.7)
PLATELET # BLD AUTO: 413 K/UL (ref 150–400)
PMV BLD AUTO: 10.4 FL (ref 8.9–12.9)
POTASSIUM SERPL-SCNC: 3.9 MMOL/L (ref 3.5–5.1)
PROT SERPL-MCNC: 7.1 G/DL (ref 6.4–8.2)
PROTHROMBIN TIME: 11 SEC (ref 9–11.1)
RBC # BLD AUTO: 2.81 M/UL (ref 4.1–5.7)
SERVICE CMNT-IMP: NORMAL
SODIUM SERPL-SCNC: 134 MMOL/L (ref 136–145)
WBC # BLD AUTO: 11.7 K/UL (ref 4.1–11.1)

## 2023-01-13 PROCEDURE — 36415 COLL VENOUS BLD VENIPUNCTURE: CPT

## 2023-01-13 PROCEDURE — 85610 PROTHROMBIN TIME: CPT

## 2023-01-13 PROCEDURE — 97535 SELF CARE MNGMENT TRAINING: CPT | Performed by: OCCUPATIONAL THERAPIST

## 2023-01-13 PROCEDURE — 74011000250 HC RX REV CODE- 250: Performed by: STUDENT IN AN ORGANIZED HEALTH CARE EDUCATION/TRAINING PROGRAM

## 2023-01-13 PROCEDURE — 65270000046 HC RM TELEMETRY

## 2023-01-13 PROCEDURE — 85025 COMPLETE CBC W/AUTO DIFF WBC: CPT

## 2023-01-13 PROCEDURE — 97530 THERAPEUTIC ACTIVITIES: CPT | Performed by: PHYSICAL THERAPIST

## 2023-01-13 PROCEDURE — 74011250636 HC RX REV CODE- 250/636: Performed by: NURSE PRACTITIONER

## 2023-01-13 PROCEDURE — 82962 GLUCOSE BLOOD TEST: CPT

## 2023-01-13 PROCEDURE — 74011250636 HC RX REV CODE- 250/636: Performed by: PHYSICIAN ASSISTANT

## 2023-01-13 PROCEDURE — 80076 HEPATIC FUNCTION PANEL: CPT

## 2023-01-13 PROCEDURE — 97116 GAIT TRAINING THERAPY: CPT | Performed by: PHYSICAL THERAPIST

## 2023-01-13 PROCEDURE — 84100 ASSAY OF PHOSPHORUS: CPT

## 2023-01-13 PROCEDURE — 80048 BASIC METABOLIC PNL TOTAL CA: CPT

## 2023-01-13 PROCEDURE — 74011000250 HC RX REV CODE- 250: Performed by: NURSE PRACTITIONER

## 2023-01-13 PROCEDURE — 97530 THERAPEUTIC ACTIVITIES: CPT | Performed by: OCCUPATIONAL THERAPIST

## 2023-01-13 PROCEDURE — 74011250637 HC RX REV CODE- 250/637: Performed by: INTERNAL MEDICINE

## 2023-01-13 RX ORDER — DEXTROSE MONOHYDRATE 100 MG/ML
0-250 INJECTION, SOLUTION INTRAVENOUS AS NEEDED
Status: DISCONTINUED | OUTPATIENT
Start: 2023-01-13 | End: 2023-01-20 | Stop reason: HOSPADM

## 2023-01-13 RX ADMIN — HYDRALAZINE HYDROCHLORIDE 10 MG: 20 INJECTION INTRAMUSCULAR; INTRAVENOUS at 11:55

## 2023-01-13 RX ADMIN — SODIUM CHLORIDE, PRESERVATIVE FREE 10 ML: 5 INJECTION INTRAVENOUS at 14:00

## 2023-01-13 RX ADMIN — SODIUM CHLORIDE, PRESERVATIVE FREE 10 ML: 5 INJECTION INTRAVENOUS at 22:25

## 2023-01-13 RX ADMIN — Medication 1 AMPULE: at 22:24

## 2023-01-13 RX ADMIN — SODIUM PHOSPHATE, MONOBASIC, MONOHYDRATE: 276; 142 INJECTION, SOLUTION INTRAVENOUS at 06:04

## 2023-01-13 RX ADMIN — I.V. FAT EMULSION 250 ML: 20 EMULSION INTRAVENOUS at 22:14

## 2023-01-13 RX ADMIN — SODIUM CHLORIDE, PRESERVATIVE FREE 10 ML: 5 INJECTION INTRAVENOUS at 05:06

## 2023-01-13 RX ADMIN — Medication 1 AMPULE: at 09:00

## 2023-01-13 RX ADMIN — HYDRALAZINE HYDROCHLORIDE 10 MG: 20 INJECTION INTRAMUSCULAR; INTRAVENOUS at 06:12

## 2023-01-13 RX ADMIN — HYDRALAZINE HYDROCHLORIDE 10 MG: 20 INJECTION INTRAMUSCULAR; INTRAVENOUS at 18:29

## 2023-01-13 NOTE — PROGRESS NOTES
1910- Bedside shift change report given to [employer] RN (oncoming nurse) by Tomy Hernandez RN (offgoing nurse). Report included the following information SBAR, Kardex, Procedure Summary, Intake/Output, MAR, Recent Results, and Cardiac Rhythm NSR-Sinus Tachy . 2000- Shift assessment completed. See flowsheets for details. 0000- Reassessment completed. See flowsheets for details. 0400- Reassessment completed. See flowsheets for details. 3582- Phosphorus 2.2. NP perfect served. New orders received. See MAR for details. 0025- PRN hydralazine given for a SBP >170.     0705- Bedside shift change report given to Claude Richards RN (oncoming nurse) by [employer], RN (offgoing nurse). Report included the following information SBAR, Kardex, Procedure Summary, Intake/Output, MAR, Recent Results, and Cardiac Rhythm NSR-Sinus tachy .

## 2023-01-13 NOTE — PROGRESS NOTES
.End of Shift Note    Bedside shift change report given to Skylar HAMM (oncoming nurse) by Jose Juan Brown RN (offgoing nurse). Report included the following information SBAR, Kardex, and MAR    Shift worked: 1093-0977     Shift summary and any significant changes:    Patient received from CCU at [phone]. Vital signs and assessment completed. No complaints of pain made throughout shift. PRN Hydralazine given x1 for BP of 178/103. Hourly rounding completed.           Jose Juan Brown RN

## 2023-01-13 NOTE — PROGRESS NOTES
Hospitalist Progress Note    NAME: Shiv Carty   :  1960   MRN:  [id number]            Subjective:     Patient was seen and examined. No acute events overnight. Discussed with RN overnight events. All patient's questions were answered. \"feeling very well as I can eat now\"    Review of Systems:  Symptom Y/N Comments  Symptom Y/N Comments   Fever/Chills n   Chest Pain n    Poor Appetite    Edema     Cough n   Abdominal Pain n    Sputum    Joint Pain     SOB/PAINTING n   Pruritis/Rash     Nausea/vomit n   Tolerating PT/OT     Diarrhea    Tolerating Diet y    Constipation    Other       Could NOT obtain due to:          Objective:     VITALS:   Last 24hrs VS reviewed since prior progress note.  Most recent are:  Patient Vitals for the past 24 hrs:   Temp Pulse Resp BP SpO2   23 1200 98.9 °F (37.2 °C) (!) 121 18 133/84 100 %   23 1000 -- 93 18 (!) 174/105 97 %   23 0800 99 °F (37.2 °C) 99 16 (!) 161/79 97 %   23 0651 -- 96 14 (!) 161/96 98 %   23 0610 -- 99 14 (!) 174/106 100 %   23 0609 -- 99 19 (!) 177/108 100 %   23 0400 99 °F (37.2 °C) 93 13 (!) 161/93 97 %   23 0200 -- 100 16 (!) 168/100 97 %   23 0000 98.2 °F (36.8 °C) (!) 102 18 (!) 145/102 97 %   23 2200 -- (!) 102 13 (!) 142/76 97 %   23 2000 98 °F (36.7 °C) (!) 103 17 (!) 162/95 96 %   23 1800 -- (!) 105 14 (!) 137/99 99 %   23 1600 99 °F (37.2 °C) (!) 124 23 (!) 146/97 99 %   23 1503 -- (!) 110 22 (!) 161/101 95 %   23 1440 -- (!) 106 20 (!) 152/104 97 %   23 1435 -- (!) 107 18 (!) 154/95 97 %   23 1430 -- (!) 107 17 (!) 173/99 95 %   23 1425 -- (!) 105 18 (!) 149/106 97 %   23 1351 99.1 °F (37.3 °C) (!) 104 18 (!) 135/97 98 %         Intake/Output Summary (Last 24 hours) at 2023 1313  Last data filed at 2023 1200  Gross per 24 hour   Intake 1341.77 ml   Output 350 ml   Net 991.77 ml          PHYSICAL EXAM:  General: WD, WN. Alert, cooperative, no acute distress    EENT:  EOMI. Anicteric sclerae. MMM  Resp:  CTA bilaterally, no wheezing or rales. No accessory muscle use  CV:  Regular  rhythm,  No edema  GI:  Soft, Non distended, Non tender. +Bowel sounds. NGT and TPN present. Neurologic:  EOMs intact. No facial asymmetry. No aphasia or slurred speech. Symmetrical strength, Sensation grossly intact. Alert and oriented X 4.     Psych:   Good insight. Not anxious nor agitated  Skin:  No rashes. No jaundice    Procedures: see electronic medical records for all procedures/Xrays and details which were not copied into this note but were reviewed prior to creation of Plan. LABS:  I reviewed today's most current labs and imaging studies.   Pertinent labs include:  Recent Labs     01/13/23 0343 01/12/23 0418 01/11/23 0436   WBC 11.7* 10.5 9.4   HGB 8.8* 8.9* 8.8*   HCT 27.8* 27.4* 27.8*   * 417* 380       Recent Labs     01/13/23 0343 01/12/23 0418 01/11/23 0436   * 136  137 138   K 3.9 3.4*  3.4* 3.2*    102  101 100   CO2 25 28  29 32   GLU 98 104*  105* 91   BUN 39* 43*  43* 30*   CREA 1.62* 1.75*  1.75* 1.88*   CA 9.0 9.0  8.9 9.0   MG  --   --  1.9   PHOS 2.2* 1.8*  1.8* 1.2*   ALB 2.5* 2.4* 2.5*   TBILI 1.2*  --   --    ALT 38  --   --    INR 1.1 1.0 1.1         Signed: Leidy Peterson MD        Reviewed most current lab test results and cultures  YES  Reviewed most current radiology test results   YES  Review and summation of old records today    NO  Reviewed patient's current orders and MAR    YES  PMH/SH reviewed - no change compared to H&P      Assessment / Plan:  Left retroperitoneal hemorrhage  L2 plexopathy with LLE weakness and sensory loss  - Spontaneous retroperitoneal bleed now status post L2 artery bleed embolization.  - S/p 5U RBC and 2U FFP this admission.  - s/p embolization with coils  Plan  Daily labs  PT/OT plans for inpatient rehab  Surgery following, input is appreciated Ileus  - retroperitoneal bleed mass effect seen on CT abdomen/pelvis    Plan  Cont advancing diet  Remove NG tue  Monitor output  Surgery input is appreciated  Daily labs  Has regular BM now    Massive PE  - S/p treatment with lovenox. Anticoagulation discontinued after spontaneous retroperitoneal bleed with hemorrhagic shock. Plan  SCDs for now-likely will not be able to tolerate long term anticoagulation  O2 NC  S/p IVC filter 1/12     GILMAR/ATN  Hypokalemia  -  d/t hemorrhagic shock    Plan  HD per nephrology  To remove HD cath access as per nephro recommendations      Shock liver  - d/t hemorrhagic shock    Plan  Continue to monitor enzymes trending down, almost normal now      Hx of alcohol abuse  Plan  Valium prn for CIWA scale. 18.5 - 24.9 Normal weight / Body mass index is 23.64 kg/m². Code status: Full  Prophylaxis: SCD's  Recommended Disposition: [facility]/Rehab  FAROOQ: 1/14  Barriers:advancing diet, hopefully dc in 24 hours       ________________________________________________________________________  Care Plan discussed with:    Comments   Patient x    Family      RN x    Care Manager     Consultant                        Multidiciplinary team rounds were held today with , nursing, pharmacist and clinical coordinator. Patient's plan of care was discussed; medications were reviewed and discharge planning was addressed.      ________________________________________________________________________        Comments   >50% of visit spent in counseling and coordination of care     ________________________________________________________________________  Jason Schultz MD

## 2023-01-13 NOTE — PROGRESS NOTES
Problem: Self Care Deficits Care Plan (Adult)  Goal: *Acute Goals and Plan of Care (Insert Text)  Description: FUNCTIONAL STATUS PRIOR TO ADMISSION: Patient was independent and active without use of DME. Patient was independent for basic and instrumental ADLs. Recently eval and discharged at independent, now re-evaluation due to ICU admission. HOME SUPPORT: The patient lived with friend (need to confirm PLOF/support). Occupational Therapy Goals  Initiated 1/7/2023  1. Patient will perform standing grooming with supervision/set-up within 7 day(s). 2.  Patient will perform bathing with supervision/set-up within 7 day(s). 3.  Patient will perform lower body dressing with minimal assistance/contact guard assist within 7 day(s). 4.  Patient will perform toilet transfers with minimal assistance/contact guard assist within 7 day(s). 5.  Patient will perform all aspects of toileting with minimal assistance/contact guard assist within 7 day(s). 6.  Patient will participate in upper extremity therapeutic exercise/activities with supervision/set-up for 10 minutes within 7 day(s). 7.  Patient will utilize energy conservation techniques during functional activities with verbal cues within 7 day(s). Outcome: Progressing Towards Goal    OCCUPATIONAL THERAPY TREATMENT  Patient: Darrin James (64 y.o. male)  Date: 1/13/2023  Diagnosis: Alcoholic ketoacidosis [Z04.98]  Pulmonary embolism (HCC) [I26.99] <principal problem not specified>  Procedure(s) (LRB):  LAPAROTOMY EXPLORATORY (N/A) 11 Days Post-Op  Precautions: Fall  Chart, occupational therapy assessment, plan of care, and goals were reviewed. ASSESSMENT  Patient presented supine in bed, eager to work with therapy. Overall he was min A for bed mobility,  CGA of 2 for transfers and min A of 2 for ambulation with a RW. In regard to ADLs he performed standing grooming with CGA.  He is demonstrating improving LLE strength and motor control, but it is still greatly impaired. Occasional cueing was needed for safety during transfers, ambulation and standing ADLs, but the patient is receptive and demonstrating improved carryover. The patient continues to become tachycardic to the 140s during activity, but he slowly recovers with seated rest breaks. At this time he remains limited by GW, LLE weakness, decreased activity tolerance, decreased safety awareness and decreased balance. He will continue to benefit from acute OT and will need inpatient rehab after discharge. PLAN :  Patient continues to benefit from skilled intervention to address the above impairments. Continue treatment per established plan of care. to address goals. Recommendation for discharge: (in order for the patient to meet his/her long term goals)  Therapy 3 hours per day 5-7 days per week      Equipment recommendations for successful discharge (if) home:RW, WC and BSC         OBJECTIVE DATA SUMMARY:   Cognitive/Behavioral Status:  Neurologic State: Alert  Orientation Level: Oriented X4  Cognition: Appropriate for age attention/concentration; Follows commands; Impaired decision making        Safety/Judgement: Decreased awareness of need for safety;Decreased insight into deficits    Functional Mobility and Transfers for ADLs:  Bed Mobility:  Supine to Sit: Minimum assistance (for LLE and CGA for trunk)  Scooting: Stand-by assistance    Transfers:  Sit to Stand: Contact guard assistance;Assist x2  Functional Transfers  Bathroom Mobility: Minimum assistance (of 2 for ambulation with a RW to the sink)       Balance:  Sitting: Intact  Standing: Impaired  Standing - Static: Good;Constant support  Standing - Dynamic : Fair;Constant support    ADL Intervention:  Grooming  Position Performed: Standing  Washing Face: Contact guard assistance  Washing Hands: Contact guard assistance    Cognitive Retraining  Safety/Judgement: Decreased awareness of need for safety;Decreased insight into deficits      Pain:  Patient    Activity Tolerance:   Fair  Patient tachycardic to the 140s during activity, but recovers slowly with seated rest breaks.      After treatment patient left in no apparent distress:   Sitting in chair, Call bell within reach, and Bed / chair alarm activated    COMMUNICATION/COLLABORATION:   The patients plan of care was discussed with: Physical Therapist and Registered Nurse    Qasim Hamilton OTR/L  Time Calculation: 39 mins

## 2023-01-13 NOTE — PROGRESS NOTES
Received notification from bedside RN about patient with regards to: Phos 2.2  VS: /93, HR 93, RR 13, O2 sat 97% on RA    Intervention given: NaPhos 20 mmol IV x 1 dose, Phos added to tomorrow AM's renal panel

## 2023-01-13 NOTE — PROGRESS NOTES
Problem: Mobility Impaired (Adult and Pediatric)  Goal: *Acute Goals and Plan of Care (Insert Text)  Description: FUNCTIONAL STATUS PRIOR TO ADMISSION: Patient was independent and active without use of DME.    HOME SUPPORT PRIOR TO ADMISSION: The patient lived with family but did not require assist.  Physical Therapy Goals  Revised 1/13/2023  1. Patient will move from supine to sit and sit to supine , scoot up and down, and roll side to side in bed with modified independence within 7 day(s). 2.  Patient will transfer from bed to chair and chair to bed with minimal assistance/contact guard assist using the least restrictive device within 7 day(s). 3.  Patient will perform sit to stand with supervision/set-up within 7 day(s). 4.  Patient will ambulate with minimal assistance/contact guard assist for 50 feet with the least restrictive device within 7 day(s). Physical Therapy Goals  Initiated 1/7/2023  1. Patient will move from supine to sit and sit to supine , scoot up and down, and roll side to side in bed with modified independence within 7 day(s). 2.  Patient will transfer from bed to chair and chair to bed with minimal assistance/contact guard assist using the least restrictive device within 7 day(s). 3.  Patient will perform sit to stand with minimal assistance/contact guard assist within 7 day(s). 4.  Patient will ambulate with minimal assistance/contact guard assist for 25 feet with the least restrictive device within 7 day(s). 1/13/2023 1316 by Chiki Cook, PT  Outcome: Progressing Towards Goal   PHYSICAL THERAPY TREATMENT: WEEKLY REASSESSMENT  Patient: Tasha Garrett (69 y.o. male)  Date: 1/13/2023  Primary Diagnosis: Alcoholic ketoacidosis [J48.37]  Pulmonary embolism (HCC) [I26.99]  Procedure(s) (LRB):  LAPAROTOMY EXPLORATORY (N/A) 11 Days Post-Op   Precautions:   Fall      ASSESSMENT  Patient continues with skilled PT services and is progressing towards goals.  L LE strength continues to improve with increased use of accessory muscles. Patient requires min A to advance L LE for bed mobility. He is able to stand with CGA of 2 and  standing balance has also improved improved. Patient able to stand with support of RW with heavy use of arms but demonstrates good static balance overall. Dynamic balance is fair. Patient demonstrating improve ability to advance L LE is all planes of motion with increased use of abductors. Patient able to ambulate 5 feet then 3 feet. HR increasing to 140s with activity. Patient is an excellent inpatient rehab candidate following discharge to improve overall function and regain functional independence. Patient's progression toward goals since last assessment: good progress- goals updated    Current Level of Function Impacting Discharge (mobility/balance): CGA/min A of 1-2             PLAN :  Goals have been updated based on progression since last assessment. Patient continues to benefit from skilled intervention to address the above impairments. Recommendations and Planned Interventions: bed mobility training, transfer training, gait training, therapeutic exercises, neuromuscular re-education, patient and family training/education, and therapeutic activities      Frequency/Duration: Patient will be followed by physical therapy:  4 times a week to address goals. Recommendation for discharge: (in order for the patient to meet his/her long term goals)  Therapy 3 hours per day 5-7 days per week    This discharge recommendation:  Has been made in collaboration with the attending provider and/or case management    IF patient discharges home will need the following DME: to be determined (TBD)         SUBJECTIVE:   Patient stated I'm ready to get up! Loan Rodriguez    OBJECTIVE DATA SUMMARY:   HISTORY:    Past Medical History:   Diagnosis Date    Hypercholesterolemia     Hypertension      Past Surgical History:   Procedure Laterality Date    HX ORTHOPAEDIC      rotator cuff repair    IR INSERT NON TUNL CVC OVER 5 YRS  1/6/2023    IR OCCL TXCATH HEMMORAGE W SI  1/2/2023    IR PLC IVC FILTER  1/12/2023       Home Situation  Home Environment: Private residence  # Steps to Enter: 3  Rails to Enter: Yes  Hand Rails : Bilateral  One/Two Story Residence: One story  Living Alone: No  Support Systems: Child(heron), Friend/Neighbor (Pt's son and friend are his support system)  Patient Expects to be Discharged to[de-identified] Rehab Unit Subacute  Current DME Used/Available at Home: None  Tub or Shower Type: Tub/Shower combination    EXAMINATION/PRESENTATION/DECISION MAKING:   Critical Behavior:  Neurologic State: Alert  Orientation Level: Oriented X4  Cognition: Follows commands  Safety/Judgement: Decreased insight into deficits  Hearing:   Auditory  Auditory Impairment: None    Range Of Motion:  AROM: Generally decreased, functional (except L hip and knee are grossly decreased)                       Strength:    Strength: Generally decreased, functional (except L hip and knee wiht grossly2/5 strength)                    Tone & Sensation:   Tone: Normal                              Coordination:  Coordination: Within functional limits  Vision:      Functional Mobility:  Bed Mobility:     Supine to Sit: Minimum assistance (for LLE and CGA for trunk)     Scooting: Stand-by assistance  Transfers:  Sit to Stand: Contact guard assistance;Assist x2  Stand to Sit: Contact guard assistance;Assist x2                       Balance:   Sitting: Intact  Standing: Impaired  Standing - Static: Good;Constant support  Standing - Dynamic : Fair;Constant support  Ambulation/Gait Training:  Distance (ft):  (5 feet then 8 feet)  Assistive Device: Walker, rolling;Gait belt  Ambulation - Level of Assistance: Minimal assistance;Contact guard assistance;Assist x2 (minimla facilitation of L quad)        Gait Abnormalities: Decreased step clearance;Circumduction (decreased graded control of LLE)        Base of Support: Widened Speed/Melanie: Pace decreased (<100 feet/min); Slow  Step Length: Left shortened;Right shortened (fluctuating)            Activity Tolerance:   requires rest breaks and HR to 140s with activity    After treatment patient left in no apparent distress:   Sitting in chair, Call bell within reach, and Bed / chair alarm activated    COMMUNICATION/EDUCATION:   The patients plan of care was discussed with: Occupational therapist and Registered nurse. Fall prevention education was provided and the patient/caregiver indicated understanding., Patient/family have participated as able in goal setting and plan of care. , and Patient/family agree to work toward stated goals and plan of care.     Thank you for this referral.  Adrianne Parker, PT   Time Calculation: 38 mins

## 2023-01-13 NOTE — PROGRESS NOTES
0700: Bedside shift change report given to Calixto Ochoa (oncoming nurse) by [employer] RN (offgoing nurse). Report included the following information SBAR, Kardex, ED Summary, Intake/Output, MAR, Recent Results, Cardiac Rhythm NSR/tach, and Alarm Parameters . 0800: AM assessment complete, see flowsheet for details. Pt is in bed, alert and oriented x4. S1S2 heart sounds, in NSR/tach, slightly hypertensive. Lung sounds clear on room air. Bowel sounds active, very mildly distended, non tender/no nausea, has NGT in R nare that is clamped. Pulses palpable in all extremities, no edema. 1200: reassessment complete, no significant changes to systems. 1500: NGT and robert catheter removed. 1600: reassessment complete, no significant changes to systems. 1630: TRANSFER - OUT REPORT:    Verbal report given to Cynthia Oshea (name) on Arielle Whitmanoln  being transferred to [address] [place] [address] oncology (unit) for routine progression of care       Report consisted of patients Situation, Background, Assessment and   Recommendations(SBAR). Information from the following report(s) SBAR, Kardex, ED Summary, Intake/Output, MAR, Recent Results, Cardiac Rhythm NSR/tach, and Alarm Parameters  was reviewed with the receiving nurse. Lines:   Peripheral IV 01/08/23 Left;Upper Arm (Active)   Site Assessment Clean, dry, & intact 01/13/23 1200   Phlebitis Assessment 0 01/13/23 1200   Infiltration Assessment 0 01/13/23 1200   Dressing Status Intact 01/13/23 1200   Dressing Type Tape;Transparent 01/13/23 1200   Hub Color/Line Status Green; Infusing 01/13/23 1200   Action Taken Open ports on tubing capped 01/13/23 1200   Alcohol Cap Used Yes 01/13/23 1200       Peripheral IV 01/12/23 Right Antecubital (Active)   Site Assessment Clean, dry, & intact 01/13/23 1200   Phlebitis Assessment 0 01/13/23 1200   Infiltration Assessment 0 01/13/23 1200   Dressing Status Clean, dry, & intact 01/13/23 1200   Dressing Type Transparent;Tape 01/13/23 1200   Hub Color/Line Status Pink;Capped 01/13/23 1200   Action Taken Open ports on tubing capped 01/13/23 1200   Alcohol Cap Used Yes 01/13/23 1200        Opportunity for questions and clarification was provided.       Patient transported with:   Monitor  Registered Nurse

## 2023-01-13 NOTE — PROGRESS NOTES
Comprehensive Nutrition Assessment    Type and Reason for Visit: Reassess    Nutrition Recommendations/Plan:   Continue diet advancement as tolerated  Recommend letting PPN run out and do not reorder  RD to add PO supplements as diet advances  Please document % meals and supplements consumed in flowsheet I/O's under intake      Malnutrition Assessment:  Malnutrition Status:  No malnutrition (01/03/23 1324)        Nutrition Assessment:  Chart reviewed, case discussed during CCU rounds. Plans for NGT removal and diet advancement today. Pt working with therapy at time of attempted visit. Appetite appears to be poor since advancement yesterday. RN reports to me he is okayed for advancement to full liquids this afternoon. Will add PO supplements to encourage kcal and protein intake. BM noted today. Patient Vitals for the past 168 hrs:   % Diet Eaten   01/13/23 1200 0%   01/07/23 0800 0%          Nutrition Related Findings:    Meds: humalog, NaPhos. BM 1/13 Wound Type: None    Current Nutrition Intake & Therapies:  Average Meal Intake: NPO   Clear Liquid diet  Current Parenteral Nutrition Orders:  Type and Formula: D10, 4.25% AA   Lipids: 250ml, Three times weekly  Duration: Continuous  Rate/Volume: 42ml/h  Goal PN Orders Provides: 0    Anthropometric Measures:  Height: 5' 9\" (175.3 cm)  Ideal Body Weight (IBW): 160 lbs (73 kg)     Current Body Wt:  72.6 kg (160 lb 0.9 oz), 137.5 % IBW. Bed scale  Current BMI (kg/m2): 23.6  Usual Body Weight: 84.4 kg (186 lb)  % Weight Change (Calculated): 18.3                    BMI Category: Normal weight (BMI 18.5-24. 9)    Estimated Daily Nutrient Needs:  Energy Requirements Based On: Formula  Weight Used for Energy Requirements: Current  Energy (kcal/day): MSJ 2150 (1784 x 1.2)  Weight Used for Protein Requirements: Current  Protein (g/day): 92-106g (1.3-1.5gPro/kg)  Method Used for Fluid Requirements: Standard renal  Fluid (ml/day): per MD    Nutrition Diagnosis: Inadequate protein-energy intake related to altered GI function, impaired nutrient utilization as evidenced by NPO or clear liquid status due to medical condition  Previous dx continues. Nutrition Interventions:   Food and/or Nutrient Delivery: Modify current diet, Start oral nutrition supplement  Nutrition Education/Counseling: No recommendations at this time  Coordination of Nutrition Care: Continue to monitor while inpatient, Interdisciplinary rounds       Goals:  Previous Goal Met: Goal(s) achieved  Goals: PO intake 75% or greater, by next RD assessment       Nutrition Monitoring and Evaluation:   Behavioral-Environmental Outcomes: None identified  Food/Nutrient Intake Outcomes: Food and nutrient intake, Diet advancement/tolerance, Supplement intake  Physical Signs/Symptoms Outcomes: Biochemical data, Nutrition focused physical findings, Skin, Weight, Fluid status or edema, Hemodynamic status    Discharge Planning:     Too soon to determine    Colleen Tineo RD, CNSC  Contact: ext [phone]

## 2023-01-13 NOTE — PROGRESS NOTES
Nephrology Progress Note  [facility] / [address], [place], [address]  Phone - [phone]  Fax - [phone]                 Patient: Raymon Coulter                   YOB: 1960        Date- 1/13/2023                      Admit Date: 12/25/2022  CC: Follow up for GILMAR  IMPRESSION & PLAN:   GILMAR (secondary to ischemic ATN from hemorrhagic shock): last hd 1-10-23  hypokalemia  Hypophosphatemia:  Hyperkalemia: resolved  Anion gap metabolic acidosis: better  Lactic acidosis  Acute blood loss anemia  Acute RP hematoma status post embolization of lumbar artery  Massive PE   Hemorrhagic/cardiogenic shock      PLAN-  NO HD INDICATED  KPHOS IV  remove robert cath today  Epogen for anemia  Follow bmp     Subjective: Interval History:   1-13-23--phos low - k improved-- cr improving  1-12-23----cr improving-- k and phos low-- making more urine-- NG tube in place  1/11/23--s/p hd yesterday-- he is oliguric-- k low  1-10-23-- urine out put low- bun increased- cr 3.2 -- on hd at present  1-9-23---cr 3.8 with k 3.0-low--last hd on Friday. 1 7 23: HD yest, 1.5 lit off, cr better. Await recovery  1-6-23--bp high-- no sob-- cr increased  1-5-23----patient pulled his femoral line and gabriel overnight- he is not happy with having multiple lines. .. he is anuric-- seen on CRRT today  1-4-23----patient is off CRRT- he is going for CT ABDO-- --k 4.1 --he is anuric    Objective:   Vitals:    01/13/23 0609 01/13/23 0610 01/13/23 0651 01/13/23 0800   BP: (!) 177/108 (!) 174/106 (!) 161/96 (!) 161/79   Pulse: 99 99 96 99   Resp: 19 14 14 16   Temp:    99 °F (37.2 °C)   TempSrc:       SpO2: 100% 100% 98% 97%   Weight:       Height:          01/12 0701 - 01/13 0700  In: 2502.6 [P.O.:100;  I.V.:2402.6]  Out: 560 [Urine:560]    Last 3 Recorded Weights in this Encounter    01/08/23 0000 01/11/23 2028 01/12/23 2032   Weight: 70.7 kg (155 lb 13.8 oz) 71.8 kg (158 lb 4.6 oz) 72.6 kg (160 lb 0.9 oz)        Physical exam:    GEN:  nad  NECK:  NG TUBE +  RESP: clear b/l, no  wheezing,   CVS: RRR,S1,S2   NEURO: non focal, normal speech  HD access: Right IJ Timo    Chart reviewed. Pertinent Notes reviewed. Data Review :  Recent Labs     01/13/23 0343 01/12/23 0418 01/11/23 0436   * 136  137 138   K 3.9 3.4*  3.4* 3.2*    102  101 100   CO2 25 28  29 32   BUN 39* 43*  43* 30*   CREA 1.62* 1.75*  1.75* 1.88*   GLU 98 104*  105* 91   CA 9.0 9.0  8.9 9.0   MG  --   --  1.9   PHOS 2.2* 1.8*  1.8* 1.2*       Recent Labs     01/13/23 0343 01/12/23 0418 01/11/23 0436   WBC 11.7* 10.5 9.4   HGB 8.8* 8.9* 8.8*   HCT 27.8* 27.4* 27.8*   * 417* 380       No results for input(s): FE, TIBC, PSAT, FERR in the last 72 hours.    No results found for: HBA1C, LRO4ELFL, QHV1MMWF   No results found for: MCACR, MCA1, MCA2, MCA3, MCAU, MCAU2, MCALPOCT  US Results (most recent):  Medication list  reviewed  Current Facility-Administered Medications   Medication Dose Route Frequency    TPN ADULT - PERIPHERAL   IntraVENous CONTINUOUS    loperamide (IMODIUM) 1 mg/7.5 mL oral solution 2 mg  2 mg Oral QID PRN    fat emulsion 20% (LIPOSYN, INTRAlipid) infusion 250 mL  250 mL IntraVENous Q MON, WED & FRI    hydrALAZINE (APRESOLINE) 20 mg/mL injection 10 mg  10 mg IntraVENous Q6H PRN    diazePAM (VALIUM) injection 5 mg  5 mg IntraVENous Q6H PRN    midodrine (PROAMATINE) tablet 10 mg  10 mg Oral TID PRN    insulin lispro (HUMALOG) injection   SubCUTAneous Q6H    glucose chewable tablet 16 g  4 Tablet Oral PRN    dextrose (D50W) injection syrg 12.5-25 g  12.5-25 g IntraVENous PRN    glucagon (GLUCAGEN) injection 1 mg  1 mg IntraMUSCular PRN    albumin, human-kjda 25% (ALBUMINEX) intravenous solution 25 g  25 g IntraVENous DIALYSIS PRN    epoetin ji-epbx (RETACRIT) injection 10,000 Units  10,000 Units SubCUTAneous Q TUE, THU & SAT simethicone (MYLICON) tablet 80 mg  80 mg Oral QID PRN    alcohol 62% (NOZIN) nasal  1 Ampule  1 Ampule Topical Q12H    [Held by provider] folic acid (FOLVITE) tablet 1 mg  1 mg Oral DAILY    [Held by provider] thiamine mononitrate (B-1) tablet 100 mg  100 mg Oral DAILY    [Held by provider] multivitamin, tx-iron-ca-min (THERA-M w/ IRON) tablet 1 Tablet  1 Tablet Oral DAILY    [Held by provider] magnesium oxide (MAG-OX) tablet 400 mg  400 mg Oral DAILY    sodium chloride (NS) flush 5-40 mL  5-40 mL IntraVENous Q8H    sodium chloride (NS) flush 5-40 mL  5-40 mL IntraVENous PRN    acetaminophen (TYLENOL) tablet 650 mg  650 mg Oral Q6H PRN    Or    acetaminophen (TYLENOL) suppository 650 mg  650 mg Rectal Q6H PRN    polyethylene glycol (MIRALAX) packet 17 g  17 g Oral DAILY PRN    ondansetron (ZOFRAN ODT) tablet 4 mg  4 mg Oral Q8H PRN    Or    ondansetron (ZOFRAN) injection 4 mg  4 mg IntraVENous Q6H PRN        Loren Montgomery MD  1/13/2023

## 2023-01-14 LAB
ALBUMIN SERPL-MCNC: 2.3 G/DL (ref 3.5–5)
ANION GAP SERPL CALC-SCNC: 8 MMOL/L (ref 5–15)
BASOPHILS # BLD: 0 K/UL (ref 0–0.1)
BASOPHILS NFR BLD: 0 % (ref 0–1)
BUN SERPL-MCNC: 31 MG/DL (ref 6–20)
BUN/CREAT SERPL: 23 (ref 12–20)
CALCIUM SERPL-MCNC: 8.3 MG/DL (ref 8.5–10.1)
CHLORIDE SERPL-SCNC: 107 MMOL/L (ref 97–108)
CO2 SERPL-SCNC: 23 MMOL/L (ref 21–32)
CREAT SERPL-MCNC: 1.32 MG/DL (ref 0.7–1.3)
DIFFERENTIAL METHOD BLD: ABNORMAL
EOSINOPHIL # BLD: 0.1 K/UL (ref 0–0.4)
EOSINOPHIL NFR BLD: 1 % (ref 0–7)
ERYTHROCYTE [DISTWIDTH] IN BLOOD BY AUTOMATED COUNT: 15.9 % (ref 11.5–14.5)
GLUCOSE BLD STRIP.AUTO-MCNC: 79 MG/DL (ref 65–117)
GLUCOSE BLD STRIP.AUTO-MCNC: 86 MG/DL (ref 65–117)
GLUCOSE BLD STRIP.AUTO-MCNC: 87 MG/DL (ref 65–117)
GLUCOSE BLD STRIP.AUTO-MCNC: 88 MG/DL (ref 65–117)
GLUCOSE SERPL-MCNC: 87 MG/DL (ref 65–100)
HCT VFR BLD AUTO: 25.2 % (ref 36.6–50.3)
HGB BLD-MCNC: 8.1 G/DL (ref 12.1–17)
IMM GRANULOCYTES # BLD AUTO: 0.1 K/UL (ref 0–0.04)
IMM GRANULOCYTES NFR BLD AUTO: 1 % (ref 0–0.5)
INR PPP: 1.1 (ref 0.9–1.1)
LYMPHOCYTES # BLD: 1.3 K/UL (ref 0.8–3.5)
LYMPHOCYTES NFR BLD: 11 % (ref 12–49)
MAGNESIUM SERPL-MCNC: 1.2 MG/DL (ref 1.6–2.4)
MCH RBC QN AUTO: 31.9 PG (ref 26–34)
MCHC RBC AUTO-ENTMCNC: 32.1 G/DL (ref 30–36.5)
MCV RBC AUTO: 99.2 FL (ref 80–99)
MONOCYTES # BLD: 1.3 K/UL (ref 0–1)
MONOCYTES NFR BLD: 11 % (ref 5–13)
NEUTS SEG # BLD: 9.4 K/UL (ref 1.8–8)
NEUTS SEG NFR BLD: 76 % (ref 32–75)
NRBC # BLD: 0 K/UL (ref 0–0.01)
NRBC BLD-RTO: 0 PER 100 WBC
PHOSPHATE SERPL-MCNC: 3.7 MG/DL (ref 2.6–4.7)
PLATELET # BLD AUTO: 409 K/UL (ref 150–400)
PMV BLD AUTO: 10.2 FL (ref 8.9–12.9)
POTASSIUM SERPL-SCNC: 3.5 MMOL/L (ref 3.5–5.1)
PROTHROMBIN TIME: 11.3 SEC (ref 9–11.1)
RBC # BLD AUTO: 2.54 M/UL (ref 4.1–5.7)
SERVICE CMNT-IMP: NORMAL
SODIUM SERPL-SCNC: 138 MMOL/L (ref 136–145)
WBC # BLD AUTO: 12.3 K/UL (ref 4.1–11.1)

## 2023-01-14 PROCEDURE — 36415 COLL VENOUS BLD VENIPUNCTURE: CPT

## 2023-01-14 PROCEDURE — 85610 PROTHROMBIN TIME: CPT

## 2023-01-14 PROCEDURE — 74011250637 HC RX REV CODE- 250/637: Performed by: INTERNAL MEDICINE

## 2023-01-14 PROCEDURE — 74011000250 HC RX REV CODE- 250: Performed by: STUDENT IN AN ORGANIZED HEALTH CARE EDUCATION/TRAINING PROGRAM

## 2023-01-14 PROCEDURE — 85025 COMPLETE CBC W/AUTO DIFF WBC: CPT

## 2023-01-14 PROCEDURE — 82962 GLUCOSE BLOOD TEST: CPT

## 2023-01-14 PROCEDURE — 80069 RENAL FUNCTION PANEL: CPT

## 2023-01-14 PROCEDURE — 74011250636 HC RX REV CODE- 250/636: Performed by: INTERNAL MEDICINE

## 2023-01-14 PROCEDURE — 65270000046 HC RM TELEMETRY

## 2023-01-14 PROCEDURE — 83735 ASSAY OF MAGNESIUM: CPT

## 2023-01-14 RX ADMIN — Medication 1 AMPULE: at 23:12

## 2023-01-14 RX ADMIN — EPOETIN ALFA-EPBX 10000 UNITS: 10000 INJECTION, SOLUTION INTRAVENOUS; SUBCUTANEOUS at 23:13

## 2023-01-14 RX ADMIN — SODIUM CHLORIDE, PRESERVATIVE FREE 10 ML: 5 INJECTION INTRAVENOUS at 07:06

## 2023-01-14 RX ADMIN — SODIUM CHLORIDE, PRESERVATIVE FREE 5 ML: 5 INJECTION INTRAVENOUS at 23:12

## 2023-01-14 RX ADMIN — Medication 1 AMPULE: at 08:37

## 2023-01-14 RX ADMIN — SODIUM CHLORIDE, PRESERVATIVE FREE 10 ML: 5 INJECTION INTRAVENOUS at 14:00

## 2023-01-14 NOTE — PROGRESS NOTES
Hospitalist Progress Note    NAME: Beverley Gifford   :  1960   MRN:  [id number]            Subjective:     Patient was seen and examined. No acute events overnight. Discussed with RN overnight events. All patient's questions were answered. \" Doing very well\"    Review of Systems:  Symptom Y/N Comments  Symptom Y/N Comments   Fever/Chills n   Chest Pain n    Poor Appetite    Edema     Cough n   Abdominal Pain n    Sputum    Joint Pain     SOB/PAINTING n   Pruritis/Rash     Nausea/vomit n   Tolerating PT/OT     Diarrhea    Tolerating Diet y    Constipation    Other       Could NOT obtain due to:          Objective:     VITALS:   Last 24hrs VS reviewed since prior progress note. Most recent are:  Patient Vitals for the past 24 hrs:   Temp Pulse Resp BP SpO2   23 0837 99.1 °F (37.3 °C) 95 19 122/86 99 %   23 2018 98.4 °F (36.9 °C) (!) 102 18 (!) 153/94 97 %   23 1824 -- (!) 104 -- (!) 178/103 --   23 1652 98.2 °F (36.8 °C) (!) 115 18 (!) 145/105 100 %   23 1600 98 °F (36.7 °C) (!) 107 13 (!) 164/102 98 %   23 1400 -- (!) 117 20 (!) 146/89 96 %         Intake/Output Summary (Last 24 hours) at 2023 1227  Last data filed at 2023 0858  Gross per 24 hour   Intake 218 ml   Output 1300 ml   Net -1082 ml          PHYSICAL EXAM:  General: WD, WN. Alert, cooperative, no acute distress    EENT:  EOMI. Anicteric sclerae. MMM  Resp:  CTA bilaterally, no wheezing or rales. No accessory muscle use  CV:  Regular  rhythm,  No edema  GI:  Soft, Non distended, Non tender. +Bowel sounds. NGT and TPN present. Neurologic:  EOMs intact. No facial asymmetry. No aphasia or slurred speech. Symmetrical strength, Sensation grossly intact. Alert and oriented X 4.     Psych:   Good insight. Not anxious nor agitated  Skin:  No rashes.   No jaundice    Procedures: see electronic medical records for all procedures/Xrays and details which were not copied into this note but were reviewed prior to creation of Plan. LABS:  I reviewed today's most current labs and imaging studies. Pertinent labs include:  Recent Labs     01/14/23 0456 01/13/23 0343 01/12/23 0418   WBC 12.3* 11.7* 10.5   HGB 8.1* 8.8* 8.9*   HCT 25.2* 27.8* 27.4*   * 413* 417*       Recent Labs     01/14/23 0456 01/13/23 0343 01/12/23 0418    134* 136  137   K 3.5 3.9 3.4*  3.4*    102 102  101   CO2 23 25 28  29   GLU 87 98 104*  105*   BUN 31* 39* 43*  43*   CREA 1.32* 1.62* 1.75*  1.75*   CA 8.3* 9.0 9.0  8.9   MG 1.2*  --   --    PHOS 3.7 2.2* 1.8*  1.8*   ALB 2.3* 2.5* 2.4*   TBILI  --  1.2*  --    ALT  --  38  --    INR 1.1 1.1 1.0         Signed: Tatianna Chambers MD        Reviewed most current lab test results and cultures  YES  Reviewed most current radiology test results   YES  Review and summation of old records today    NO  Reviewed patient's current orders and MAR    YES  PMH/SH reviewed - no change compared to H&P      Assessment / Plan:  Left retroperitoneal hemorrhage  L2 plexopathy with LLE weakness and sensory loss  - Spontaneous retroperitoneal bleed now status post L2 artery bleed embolization.  - S/p 5U RBC and 2U FFP this admission.  - s/p embolization with coils  Plan  Daily labs  PT/OT plans for inpatient rehab  Surgery following, input is appreciated     Ileus  - retroperitoneal bleed mass effect seen on CT abdomen/pelvis    Plan  Cont advancing diet  Removed NG tube on January 13  Monitor output  Surgery input is appreciated  Daily labs  Continue to have regular bowel movements    Massive PE  - S/p treatment with lovenox. Anticoagulation discontinued after spontaneous retroperitoneal bleed with hemorrhagic shock.     Plan  SCDs for now-likely will not be able to tolerate long term anticoagulation  Currently on room air with no respiratory distress  S/p IVC filter 1/12     GILMAR/ATN  Hypokalemia  -  d/t hemorrhagic shock    Plan  HD per nephrology  To remove HD cath access as per nephro recommendations      Shock liver  - d/t hemorrhagic shock    Plan  Continue to monitor enzymes trending down, almost normal now      Hx of alcohol abuse  Plan  Valium prn for CIWA scale. 18.5 - 24.9 Normal weight / Body mass index is 23.64 kg/m². Code status: Full  Prophylaxis: SCD's  Recommended Disposition: SAH/Rehab  FAROOQ: 1/15  Barriers: Hopefully discharge in 24 hours once rehab is available     ________________________________________________________________________  Care Plan discussed with:    Comments   Patient x    Family      RN x    Care Manager     Consultant                        Multidiciplinary team rounds were held today with , nursing, pharmacist and clinical coordinator. Patient's plan of care was discussed; medications were reviewed and discharge planning was addressed.      ________________________________________________________________________        Comments   >50% of visit spent in counseling and coordination of care     ________________________________________________________________________  Ann Brennan MD

## 2023-01-15 LAB
ALBUMIN SERPL-MCNC: 2.5 G/DL (ref 3.5–5)
ANION GAP SERPL CALC-SCNC: 8 MMOL/L (ref 5–15)
BASOPHILS # BLD: 0 K/UL (ref 0–0.1)
BASOPHILS NFR BLD: 0 % (ref 0–1)
BUN SERPL-MCNC: 21 MG/DL (ref 6–20)
BUN/CREAT SERPL: 17 (ref 12–20)
CALCIUM SERPL-MCNC: 8.1 MG/DL (ref 8.5–10.1)
CHLORIDE SERPL-SCNC: 106 MMOL/L (ref 97–108)
CO2 SERPL-SCNC: 24 MMOL/L (ref 21–32)
CREAT SERPL-MCNC: 1.22 MG/DL (ref 0.7–1.3)
DIFFERENTIAL METHOD BLD: ABNORMAL
EOSINOPHIL # BLD: 0.1 K/UL (ref 0–0.4)
EOSINOPHIL NFR BLD: 1 % (ref 0–7)
ERYTHROCYTE [DISTWIDTH] IN BLOOD BY AUTOMATED COUNT: 15.7 % (ref 11.5–14.5)
GLUCOSE BLD STRIP.AUTO-MCNC: 67 MG/DL (ref 65–117)
GLUCOSE BLD STRIP.AUTO-MCNC: 74 MG/DL (ref 65–117)
GLUCOSE BLD STRIP.AUTO-MCNC: 78 MG/DL (ref 65–117)
GLUCOSE BLD STRIP.AUTO-MCNC: 78 MG/DL (ref 65–117)
GLUCOSE BLD STRIP.AUTO-MCNC: 93 MG/DL (ref 65–117)
GLUCOSE SERPL-MCNC: 86 MG/DL (ref 65–100)
HCT VFR BLD AUTO: 29 % (ref 36.6–50.3)
HGB BLD-MCNC: 9.5 G/DL (ref 12.1–17)
IMM GRANULOCYTES # BLD AUTO: 0 K/UL (ref 0–0.04)
IMM GRANULOCYTES NFR BLD AUTO: 0 % (ref 0–0.5)
INR PPP: 1.1 (ref 0.9–1.1)
LYMPHOCYTES # BLD: 1.3 K/UL (ref 0.8–3.5)
LYMPHOCYTES NFR BLD: 15 % (ref 12–49)
MCH RBC QN AUTO: 31.8 PG (ref 26–34)
MCHC RBC AUTO-ENTMCNC: 32.8 G/DL (ref 30–36.5)
MCV RBC AUTO: 97 FL (ref 80–99)
MONOCYTES # BLD: 0.9 K/UL (ref 0–1)
MONOCYTES NFR BLD: 10 % (ref 5–13)
NEUTS SEG # BLD: 6.6 K/UL (ref 1.8–8)
NEUTS SEG NFR BLD: 74 % (ref 32–75)
NRBC # BLD: 0 K/UL (ref 0–0.01)
NRBC BLD-RTO: 0 PER 100 WBC
PHOSPHATE SERPL-MCNC: 3.7 MG/DL (ref 2.6–4.7)
PLATELET # BLD AUTO: 469 K/UL (ref 150–400)
PMV BLD AUTO: 9.9 FL (ref 8.9–12.9)
POTASSIUM SERPL-SCNC: 3.6 MMOL/L (ref 3.5–5.1)
PROTHROMBIN TIME: 11.5 SEC (ref 9–11.1)
RBC # BLD AUTO: 2.99 M/UL (ref 4.1–5.7)
SERVICE CMNT-IMP: NORMAL
SODIUM SERPL-SCNC: 138 MMOL/L (ref 136–145)
WBC # BLD AUTO: 8.9 K/UL (ref 4.1–11.1)

## 2023-01-15 PROCEDURE — 85025 COMPLETE CBC W/AUTO DIFF WBC: CPT

## 2023-01-15 PROCEDURE — 74011000250 HC RX REV CODE- 250: Performed by: STUDENT IN AN ORGANIZED HEALTH CARE EDUCATION/TRAINING PROGRAM

## 2023-01-15 PROCEDURE — 65270000046 HC RM TELEMETRY

## 2023-01-15 PROCEDURE — 80069 RENAL FUNCTION PANEL: CPT

## 2023-01-15 PROCEDURE — 36415 COLL VENOUS BLD VENIPUNCTURE: CPT

## 2023-01-15 PROCEDURE — 82962 GLUCOSE BLOOD TEST: CPT

## 2023-01-15 PROCEDURE — 85610 PROTHROMBIN TIME: CPT

## 2023-01-15 PROCEDURE — 74011250637 HC RX REV CODE- 250/637: Performed by: INTERNAL MEDICINE

## 2023-01-15 PROCEDURE — 74011250637 HC RX REV CODE- 250/637: Performed by: STUDENT IN AN ORGANIZED HEALTH CARE EDUCATION/TRAINING PROGRAM

## 2023-01-15 RX ADMIN — FOLIC ACID 1 MG: 1 TABLET ORAL at 10:54

## 2023-01-15 RX ADMIN — MULTIPLE VITAMINS W/ MINERALS TAB 1 TABLET: TAB at 10:54

## 2023-01-15 RX ADMIN — SODIUM CHLORIDE, PRESERVATIVE FREE 5 ML: 5 INJECTION INTRAVENOUS at 22:39

## 2023-01-15 RX ADMIN — Medication 1 AMPULE: at 11:09

## 2023-01-15 RX ADMIN — SODIUM CHLORIDE, PRESERVATIVE FREE 5 ML: 5 INJECTION INTRAVENOUS at 06:00

## 2023-01-15 RX ADMIN — Medication 1 AMPULE: at 22:38

## 2023-01-15 RX ADMIN — THIAMINE HCL TAB 100 MG 100 MG: 100 TAB at 10:54

## 2023-01-15 NOTE — PROGRESS NOTES
End of Shift Note    Bedside shift change report given to Sterling Lobato RN (oncoming nurse) by Reji Webb RN (offgoing nurse). Report included the following information SBAR, Kardex, Intake/Output, and MAR    Shift worked:  7P-7A     Shift summary and any significant changes:     Midnight Humalog was held, see MAR. Scheduled medications were given, see MAR. 1 BM during the shift. Morning labs are not drawn. Three attempts were done by two nurses unsuccessfully. Hygiene care was provided. Frequent rounding has been done. Patient blood sugar in the morning was 67. Orange juice was given to the patient, and blood sugar was rechecked in a 15 minutes. The result was 78.      Concerns for physician to address:       Zone phone for oncoming shift:            Reji Webb RN

## 2023-01-15 NOTE — PROGRESS NOTES
Hospitalist Progress Note    NAME: Talha Hua   :  1960   MRN:  [id number]            Subjective:     Patient was seen and examined. No acute events overnight. Discussed with RN overnight events. All patient's questions were answered. \"Feeling fine but I don't like food\"    Review of Systems:  Symptom Y/N Comments  Symptom Y/N Comments   Fever/Chills n   Chest Pain n    Poor Appetite    Edema     Cough n   Abdominal Pain n    Sputum    Joint Pain     SOB/PAINTING n   Pruritis/Rash     Nausea/vomit n   Tolerating PT/OT     Diarrhea    Tolerating Diet y    Constipation    Other       Could NOT obtain due to:          Objective:     VITALS:   Last 24hrs VS reviewed since prior progress note. Most recent are:  Patient Vitals for the past 24 hrs:   Temp Pulse Resp BP SpO2   01/15/23 0801 99.1 °F (37.3 °C) 92 16 (!) 142/94 98 %   23 98.6 °F (37 °C) 94 18 (!) 147/97 94 %         Intake/Output Summary (Last 24 hours) at 1/15/2023 1353  Last data filed at 2023 1700  Gross per 24 hour   Intake --   Output 300 ml   Net -300 ml          PHYSICAL EXAM:  General: WD, WN. Alert, cooperative, no acute distress    EENT:  EOMI. Anicteric sclerae. MMM  Resp:  CTA bilaterally, no wheezing or rales. No accessory muscle use  CV:  Regular  rhythm,  No edema  GI:  Soft, Non distended, Non tender. +Bowel sounds. NGT and TPN present. Neurologic:  EOMs intact. No facial asymmetry. No aphasia or slurred speech. Symmetrical strength, Sensation grossly intact. Alert and oriented X 4.     Psych:   Good insight. Not anxious nor agitated  Skin:  No rashes. No jaundice    Procedures: see electronic medical records for all procedures/Xrays and details which were not copied into this note but were reviewed prior to creation of Plan. LABS:  I reviewed today's most current labs and imaging studies.   Pertinent labs include:  Recent Labs     01/15/23  1100 23  0456 23  0343   WBC 8.9 12.3* 11.7* HGB 9.5* 8.1* 8.8*   HCT 29.0* 25.2* 27.8*   * 409* 413*       Recent Labs     01/15/23  1100 01/14/23  0456 01/13/23  0343    138 134*   K 3.6 3.5 3.9    107 102   CO2 24 23 25   GLU 86 87 98   BUN 21* 31* 39*   CREA 1.22 1.32* 1.62*   CA 8.1* 8.3* 9.0   MG  --  1.2*  --    PHOS 3.7 3.7 2.2*   ALB 2.5* 2.3* 2.5*   TBILI  --   --  1.2*   ALT  --   --  38   INR 1.1 1.1 1.1         Signed: Zbigniew Triplett MD        Reviewed most current lab test results and cultures  YES  Reviewed most current radiology test results   YES  Review and summation of old records today    NO  Reviewed patient's current orders and MAR    YES  PMH/SH reviewed - no change compared to H&P      Assessment / Plan:  Left retroperitoneal hemorrhage  L2 plexopathy with LLE weakness and sensory loss  - Spontaneous retroperitoneal bleed now status post L2 artery bleed embolization.  - S/p 5U RBC and 2U FFP this admission.  - s/p embolization with coils  Plan  Daily labs  PT/OT plans for inpatient rehab  Surgery following, input is appreciated     Ileus  - retroperitoneal bleed mass effect seen on CT abdomen/pelvis    Plan  Cont advancing diet  Removed NG tube on January 13  Monitor output  Surgery input is appreciated  Daily labs  Continue to have regular bowel movements    Massive PE  - S/p treatment with lovenox. Anticoagulation discontinued after spontaneous retroperitoneal bleed with hemorrhagic shock. Plan  SCDs for now-likely will not be able to tolerate long term anticoagulation  Currently on room air with no respiratory distress  S/p IVC filter 1/12     GLIMAR/ATN  Hypokalemia  -  d/t hemorrhagic shock    Plan  HD per nephrology  To remove HD cath access as per nephro recommendations      Shock liver  - d/t hemorrhagic shock    Plan  Continue to monitor enzymes trending down, almost normal now      Hx of alcohol abuse  Plan  Valium prn for CIWA scale. 18.5 - 24.9 Normal weight / Body mass index is 23.64 kg/m².     Code status: Full  Prophylaxis: SCD's  Recommended Disposition: SAH/Rehab  FAROOQ: 1/16  Barriers: Medically stable to be discharged once rehab is available     ________________________________________________________________________  Care Plan discussed with:    Comments   Patient x    Family      RN x    Care Manager     Consultant                        Multidiciplinary team rounds were held today with , nursing, pharmacist and clinical coordinator. Patient's plan of care was discussed; medications were reviewed and discharge planning was addressed.      ________________________________________________________________________        Comments   >50% of visit spent in counseling and coordination of care     ________________________________________________________________________  Leidy Peterson MD

## 2023-01-15 NOTE — PROGRESS NOTES
End of Shift Note    Bedside shift change report given to Joleen Ricardo RN(oncoming nurse) by Rikki Cobb RN (offgoing nurse). Report included the following information SBAR, Kardex, Intake/Output, MAR, and Recent Results    Shift worked:7a-7p  Night     Shift summary and any significant changes:     Pt waiting on IPR, [facility]? No complaints today. Pt voiding w/ urinal. Blood sugars normal. VSS. 2 losse BM's today.       Concerns for physician to address:    none   Zone phone for oncoming shift:            Jean Christiansen RN

## 2023-01-16 LAB
ALBUMIN SERPL-MCNC: 2.4 G/DL (ref 3.5–5)
ALBUMIN/GLOB SERPL: 0.5 (ref 1.1–2.2)
ALP SERPL-CCNC: 257 U/L (ref 45–117)
ALT SERPL-CCNC: 46 U/L (ref 12–78)
ANION GAP SERPL CALC-SCNC: 6 MMOL/L (ref 5–15)
AST SERPL-CCNC: 70 U/L (ref 15–37)
BASOPHILS # BLD: 0 K/UL (ref 0–0.1)
BASOPHILS NFR BLD: 0 % (ref 0–1)
BILIRUB DIRECT SERPL-MCNC: 0.3 MG/DL (ref 0–0.2)
BILIRUB SERPL-MCNC: 0.9 MG/DL (ref 0.2–1)
BUN SERPL-MCNC: 17 MG/DL (ref 6–20)
BUN/CREAT SERPL: 15 (ref 12–20)
CALCIUM SERPL-MCNC: 8.2 MG/DL (ref 8.5–10.1)
CHLORIDE SERPL-SCNC: 108 MMOL/L (ref 97–108)
CO2 SERPL-SCNC: 24 MMOL/L (ref 21–32)
CREAT SERPL-MCNC: 1.17 MG/DL (ref 0.7–1.3)
DIFFERENTIAL METHOD BLD: ABNORMAL
EOSINOPHIL # BLD: 0.1 K/UL (ref 0–0.4)
EOSINOPHIL NFR BLD: 1 % (ref 0–7)
ERYTHROCYTE [DISTWIDTH] IN BLOOD BY AUTOMATED COUNT: 15.6 % (ref 11.5–14.5)
GLOBULIN SER CALC-MCNC: 4.9 G/DL (ref 2–4)
GLUCOSE BLD STRIP.AUTO-MCNC: 101 MG/DL (ref 65–117)
GLUCOSE BLD STRIP.AUTO-MCNC: 80 MG/DL (ref 65–117)
GLUCOSE BLD STRIP.AUTO-MCNC: 84 MG/DL (ref 65–117)
GLUCOSE BLD STRIP.AUTO-MCNC: 88 MG/DL (ref 65–117)
GLUCOSE BLD STRIP.AUTO-MCNC: 96 MG/DL (ref 65–117)
GLUCOSE SERPL-MCNC: 87 MG/DL (ref 65–100)
HCT VFR BLD AUTO: 27.8 % (ref 36.6–50.3)
HGB BLD-MCNC: 8.9 G/DL (ref 12.1–17)
IMM GRANULOCYTES # BLD AUTO: 0 K/UL (ref 0–0.04)
IMM GRANULOCYTES NFR BLD AUTO: 0 % (ref 0–0.5)
INR PPP: 1.1 (ref 0.9–1.1)
LYMPHOCYTES # BLD: 1.6 K/UL (ref 0.8–3.5)
LYMPHOCYTES NFR BLD: 18 % (ref 12–49)
MCH RBC QN AUTO: 30.9 PG (ref 26–34)
MCHC RBC AUTO-ENTMCNC: 32 G/DL (ref 30–36.5)
MCV RBC AUTO: 96.5 FL (ref 80–99)
MONOCYTES # BLD: 1 K/UL (ref 0–1)
MONOCYTES NFR BLD: 11 % (ref 5–13)
NEUTS SEG # BLD: 6.2 K/UL (ref 1.8–8)
NEUTS SEG NFR BLD: 69 % (ref 32–75)
NRBC # BLD: 0 K/UL (ref 0–0.01)
NRBC BLD-RTO: 0 PER 100 WBC
PHOSPHATE SERPL-MCNC: 3.8 MG/DL (ref 2.6–4.7)
PLATELET # BLD AUTO: 502 K/UL (ref 150–400)
PMV BLD AUTO: 10.1 FL (ref 8.9–12.9)
POTASSIUM SERPL-SCNC: 4 MMOL/L (ref 3.5–5.1)
PROT SERPL-MCNC: 7.3 G/DL (ref 6.4–8.2)
PROTHROMBIN TIME: 11.8 SEC (ref 9–11.1)
RBC # BLD AUTO: 2.88 M/UL (ref 4.1–5.7)
SERVICE CMNT-IMP: NORMAL
SODIUM SERPL-SCNC: 138 MMOL/L (ref 136–145)
WBC # BLD AUTO: 8.9 K/UL (ref 4.1–11.1)

## 2023-01-16 PROCEDURE — 82962 GLUCOSE BLOOD TEST: CPT

## 2023-01-16 PROCEDURE — 80048 BASIC METABOLIC PNL TOTAL CA: CPT

## 2023-01-16 PROCEDURE — 97116 GAIT TRAINING THERAPY: CPT

## 2023-01-16 PROCEDURE — 65270000046 HC RM TELEMETRY

## 2023-01-16 PROCEDURE — 36415 COLL VENOUS BLD VENIPUNCTURE: CPT

## 2023-01-16 PROCEDURE — 74011250637 HC RX REV CODE- 250/637: Performed by: INTERNAL MEDICINE

## 2023-01-16 PROCEDURE — 85025 COMPLETE CBC W/AUTO DIFF WBC: CPT

## 2023-01-16 PROCEDURE — 80076 HEPATIC FUNCTION PANEL: CPT

## 2023-01-16 PROCEDURE — 74011000250 HC RX REV CODE- 250: Performed by: STUDENT IN AN ORGANIZED HEALTH CARE EDUCATION/TRAINING PROGRAM

## 2023-01-16 PROCEDURE — 94760 N-INVAS EAR/PLS OXIMETRY 1: CPT

## 2023-01-16 PROCEDURE — 84100 ASSAY OF PHOSPHORUS: CPT

## 2023-01-16 PROCEDURE — 74011250637 HC RX REV CODE- 250/637: Performed by: STUDENT IN AN ORGANIZED HEALTH CARE EDUCATION/TRAINING PROGRAM

## 2023-01-16 PROCEDURE — 85610 PROTHROMBIN TIME: CPT

## 2023-01-16 RX ADMIN — Medication 1 AMPULE: at 08:02

## 2023-01-16 RX ADMIN — THIAMINE HCL TAB 100 MG 100 MG: 100 TAB at 08:02

## 2023-01-16 RX ADMIN — SODIUM CHLORIDE, PRESERVATIVE FREE 10 ML: 5 INJECTION INTRAVENOUS at 22:22

## 2023-01-16 RX ADMIN — MULTIPLE VITAMINS W/ MINERALS TAB 1 TABLET: TAB at 08:02

## 2023-01-16 RX ADMIN — FOLIC ACID 1 MG: 1 TABLET ORAL at 08:02

## 2023-01-16 RX ADMIN — SODIUM CHLORIDE, PRESERVATIVE FREE 5 ML: 5 INJECTION INTRAVENOUS at 06:23

## 2023-01-16 RX ADMIN — SODIUM CHLORIDE, PRESERVATIVE FREE 10 ML: 5 INJECTION INTRAVENOUS at 13:40

## 2023-01-16 RX ADMIN — Medication 1 AMPULE: at 22:21

## 2023-01-16 NOTE — PROGRESS NOTES
.End of Shift Note    Bedside shift change report given to Skylar HAMM (oncoming nurse) by Nadir Ricardo RN (offgoing nurse). Report included the following information SBAR, Kardex, and MAR    Shift worked: 7a-7p     Shift summary and any significant changes:    Patient tolerating care fairly well. Medications given per [facility] and education provided. No complaints of pain made throughout shift. Pt worked with PT/OT and has been up in the chair most of the afternoon. Hourly rounding completed and pt is resting quietly.           Nadir Ricardo RN

## 2023-01-16 NOTE — PROGRESS NOTES
Nephrology Progress Note  [facility] / [address], [address]  [place], [address]  Phone - [phone]  Fax - [phone]                 Patient: Tasha Garrett                   YOB: 1960        Date- 1/16/2023                      Admit Date: 12/25/2022  CC: Follow up for GILMAR  IMPRESSION & PLAN:   GILMAR (secondary to ischemic ATN from hemorrhagic shock): last hd 1-10-23  hypokalemia  Hypophosphatemia:  Hyperkalemia: resolved  Anion gap metabolic acidosis: better  Lactic acidosis  Acute blood loss anemia  Acute RP hematoma status post embolization of lumbar artery  Massive PE   Hemorrhagic/cardiogenic shock      PLAN-  Timo catheter has been removed  Creatinine has been stable and at baseline  Renal team will sign off call if needed  Discussed with internal medicine team   Subjective: Interval History:   Seen and examined today  Creatinine at baseline    Objective:   Vitals:    01/14/23 2017 01/15/23 0801 01/15/23 2016 01/16/23 0756   BP: (!) 147/97 (!) 142/94 (!) 147/99 (!) 154/100   Pulse: 94 92 96 91   Resp: 18 16 18 18   Temp: 98.6 °F (37 °C) 99.1 °F (37.3 °C) 99.1 °F (37.3 °C) 98.2 °F (36.8 °C)   TempSrc:       SpO2: 94% 98% 98% 96%   Weight:       Height:          No intake/output data recorded. Last 3 Recorded Weights in this Encounter    01/08/23 0000 01/11/23 2028 01/12/23 2032   Weight: 70.7 kg (155 lb 13.8 oz) 71.8 kg (158 lb 4.6 oz) 72.6 kg (160 lb 0.9 oz)        Physical exam:    GEN:  nad  NECK:  NG TUBE +  RESP: clear b/l, no  wheezing,   CVS: RRR,S1,S2   NEURO: non focal, normal speech  HD access: Right IJ Timo    Chart reviewed. Pertinent Notes reviewed.      Data Review :  Recent Labs     01/16/23  0512 01/15/23  1100 01/14/23  0456    138 138   K 4.0 3.6 3.5    106 107   CO2 24 24 23   BUN 17 21* 31*   CREA 1.17 1.22 1.32*   GLU 87 86 87   CA 8.2* 8.1* 8.3*   MG  -- --  1.2*   PHOS 3.8 3.7 3.7       Recent Labs     01/16/23  0512 01/15/23  1100 01/14/23  0456   WBC 8.9 8.9 12.3*   HGB 8.9* 9.5* 8.1*   HCT 27.8* 29.0* 25.2*   * 469* 409*       No results for input(s): FE, TIBC, PSAT, FERR in the last 72 hours.    No results found for: HBA1C, UJW5IWUS, EGM9CCMW   No results found for: MCACR, MCA1, MCA2, MCA3, MCAU, MCAU2, MCALPOCT  US Results (most recent):  Medication list  reviewed  Current Facility-Administered Medications   Medication Dose Route Frequency    dextrose 10% infusion 0-250 mL  0-250 mL IntraVENous PRN    loperamide (IMODIUM) 1 mg/7.5 mL oral solution 2 mg  2 mg Oral QID PRN    hydrALAZINE (APRESOLINE) 20 mg/mL injection 10 mg  10 mg IntraVENous Q6H PRN    diazePAM (VALIUM) injection 5 mg  5 mg IntraVENous Q6H PRN    midodrine (PROAMATINE) tablet 10 mg  10 mg Oral TID PRN    insulin lispro (HUMALOG) injection   SubCUTAneous Q6H    glucose chewable tablet 16 g  4 Tablet Oral PRN    glucagon (GLUCAGEN) injection 1 mg  1 mg IntraMUSCular PRN    albumin, human-kjda 25% (ALBUMINEX) intravenous solution 25 g  25 g IntraVENous DIALYSIS PRN    epoetin ji-epbx (RETACRIT) injection 10,000 Units  10,000 Units SubCUTAneous Q TUE, THU & SAT    simethicone (MYLICON) tablet 80 mg  80 mg Oral QID PRN    alcohol 62% (NOZIN) nasal  1 Ampule  1 Ampule Topical L58A    folic acid (FOLVITE) tablet 1 mg  1 mg Oral DAILY    thiamine mononitrate (B-1) tablet 100 mg  100 mg Oral DAILY    multivitamin, tx-iron-ca-min (THERA-M w/ IRON) tablet 1 Tablet  1 Tablet Oral DAILY    [Held by provider] magnesium oxide (MAG-OX) tablet 400 mg  400 mg Oral DAILY    sodium chloride (NS) flush 5-40 mL  5-40 mL IntraVENous Q8H    sodium chloride (NS) flush 5-40 mL  5-40 mL IntraVENous PRN    acetaminophen (TYLENOL) tablet 650 mg  650 mg Oral Q6H PRN    Or    acetaminophen (TYLENOL) suppository 650 mg  650 mg Rectal Q6H PRN    polyethylene glycol (MIRALAX) packet 17 g  17 g Oral DAILY PRN ondansetron (ZOFRAN ODT) tablet 4 mg  4 mg Oral Q8H PRN    Or    ondansetron (ZOFRAN) injection 4 mg  4 mg IntraVENous Q6H PRN        Harlie Crigler, MD  1/16/2023

## 2023-01-16 NOTE — PROGRESS NOTES
End of Shift Note    Bedside shift change report given to Kristi Meng RN (oncoming nurse) by Marilia Rust RN (offgoing nurse). Report included the following information SBAR, Kardex, Intake/Output, and MAR    Shift worked:  7p-7A     Shift summary and any significant changes:     Humalog was held at midnight and in the morning, see MAR. Patient did not complained about the pain. Morning labs were drawn. Frequent rounding has been done.      Concerns for physician to address:       Zone phone for oncoming shift:              Marilia Rust RN

## 2023-01-16 NOTE — PROGRESS NOTES
Problem: Mobility Impaired (Adult and Pediatric)  Goal: *Acute Goals and Plan of Care (Insert Text)  Description: FUNCTIONAL STATUS PRIOR TO ADMISSION: Patient was independent and active without use of DME.    HOME SUPPORT PRIOR TO ADMISSION: The patient lived with family but did not require assist.  Physical Therapy Goals    Physical Therapy Goals  Revised 1/16/2023  1. Patient will move from supine to sit and sit to supine , scoot up and down, and roll side to side in bed with modified independence within 7 day(s). 2.  Patient will transfer from bed to chair and chair to bed with contact guard assist using the least restrictive device within 7 day(s). 3.  Patient will perform sit to stand with supervision within 7 day(s). 4.  Patient will ambulate with minimal assistance/contact guard assist for 75 feet with the least restrictive device within 7 day(s). Revised 1/13/2023  1. Patient will move from supine to sit and sit to supine , scoot up and down, and roll side to side in bed with modified independence within 7 day(s). 2.  Patient will transfer from bed to chair and chair to bed with minimal assistance/contact guard assist using the least restrictive device within 7 day(s). 3.  Patient will perform sit to stand with supervision/set-up within 7 day(s). 4.  Patient will ambulate with minimal assistance/contact guard assist for 50 feet with the least restrictive device within 7 day(s). Physical Therapy Goals  Initiated 1/7/2023  1. Patient will move from supine to sit and sit to supine , scoot up and down, and roll side to side in bed with modified independence within 7 day(s). 2.  Patient will transfer from bed to chair and chair to bed with minimal assistance/contact guard assist using the least restrictive device within 7 day(s). 3.  Patient will perform sit to stand with minimal assistance/contact guard assist within 7 day(s).   4.  Patient will ambulate with minimal assistance/contact guard assist for 25 feet with the least restrictive device within 7 day(s). 1/16/2023 1317 by Dong Dash PT  Outcome: Progressing Towards Goal  PHYSICAL THERAPY TREATMENT: WEEKLY REASSESSMENT  Patient: Talha Hua (41 y.o. male)  Date: 1/16/2023  Primary Diagnosis: Alcoholic ketoacidosis [W38.21]  Pulmonary embolism (HCC) [I26.99]  Procedure(s) (LRB):  LAPAROTOMY EXPLORATORY (N/A) 14 Days Post-Op   Precautions: Fall      ASSESSMENT  Patient continues with skilled PT services and is progressing towards goals. He has transferred out of CCU and today was able to progress well with his ambulation. He continues to show weakness in LLE at hip and knee but his control is becoming more consistent in gait and was able to move closer to a step through gait with the walker today. He continues however to need cues for safe foot placement to avoid buckling and does show instability in stand with decreased control of hyperextension at times and buckling if inattentive to L foot being too far posterior for control. He still does rely heavily on the walker for support. He is very motivated to improve and is showing good response to therapy. He will benefit from intense rehab at d/c and is a good candidate for inpatient rehab therapy. Patient's progression toward goals since last assessment: Pt now improved in amb distance, tolerance to therapy, awareness and use of LLE in gait and transfers and has progressed to using a shortened step through gait pattern; he goals have been reviewed an updated as noted above    Current Level of Function Impacting Discharge (mobility/balance): standby to [facility] 62 for bed mobility, min to CGA for transfers, min A + CGA for amb with rolling walker up to 10'. Functional Outcome Measure: The patient scored 50/100 on the barthel outcome measure which is indicative of 50% functional impairment.       Other factors to consider for discharge: Pt was fully independent without DME - now with prolonged hospitalization with multiple medical issues and is well below his baseline. PLAN :  Goals have been updated based on progression since last assessment. Patient continues to benefit from skilled intervention to address the above impairments. Recommendations and Planned Interventions: bed mobility training, transfer training, gait training, therapeutic exercises, neuromuscular re-education, patient and family training/education, and therapeutic activities      Frequency/Duration: Patient will be followed by physical therapy:  5 times a week to address goals. Recommendation for discharge: (in order for the patient to meet his/her long term goals)  Therapy 3 hours per day 5-7 days per week    This discharge recommendation:  Has been made in collaboration with the attending provider and/or case management    IF patient discharges home will need the following DME: rolling walker and wheelchair         SUBJECTIVE:   Patient stated I am ready.     OBJECTIVE DATA SUMMARY:   HISTORY:    Past Medical History:   Diagnosis Date    Hypercholesterolemia     Hypertension      Past Surgical History:   Procedure Laterality Date    HX ORTHOPAEDIC      rotator cuff repair    IR INSERT NON TUNL CVC OVER 5 YRS  1/6/2023    IR OCCL TXCATH HEMMORAGE W SI  1/2/2023    IR PLC IVC FILTER  1/12/2023       Personal factors and/or comorbidities impacting plan of care:     Home Situation  Home Environment: Private residence  # Steps to Enter: 3  Rails to Enter: Yes  Hand Rails : Bilateral  One/Two Story Residence: One story  Living Alone: No  Support Systems: Child(heron), Friend/Neighbor (Pt's son and friend are his support system)  Patient Expects to be Discharged to[de-identified] Rehab Unit Subacute  Current DME Used/Available at Home: None  Tub or Shower Type: Tub/Shower combination    EXAMINATION/PRESENTATION/DECISION MAKING:   Critical Behavior:  Neurologic State: Alert  Orientation Level: Oriented X4  Cognition: Follows commands  Safety/Judgement: Decreased awareness of need for safety, Decreased insight into deficits  Hearing: Auditory  Auditory Impairment: None    Range Of Motion:  AROM:  (LLE with weakness: 2+/5 at knee, hip - decreased control in gait due to weakness)                       Strength:    Strength:  (see AROM note for LLE)                    Tone & Sensation:   Tone: Normal              Sensation: Impaired (LLE decreased sensation and proprioception)               Coordination:  Coordination: Within functional limits    Functional Mobility:  Bed Mobility:  Rolling: Stand-by assistance  Supine to Sit: Contact guard assistance; Other (comment) (uses rail of bed)     Scooting: Stand-by assistance  Transfers:  Sit to Stand: Minimum assistance  Stand to Sit: Minimum assistance        Bed to Chair: Minimum assistance;Assist x2; Other (comment) (second person for safety due to instability of L knee)              Balance:   Sitting: Intact  Standing: Impaired  Standing - Static: Fair;Good;Constant support; Other (comment) (uses RW for support; good statically when cues given for good LLE placement, decreased at times if pt not attentive to foot placement)  Standing - Dynamic : Fair;Constant support (with RW; one instance of LOB with L knee buckling when not getting foot placed well)  Ambulation/Gait Training:  Distance (ft): 32 Feet (ft) (6+6+10+10)  Assistive Device: Gait belt;Walker, rolling  Ambulation - Level of Assistance: Minimal assistance;Contact guard assistance;Assist x2; Other (comment) (min A of 1 and min to CGA of 1 with second person there for safety)        Gait Abnormalities: Decreased step clearance;Trunk sway increased; Other (L knee instability, relies heavily on UEs)        Base of Support: Widened  Stance: Left decreased (improving control of L knee but if placement is poor, needs cues to correct)  Speed/Melanie: Slow;Pace decreased (<100 feet/min)  Step Length: Right shortened;Left shortened             Functional Measure:  Barthel Index:    Bathin  Bladder: 10  Bowels: 10  Groomin  Dressing: 10  Feedin  Mobility: 0  Stairs: 0  Toilet Use: 5  Transfer (Bed to Chair and Back): 10  Total: 50/100       The Barthel ADL Index: Guidelines  1. The index should be used as a record of what a patient does, not as a record of what a patient could do. 2. The main aim is to establish degree of independence from any help, physical or verbal, however minor and for whatever reason. 3. The need for supervision renders the patient not independent. 4. A patient's performance should be established using the best available evidence. Asking the patient, friends/relatives and nurses are the usual sources, but direct observation and common sense are also important. However direct testing is not needed. 5. Usually the patient's performance over the preceding 24-48 hours is important, but occasionally longer periods will be relevant. 6. Middle categories imply that the patient supplies over 50 per cent of the effort. 7. Use of aids to be independent is allowed. Score Interpretation (from [address] [place] [address])    Independent   60-79 Minimally independent   40-59 Partially dependent   20-39 Very dependent   <20 Totally dependent     -Max De La Garza., Barthel, D.W. (1965). Functional evaluation: the Barthel Index. [facility] ([address] (1997). The Barthel activities of daily living index: self-reporting versus actual performance in the old (> or = 75 years). Journal of [address] [place] [address] 45(7), 14 [facility], JYOTI, Eri Chow, Abigail Roger. (1999). Measuring the change in disability after inpatient rehabilitation; comparison of the responsiveness of the Barthel Index and Functional [place] Measure. Journal of Neurology, Neurosurgery, and Psychiatry, 66(4), 507-263.   DELILAH Vyas.ROBERTO, KEENAN Bernard, & Sandi Whitney M.A. (2004) Assessment of post-stroke quality of life in cost-effectiveness studies: The usefulness of the Barthel Index and the EuroQoL-5D. Quality of Life Research, 13, 427-43          Pain Rating:  No c/o    Activity Tolerance:   Fair    After treatment patient left in no apparent distress:   Supine in bed, Call bell within reach, Bed / chair alarm activated, and Side rails x 3    COMMUNICATION/EDUCATION:   The patients plan of care was discussed with: Registered nurse. Fall prevention education was provided and the patient/caregiver indicated understanding., Patient/family have participated as able in goal setting and plan of care. , and Patient/family agree to work toward stated goals and plan of care.     Thank you for this referral.  Eros Daniel, PT   Time Calculation: 31 mins

## 2023-01-16 NOTE — PROGRESS NOTES
CM: Mary Ann Clark is currently working with pt in the [place] [facility]. CM reviewed case with clinical team, and pt is known to be medically stable to d/c. Pt referrals wre sent to [facility]: [facility] (no current beds) and [facility] (able to accept). [facility] had concerns with pts wbc and diet. NP informed and both are no longer concerns that will delay d/c.  CM sent updates and clinicals to [facility]. CM currently waiting for response to verify if insurance auth been initiated. CM will continue to follow.     Mary Ann Clark, MSW, [address] [place] [address]

## 2023-01-16 NOTE — PROGRESS NOTES
Hospitalist Progress Note    Subjective:   Daily Progress Note: 1/16/2023 6:29 PM    Hospital Course:  Mr. Marilynn Garcia is a 59 y/o male with PMH significant for alcohol abuse, who presented to ED with syncopal episode with associated shortness of breath. He was found to have massive PE and was started on Lovenox treatment, however this resulted in left retroperitoneal hemorrhage requiring transfusion of 5 units PRBC and 2 of FFP and s/p L2 artery embolization. As complication of the hemorrhage he also had ileus, required NG tube which has since been discontinued he is now tolerating diet and having regular bowel movements. IVC filter placed 1/12, anticipate DC to I[facility] 1/17. Subjective:  Patient examined sitting up in chair, talking on phone. Denies any new complaints at this time including CP, SOB, nausea/vomiting, abdominal pain, constipation/diarrhea, blood in urine, stool or emesis. States \"I need to get my strength back before going home. \"    Current Facility-Administered Medications   Medication Dose Route Frequency    dextrose 10% infusion 0-250 mL  0-250 mL IntraVENous PRN    loperamide (IMODIUM) 1 mg/7.5 mL oral solution 2 mg  2 mg Oral QID PRN    hydrALAZINE (APRESOLINE) 20 mg/mL injection 10 mg  10 mg IntraVENous Q6H PRN    diazePAM (VALIUM) injection 5 mg  5 mg IntraVENous Q6H PRN    midodrine (PROAMATINE) tablet 10 mg  10 mg Oral TID PRN    insulin lispro (HUMALOG) injection   SubCUTAneous Q6H    glucose chewable tablet 16 g  4 Tablet Oral PRN    glucagon (GLUCAGEN) injection 1 mg  1 mg IntraMUSCular PRN    albumin, human-kjda 25% (ALBUMINEX) intravenous solution 25 g  25 g IntraVENous DIALYSIS PRN    epoetin ji-epbx (RETACRIT) injection 10,000 Units  10,000 Units SubCUTAneous Q TUE, THU & SAT    simethicone (MYLICON) tablet 80 mg  80 mg Oral QID PRN    alcohol 62% (NOZIN) nasal  1 Ampule  1 Ampule Topical O65O    folic acid (FOLVITE) tablet 1 mg  1 mg Oral DAILY    thiamine mononitrate (B-1) tablet 100 mg  100 mg Oral DAILY    multivitamin, tx-iron-ca-min (THERA-M w/ IRON) tablet 1 Tablet  1 Tablet Oral DAILY    [Held by provider] magnesium oxide (MAG-OX) tablet 400 mg  400 mg Oral DAILY    sodium chloride (NS) flush 5-40 mL  5-40 mL IntraVENous Q8H    sodium chloride (NS) flush 5-40 mL  5-40 mL IntraVENous PRN    acetaminophen (TYLENOL) tablet 650 mg  650 mg Oral Q6H PRN    Or    acetaminophen (TYLENOL) suppository 650 mg  650 mg Rectal Q6H PRN    polyethylene glycol (MIRALAX) packet 17 g  17 g Oral DAILY PRN    ondansetron (ZOFRAN ODT) tablet 4 mg  4 mg Oral Q8H PRN    Or    ondansetron (ZOFRAN) injection 4 mg  4 mg IntraVENous Q6H PRN        Review of Systems:    Review of Systems   Constitutional:  Negative for chills, fever, malaise/fatigue and weight loss. Respiratory:  Negative for cough, sputum production and shortness of breath. Cardiovascular:  Negative for chest pain, palpitations and leg swelling. Gastrointestinal:  Negative for abdominal pain, constipation, diarrhea, nausea and vomiting. Neurological:  Positive for weakness. Negative for dizziness. Objective:     Visit Vitals  BP (!) 154/100   Pulse 91   Temp 98.2 °F (36.8 °C)   Resp 18   Ht 5' 9\" (1.753 m)   Wt 72.6 kg (160 lb 0.9 oz)   SpO2 96%   BMI 23.64 kg/m²    O2 Flow Rate (L/min): 2 l/min O2 Device: None (Room air)    Temp (24hrs), Av.7 °F (37.1 °C), Min:98.2 °F (36.8 °C), Max:99.1 °F (37.3 °C)      No intake/output data recorded. No intake/output data recorded. PHYSICAL EXAM:    Physical Exam  Constitutional:       Appearance: Normal appearance. HENT:      Head: Normocephalic and atraumatic. Mouth/Throat:      Mouth: Mucous membranes are moist.   Eyes:      Pupils: Pupils are equal, round, and reactive to light. Cardiovascular:      Rate and Rhythm: Normal rate and regular rhythm. Pulses: Normal pulses. Heart sounds: Normal heart sounds.    Pulmonary:      Effort: Pulmonary effort is normal.      Breath sounds: Normal breath sounds. Abdominal:      General: Bowel sounds are normal.      Palpations: Abdomen is soft. Skin:     General: Skin is warm and dry. Neurological:      Mental Status: He is alert and oriented to person, place, and time. Motor: Weakness present. Data Review    Recent Results (from the past 24 hour(s))   GLUCOSE, POC    Collection Time: 01/16/23 12:28 AM   Result Value Ref Range    Glucose (POC) 101 65 - 117 mg/dL    Performed by Watson Bustamante (PCT)    METABOLIC PANEL, BASIC    Collection Time: 01/16/23  5:12 AM   Result Value Ref Range    Sodium 138 136 - 145 mmol/L    Potassium 4.0 3.5 - 5.1 mmol/L    Chloride 108 97 - 108 mmol/L    CO2 24 21 - 32 mmol/L    Anion gap 6 5 - 15 mmol/L    Glucose 87 65 - 100 mg/dL    BUN 17 6 - 20 MG/DL    Creatinine 1.17 0.70 - 1.30 MG/DL    BUN/Creatinine ratio 15 12 - 20      eGFR >60 >60 ml/min/1.73m2    Calcium 8.2 (L) 8.5 - 10.1 MG/DL   CBC WITH AUTOMATED DIFF    Collection Time: 01/16/23  5:12 AM   Result Value Ref Range    WBC 8.9 4.1 - 11.1 K/uL    RBC 2.88 (L) 4.10 - 5.70 M/uL    HGB 8.9 (L) 12.1 - 17.0 g/dL    HCT 27.8 (L) 36.6 - 50.3 %    MCV 96.5 80.0 - 99.0 FL    MCH 30.9 26.0 - 34.0 PG    MCHC 32.0 30.0 - 36.5 g/dL    RDW 15.6 (H) 11.5 - 14.5 %    PLATELET 378 (H) 264 - 400 K/uL    MPV 10.1 8.9 - 12.9 FL    NRBC 0.0 0  WBC    ABSOLUTE NRBC 0.00 0.00 - 0.01 K/uL    NEUTROPHILS 69 32 - 75 %    LYMPHOCYTES 18 12 - 49 %    MONOCYTES 11 5 - 13 %    EOSINOPHILS 1 0 - 7 %    BASOPHILS 0 0 - 1 %    IMMATURE GRANULOCYTES 0 0.0 - 0.5 %    ABS. NEUTROPHILS 6.2 1.8 - 8.0 K/UL    ABS. LYMPHOCYTES 1.6 0.8 - 3.5 K/UL    ABS. MONOCYTES 1.0 0.0 - 1.0 K/UL    ABS. EOSINOPHILS 0.1 0.0 - 0.4 K/UL    ABS. BASOPHILS 0.0 0.0 - 0.1 K/UL    ABS. IMM.  GRANS. 0.0 0.00 - 0.04 K/UL    DF AUTOMATED     HEPATIC FUNCTION PANEL    Collection Time: 01/16/23  5:12 AM   Result Value Ref Range    Protein, total 7.3 6.4 - 8.2 g/dL    Albumin 2.4 (L) 3.5 - 5.0 g/dL    Globulin 4.9 (H) 2.0 - 4.0 g/dL    A-G Ratio 0.5 (L) 1.1 - 2.2      Bilirubin, total 0.9 0.2 - 1.0 MG/DL    Bilirubin, direct 0.3 (H) 0.0 - 0.2 MG/DL    Alk. phosphatase 257 (H) 45 - 117 U/L    AST (SGOT) 70 (H) 15 - 37 U/L    ALT (SGPT) 46 12 - 78 U/L   PHOSPHORUS    Collection Time: 01/16/23  5:12 AM   Result Value Ref Range    Phosphorus 3.8 2.6 - 4.7 MG/DL   PROTHROMBIN TIME + INR    Collection Time: 01/16/23  5:12 AM   Result Value Ref Range    INR 1.1 0.9 - 1.1      Prothrombin time 11.8 (H) 9.0 - 11.1 sec   GLUCOSE, POC    Collection Time: 01/16/23  6:04 AM   Result Value Ref Range    Glucose (POC) 80 65 - 117 mg/dL    Performed by Magy Maravilla (PCT)    GLUCOSE, POC    Collection Time: 01/16/23 12:07 PM   Result Value Ref Range    Glucose (POC) 88 65 - 117 mg/dL    Performed by Fingos PCT    GLUCOSE, POC    Collection Time: 01/16/23  6:01 PM   Result Value Ref Range    Glucose (POC) 84 65 - 117 mg/dL    Performed by Fingos PCT        IR [facility] IVC FILTER   Final Result   1. Successful deployment of an [employer] Option IVC filter in an infrarenal IVC   location. 2.  This filter is potentially removable when the risk of venous thromboembolic   events diminishes or the patient becomes a candidate for appropriate   anticoagulation. XR ABD (KUB)   Final Result   Gastric tube tip lies just below the diaphragm, consider advancing the NG tube 7   to 10 cm. CT SPINE LUMB WO CONT   Final Result    Degenerative changes in the lumbar spine as described. Moderate to severe canal   stenosis at L3-4. Severe bilateral foraminal narrowing at L5-S1. CT ABD PELV WO CONT   Final Result   1. Left retroperitoneal hematoma grossly stable. 2.  Interval development of a small bowel obstruction. Transition point in the   right upper quadrant. No definite obstructing lesions identified.       3.  Left effusion and left lower lobe volume loss without significant change. XR ABD PORT  1 V   Final Result   Interval placement of NG tube               IR INSERT NON TUNL CVC OVER 5 YRS   Final Result   Technically successful right internal jugular vein non-tunneled dialysis   catheter exchange. A post procedure chest x-ray is pending. DUPLEX LOWER EXT ARTERY LEFT   Final Result      CTA ABDOMEN PELV W CONT   Final Result   1. Stable to mild provement in 9.1 x 11.5 x 18.6 cm left retroperitoneal   collection, previously 9.9 x 11.4 x 20.5 cm.   2.  High density contrast material within the collection likely represents   residual contrast material from embolization procedure. No definitive evidence   of active extravasation. 3.  Evolving right upper pole renal hypodensity may represent renal infarction   versus pyelonephritis. Evaluation for embolic phenomenon may be considered. 4.  Evolving peripheral splenic hypodensities are nonspecific, possibly infarct   versus infectious foci. 5.  Additional findings as above. XR ABD (KUB)   Final Result   New small bowel dilation may represent ileus. IR OCCL TXCATH HEMMORAGE W SI   Final Result      Abdominal arteriogram demonstrating active hemorrhage off of the distal left L2   lumbar artery. Coil embolization of the lumbar artery was performed. XR CHEST PORT   Final Result   Right IJ catheter satisfactory position without pneumothorax. CT CHEST W CONT   Final Result   Large left retroperitoneal hematoma with evidence of active extravasation. Right   renal infarct. Small left pleural effusion with left lower lobe atelectasis. CT ABD PELV W CONT   Final Result   Large left retroperitoneal hematoma with evidence of active extravasation. Right   renal infarct. Small left pleural effusion with left lower lobe atelectasis.       XR CHEST PORT   Final Result      No acute process on portable chest.         DUPLEX LOWER EXT VENOUS BILAT   Final Result      CT HEAD WO CONT   Final Result   No evidence of acute process. XR CHEST PORT   Final Result   No acute cardiopulmonary process. XR HIP LT W OR WO PELV  1 VW   Final Result   No acute bony abnormality. CTA CHEST W OR W WO CONT   Final Result      Single right middle lobe pulmonary embolism. No evidence for right heart strain   or pulmonary infarction. Clear lungs   Incidental hepatic steatosis   This result was reportedly relieved by me to Dr. Diamond Wills at 1937 hours   789      XR CHEST PA LAT   Final Result      No acute cardiopulmonary process. Active Problems:    Pulmonary embolism ([address] (12/25/2022)      Retroperitoneal bleed (1/2/2023)      Left leg weakness (1/5/2023)      Nontraumatic lumbosacral plexopathy (1/5/2023)      Lumbar back pain with radiculopathy affecting left lower extremity (1/5/2023)      Bilateral carotid artery stenosis (1/5/2023)      Cerebral microvascular disease (1/5/2023)        Assessment/Plan:   Left retroperitoneal hemorrhage - resolved  L2 plexopathy with LLE weakness and sensory loss  - Spontaneous retroperitoneal bleed now status post L2 artery bleed embolization.  - S/p 5U RBC and 2U FFP this admission.  - s/p embolization with coils  Plan  Daily labs - Hgb stable 8s-9s  PT/OT plans for inpatient rehab when bed avail/ins auth  Surgery following, appreciate input     Ileus  - retroperitoneal bleed mass effect seen on CT abdomen/pelvis  - NG tube removed 1/13, pt tolerating full liquid diet 1/16  - Reports regular bowel movements     Massive PE  - S/p treatment with lovenox.  Anticoagulation discontinued after spontaneous retroperitoneal bleed with hemorrhagic shock.  - SCDs only for now - likely intolerant of long term [address]  - IVC filter placed 1.12      GILMAR/ATN (Creat 3.44)  Hypokalemia  -  d/t hemorrhagic shock  - temporary Hemodialysis - now DC'd  - nephrology following  - Creat normalized to 1.17    Shock liver  - d/t hemorrhagic shock  - LFTs improving    Hx of alcohol abuse  Plan  Valium prn for CIWA scale.     Disposition: IPR pending bed/ins auth  DVT Prophylaxis: SCDs  Code Status: Full Code  POA: Prince Homans - child - [phone]    Care Plan discussed with: Patient, Nurse,   _______________________________________________________________    JAY Sanchez

## 2023-01-17 LAB
ALBUMIN SERPL-MCNC: 2.7 G/DL (ref 3.5–5)
ANION GAP SERPL CALC-SCNC: 8 MMOL/L (ref 5–15)
BUN SERPL-MCNC: 14 MG/DL (ref 6–20)
BUN/CREAT SERPL: 12 (ref 12–20)
CALCIUM SERPL-MCNC: 8.2 MG/DL (ref 8.5–10.1)
CHLORIDE SERPL-SCNC: 104 MMOL/L (ref 97–108)
CO2 SERPL-SCNC: 25 MMOL/L (ref 21–32)
CREAT SERPL-MCNC: 1.17 MG/DL (ref 0.7–1.3)
ERYTHROCYTE [DISTWIDTH] IN BLOOD BY AUTOMATED COUNT: 15.7 % (ref 11.5–14.5)
GLUCOSE BLD STRIP.AUTO-MCNC: 108 MG/DL (ref 65–117)
GLUCOSE BLD STRIP.AUTO-MCNC: 89 MG/DL (ref 65–117)
GLUCOSE BLD STRIP.AUTO-MCNC: 93 MG/DL (ref 65–117)
GLUCOSE SERPL-MCNC: 104 MG/DL (ref 65–100)
HCT VFR BLD AUTO: 28.5 % (ref 36.6–50.3)
HGB BLD-MCNC: 9.1 G/DL (ref 12.1–17)
INR PPP: 1.1 (ref 0.9–1.1)
MCH RBC QN AUTO: 30.7 PG (ref 26–34)
MCHC RBC AUTO-ENTMCNC: 31.9 G/DL (ref 30–36.5)
MCV RBC AUTO: 96.3 FL (ref 80–99)
NRBC # BLD: 0 K/UL (ref 0–0.01)
NRBC BLD-RTO: 0 PER 100 WBC
PHOSPHATE SERPL-MCNC: 3.1 MG/DL (ref 2.6–4.7)
PLATELET # BLD AUTO: 522 K/UL (ref 150–400)
PMV BLD AUTO: 10.4 FL (ref 8.9–12.9)
POTASSIUM SERPL-SCNC: 3.4 MMOL/L (ref 3.5–5.1)
PROTHROMBIN TIME: 11.4 SEC (ref 9–11.1)
RBC # BLD AUTO: 2.96 M/UL (ref 4.1–5.7)
SERVICE CMNT-IMP: NORMAL
SODIUM SERPL-SCNC: 137 MMOL/L (ref 136–145)
WBC # BLD AUTO: 8 K/UL (ref 4.1–11.1)

## 2023-01-17 PROCEDURE — 97530 THERAPEUTIC ACTIVITIES: CPT

## 2023-01-17 PROCEDURE — 74011250637 HC RX REV CODE- 250/637: Performed by: STUDENT IN AN ORGANIZED HEALTH CARE EDUCATION/TRAINING PROGRAM

## 2023-01-17 PROCEDURE — 85610 PROTHROMBIN TIME: CPT

## 2023-01-17 PROCEDURE — 80069 RENAL FUNCTION PANEL: CPT

## 2023-01-17 PROCEDURE — 74011250637 HC RX REV CODE- 250/637

## 2023-01-17 PROCEDURE — 74011250637 HC RX REV CODE- 250/637: Performed by: INTERNAL MEDICINE

## 2023-01-17 PROCEDURE — 94760 N-INVAS EAR/PLS OXIMETRY 1: CPT

## 2023-01-17 PROCEDURE — 36415 COLL VENOUS BLD VENIPUNCTURE: CPT

## 2023-01-17 PROCEDURE — 97535 SELF CARE MNGMENT TRAINING: CPT

## 2023-01-17 PROCEDURE — 77010033678 HC OXYGEN DAILY

## 2023-01-17 PROCEDURE — 82962 GLUCOSE BLOOD TEST: CPT

## 2023-01-17 PROCEDURE — 85027 COMPLETE CBC AUTOMATED: CPT

## 2023-01-17 PROCEDURE — 74011000250 HC RX REV CODE- 250: Performed by: STUDENT IN AN ORGANIZED HEALTH CARE EDUCATION/TRAINING PROGRAM

## 2023-01-17 PROCEDURE — 97116 GAIT TRAINING THERAPY: CPT

## 2023-01-17 PROCEDURE — 74011250636 HC RX REV CODE- 250/636: Performed by: INTERNAL MEDICINE

## 2023-01-17 PROCEDURE — 65270000029 HC RM PRIVATE

## 2023-01-17 RX ORDER — LOSARTAN POTASSIUM 50 MG/1
25 TABLET ORAL DAILY
Status: DISCONTINUED | OUTPATIENT
Start: 2023-01-17 | End: 2023-01-18

## 2023-01-17 RX ADMIN — Medication 1 AMPULE: at 21:41

## 2023-01-17 RX ADMIN — THIAMINE HCL TAB 100 MG 100 MG: 100 TAB at 08:21

## 2023-01-17 RX ADMIN — EPOETIN ALFA-EPBX 10000 UNITS: 10000 INJECTION, SOLUTION INTRAVENOUS; SUBCUTANEOUS at 21:38

## 2023-01-17 RX ADMIN — LOSARTAN POTASSIUM 25 MG: 50 TABLET, FILM COATED ORAL at 09:14

## 2023-01-17 RX ADMIN — MULTIPLE VITAMINS W/ MINERALS TAB 1 TABLET: TAB at 08:21

## 2023-01-17 RX ADMIN — FOLIC ACID 1 MG: 1 TABLET ORAL at 08:21

## 2023-01-17 RX ADMIN — SODIUM CHLORIDE, PRESERVATIVE FREE 10 ML: 5 INJECTION INTRAVENOUS at 13:14

## 2023-01-17 RX ADMIN — Medication 1 AMPULE: at 08:21

## 2023-01-17 RX ADMIN — SODIUM CHLORIDE, PRESERVATIVE FREE 5 ML: 5 INJECTION INTRAVENOUS at 21:41

## 2023-01-17 RX ADMIN — SODIUM CHLORIDE, PRESERVATIVE FREE 10 ML: 5 INJECTION INTRAVENOUS at 06:10

## 2023-01-17 NOTE — PROGRESS NOTES
Attempted to return call to [facility] for P2P, however the number provided to [address] is not going through on hospital desk phone or private phone. Notified CM who will attempt to identify alternate mode of contact.

## 2023-01-17 NOTE — PROGRESS NOTES
Comprehensive Nutrition Assessment    Type and Reason for Visit: Reassess    Nutrition Recommendations/Plan:   Advance diet to trial tolerance  Continue Ensure Plus BID  Please document % meals and supplements consumed in flowsheet I/O's under intake   Replete potassium (3.4)     Malnutrition Assessment:  Malnutrition Status:  No malnutrition (01/03/23 1324)        Nutrition Assessment:     Chart reviewed and RD met with pt at bedside. Pt reports good tolerance of the full liquid diet, ready to advance. Spoke with attending NP who is on the same page to trial a solid diet before discharge. Pt is also drinking 1-2 of the Ensure shakes as ordered. Discharge pending insurance auth and diet tolerance. Patient Vitals for the past 168 hrs:   % Diet Eaten   01/13/23 1600 0%   01/13/23 1200 0%     Wt Readings from Last 5 Encounters:   01/17/23 73.1 kg (161 lb 2.5 oz)   09/28/21 84.1 kg (185 lb 6.5 oz)   10/18/19 84.4 kg (186 lb)   08/19/19 84.5 kg (186 lb 4.6 oz)   11/13/17 94.3 kg (208 lb)   ]    Nutrition Related Findings:    Labs: K 3.4, hgb 9.1. Meds: folvite, MVI, thiamine. BM 1/14. Wound Type: None    Current Nutrition Intake & Therapies:  Average Meal Intake: 51-75%  Average Supplement Intake: %  ADULT ORAL NUTRITION SUPPLEMENT Breakfast, Dinner; Standard High Calorie/High Protein  ADULT DIET Regular    Anthropometric Measures:  Height: 5' 9\" (175.3 cm)  Ideal Body Weight (IBW): 160 lbs (73 kg)     Current Body Wt:  73.1 kg (161 lb 2.5 oz), 137.5 % IBW. Bed scale  Current BMI (kg/m2): 23.8  Usual Body Weight: 84.4 kg (186 lb)  % Weight Change (Calculated): 18.3                    BMI Category: Normal weight (BMI 18.5-24. 9)    Estimated Daily Nutrient Needs:  Energy Requirements Based On: Formula  Weight Used for Energy Requirements: Current  Energy (kcal/day): MSJ 2150 (1784 x 1.2)  Weight Used for Protein Requirements: Current  Protein (g/day): 92-106g (1.3-1.5gPro/kg)  Method Used for Fluid Requirements: Standard renal  Fluid (ml/day): per MD    Nutrition Diagnosis:   Inadequate protein-energy intake related to altered GI function, impaired nutrient utilization as evidenced by NPO or clear liquid status due to medical condition  Dx improving, diet advanced with good PO intake. Advancing to regular food today. Nutrition Interventions:   Food and/or Nutrient Delivery: Modify current diet, Continue oral nutrition supplement  Nutrition Education/Counseling: No recommendations at this time  Coordination of Nutrition Care: Continue to monitor while inpatient, Interdisciplinary rounds       Goals:  Previous Goal Met: Goal(s) achieved  Goals: PO intake 75% or greater, by next RD assessment       Nutrition Monitoring and Evaluation:   Behavioral-Environmental Outcomes: None identified  Food/Nutrient Intake Outcomes: Food and nutrient intake, Diet advancement/tolerance, Supplement intake  Physical Signs/Symptoms Outcomes: Biochemical data, Nutrition focused physical findings, Skin, Weight, Fluid status or edema, Hemodynamic status    Discharge Planning:     Too soon to determine    Paz Monahan, [address]  Contact: [phone]

## 2023-01-17 NOTE — PROGRESS NOTES
Problem: Mobility Impaired (Adult and Pediatric)  Goal: *Acute Goals and Plan of Care (Insert Text)  Description: FUNCTIONAL STATUS PRIOR TO ADMISSION: Patient was independent and active without use of DME.    HOME SUPPORT PRIOR TO ADMISSION: The patient lived with family but did not require assist.  Physical Therapy Goals    Physical Therapy Goals  Revised 1/16/2023  1. Patient will move from supine to sit and sit to supine , scoot up and down, and roll side to side in bed with modified independence within 7 day(s). 2.  Patient will transfer from bed to chair and chair to bed with contact guard assist using the least restrictive device within 7 day(s). 3.  Patient will perform sit to stand with supervision within 7 day(s). 4.  Patient will ambulate with minimal assistance/contact guard assist for 75 feet with the least restrictive device within 7 day(s). Revised 1/13/2023  1. Patient will move from supine to sit and sit to supine , scoot up and down, and roll side to side in bed with modified independence within 7 day(s). 2.  Patient will transfer from bed to chair and chair to bed with minimal assistance/contact guard assist using the least restrictive device within 7 day(s). 3.  Patient will perform sit to stand with supervision/set-up within 7 day(s). 4.  Patient will ambulate with minimal assistance/contact guard assist for 50 feet with the least restrictive device within 7 day(s). Physical Therapy Goals  Initiated 1/7/2023  1. Patient will move from supine to sit and sit to supine , scoot up and down, and roll side to side in bed with modified independence within 7 day(s). 2.  Patient will transfer from bed to chair and chair to bed with minimal assistance/contact guard assist using the least restrictive device within 7 day(s). 3.  Patient will perform sit to stand with minimal assistance/contact guard assist within 7 day(s).   4.  Patient will ambulate with minimal assistance/contact guard assist for 25 feet with the least restrictive device within 7 day(s). Outcome: Progressing Towards Goal   PHYSICAL THERAPY TREATMENT  Patient: Pamela Oquendo (19 y.o. male)  Date: 1/17/2023  Diagnosis: Alcoholic ketoacidosis [D94.32]  Pulmonary embolism (HCC) [I26.99] <principal problem not specified>  Procedure(s) (LRB):  LAPAROTOMY EXPLORATORY (N/A) 15 Days Post-Op  Precautions: Fall, Seizure, Bed Alarm  Chart, physical therapy assessment, plan of care and goals were reviewed. ASSESSMENT  Patient continues with skilled PT services and is progressing towards goals. Patient lying in bed when PT arrived and agreed to therapy session. Reassessed patients LE strength by MMT: RLE & BUE: 4/5. LLE: ankle df 4-/5, quad 0/5, hams 3/5, hip flexion 2-/5, hip ext 3+/5, hip add 2-/5, hip abd 3+/5. Will need a custom rigid AFO made by prosthetist to stabilize L quad through stance phase of gait and prevent buckling. Came to sitting with cg assistance. Worked on gait training with RW with no AFO available. Used multi-modal cues to lock patient's L knee and improve swing and stance phases of gait: cues to step with RLE first to preload L hip flexor with tension prior to advancing and using L glut to keep L knee locked. This improved step lengths, speed and overall quality, but as his L glut fatigued the L knee started buckling more as well as loss of speed and step lengths. Will need custom brace measured and fitted by prosthetist in rehab setting due to high risk for falls and injury. Current Level of Function Impacting Discharge (mobility/balance): min a ambulation with RW for 65 feet - see above for more details and concerns. Other factors to consider for discharge: high risk for falls; cognitively doing much better and ready for rehab setting. PLAN :  Patient continues to benefit from skilled intervention to address the above impairments. Continue treatment per established plan of care.   to address goals. Recommendation for discharge: (in order for the patient to meet his/her long term goals)  Rehab up to 15 hours per week. This discharge recommendation:  Has been made in collaboration with the attending provider and/or case management    IF patient discharges home will need the following DME: bedside commode, custom AFO fitted by prosthetist, rolling walker, and wheelchair       SUBJECTIVE:   Patient stated  you really helped me out.     OBJECTIVE DATA SUMMARY:   Critical Behavior:  Neurologic State: Alert  Orientation Level: Oriented X4  Cognition: Appropriate decision making, Appropriate for age attention/concentration, Appropriate safety awareness, Follows commands  Safety/Judgement: Awareness of environment, Fall prevention, Good awareness of safety precautions (severe L quad weakness)  Functional Mobility Training:  Bed Mobility:  Rolling: Stand-by assistance  Supine to Sit: Contact guard assistance  Sit to Supine: Moderate assistance (mod a for LLE)  Scooting: Stand-by assistance        Transfers:  Sit to Stand: Minimum assistance  Stand to Sit: Contact guard assistance        Bed to Chair: Minimum assistance; Adaptive equipment (RW)                    Balance:  Sitting: Intact  Standing: Impaired; Without support  Standing - Static: Fair;Occasional  Standing - Dynamic : Poor;Constant support  Ambulation/Gait Training:  Distance (ft): 65 Feet (ft)  Assistive Device: Gait belt;Walker, rolling  Ambulation - Level of Assistance: Minimal assistance        Gait Abnormalities: Decreased step clearance;Circumduction;Path deviations           Stance: Left decreased  Speed/Melanie: Slow  Step Length: Left shortened;Right shortened        Interventions: Safety awareness training; Tactile cues; Verbal cues; Visual/Demos (cues to step with RLE first to preload L hip flexor with tension prior to advancing and using L glut to keep L knee locked.)       Pain Rating:  Intermittent shooting pains down LLE into thigh/knee. Activity Tolerance:   Fair, SpO2 stable on RA, and requires rest breaks    After treatment patient left in no apparent distress:   Supine in bed, Heels elevated for pressure relief, Call bell within reach, and Side rails x 3    COMMUNICATION/COLLABORATION:   The patients plan of care was discussed with: Registered nurse.      Dmitriy Cough, PT   Time Calculation: 26 mins

## 2023-01-17 NOTE — PROGRESS NOTES
Hospitalist Progress Note    Subjective:   Daily Progress Note: 1/17/2023 6:29 PM    Hospital Course:  Mr. China Hughes is a 59 y/o male with PMH significant for alcohol abuse, who presented to ED with syncopal episode with associated shortness of breath. He was found to have massive PE and was started on Lovenox treatment, however this resulted in left retroperitoneal hemorrhage requiring transfusion of 5 units PRBC and 2 of FFP and s/p L2 artery embolization. As complication of the hemorrhage he also had ileus, required NG tube which has since been discontinued he is now tolerating diet and having regular bowel movements. IVC filter placed 1/12, anticipate DC to IPR 1/17. Subjective:  Pt seen sitting up in chair, in good spirits, states \"waiting to get out of here! \" Denies any new complaints at this time including CP, SOB, nausea/vomiting, abdominal pain, constipation/diarrhea, blood in urine, stool or emesis.     Current Facility-Administered Medications   Medication Dose Route Frequency    losartan (COZAAR) tablet 25 mg  25 mg Oral DAILY    dextrose 10% infusion 0-250 mL  0-250 mL IntraVENous PRN    hydrALAZINE (APRESOLINE) 20 mg/mL injection 10 mg  10 mg IntraVENous Q6H PRN    diazePAM (VALIUM) injection 5 mg  5 mg IntraVENous Q6H PRN    insulin lispro (HUMALOG) injection   SubCUTAneous Q6H    glucose chewable tablet 16 g  4 Tablet Oral PRN    glucagon (GLUCAGEN) injection 1 mg  1 mg IntraMUSCular PRN    epoetin ji-epbx (RETACRIT) injection 10,000 Units  10,000 Units SubCUTAneous Q TUE, THU & SAT    simethicone (MYLICON) tablet 80 mg  80 mg Oral QID PRN    alcohol 62% (NOZIN) nasal  1 Ampule  1 Ampule Topical G06J    folic acid (FOLVITE) tablet 1 mg  1 mg Oral DAILY    thiamine mononitrate (B-1) tablet 100 mg  100 mg Oral DAILY    multivitamin, tx-iron-ca-min (THERA-M w/ IRON) tablet 1 Tablet  1 Tablet Oral DAILY    [Held by provider] magnesium oxide (MAG-OX) tablet 400 mg  400 mg Oral DAILY sodium chloride (NS) flush 5-40 mL  5-40 mL IntraVENous Q8H    sodium chloride (NS) flush 5-40 mL  5-40 mL IntraVENous PRN    acetaminophen (TYLENOL) tablet 650 mg  650 mg Oral Q6H PRN    Or    acetaminophen (TYLENOL) suppository 650 mg  650 mg Rectal Q6H PRN    polyethylene glycol (MIRALAX) packet 17 g  17 g Oral DAILY PRN    ondansetron (ZOFRAN ODT) tablet 4 mg  4 mg Oral Q8H PRN    Or    ondansetron (ZOFRAN) injection 4 mg  4 mg IntraVENous Q6H PRN        Review of Systems:     Review of Systems   Constitutional:  Negative for chills, fever, malaise/fatigue and weight loss. Respiratory:  Negative for cough, sputum production and shortness of breath. Cardiovascular:  Negative for chest pain, palpitations and leg swelling. Gastrointestinal:  Negative for abdominal pain, constipation, diarrhea, nausea and vomiting. Neurological:  Positive for weakness. Negative for dizziness. Objective:     Visit Vitals  BP (!) 171/98   Pulse 88   Temp 98.4 °F (36.9 °C)   Resp 15   Ht 5' 9\" (1.753 m)   Wt 73.1 kg (161 lb 2.5 oz)   SpO2 94%   BMI 23.80 kg/m²    O2 Flow Rate (L/min): 2 l/min O2 Device: None (Room air)    Temp (24hrs), Av.4 °F (36.9 °C), Min:98.1 °F (36.7 °C), Max:98.7 °F (37.1 °C)      No intake/output data recorded. 01/15 1901 -  0700  In: 200 [P.O.:200]  Out: 400 [Urine:400]    PHYSICAL EXAM:     Physical Exam  Constitutional:       Appearance: Normal appearance. HENT:      Head: Normocephalic and atraumatic. Mouth/Throat:      Mouth: Mucous membranes are moist.   Eyes:      Pupils: Pupils are equal, round, and reactive to light. Cardiovascular:      Rate and Rhythm: Normal rate and regular rhythm. Pulses: Normal pulses. Heart sounds: Normal heart sounds. Pulmonary:      Effort: Pulmonary effort is normal.      Breath sounds: Normal breath sounds. Abdominal:      General: Bowel sounds are normal.      Palpations: Abdomen is soft.    Skin:     General: Skin is warm and dry. Neurological:      Mental Status: He is alert and oriented to person, place, and time. Motor: Weakness present. Data Review    Recent Results (from the past 24 hour(s))   GLUCOSE, POC    Collection Time: 01/16/23 11:44 PM   Result Value Ref Range    Glucose (POC) 96 65 - 117 mg/dL    Performed by Jaimee Altamirano RN    PROTHROMBIN TIME + INR    Collection Time: 01/17/23  3:23 AM   Result Value Ref Range    INR 1.1 0.9 - 1.1      Prothrombin time 11.4 (H) 9.0 - 11.1 sec   CBC W/O DIFF    Collection Time: 01/17/23  3:23 AM   Result Value Ref Range    WBC 8.0 4.1 - 11.1 K/uL    RBC 2.96 (L) 4.10 - 5.70 M/uL    HGB 9.1 (L) 12.1 - 17.0 g/dL    HCT 28.5 (L) 36.6 - 50.3 %    MCV 96.3 80.0 - 99.0 FL    MCH 30.7 26.0 - 34.0 PG    MCHC 31.9 30.0 - 36.5 g/dL    RDW 15.7 (H) 11.5 - 14.5 %    PLATELET 439 (H) 604 - 400 K/uL    MPV 10.4 8.9 - 12.9 FL    NRBC 0.0 0  WBC    ABSOLUTE NRBC 0.00 0.00 - 0.01 K/uL   RENAL FUNCTION PANEL    Collection Time: 01/17/23  3:23 AM   Result Value Ref Range    Sodium 137 136 - 145 mmol/L    Potassium 3.4 (L) 3.5 - 5.1 mmol/L    Chloride 104 97 - 108 mmol/L    CO2 25 21 - 32 mmol/L    Anion gap 8 5 - 15 mmol/L    Glucose 104 (H) 65 - 100 mg/dL    BUN 14 6 - 20 MG/DL    Creatinine 1.17 0.70 - 1.30 MG/DL    BUN/Creatinine ratio 12 12 - 20      eGFR >60 >60 ml/min/1.73m2    Calcium 8.2 (L) 8.5 - 10.1 MG/DL    Phosphorus 3.1 2.6 - 4.7 MG/DL    Albumin 2.7 (L) 3.5 - 5.0 g/dL   GLUCOSE, POC    Collection Time: 01/17/23  5:44 AM   Result Value Ref Range    Glucose (POC) 93 65 - 117 mg/dL    Performed by Jaimee Altamirano RN    GLUCOSE, POC    Collection Time: 01/17/23 12:00 PM   Result Value Ref Range    Glucose (POC) 89 65 - 117 mg/dL    Performed by Xiao JEFFRIES    GLUCOSE, POC    Collection Time: 01/17/23  4:37 PM   Result Value Ref Range    Glucose (POC) 108 65 - 117 mg/dL    Performed by Digna Quiroz RN        IR PLC IVC FILTER   Final Result   1.   Successful deployment of an [employer] Option IVC filter in an infrarenal IVC   location. 2.  This filter is potentially removable when the risk of venous thromboembolic   events diminishes or the patient becomes a candidate for appropriate   anticoagulation. XR ABD (KUB)   Final Result   Gastric tube tip lies just below the diaphragm, consider advancing the NG tube 7   to 10 cm. CT SPINE LUMB WO CONT   Final Result    Degenerative changes in the lumbar spine as described. Moderate to severe canal   stenosis at L3-4. Severe bilateral foraminal narrowing at L5-S1. CT ABD PELV WO CONT   Final Result   1. Left retroperitoneal hematoma grossly stable. 2.  Interval development of a small bowel obstruction. Transition point in the   right upper quadrant. No definite obstructing lesions identified. 3.  Left effusion and left lower lobe volume loss without significant change. XR ABD PORT  1 V   Final Result   Interval placement of NG tube               IR INSERT NON TUNL CVC OVER 5 YRS   Final Result   Technically successful right internal jugular vein non-tunneled dialysis   catheter exchange. A post procedure chest x-ray is pending. DUPLEX LOWER EXT ARTERY LEFT   Final Result      CTA ABDOMEN PELV W CONT   Final Result   1. Stable to mild provement in 9.1 x 11.5 x 18.6 cm left retroperitoneal   collection, previously 9.9 x 11.4 x 20.5 cm.   2.  High density contrast material within the collection likely represents   residual contrast material from embolization procedure. No definitive evidence   of active extravasation. 3.  Evolving right upper pole renal hypodensity may represent renal infarction   versus pyelonephritis. Evaluation for embolic phenomenon may be considered. 4.  Evolving peripheral splenic hypodensities are nonspecific, possibly infarct   versus infectious foci. 5.  Additional findings as above.          XR ABD (KUB)   Final Result   New small bowel dilation may represent ileus.      IR OCCL TXCATH HEMMORAGE W SI   Final Result      Abdominal arteriogram demonstrating active hemorrhage off of the distal left L2   lumbar artery. Coil embolization of the lumbar artery was performed. XR CHEST PORT   Final Result   Right IJ catheter satisfactory position without pneumothorax. CT CHEST W CONT   Final Result   Large left retroperitoneal hematoma with evidence of active extravasation. Right   renal infarct. Small left pleural effusion with left lower lobe atelectasis. CT ABD PELV W CONT   Final Result   Large left retroperitoneal hematoma with evidence of active extravasation. Right   renal infarct. Small left pleural effusion with left lower lobe atelectasis. XR CHEST PORT   Final Result      No acute process on portable chest.         DUPLEX LOWER EXT VENOUS BILAT   Final Result      CT HEAD WO CONT   Final Result   No evidence of acute process. XR CHEST PORT   Final Result   No acute cardiopulmonary process. XR HIP LT W OR WO PELV  1 VW   Final Result   No acute bony abnormality. CTA CHEST W OR W WO CONT   Final Result      Single right middle lobe pulmonary embolism. No evidence for right heart strain   or pulmonary infarction. Clear lungs   Incidental hepatic steatosis   This result was reportedly relieved by me to Dr. Dana Wood at 1937 hours   789      XR CHEST PA LAT   Final Result      No acute cardiopulmonary process.              Active Problems:    Pulmonary embolism ([address] (12/25/2022)      Retroperitoneal bleed (1/2/2023)      Left leg weakness (1/5/2023)      Nontraumatic lumbosacral plexopathy (1/5/2023)      Lumbar back pain with radiculopathy affecting left lower extremity (1/5/2023)      Bilateral carotid artery stenosis (1/5/2023)      Cerebral microvascular disease (1/5/2023)        Assessment/Plan:   Left retroperitoneal hemorrhage - resolved  L2 plexopathy with LLE weakness and sensory loss  - Spontaneous retroperitoneal bleed now status post L2 artery bleed embolization.  - S/p 5U RBC and 2U FFP this admission.  - s/p embolization with coils  Daily labs - Hgb stable 8s-9s  PT/OT plans for inpatient rehab when bed avail/ins auth  Surgery following, appreciate input  - remains stable     Ileus  - retroperitoneal bleed mass effect seen on CT abdomen/pelvis  - NG tube removed 1/13, pt tolerating full liquid diet 1/16  - Reports regular bowel movements     Massive PE  - S/p treatment with lovenox. Anticoagulation discontinued after spontaneous retroperitoneal bleed with hemorrhagic shock.  - SCDs only for now - likely intolerant of long term [address]  - IVC filter placed 1/12        GILMAR/ATN (Creat 3.44)  Hypokalemia  -  d/t hemorrhagic shock  - temporary Hemodialysis - now [address]  - nephrology following  - Creat normalized to 1.17    Hypertension  Hx HTN - per pt not on home meds for some time now, historically on losartan so will resume as BP 748A systolic here     Shock liver  - d/t hemorrhagic shock  - LFTs improving     Hx of alcohol abuse  Plan  Valium prn for CIWA scale.      Disposition: IPR pending bed/ins auth-- in need of P2P, attempted but phone number not working, CM aware--P2P needs to be done within 7 days    DVT Prophylaxis: SCDs  Code Status: Full Code  POA: Shanika Quintanilla - child - [phone]     Care Plan discussed with: Patient, Nurse,   _______________________________________________________________    JAY Gonzalez

## 2023-01-17 NOTE — PROGRESS NOTES
Problem: Alcohol Withdrawal  Goal: *STG: Participates in treatment plan  1/17/2023 1827 by Charleen Nevarez RN  Outcome: Progressing Towards Goal  1/17/2023 1827 by Charleen Nevarez RN  Outcome: Progressing Towards Goal  Goal: *STG: Remains safe in hospital  1/17/2023 1827 by Charleen Nevarez RN  Outcome: Progressing Towards Goal  1/17/2023 1827 by Charleen Nevarez RN  Outcome: Progressing Towards Goal  Goal: *STG: Seeks staff when symptoms of withdrawal increase  1/17/2023 1827 by Charleen Nevarez RN  Outcome: Progressing Towards Goal  1/17/2023 1827 by Charleen Nevarez RN  Outcome: Progressing Towards Goal  Goal: *STG: Complies with medication therapy  1/17/2023 1827 by Charleen Nevarez RN  Outcome: Progressing Towards Goal  1/17/2023 1827 by Charleen Nevarez RN  Outcome: Progressing Towards Goal  Goal: *STG: Attends activities and groups  1/17/2023 1827 by Charleen Nevarez RN  Outcome: Progressing Towards Goal  1/17/2023 1827 by Charleen Nevarez RN  Outcome: Progressing Towards Goal  Goal: *STG: Will identify negative impact of chemical dependency including the use of tobacco, alcohol, and other substances  1/17/2023 1827 by Charleen Nevarez RN  Outcome: Progressing Towards Goal  1/17/2023 1827 by Charleen Nevarez RN  Outcome: Progressing Towards Goal  Goal: *STG: Verbalizes abstinence as an achievable goal  Outcome: Progressing Towards Goal  Goal: *STG: Agrees to participate in outpatient after care program to support ongoing mental health  Outcome: Progressing Towards Goal  Goal: *STG: Able to indentify relapse triggers including interpersonal/social and familial factors  Outcome: Progressing Towards Goal  Goal: *STG: Identify lifestyle changes to support long term sobriety such as vocation, employment, education, and legal issues  Outcome: Progressing Towards Goal  Goal: *STG: Maintains appropriate nutrition and hydration  Outcome: Progressing Towards Goal  Goal: *STG: Vital signs within defined limits  Outcome: Progressing Towards Goal  Goal: *STG/LTG: Relapse prevention plan in place to include housing/aftercare, leisure activities, and spirituality  Outcome: Progressing Towards Goal  Goal: Interventions  Outcome: Progressing Towards Goal

## 2023-01-17 NOTE — PROGRESS NOTES
End of Shift Note    Bedside shift change report given to Елена HAMM (oncoming nurse) by Zena Montez RN (offgoing nurse). Report included the following information SBAR, Kardex, Intake/Output, MAR, and Recent Results    Shift worked:  Night     Shift summary and any significant changes:    Had a restful night though BP on the higher limits on and off. B/sugar remained within normal range  the whole night.  Woke up and has already cleaned himself in bed  Passing little amts of urine which looks concentrated' brown   Concerns for physician to address:       Zone phone for oncoming shift:            Zena Montez, RN

## 2023-01-17 NOTE — PROGRESS NOTES
UPDATE: 1:53PM    CM informed that pt has been denied insurance auth approval.  However, P2P is being requested. CM informed that hospitalist can contact pts insurance at: [phone] (option 2). P2P must be completed within 7 business days. CM will update clinical team and NP of the following. TATI Sheridan, [place]            UPDATE: 11:31AM     CM received OT clinicals, and sent updated notes for insurance auth at [facility]. TAIT Sheridan, [place]          INITIAL NOTE: CM received verification that pts insurance has been initiated, and was provided with auth reference number: [id number]. CM informed that pt will require updated OT notes, last OT note entered on 1/13/23. CM informed OT therapist on the unit. CM will send updated clinicals  once entered. CM will continue to follow.     TATI Sheridan, [address] [place] [address]

## 2023-01-17 NOTE — PROGRESS NOTES
Problem: Self Care Deficits Care Plan (Adult)  Goal: *Acute Goals and Plan of Care (Insert Text)  Description: FUNCTIONAL STATUS PRIOR TO ADMISSION: Patient was independent and active without use of DME. Patient was independent for basic and instrumental ADLs. Recently eval and discharged at independent, now re-evaluation due to ICU admission. HOME SUPPORT: The patient lived with friend (need to confirm PLOF/support). Occupational Therapy Goals  Initiated 1/7/2023  1. Patient will perform standing grooming with supervision/set-up within 7 day(s). 2.  Patient will perform bathing with supervision/set-up within 7 day(s). 3.  Patient will perform lower body dressing with minimal assistance/contact guard assist within 7 day(s). 4.  Patient will perform toilet transfers with minimal assistance/contact guard assist within 7 day(s). 5.  Patient will perform all aspects of toileting with minimal assistance/contact guard assist within 7 day(s). 6.  Patient will participate in upper extremity therapeutic exercise/activities with supervision/set-up for 10 minutes within 7 day(s). 7.  Patient will utilize energy conservation techniques during functional activities with verbal cues within 7 day(s). Outcome: Progressing Towards Goal   OCCUPATIONAL THERAPY TREATMENT  Patient: Ny Cruz (89 y.o. male)  Date: 1/17/2023  Diagnosis: Alcoholic ketoacidosis [C19.26]  Pulmonary embolism (HCC) [I26.99] <principal problem not specified>  Procedure(s) (LRB):  LAPAROTOMY EXPLORATORY (N/A) 15 Days Post-Op  Precautions: Fall, Seizure, Bed Alarm  Chart, occupational therapy assessment, plan of care, and goals were reviewed. ASSESSMENT  Patient continues with skilled OT services and is progressing towards goals.   Good participation with ADL tasks with L LE weakness noted in sitting and standing; using B UE to manage movement of L LE in sitting position for access to L foot; pain in L hip with PROM L LE of LE placed into graciela sitting on L for foot access. Cues for mary alice as L LE drags behind x 3 in 20 feet of ambulation; able to stop and self correct with vc. Mod I use of urinal Appears cog intact but not formally assessed which is recommended    Current Level of Function Impacting Discharge (ADLs): Set up-I UE ADLs, diet improving; Min A LE ADLs and functional mobility    Other factors to consider for discharge: supportive family         PLAN :  Patient continues to benefit from skilled intervention to address the above impairments. Continue treatment per established plan of care to address goals. Recommend with staff: Up to bathroom with RW and gait belt    Recommend next OT session: LE ADLs    Recommendation for discharge: (in order for the patient to meet his/her long term goals)  Therapy 3 hours per day 5-7 days per week    This discharge recommendation:  Has been made in collaboration with the attending provider and/or case management    IF patient discharges home will need the following DME: AE: long handled bathing, AE: long handled dressing, bedside commode, transfer bench, and walker: rolling       SUBJECTIVE:   Patient stated Ju Ko told me to keep the chair touching the back of my legs.   (not reaching back)    OBJECTIVE DATA SUMMARY:   Cognitive/Behavioral Status:  Neurologic State: Alert; Appropriate for age  Orientation Level: Oriented X4  Cognition: Appropriate for age attention/concentration; Appropriate decision making; Follows commands  Perception: Appears intact  Perseveration: No perseveration noted  Safety/Judgement: Awareness of environment; Fall prevention; Insight into deficits; Decreased insight into deficits (cues needed for safe RW sequencing with L LE weakness)    Functional Mobility and Transfers for ADLs:         Transfers:  Sit to Stand: Contact guard assistance;Minimum assistance; Additional time; Adaptive equipment  Functional Transfers  Toilet Transfer : Contact guard assistance; Additional time;Adaptive equipment       Balance:  Sitting: Intact  Standing: Impaired; Without support  Standing - Static: Good;Constant support  Standing - Dynamic : Fair;Constant support (cues for L LE management in standing/ambulation)    ADL Intervention:  Feeding  Feeding Assistance: Stand-by assistance (eating yogurt without difficulty)    Grooming  Grooming Assistance: Stand-by assistance (done in standing with RW)  Position Performed: Standing              Lower Body Bathing  Bathing Assistance: Minimum assistance (A to manage L LE into crossed leg position, seated, for bathing)    Upper Body Dressing Assistance  Dressing Assistance: Set-up    Lower Body Dressing Assistance  Dressing Assistance: Minimum assistance;Contact guard assistance (CGA in standing portion of task with RW; may benefit from leg  for L LE dressing, positioning)    Toileting  Toileting Assistance: Contact guard assistance;Minimum assistance    Cognitive Retraining  Attention to Task: Single task  Maintains Attention For (Time): Greater than 10 minutes  Following Commands: Follows one step commands/directions; Follows two step commands/directions  Safety/Judgement: Awareness of environment; Fall prevention; Insight into deficits; Decreased insight into deficits (cues needed for safe RW sequencing with L LE weakness)      Pain:  5/10 L hip \"better\"    Activity Tolerance:   Fair, tolerates ADLs without rest breaks, and SpO2 stable on RA    After treatment patient left in no apparent distress:   Sitting in chair, Call bell within reach, Bed / chair alarm activated, and non skid socks in place, phone in reach    COMMUNICATION/COLLABORATION:   The patients plan of care was discussed with: Registered nurse and Case management.      Skye Watters OTR/L  Time Calculation: 25 mins

## 2023-01-18 LAB
ALBUMIN SERPL-MCNC: 2.5 G/DL (ref 3.5–5)
ANION GAP SERPL CALC-SCNC: 10 MMOL/L (ref 5–15)
BUN SERPL-MCNC: 9 MG/DL (ref 6–20)
BUN/CREAT SERPL: 10 (ref 12–20)
CALCIUM SERPL-MCNC: 8.3 MG/DL (ref 8.5–10.1)
CHLORIDE SERPL-SCNC: 107 MMOL/L (ref 97–108)
CO2 SERPL-SCNC: 22 MMOL/L (ref 21–32)
CREAT SERPL-MCNC: 0.94 MG/DL (ref 0.7–1.3)
ERYTHROCYTE [DISTWIDTH] IN BLOOD BY AUTOMATED COUNT: 16 % (ref 11.5–14.5)
GLUCOSE SERPL-MCNC: 87 MG/DL (ref 65–100)
HCT VFR BLD AUTO: 28.6 % (ref 36.6–50.3)
HGB BLD-MCNC: 9.3 G/DL (ref 12.1–17)
INR PPP: 1.1 (ref 0.9–1.1)
MCH RBC QN AUTO: 30.4 PG (ref 26–34)
MCHC RBC AUTO-ENTMCNC: 32.5 G/DL (ref 30–36.5)
MCV RBC AUTO: 93.5 FL (ref 80–99)
NRBC # BLD: 0.02 K/UL (ref 0–0.01)
NRBC BLD-RTO: 0.3 PER 100 WBC
PHOSPHATE SERPL-MCNC: 3.2 MG/DL (ref 2.6–4.7)
PLATELET # BLD AUTO: 464 K/UL (ref 150–400)
PMV BLD AUTO: 9.8 FL (ref 8.9–12.9)
POTASSIUM SERPL-SCNC: 3.7 MMOL/L (ref 3.5–5.1)
PROTHROMBIN TIME: 11.6 SEC (ref 9–11.1)
RBC # BLD AUTO: 3.06 M/UL (ref 4.1–5.7)
SODIUM SERPL-SCNC: 139 MMOL/L (ref 136–145)
WBC # BLD AUTO: 7.5 K/UL (ref 4.1–11.1)

## 2023-01-18 PROCEDURE — 36415 COLL VENOUS BLD VENIPUNCTURE: CPT

## 2023-01-18 PROCEDURE — 80069 RENAL FUNCTION PANEL: CPT

## 2023-01-18 PROCEDURE — 74011250637 HC RX REV CODE- 250/637

## 2023-01-18 PROCEDURE — 74011000250 HC RX REV CODE- 250: Performed by: STUDENT IN AN ORGANIZED HEALTH CARE EDUCATION/TRAINING PROGRAM

## 2023-01-18 PROCEDURE — 85610 PROTHROMBIN TIME: CPT

## 2023-01-18 PROCEDURE — 94760 N-INVAS EAR/PLS OXIMETRY 1: CPT

## 2023-01-18 PROCEDURE — 97116 GAIT TRAINING THERAPY: CPT

## 2023-01-18 PROCEDURE — 65270000029 HC RM PRIVATE

## 2023-01-18 PROCEDURE — 74011250637 HC RX REV CODE- 250/637: Performed by: INTERNAL MEDICINE

## 2023-01-18 PROCEDURE — 74011250637 HC RX REV CODE- 250/637: Performed by: STUDENT IN AN ORGANIZED HEALTH CARE EDUCATION/TRAINING PROGRAM

## 2023-01-18 PROCEDURE — 97530 THERAPEUTIC ACTIVITIES: CPT

## 2023-01-18 PROCEDURE — 85027 COMPLETE CBC AUTOMATED: CPT

## 2023-01-18 RX ORDER — LOSARTAN POTASSIUM 50 MG/1
50 TABLET ORAL DAILY
Status: DISCONTINUED | OUTPATIENT
Start: 2023-01-19 | End: 2023-01-20 | Stop reason: HOSPADM

## 2023-01-18 RX ADMIN — SODIUM CHLORIDE, PRESERVATIVE FREE 10 ML: 5 INJECTION INTRAVENOUS at 17:16

## 2023-01-18 RX ADMIN — SODIUM CHLORIDE, PRESERVATIVE FREE 5 ML: 5 INJECTION INTRAVENOUS at 06:34

## 2023-01-18 RX ADMIN — FOLIC ACID 1 MG: 1 TABLET ORAL at 08:41

## 2023-01-18 RX ADMIN — MULTIPLE VITAMINS W/ MINERALS TAB 1 TABLET: TAB at 08:41

## 2023-01-18 RX ADMIN — Medication 1 AMPULE: at 21:23

## 2023-01-18 RX ADMIN — THIAMINE HCL TAB 100 MG 100 MG: 100 TAB at 08:40

## 2023-01-18 RX ADMIN — LOSARTAN POTASSIUM 25 MG: 50 TABLET, FILM COATED ORAL at 08:41

## 2023-01-18 RX ADMIN — SODIUM CHLORIDE, PRESERVATIVE FREE 5 ML: 5 INJECTION INTRAVENOUS at 21:23

## 2023-01-18 NOTE — PROGRESS NOTES
Hospitalist Progress Note    Subjective:   Daily Progress Note: 1/18/2023 6:29 PM    Hospital Course:  Mr. Evelyn Wiley is a 59 y/o male with PMH significant for alcohol abuse, who presented to ED with syncopal episode with associated shortness of breath. He was found to have massive PE and was started on Lovenox treatment, however this resulted in left retroperitoneal hemorrhage requiring transfusion of 5 units PRBC and 2 of FFP and s/p L2 artery embolization. As complication of the hemorrhage he also had ileus, required NG tube which has since been discontinued he is now tolerating diet and having regular bowel movements. IVC filter placed 1/12, anticipate DC to IPR 1/17. Subjective:  NAD. L leg weakness.     Current Facility-Administered Medications   Medication Dose Route Frequency    losartan (COZAAR) tablet 25 mg  25 mg Oral DAILY    dextrose 10% infusion 0-250 mL  0-250 mL IntraVENous PRN    hydrALAZINE (APRESOLINE) 20 mg/mL injection 10 mg  10 mg IntraVENous Q6H PRN    diazePAM (VALIUM) injection 5 mg  5 mg IntraVENous Q6H PRN    glucose chewable tablet 16 g  4 Tablet Oral PRN    glucagon (GLUCAGEN) injection 1 mg  1 mg IntraMUSCular PRN    epoetin ji-epbx (RETACRIT) injection 10,000 Units  10,000 Units SubCUTAneous Q TUE, THU & SAT    simethicone (MYLICON) tablet 80 mg  80 mg Oral QID PRN    alcohol 62% (NOZIN) nasal  1 Ampule  1 Ampule Topical H19H    folic acid (FOLVITE) tablet 1 mg  1 mg Oral DAILY    thiamine mononitrate (B-1) tablet 100 mg  100 mg Oral DAILY    multivitamin, tx-iron-ca-min (THERA-M w/ IRON) tablet 1 Tablet  1 Tablet Oral DAILY    [Held by provider] magnesium oxide (MAG-OX) tablet 400 mg  400 mg Oral DAILY    sodium chloride (NS) flush 5-40 mL  5-40 mL IntraVENous Q8H    sodium chloride (NS) flush 5-40 mL  5-40 mL IntraVENous PRN    acetaminophen (TYLENOL) tablet 650 mg  650 mg Oral Q6H PRN    Or    acetaminophen (TYLENOL) suppository 650 mg  650 mg Rectal Q6H PRN polyethylene glycol (MIRALAX) packet 17 g  17 g Oral DAILY PRN    ondansetron (ZOFRAN ODT) tablet 4 mg  4 mg Oral Q8H PRN    Or    ondansetron (ZOFRAN) injection 4 mg  4 mg IntraVENous Q6H PRN        Review of Systems:     Review of Systems   Constitutional:  Negative for chills, fever, malaise/fatigue and weight loss. Respiratory:  Negative for cough, sputum production and shortness of breath. Cardiovascular:  Negative for chest pain, palpitations and leg swelling. Gastrointestinal:  Negative for abdominal pain, constipation, diarrhea, nausea and vomiting. Neurological:  Positive for weakness. Negative for dizziness. Objective:     Visit Vitals  BP (!) 146/97   Pulse (!) 105   Temp 97.9 °F (36.6 °C)   Resp 16   Ht 5' 9\" (1.753 m)   Wt 73.1 kg (161 lb 2.5 oz)   SpO2 100%   BMI 23.80 kg/m²    O2 Flow Rate (L/min): 2 l/min O2 Device: None (Room air)    Temp (24hrs), Av.3 °F (36.8 °C), Min:97.9 °F (36.6 °C), Max:98.5 °F (36.9 °C)      No intake/output data recorded.  1901 -  0700  In: 200 [P.O.:200]  Out: 650 [Urine:650]    PHYSICAL EXAM:     Physical Exam  Constitutional:       Appearance: Normal appearance. HENT:      Head: Normocephalic and atraumatic. Mouth/Throat:      Mouth: Mucous membranes are moist.   Eyes:      Pupils: Pupils are equal, round, and reactive to light. Cardiovascular:      Rate and Rhythm: Normal rate and regular rhythm. Pulses: Normal pulses. Heart sounds: Normal heart sounds. Pulmonary:      Effort: Pulmonary effort is normal.      Breath sounds: Normal breath sounds. Abdominal:      General: Bowel sounds are normal.      Palpations: Abdomen is soft. Skin:     General: Skin is warm and dry. Neurological:      Mental Status: He is alert and oriented to person, place, and time. Motor: Weakness present.              Data Review    Recent Results (from the past 24 hour(s))   GLUCOSE, POC    Collection Time: 23  4:37 PM Result Value Ref Range    Glucose (POC) 108 65 - 117 mg/dL    Performed by Renny Thakur RN    CBC W/O DIFF    Collection Time: 01/18/23  6:13 AM   Result Value Ref Range    WBC 7.5 4.1 - 11.1 K/uL    RBC 3.06 (L) 4.10 - 5.70 M/uL    HGB 9.3 (L) 12.1 - 17.0 g/dL    HCT 28.6 (L) 36.6 - 50.3 %    MCV 93.5 80.0 - 99.0 FL    MCH 30.4 26.0 - 34.0 PG    MCHC 32.5 30.0 - 36.5 g/dL    RDW 16.0 (H) 11.5 - 14.5 %    PLATELET 534 (H) 388 - 400 K/uL    MPV 9.8 8.9 - 12.9 FL    NRBC 0.3 (H) 0  WBC    ABSOLUTE NRBC 0.02 (H) 0.00 - 0.01 K/uL   RENAL FUNCTION PANEL    Collection Time: 01/18/23  6:13 AM   Result Value Ref Range    Sodium 139 136 - 145 mmol/L    Potassium 3.7 3.5 - 5.1 mmol/L    Chloride 107 97 - 108 mmol/L    CO2 22 21 - 32 mmol/L    Anion gap 10 5 - 15 mmol/L    Glucose 87 65 - 100 mg/dL    BUN 9 6 - 20 MG/DL    Creatinine 0.94 0.70 - 1.30 MG/DL    BUN/Creatinine ratio 10 (L) 12 - 20      eGFR >60 >60 ml/min/1.73m2    Calcium 8.3 (L) 8.5 - 10.1 MG/DL    Phosphorus 3.2 2.6 - 4.7 MG/DL    Albumin 2.5 (L) 3.5 - 5.0 g/dL   PROTHROMBIN TIME + INR    Collection Time: 01/18/23  6:13 AM   Result Value Ref Range    INR 1.1 0.9 - 1.1      Prothrombin time 11.6 (H) 9.0 - 11.1 sec       IR PLC IVC FILTER   Final Result   1. Successful deployment of an [employer] Option IVC filter in an infrarenal IVC   location. 2.  This filter is potentially removable when the risk of venous thromboembolic   events diminishes or the patient becomes a candidate for appropriate   anticoagulation. XR ABD (KUB)   Final Result   Gastric tube tip lies just below the diaphragm, consider advancing the NG tube 7   to 10 cm. CT SPINE LUMB WO CONT   Final Result    Degenerative changes in the lumbar spine as described. Moderate to severe canal   stenosis at L3-4. Severe bilateral foraminal narrowing at L5-S1. CT ABD PELV WO CONT   Final Result   1. Left retroperitoneal hematoma grossly stable.       2.  Interval development of a small bowel obstruction. Transition point in the   right upper quadrant. No definite obstructing lesions identified. 3.  Left effusion and left lower lobe volume loss without significant change. XR ABD PORT  1 V   Final Result   Interval placement of NG tube               IR INSERT NON TUNL CVC OVER 5 YRS   Final Result   Technically successful right internal jugular vein non-tunneled dialysis   catheter exchange. A post procedure chest x-ray is pending. DUPLEX LOWER EXT ARTERY LEFT   Final Result      CTA ABDOMEN PELV W CONT   Final Result   1. Stable to mild provement in 9.1 x 11.5 x 18.6 cm left retroperitoneal   collection, previously 9.9 x 11.4 x 20.5 cm.   2.  High density contrast material within the collection likely represents   residual contrast material from embolization procedure. No definitive evidence   of active extravasation. 3.  Evolving right upper pole renal hypodensity may represent renal infarction   versus pyelonephritis. Evaluation for embolic phenomenon may be considered. 4.  Evolving peripheral splenic hypodensities are nonspecific, possibly infarct   versus infectious foci. 5.  Additional findings as above. XR ABD (KUB)   Final Result   New small bowel dilation may represent ileus. IR OCCL TXCATH HEMMORAGE W SI   Final Result      Abdominal arteriogram demonstrating active hemorrhage off of the distal left L2   lumbar artery. Coil embolization of the lumbar artery was performed. XR CHEST PORT   Final Result   Right IJ catheter satisfactory position without pneumothorax. CT CHEST W CONT   Final Result   Large left retroperitoneal hematoma with evidence of active extravasation. Right   renal infarct. Small left pleural effusion with left lower lobe atelectasis. CT ABD PELV W CONT   Final Result   Large left retroperitoneal hematoma with evidence of active extravasation. Right   renal infarct.  Small left pleural effusion with left lower lobe atelectasis. XR CHEST PORT   Final Result      No acute process on portable chest.         DUPLEX LOWER EXT VENOUS BILAT   Final Result      CT HEAD WO CONT   Final Result   No evidence of acute process. XR CHEST PORT   Final Result   No acute cardiopulmonary process. XR HIP LT W OR WO PELV  1 VW   Final Result   No acute bony abnormality. CTA CHEST W OR W WO CONT   Final Result      Single right middle lobe pulmonary embolism. No evidence for right heart strain   or pulmonary infarction. Clear lungs   Incidental hepatic steatosis   This result was reportedly relieved by me to Dr. Diamond Wills at 1937 hours   789      XR CHEST PA LAT   Final Result      No acute cardiopulmonary process. Active Problems:    Pulmonary embolism ([address] (12/25/2022)      Retroperitoneal bleed (1/2/2023)      Left leg weakness (1/5/2023)      Nontraumatic lumbosacral plexopathy (1/5/2023)      Lumbar back pain with radiculopathy affecting left lower extremity (1/5/2023)      Bilateral carotid artery stenosis (1/5/2023)      Cerebral microvascular disease (1/5/2023)      Assessment/Plan:   Left retroperitoneal hemorrhage - resolved  L2 plexopathy with LLE weakness and sensory loss  - Spontaneous retroperitoneal bleed now status post L2 artery bleed embolization.  - S/p 5U RBC and 2U FFP this admission.  - s/p embolization with coils  -hgb stable  PT/OT plans for inpatient rehab when bed avail/ins auth  Surgery following, appreciate input     Ileus  - retroperitoneal bleed mass effect seen on CT abdomen/pelvis  - NG tube removed 1/13,  tolerating regular diet  - Reports regular bowel movements     Massive PE  - S/p treatment with lovenox.  Anticoagulation discontinued after spontaneous retroperitoneal bleed with hemorrhagic shock.  - SCDs only for now - likely intolerant of long term [address]  - IVC filter placed 1/12        GILMAR/ATN (Creat 3.44)  Hypokalemia  -  d/t hemorrhagic shock  - temporary Hemodialysis - now DC'd  - Creat normalized to 1.17.  appreciate nephro evaluation. Hypertension  BP not well controlled. Will incr losartan. Prn hydralazine      Shock liver  - d/t hemorrhagic shock  - LFTs improving     Hx of alcohol abuse  Plan  Valium prn for CIWA scale.      Disposition: IPR pending bed/ins auth, P2P to be done today    DVT Prophylaxis: SCDs  Code Status: Full Code  POA: Hansa Hall - child - [phone]     Care Plan discussed with: Patient, Nurse,   _______________________________________________________________    Celia Davis MD

## 2023-01-18 NOTE — PROGRESS NOTES
Problem: Self Care Deficits Care Plan (Adult)  Goal: *Acute Goals and Plan of Care (Insert Text)  Description: FUNCTIONAL STATUS PRIOR TO ADMISSION: Patient was independent and active without use of DME. Patient was independent for basic and instrumental ADLs. Recently eval and discharged at independent, now re-evaluation due to ICU admission. HOME SUPPORT: The patient lived with friend (need to confirm PLOF/support). OT weekly re-assessment 1/18/2023  Continue goals below    Occupational Therapy Goals  Initiated 1/7/2023  1. Patient will perform standing grooming with supervision/set-up within 7 day(s). 2.  Patient will perform bathing with supervision/set-up within 7 day(s). 3.  Patient will perform lower body dressing with minimal assistance/contact guard assist within 7 day(s). 4.  Patient will perform toilet transfers with minimal assistance/contact guard assist within 7 day(s). MET 1/18/23; upgrade to supervision  5. Patient will perform all aspects of toileting with minimal assistance/contact guard assist within 7 day(s). 6.  Patient will participate in upper extremity therapeutic exercise/activities with supervision/set-up for 10 minutes within 7 day(s). 7.  Patient will utilize energy conservation techniques during functional activities with verbal cues within 7 day(s). Outcome: Progressing Towards Goal   OCCUPATIONAL THERAPY TREATMENT/WEEKLY RE-ASSESSMENT  Patient: Carlos Vallejo (89 y.o. male)  Date: 1/18/2023  Diagnosis: Alcoholic ketoacidosis [G81.94]  Pulmonary embolism (HCC) [I26.99] <principal problem not specified>  Procedure(s) (LRB):  LAPAROTOMY EXPLORATORY (N/A) 16 Days Post-Op  Precautions: Fall, Seizure, Bed Alarm  Chart, occupational therapy assessment, plan of care, and goals were reviewed. ASSESSMENT  Patient continues with skilled OT services and is progressing towards goals. Pt received in bed, alert and oriented x4. Pt c/o L hip/thigh pain.  Pt agreeable to OT session. Pt continues to demonstrate impaired activity tolerance, mild PAINTING, LLE weakness, generalized weakness in BUE/RLE, impaired standing balance, impaired standing tolerance and functional reach. ADLs overall set-up to modA, bed mobility supervision to CGA, functional transfers Yissel, functional mobility with RW min-modA. Pt required frequent rest breaks with therapeutic exercises today. Pt also noted with impaired LLE strength, proprioception and sensation, demonstrating decreased strength with hip flexion, adduction and knee extension as well as impaired coordination/proprioception with mobility and side stepping L>R. Continue to strongly recommend rehab at discharge, pt can tolerate 3 hours therapy/day; pt seems very motivated. Current Level of Function Impacting Discharge (ADLs): set-up to modA    Other factors to consider for discharge: fall risk, prolonged/complicated hospitalization, ETOH         PLAN :  Goals have been updated based on progression since last assessment. Patient continues to benefit from skilled intervention to address the above impairments. Continue to follow patient 4 times a week to address goals. Recommend with staff: OOB TID, ambulate to bathroom with RW and gait belt and guarding of LLE    Recommend next OT session: continue ADLs, bathroom mobility and standing at sink    Recommendation for discharge: (in order for the patient to meet his/her long term goals)  Therapy 3 hours per day 5-7 days per week    This discharge recommendation:  Has not yet been discussed the attending provider and/or case management    IF patient discharges home will need the following DME: TBD at rehab       SUBJECTIVE:   Patient stated My left hip and thigh are still hurting.     OBJECTIVE DATA SUMMARY:   Cognitive/Behavioral Status:  Neurologic State: Alert; Appropriate for age  Orientation Level: Oriented X4  Cognition: Follows commands; Appropriate safety awareness; Appropriate for age attention/concentration; Appropriate decision making  Perception: Appears intact  Perseveration: No perseveration noted  Safety/Judgement: Awareness of environment; Fall prevention;Good awareness of safety precautions; Home safety; Insight into deficits    Functional Mobility and Transfers for ADLs:  Bed Mobility:  Supine to Sit: Supervision; Additional time    Transfers:  Sit to Stand: Minimum assistance; Additional time;Assist x1          Balance:  Sitting: Intact  Standing: Impaired; With support  Standing - Static: Fair;Constant support  Standing - Dynamic : Fair;Constant support    ADL Intervention:                 Type of Bath: Chlorhexidine (CHG); Basin/Soap/Water                        Cognitive Retraining  Safety/Judgement: Awareness of environment; Fall prevention;Good awareness of safety precautions; Home safety; Insight into deficits    Therapeutic Exercises:   Pt completed 1 set of 10 repetitions of chair push ups, requiring short rest break between every 2-3 reps. Pain:  L hip    Activity Tolerance:   Fair    After treatment patient left in no apparent distress:   Sitting in chair, Call bell within reach, and Bed / chair alarm activated    COMMUNICATION/COLLABORATION:   The patients plan of care was discussed with: Registered nurse.      Camilla Basilio OT  Time Calculation: 27 mins

## 2023-01-18 NOTE — PROGRESS NOTES
Problem: Mobility Impaired (Adult and Pediatric)  Goal: *Acute Goals and Plan of Care (Insert Text)  Description: FUNCTIONAL STATUS PRIOR TO ADMISSION: Patient was independent and active without use of DME.    HOME SUPPORT PRIOR TO ADMISSION: The patient lived with family but did not require assist.  Physical Therapy Goals    Physical Therapy Goals  Revised 1/16/2023  1. Patient will move from supine to sit and sit to supine , scoot up and down, and roll side to side in bed with modified independence within 7 day(s). 2.  Patient will transfer from bed to chair and chair to bed with contact guard assist using the least restrictive device within 7 day(s). 3.  Patient will perform sit to stand with supervision within 7 day(s). 4.  Patient will ambulate with minimal assistance/contact guard assist for 75 feet with the least restrictive device within 7 day(s). Revised 1/13/2023  1. Patient will move from supine to sit and sit to supine , scoot up and down, and roll side to side in bed with modified independence within 7 day(s). 2.  Patient will transfer from bed to chair and chair to bed with minimal assistance/contact guard assist using the least restrictive device within 7 day(s). 3.  Patient will perform sit to stand with supervision/set-up within 7 day(s). 4.  Patient will ambulate with minimal assistance/contact guard assist for 50 feet with the least restrictive device within 7 day(s). Physical Therapy Goals  Initiated 1/7/2023  1. Patient will move from supine to sit and sit to supine , scoot up and down, and roll side to side in bed with modified independence within 7 day(s). 2.  Patient will transfer from bed to chair and chair to bed with minimal assistance/contact guard assist using the least restrictive device within 7 day(s). 3.  Patient will perform sit to stand with minimal assistance/contact guard assist within 7 day(s).   4.  Patient will ambulate with minimal assistance/contact guard assist for 25 feet with the least restrictive device within 7 day(s). Outcome: Progressing Towards Goal   PHYSICAL THERAPY TREATMENT  Patient: Guanaco Terrazas (3 y.o. male)  Date: 1/18/2023  Diagnosis: Alcoholic ketoacidosis [V68.74]  Pulmonary embolism (HCC) [I26.99] <principal problem not specified>  Procedure(s) (LRB):  LAPAROTOMY EXPLORATORY (N/A) 16 Days Post-Op  Precautions: Fall, Seizure, Bed Alarm  Chart, physical therapy assessment, plan of care and goals were reviewed. ASSESSMENT  Patient continues with skilled PT services and is progressing towards goals. He demonstrates improved L LE stability with gait as evidenced by decreased L LE hyperextension in stance. He demonstrates the need for VC and increased assistance to maintain balance with turns. Patient also requires increased support when performing activities that requires shifting weight on to L LE. Patient is left supine in bed post gait with gait belt to work on assisted L LE heel slides and SLR. Current Level of Function Impacting Discharge (mobility/balance): Min A with RW    Other factors to consider for discharge: medical stability, decreased balance, decreased coordination, increased time for functional moblity          PLAN :  Patient continues to benefit from skilled intervention to address the above impairments. Continue treatment per established plan of care. to address goals. Recommendation for discharge: (in order for the patient to meet his/her long term goals)  Therapy 3 hours per day 5-7 days per week    This discharge recommendation:  Has been made in collaboration with the attending provider and/or case management    IF patient discharges home will need the following DME: to be determined (TBD) L LE brace       SUBJECTIVE:   Patient stated I'm doing ok.     OBJECTIVE DATA SUMMARY:   Critical Behavior:  Neurologic State: Alert, Appropriate for age  Orientation Level: Oriented X4  Cognition: Follows commands, Appropriate safety awareness, Appropriate for age attention/concentration, Appropriate decision making  Safety/Judgement: Awareness of environment, Fall prevention, Good awareness of safety precautions, Home safety, Insight into deficits  Functional Mobility Training:  Bed Mobility:     Supine to Sit: Supervision;Bed Modified; Additional time  Sit to Supine: Additional time;Bed Modified;Supervision           Transfers:  Sit to Stand: Minimum assistance; Additional time;Assist x1  Stand to Sit: Minimum assistance; Additional time;Assist x1        Bed to Chair: Minimum assistance; Additional time; Adaptive equipment                    Balance:  Sitting: Intact  Standing: Impaired; With support  Standing - Static: Fair;Constant support  Standing - Dynamic : Fair;Constant support  Ambulation/Gait Training:  Distance (ft): 65 Feet (ft) (x2)  Assistive Device: Gait belt;Walker, rolling  Ambulation - Level of Assistance: Minimal assistance        Gait Abnormalities: Decreased step clearance;Circumduction;Path deviations           Stance: Left decreased  Speed/Melanie: Slow  Step Length: Left shortened                    Stairs: Therapeutic Exercises:     Pain Rating:      Activity Tolerance:   Good    After treatment patient left in no apparent distress:   Supine in bed, Call bell within reach, and Side rails x 3    COMMUNICATION/COLLABORATION:   The patients plan of care was discussed with: Registered nurse.      Mai Wong, PT   Time Calculation: 21 mins

## 2023-01-18 NOTE — PROGRESS NOTES
Transition of Care Plan:     RUR: 15%  Disposition: IPR-at [facility] if approved by insur. P2P in progress. Patient has [address]  so patient has no SNF benefits     If SNF or IPR: Date FOC offered: 1/12/23  Date FOC received:1/12/23  Date authorization started with reference number:  Date authorization received and expires:  Accepting facility:  [facility]  Follow up appointments:PCP/Specialist  DME needed:per rehab  Transportation at [address] or means to access home:        IM Medicare Letter:n/a- has medicaid  Is patient a [place] and connected with the [place]? N/A          If yes, was [place] transfer form completed and VA notified? Caregiver Contact:Pt's son Starla Bradford [phone]  Discharge Caregiver contacted prior to discharge? Per patient  Care Conference needed?: no      1/17/23:   CM informed that pt has been denied insurance auth approval.  However, P2P is being requested. CM informed that hospitalist can contact pts insurance at: [phone] (option 2). P2P must be completed within 7 business days. Phone number for P2P was incorrect so NP was not able to start process. 1/18/23:  P2P phone number was corrected to:  [phone], option 2. This info was sent to Dr. David Quigley via [facility].      Yonny Oconnell, MSW

## 2023-01-19 VITALS
DIASTOLIC BLOOD PRESSURE: 105 MMHG | HEART RATE: 94 BPM | WEIGHT: 161.16 LBS | BODY MASS INDEX: 23.87 KG/M2 | TEMPERATURE: 98.7 F | HEIGHT: 69 IN | OXYGEN SATURATION: 95 % | RESPIRATION RATE: 18 BRPM | SYSTOLIC BLOOD PRESSURE: 158 MMHG

## 2023-01-19 LAB
ALBUMIN SERPL-MCNC: 2.5 G/DL (ref 3.5–5)
ALBUMIN/GLOB SERPL: 0.6 (ref 1.1–2.2)
ALP SERPL-CCNC: 217 U/L (ref 45–117)
ALT SERPL-CCNC: 42 U/L (ref 12–78)
ANION GAP SERPL CALC-SCNC: 7 MMOL/L (ref 5–15)
AST SERPL-CCNC: 62 U/L (ref 15–37)
BILIRUB DIRECT SERPL-MCNC: 0.3 MG/DL (ref 0–0.2)
BILIRUB SERPL-MCNC: 0.8 MG/DL (ref 0.2–1)
BUN SERPL-MCNC: 7 MG/DL (ref 6–20)
BUN/CREAT SERPL: 7 (ref 12–20)
CALCIUM SERPL-MCNC: 8.2 MG/DL (ref 8.5–10.1)
CHLORIDE SERPL-SCNC: 108 MMOL/L (ref 97–108)
CO2 SERPL-SCNC: 23 MMOL/L (ref 21–32)
CREAT SERPL-MCNC: 1.02 MG/DL (ref 0.7–1.3)
ERYTHROCYTE [DISTWIDTH] IN BLOOD BY AUTOMATED COUNT: 15.9 % (ref 11.5–14.5)
GLOBULIN SER CALC-MCNC: 4.3 G/DL (ref 2–4)
GLUCOSE SERPL-MCNC: 96 MG/DL (ref 65–100)
HCT VFR BLD AUTO: 29 % (ref 36.6–50.3)
HGB BLD-MCNC: 9.3 G/DL (ref 12.1–17)
INR PPP: 1.1 (ref 0.9–1.1)
MCH RBC QN AUTO: 30.5 PG (ref 26–34)
MCHC RBC AUTO-ENTMCNC: 32.1 G/DL (ref 30–36.5)
MCV RBC AUTO: 95.1 FL (ref 80–99)
NRBC # BLD: 0 K/UL (ref 0–0.01)
NRBC BLD-RTO: 0 PER 100 WBC
PHOSPHATE SERPL-MCNC: 3.5 MG/DL (ref 2.6–4.7)
PLATELET # BLD AUTO: 469 K/UL (ref 150–400)
PMV BLD AUTO: 10 FL (ref 8.9–12.9)
POTASSIUM SERPL-SCNC: 3.6 MMOL/L (ref 3.5–5.1)
PROT SERPL-MCNC: 6.8 G/DL (ref 6.4–8.2)
PROTHROMBIN TIME: 11.5 SEC (ref 9–11.1)
RBC # BLD AUTO: 3.05 M/UL (ref 4.1–5.7)
SODIUM SERPL-SCNC: 138 MMOL/L (ref 136–145)
WBC # BLD AUTO: 6.5 K/UL (ref 4.1–11.1)

## 2023-01-19 PROCEDURE — 97530 THERAPEUTIC ACTIVITIES: CPT

## 2023-01-19 PROCEDURE — 74011250636 HC RX REV CODE- 250/636: Performed by: PHYSICIAN ASSISTANT

## 2023-01-19 PROCEDURE — 80076 HEPATIC FUNCTION PANEL: CPT

## 2023-01-19 PROCEDURE — 85027 COMPLETE CBC AUTOMATED: CPT

## 2023-01-19 PROCEDURE — 85610 PROTHROMBIN TIME: CPT

## 2023-01-19 PROCEDURE — 74011000250 HC RX REV CODE- 250: Performed by: STUDENT IN AN ORGANIZED HEALTH CARE EDUCATION/TRAINING PROGRAM

## 2023-01-19 PROCEDURE — 97535 SELF CARE MNGMENT TRAINING: CPT

## 2023-01-19 PROCEDURE — 80048 BASIC METABOLIC PNL TOTAL CA: CPT

## 2023-01-19 PROCEDURE — 97116 GAIT TRAINING THERAPY: CPT

## 2023-01-19 PROCEDURE — 94760 N-INVAS EAR/PLS OXIMETRY 1: CPT

## 2023-01-19 PROCEDURE — 36415 COLL VENOUS BLD VENIPUNCTURE: CPT

## 2023-01-19 PROCEDURE — 74011250637 HC RX REV CODE- 250/637: Performed by: INTERNAL MEDICINE

## 2023-01-19 PROCEDURE — 74011250637 HC RX REV CODE- 250/637: Performed by: STUDENT IN AN ORGANIZED HEALTH CARE EDUCATION/TRAINING PROGRAM

## 2023-01-19 PROCEDURE — 84100 ASSAY OF PHOSPHORUS: CPT

## 2023-01-19 RX ORDER — LOSARTAN POTASSIUM 50 MG/1
50 TABLET ORAL DAILY
Qty: 30 TABLET | Refills: 0 | Status: SHIPPED
Start: 2023-01-20

## 2023-01-19 RX ADMIN — HYDRALAZINE HYDROCHLORIDE 10 MG: 20 INJECTION INTRAMUSCULAR; INTRAVENOUS at 08:26

## 2023-01-19 RX ADMIN — LOSARTAN POTASSIUM 50 MG: 50 TABLET, FILM COATED ORAL at 08:18

## 2023-01-19 RX ADMIN — FOLIC ACID 1 MG: 1 TABLET ORAL at 08:17

## 2023-01-19 RX ADMIN — Medication 1 AMPULE: at 09:46

## 2023-01-19 RX ADMIN — THIAMINE HCL TAB 100 MG 100 MG: 100 TAB at 08:18

## 2023-01-19 RX ADMIN — MULTIPLE VITAMINS W/ MINERALS TAB 1 TABLET: TAB at 08:18

## 2023-01-19 RX ADMIN — SODIUM CHLORIDE, PRESERVATIVE FREE 5 ML: 5 INJECTION INTRAVENOUS at 06:35

## 2023-01-19 NOTE — PROGRESS NOTES
Problem: Self Care Deficits Care Plan (Adult)  Goal: *Acute Goals and Plan of Care (Insert Text)  Description: FUNCTIONAL STATUS PRIOR TO ADMISSION: Patient was independent and active without use of DME. Patient was independent for basic and instrumental ADLs. Recently eval and discharged at independent, now re-evaluation due to ICU admission. HOME SUPPORT: The patient lived with friend (need to confirm PLOF/support). OT weekly re-assessment 1/18/2023  Continue goals below    Occupational Therapy Goals  Initiated 1/7/2023  1. Patient will perform standing grooming with supervision/set-up within 7 day(s). 2.  Patient will perform bathing with supervision/set-up within 7 day(s). 3.  Patient will perform lower body dressing with minimal assistance/contact guard assist within 7 day(s). 4.  Patient will perform toilet transfers with minimal assistance/contact guard assist within 7 day(s). MET 1/18/23; upgrade to supervision  5. Patient will perform all aspects of toileting with minimal assistance/contact guard assist within 7 day(s). 6.  Patient will participate in upper extremity therapeutic exercise/activities with supervision/set-up for 10 minutes within 7 day(s). 7.  Patient will utilize energy conservation techniques during functional activities with verbal cues within 7 day(s). Outcome: Progressing Towards Goal  OCCUPATIONAL THERAPY TREATMENT  Patient: Lashawn Alonso (83 y.o. male)  Date: 1/19/2023  Diagnosis: Alcoholic ketoacidosis [L74.54]  Pulmonary embolism (HCC) [I26.99] <principal problem not specified>  Procedure(s) (LRB):  LAPAROTOMY EXPLORATORY (N/A) 17 Days Post-Op  Precautions: Fall, Seizure, Bed Alarm  Chart, occupational therapy assessment, plan of care, and goals were reviewed. ASSESSMENT  Patient continues with skilled OT services and is progressing towards goals. Pt received supine in bed, agreeable to participate.  He transferred supine>sit with SBA, demo'd good sitting balance EOB. Using RW pt ambulated to bathroom, performed toilet transfer and brief standing grooming ADL with CGA-min A. Required intermittent cueing for safe RW management during transfers and ambulation, and had a few instances of LLE buckling that pt was able to self-correct with RW support. Returned to bed at end of session with needs met and VSS. Pt is progressing well towards goals but is functioning below his independent baseline due to generalized weakness (particularly LLE), decreased activity tolerance, and impaired functional mobility and balance. Continue to recommend rehab at discharge to improve functional performance. Current Level of Function Impacting Discharge (ADLs): SBA-min A transfers with RW, setup-mod A ADLs    Other factors to consider for discharge: fall risk, independent baseline         PLAN :  Patient continues to benefit from skilled intervention to address the above impairments. Continue treatment per established plan of care to address goals    Recommendation for discharge: (in order for the patient to meet his/her long term goals)  Therapy 3 hours per day 5-7 days per week    This discharge recommendation:  Has been made in collaboration with the attending provider and/or case management    IF patient discharges home will need the following DME: TBD       SUBJECTIVE:   Patient stated I need to do some walking.     OBJECTIVE DATA SUMMARY:   Cognitive/Behavioral Status:  Neurologic State: Alert  Orientation Level: Oriented X4  Cognition: Appropriate decision making          Functional Mobility and Transfers for ADLs:  Bed Mobility:  Supine to Sit: Stand-by assistance;Supervision  Sit to Supine: Stand-by assistance;Supervision    Transfers:  Sit to Stand: Contact guard assistance  Functional Transfers  Toilet Transfer : Contact guard assistance; Adaptive equipment (RW, grabbar)  Cues: Verbal cues provided       Balance:  Sitting: Intact  Standing: Impaired  Standing - Static: Good;Fair;Constant support  Standing - Dynamic : Fair;Constant support    ADL Intervention:       Grooming  Position Performed:  (standing at sink with RW)  Washing Hands: Contact guard assistance       Pain:  Pt reported no pain during session, did report numbness L hip through knee    Activity Tolerance:   Good    After treatment patient left in no apparent distress:   Supine in bed, Call bell within reach, Bed / chair alarm activated, and Side rails x 3    COMMUNICATION/COLLABORATION:   The patients plan of care was discussed with: Physical therapist and Registered nurse.      Marvis Halsted, OT  Time Calculation: 29 mins

## 2023-01-19 NOTE — PROGRESS NOTES
Hospitalist Progress Note    Subjective:   Daily Progress Note: 1/19/2023 6:29 PM    Hospital Course:  Mr. Raad Carrasco is a 59 y/o male with PMH significant for alcohol abuse, who presented to ED with syncopal episode with associated shortness of breath. He was found to have massive PE and was started on Lovenox treatment, however this resulted in left retroperitoneal hemorrhage requiring transfusion of 5 units PRBC and 2 of FFP and s/p L2 artery embolization. As complication of the hemorrhage he also had ileus, required NG tube which has since been discontinued he is now tolerating diet and having regular bowel movements. IVC filter placed 1/12, anticipate DC to IPR, pending bed. Subjective:  NAD. L leg weakness.     Current Facility-Administered Medications   Medication Dose Route Frequency    losartan (COZAAR) tablet 50 mg  50 mg Oral DAILY    dextrose 10% infusion 0-250 mL  0-250 mL IntraVENous PRN    hydrALAZINE (APRESOLINE) 20 mg/mL injection 10 mg  10 mg IntraVENous Q6H PRN    diazePAM (VALIUM) injection 5 mg  5 mg IntraVENous Q6H PRN    glucose chewable tablet 16 g  4 Tablet Oral PRN    glucagon (GLUCAGEN) injection 1 mg  1 mg IntraMUSCular PRN    epoetin ji-epbx (RETACRIT) injection 10,000 Units  10,000 Units SubCUTAneous Q TUE, THU & SAT    simethicone (MYLICON) tablet 80 mg  80 mg Oral QID PRN    alcohol 62% (NOZIN) nasal  1 Ampule  1 Ampule Topical S33Q    folic acid (FOLVITE) tablet 1 mg  1 mg Oral DAILY    thiamine mononitrate (B-1) tablet 100 mg  100 mg Oral DAILY    multivitamin, tx-iron-ca-min (THERA-M w/ IRON) tablet 1 Tablet  1 Tablet Oral DAILY    [Held by provider] magnesium oxide (MAG-OX) tablet 400 mg  400 mg Oral DAILY    sodium chloride (NS) flush 5-40 mL  5-40 mL IntraVENous Q8H    sodium chloride (NS) flush 5-40 mL  5-40 mL IntraVENous PRN    acetaminophen (TYLENOL) tablet 650 mg  650 mg Oral Q6H PRN    Or    acetaminophen (TYLENOL) suppository 650 mg  650 mg Rectal Q6H PRN    polyethylene glycol (MIRALAX) packet 17 g  17 g Oral DAILY PRN    ondansetron (ZOFRAN ODT) tablet 4 mg  4 mg Oral Q8H PRN    Or    ondansetron (ZOFRAN) injection 4 mg  4 mg IntraVENous Q6H PRN        Review of Systems:     Review of Systems   Constitutional:  Negative for chills, fever, malaise/fatigue and weight loss. Respiratory:  Negative for cough, sputum production and shortness of breath. Cardiovascular:  Negative for chest pain, palpitations and leg swelling. Gastrointestinal:  Negative for abdominal pain, constipation, diarrhea, nausea and vomiting. Neurological:  Positive for weakness. Negative for dizziness. Objective:     Visit Vitals  BP (!) 150/106 (BP 1 Location: Left upper arm)   Pulse 82   Temp 98.4 °F (36.9 °C)   Resp 18   Ht 5' 9\" (1.753 m)   Wt 73.1 kg (161 lb 2.5 oz)   SpO2 97%   BMI 23.80 kg/m²    O2 Flow Rate (L/min): 2 l/min O2 Device: None (Room air)    Temp (24hrs), Av.5 °F (36.9 °C), Min:98.4 °F (36.9 °C), Max:98.6 °F (37 °C)      No intake/output data recorded.  1901 -  0700  In: -   Out: 450 [Urine:450]    PHYSICAL EXAM:     Physical Exam  Constitutional:       Appearance: Normal appearance. HENT:      Head: Normocephalic and atraumatic. Mouth/Throat:      Mouth: Mucous membranes are moist.   Eyes:      Pupils: Pupils are equal, round, and reactive to light. Cardiovascular:      Rate and Rhythm: Normal rate and regular rhythm. Pulses: Normal pulses. Heart sounds: Normal heart sounds. Pulmonary:      Effort: Pulmonary effort is normal.      Breath sounds: Normal breath sounds. Abdominal:      General: Bowel sounds are normal.      Palpations: Abdomen is soft. Skin:     General: Skin is warm and dry. Neurological:      Mental Status: He is alert and oriented to person, place, and time. Motor: Weakness present.              Data Review    Recent Results (from the past 24 hour(s))   CBC W/O DIFF    Collection Time: 01/19/23  6:30 AM   Result Value Ref Range    WBC 6.5 4.1 - 11.1 K/uL    RBC 3.05 (L) 4.10 - 5.70 M/uL    HGB 9.3 (L) 12.1 - 17.0 g/dL    HCT 29.0 (L) 36.6 - 50.3 %    MCV 95.1 80.0 - 99.0 FL    MCH 30.5 26.0 - 34.0 PG    MCHC 32.1 30.0 - 36.5 g/dL    RDW 15.9 (H) 11.5 - 14.5 %    PLATELET 402 (H) 723 - 400 K/uL    MPV 10.0 8.9 - 12.9 FL    NRBC 0.0 0  WBC    ABSOLUTE NRBC 0.00 0.00 - 0.01 K/uL   HEPATIC FUNCTION PANEL    Collection Time: 01/19/23  6:30 AM   Result Value Ref Range    Protein, total 6.8 6.4 - 8.2 g/dL    Albumin 2.5 (L) 3.5 - 5.0 g/dL    Globulin 4.3 (H) 2.0 - 4.0 g/dL    A-G Ratio 0.6 (L) 1.1 - 2.2      Bilirubin, total 0.8 0.2 - 1.0 MG/DL    Bilirubin, direct 0.3 (H) 0.0 - 0.2 MG/DL    Alk. phosphatase 217 (H) 45 - 117 U/L    AST (SGOT) 62 (H) 15 - 37 U/L    ALT (SGPT) 42 12 - 78 U/L   METABOLIC PANEL, BASIC    Collection Time: 01/19/23  6:30 AM   Result Value Ref Range    Sodium 138 136 - 145 mmol/L    Potassium 3.6 3.5 - 5.1 mmol/L    Chloride 108 97 - 108 mmol/L    CO2 23 21 - 32 mmol/L    Anion gap 7 5 - 15 mmol/L    Glucose 96 65 - 100 mg/dL    BUN 7 6 - 20 MG/DL    Creatinine 1.02 0.70 - 1.30 MG/DL    BUN/Creatinine ratio 7 (L) 12 - 20      eGFR >60 >60 ml/min/1.73m2    Calcium 8.2 (L) 8.5 - 10.1 MG/DL   PHOSPHORUS    Collection Time: 01/19/23  6:30 AM   Result Value Ref Range    Phosphorus 3.5 2.6 - 4.7 MG/DL   PROTHROMBIN TIME + INR    Collection Time: 01/19/23  6:30 AM   Result Value Ref Range    INR 1.1 0.9 - 1.1      Prothrombin time 11.5 (H) 9.0 - 11.1 sec       IR PLC IVC FILTER   Final Result   1. Successful deployment of an [employer] Option IVC filter in an infrarenal IVC   location. 2.  This filter is potentially removable when the risk of venous thromboembolic   events diminishes or the patient becomes a candidate for appropriate   anticoagulation.         XR ABD (KUB)   Final Result   Gastric tube tip lies just below the diaphragm, consider advancing the NG tube 7   to 10 cm.          CT SPINE LUMB WO CONT   Final Result    Degenerative changes in the lumbar spine as described. Moderate to severe canal   stenosis at L3-4. Severe bilateral foraminal narrowing at L5-S1. CT ABD PELV WO CONT   Final Result   1. Left retroperitoneal hematoma grossly stable. 2.  Interval development of a small bowel obstruction. Transition point in the   right upper quadrant. No definite obstructing lesions identified. 3.  Left effusion and left lower lobe volume loss without significant change. XR ABD PORT  1 V   Final Result   Interval placement of NG tube               IR INSERT NON TUNL CVC OVER 5 YRS   Final Result   Technically successful right internal jugular vein non-tunneled dialysis   catheter exchange. A post procedure chest x-ray is pending. DUPLEX LOWER EXT ARTERY LEFT   Final Result      CTA ABDOMEN PELV W CONT   Final Result   1. Stable to mild provement in 9.1 x 11.5 x 18.6 cm left retroperitoneal   collection, previously 9.9 x 11.4 x 20.5 cm.   2.  High density contrast material within the collection likely represents   residual contrast material from embolization procedure. No definitive evidence   of active extravasation. 3.  Evolving right upper pole renal hypodensity may represent renal infarction   versus pyelonephritis. Evaluation for embolic phenomenon may be considered. 4.  Evolving peripheral splenic hypodensities are nonspecific, possibly infarct   versus infectious foci. 5.  Additional findings as above. XR ABD (KUB)   Final Result   New small bowel dilation may represent ileus. IR OCCL TXCATH HEMMORAGE W SI   Final Result      Abdominal arteriogram demonstrating active hemorrhage off of the distal left L2   lumbar artery. Coil embolization of the lumbar artery was performed. XR CHEST PORT   Final Result   Right IJ catheter satisfactory position without pneumothorax.           CT CHEST W CONT   Final Result   Large left retroperitoneal hematoma with evidence of active extravasation. Right   renal infarct. Small left pleural effusion with left lower lobe atelectasis. CT ABD PELV W CONT   Final Result   Large left retroperitoneal hematoma with evidence of active extravasation. Right   renal infarct. Small left pleural effusion with left lower lobe atelectasis. XR CHEST PORT   Final Result      No acute process on portable chest.         DUPLEX LOWER EXT VENOUS BILAT   Final Result      CT HEAD WO CONT   Final Result   No evidence of acute process. XR CHEST PORT   Final Result   No acute cardiopulmonary process. XR HIP LT W OR WO PELV  1 VW   Final Result   No acute bony abnormality. CTA CHEST W OR W WO CONT   Final Result      Single right middle lobe pulmonary embolism. No evidence for right heart strain   or pulmonary infarction. Clear lungs   Incidental hepatic steatosis   This result was reportedly relieved by me to Dr. Perry Rivers at 1937 hours   789      XR CHEST PA LAT   Final Result      No acute cardiopulmonary process.              Active Problems:    Pulmonary embolism ([address] (12/25/2022)      Retroperitoneal bleed (1/2/2023)      Left leg weakness (1/5/2023)      Nontraumatic lumbosacral plexopathy (1/5/2023)      Lumbar back pain with radiculopathy affecting left lower extremity (1/5/2023)      Bilateral carotid artery stenosis (1/5/2023)      Cerebral microvascular disease (1/5/2023)      Assessment/Plan:   Left retroperitoneal hemorrhage - resolved  L2 plexopathy with LLE weakness and sensory loss  - Spontaneous retroperitoneal bleed now status post L2 artery bleed embolization.  - S/p 5U RBC and 2U FFP this admission.  - s/p embolization with coils  -hgb stable  PT/OT plans for inpatient rehab when bed avail/ins auth  Surgery following, appreciate input     Ileus  - retroperitoneal bleed mass effect seen on CT abdomen/pelvis  - NG tube removed 1/13,  tolerating regular diet  - Reports regular bowel movements     Massive PE  - S/p treatment with lovenox. Anticoagulation discontinued after spontaneous retroperitoneal bleed with hemorrhagic shock.  - SCDs only for now - likely intolerant of long term [address]  - IVC filter placed 1/12        GILMAR/ATN (Creat 3.44)  Hypokalemia  -  d/t hemorrhagic shock  - temporary Hemodialysis - now DC'd  - Creat normalized to 1.17.  appreciate nephro evaluation. Hypertension  BP not well controlled. Cont' incr dose of losartan.   Prn hydralazine      Shock liver  - d/t hemorrhagic shock  - LFTs improving     Hx of alcohol abuse  Not in withdrawal       Disposition: IPR pending bed/ins auth, P2P done, approved    DVT Prophylaxis: SCDs  Code Status: Full Code  POA: Martha Jose - child - [phone]     Care Plan discussed with: Patient, Nurse,   _______________________________________________________________    Shannon Chowdhury MD

## 2023-01-19 NOTE — PROGRESS NOTES
Problem: Mobility Impaired (Adult and Pediatric)  Goal: *Acute Goals and Plan of Care (Insert Text)  Description: FUNCTIONAL STATUS PRIOR TO ADMISSION: Patient was independent and active without use of DME.    HOME SUPPORT PRIOR TO ADMISSION: The patient lived with family but did not require assist.  Physical Therapy Goals    Physical Therapy Goals  Revised 1/16/2023  1. Patient will move from supine to sit and sit to supine , scoot up and down, and roll side to side in bed with modified independence within 7 day(s). 2.  Patient will transfer from bed to chair and chair to bed with contact guard assist using the least restrictive device within 7 day(s). 3.  Patient will perform sit to stand with supervision within 7 day(s). 4.  Patient will ambulate with minimal assistance/contact guard assist for 75 feet with the least restrictive device within 7 day(s). Revised 1/13/2023  1. Patient will move from supine to sit and sit to supine , scoot up and down, and roll side to side in bed with modified independence within 7 day(s). 2.  Patient will transfer from bed to chair and chair to bed with minimal assistance/contact guard assist using the least restrictive device within 7 day(s). 3.  Patient will perform sit to stand with supervision/set-up within 7 day(s). 4.  Patient will ambulate with minimal assistance/contact guard assist for 50 feet with the least restrictive device within 7 day(s). Physical Therapy Goals  Initiated 1/7/2023  1. Patient will move from supine to sit and sit to supine , scoot up and down, and roll side to side in bed with modified independence within 7 day(s). 2.  Patient will transfer from bed to chair and chair to bed with minimal assistance/contact guard assist using the least restrictive device within 7 day(s). 3.  Patient will perform sit to stand with minimal assistance/contact guard assist within 7 day(s).   4.  Patient will ambulate with minimal assistance/contact guard assist for 25 feet with the least restrictive device within 7 day(s). Outcome: Progressing Towards Goal   PHYSICAL THERAPY TREATMENT  Patient: Pamela Oquendo (89 y.o. 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 ASSESSMENT  Patient continues with skilled PT services and is progressing towards goals. Pt presents with decreased strength and endurance. Pt performed bed mobility at [facility]/supervision. Pt performed sit to stand transfer at [facility]. Pt ambulated 150ft with RW at [facility]. Pt with a few instances of buckling due to LLE weakness. Pt ty to sefl correct buckling. Pt with improved mobility tolerance and safety. Current Level of Function Impacting Discharge (mobility/balance): mobility at [facility]     Other factors to consider for discharge: decreased strength and endurance          PLAN :  Patient continues to benefit from skilled intervention to address the above impairments. Continue treatment per established plan of care. to address goals. Recommendation for discharge: (in order for the patient to meet his/her long term goals)  Therapy 3 hours per day 5-7 days per week    This discharge recommendation:  Has been made in collaboration with the attending provider and/or case management    IF patient discharges home will need the following DME: to be determined (TBD)       SUBJECTIVE:   Patient stated  I'm  handicapped but not that handicapped .     OBJECTIVE DATA SUMMARY:   Critical Behavior:  Neurologic State: Alert  Orientation Level: Oriented X4  Cognition: Appropriate decision making  Safety/Judgement: Awareness of environment, Fall prevention, Good awareness of safety precautions, Home safety, Insight into deficits  Functional Mobility Training:  Bed Mobility:     Supine to Sit: Stand-by assistance;Supervision  Sit to Supine: Stand-by assistance;Supervision           Transfers:  Sit to Stand: Contact guard assistance  Stand to Sit: Contact guard assistance                             Balance:  Sitting: Intact  Standing: Impaired  Standing - Static: Good;Fair;Constant support  Standing - Dynamic : Fair;Constant support  Ambulation/Gait Training:  Distance (ft): 150 Feet (ft)  Assistive Device: Gait belt;Walker, rolling  Ambulation - Level of Assistance: Contact guard assistance        Gait Abnormalities: Decreased step clearance        Base of Support: Widened  Stance: Left decreased  Speed/Melanie: Pace decreased (<100 feet/min)  Step Length: Right shortened;Left shortened        Pain Rating:  Pt reported pain in LLE     Activity Tolerance:   Good and Fair    After treatment patient left in no apparent distress:   Supine in bed, Call bell within reach, Bed / chair alarm activated, and Side rails x 3    COMMUNICATION/COLLABORATION:   The patients plan of care was discussed with: Occupational therapist and Registered nurse.      Anthony Carson PTA   Time Calculation: 25 mins

## 2023-01-19 NOTE — PROGRESS NOTES
Transition of Care Plan:    RUR:12%    Disposition:[facility] to room# 400    Report number: [phone]    EMTALA needed: Accepting MD is Dr. Sriram Gonzales      If SNF or IPR: Date FOC offered:  Date FOC received:  Date authorization started with reference number:  Date authorization received and expires:  Accepting facility: [facility]  Follow up appointments:  DME needed:per rehab  Transportation at [place] [address] to home w/c van at 6:30 pm  [place] or means to access home:        IM Medicare Letter:n/a  Is patient a  and connected with the [facility]? N/a  If yes, was [employer] transfer form completed and [facility] notified? Caregiver Contact: Linette Mejia child [phone]  Discharge Caregiver contacted prior to discharge? Per patient  Care Conference needed?:       no    CM received a call from [address] with [facility] and they have a bed available for late today - CM sent perfect serve to attending MD - tentatively set up transport for 6:30 pm pick-up. Will make Caridad canales bedside aware.  Vishnu Joiner, MSW

## 2023-01-19 NOTE — DISCHARGE SUMMARY
Discharge Summary      Name: Darrin James  [id number]  YOB: 1960 (Age: 60 y.o.)   Date of Admission: 12/25/2022  Date of Discharge: 1/19/2023  Attending Physician: Bonnie Gibbs MD    Discharge Diagnosis:   Left retroperitoneal hemorrhage - resolved  L2 plexopathy with LLE weakness and sensory loss  GILMAR/ATN   Hypokalemia  Massive PE  Hypertension  Shock liver  Hx of alcohol abuse    Consultations:  IP CONSULT TO INTENSIVIST  IP CONSULT TO UROLOGY  IP CONSULT TO NEPHROLOGY  IP CONSULT TO NEUROLOGY  IP CONSULT TO CARDIOLOGY  IP CONSULT TO GENERAL SURGERY      Brief Admission History/Reason for Admission Per Feliberto Melgoza DO:   Magy Camacho is a 60 y.o.  male with pertinent past medical history of essential hypertension, alcoholism (0.5-1.0 L gin per day) without history of delirium tremens who presents with complaints of progressively worsening chest pain and shortness of breath of 1 days duration. Patient describes persistent aching pain in his side as well as subjective shortness of breath. He denies cough or wheeze. He denies preceding illness and has no known sick contacts. Patient reports he has been drinking heavily for quite some time averaging 0.5-1 L of gin per day and reports if he goes greater than 1 day without a drink he becomes very tremulous, but has no history of withdrawal seizures or delirium tremens. Patient acknowledges he drinks too much and wants to cut back/quit but reports that when discharged he plans to resume drinking. Patient denies tobacco use, but reports occasional marijuana and denies any other illicit substance. He reports he is independent in ADLs at baseline living in a room in a room in his mother's garage and is unemployed on [employer] [employer]. ROS otherwise negative. In the ED, patient tachycardic to 110s otherwise hemodynamically stable saturating mid to upper 90s on room air.   ECG demonstrating sinus tachycardia without significant change from prior ECGs. CTA chest demonstrates single right middle lobe pulmonary embolism without evidence of right heart strain or pulmonary infarct as well as incidental hepatic steatosis. Labs demonstrate: Lactic acid 13.24 improved to 9.3 on recheck, high sensitive troponin 7, proBNP 186, sodium 142, potassium 5.1, bicarb 9, anion gap 30, glucose 43, BUN 11, creatinine 1.3 (baseline), WBC 7.9, hemoglobin 9.8 (chronic), platelets 883, CK1 89, magnesium 1.9, Phos 5.0. Patient given rally pack and D10 bolus by ED provider as well as therapeutic Lovenox. We were asked to admit for work up and evaluation of the above problems. Brief Hospital Course by Main Problems:   Left retroperitoneal hemorrhage - resolved  L2 plexopathy with LLE weakness and sensory loss  Spontaneous retroperitoneal bleed now status post L2 artery bleed embolization. S/p 5U RBC and 2U FFP this admission. He underwent embolization with coils. His hgb is stable currently at 9. 3.  would repeat labs in 3 days to ensure hgb remaining stable. PT/OT plans for inpatient rehab. Neurology evaluated, recommended EMG study in 2-3 weeks. Ileus  Retroperitoneal bleed mass effect seen on CT abdomen/pelvis. He had an NGT that was removed  on 1/13. He is currently tolerating regular diet. He reports regular bowel movements. Massive PE  S/p treatment with lovenox bridge with coumadin. Anticoagulation discontinued after spontaneous retroperitoneal bleed with hemorrhagic shock. IVC filter placed on 1/12. Likely intolerant of long term [address]. Will refer pt to hematology outpt for further hypercoag work up. GILMAR/ATN   Hypokalemia  This was due to hemorrhagic shock. Pt required temporary hemodialysis, now discontinued. Creat normalized to 1.17.  appreciate nephro evaluation. Hypertension  Cont' BB and incr dose of losartan. Prn hydralazine      Shock liver  Due to hemorrhagic shock, LFTs improving. Repeat CMP in 3 days. Hx of alcohol abuse  Not in withdrawal       Discharge Exam:  Patient seen and examined by me on discharge day. Pertinent Findings:  Visit Vitals  BP (!) 158/105 (BP 1 Location: Left upper arm, BP Patient Position: At rest;Semi fowlers) Comment: RN was notified. Pulse 94   Temp 98.7 °F (37.1 °C)   Resp 18   Ht 5' 9\" (1.753 m)   Wt 73.1 kg (161 lb 2.5 oz)   SpO2 95%   BMI 23.80 kg/m²     Gen:    Not in distress  Chest: Clear lungs  CVS:   Regular rhythm. No edema  Abd:  Soft, not distended, not tender    Discharge/Recent Laboratory Results:  Recent Labs     01/19/23  0630      K 3.6      CO2 23   BUN 7   GLU 96   CA 8.2*   PHOS 3.5     Recent Labs     01/19/23  0630   HGB 9.3*   HCT 29.0*   WBC 6.5   *       Discharge Medications:  Current Discharge Medication List        START taking these medications    Details   folic acid (FOLVITE) 1 mg tablet Take 1 Tablet by mouth daily. Qty: 30 Tablet, Refills: 1  Start date: 12/29/2022           CONTINUE these medications which have CHANGED    Details   losartan (COZAAR) 50 mg tablet Take 1 Tablet by mouth daily. Qty: 30 Tablet, Refills: 0  Start date: 1/20/2023           CONTINUE these medications which have NOT CHANGED    Details   diphenhydrAMINE (BENADRYL) 25 mg capsule Take 25 mg by mouth as needed for Itching. metoprolol succinate (TOPROL-XL) 50 mg XL tablet Take 1 Tab by mouth daily. In place of metoprolol tartrate. Qty: 90 Tab, Refills: 1    Associated Diagnoses: Essential hypertension      thiamine (B-1) 100 mg tablet Take 1 Tab by mouth daily.   Qty: 30 Tab, Refills: 0           STOP taking these medications       aspirin 81 mg chewable tablet Comments:   Reason for Stopping:         therapeutic multivitamin (THERAGRAN) tablet Comments:   Reason for Stopping:         ibuprofen (MOTRIN) 200 mg tablet Comments:   Reason for Stopping:                   DISPOSITION:    Home with Family:    Home with HH/PT/OT/RN: SNF/LTC:    KEKE:    OTHER: [facility]         Follow up with:   PCP : None  Follow-up Information       Follow up With Specialties Details Why Contact Info    Regino Lopez MD Family Medicine Follow up  [address] [facility]  [phone]      Tiffani Phoenix MD Neurology Follow up in 1 month(s)  [address] [facility]  [phone]      None    None (064) Patient stated that they have no PCP      Mariella Greenberg MD Hematology and Oncology Follow up in 1 month(s)  [address]  [address]  [facility]  [phone]                Total time in minutes spent coordinating this discharge (includes going over instructions, follow-up, prescriptions, and preparing report for sign off to her PCP) :  35minutes

## 2023-08-22 ENCOUNTER — OFFICE VISIT (OUTPATIENT)
Age: 63
End: 2023-08-22
Payer: COMMERCIAL

## 2023-08-22 VITALS
OXYGEN SATURATION: 98 % | WEIGHT: 156.6 LBS | SYSTOLIC BLOOD PRESSURE: 104 MMHG | HEART RATE: 68 BPM | TEMPERATURE: 98.5 F | HEIGHT: 69 IN | DIASTOLIC BLOOD PRESSURE: 62 MMHG | RESPIRATION RATE: 16 BRPM | BODY MASS INDEX: 23.19 KG/M2

## 2023-08-22 DIAGNOSIS — S74.12XD INJURY OF LEFT FEMORAL NERVE, SUBSEQUENT ENCOUNTER: Primary | ICD-10-CM

## 2023-08-22 DIAGNOSIS — M54.16 LUMBAR BACK PAIN WITH RADICULOPATHY AFFECTING LEFT LOWER EXTREMITY: ICD-10-CM

## 2023-08-22 DIAGNOSIS — N52.9 ERECTILE DYSFUNCTION, UNSPECIFIED ERECTILE DYSFUNCTION TYPE: ICD-10-CM

## 2023-08-22 DIAGNOSIS — G57.22 FEMORAL NEUROPATHY OF LEFT LOWER EXTREMITY: ICD-10-CM

## 2023-08-22 PROBLEM — S74.12XA: Status: ACTIVE | Noted: 2023-08-22

## 2023-08-22 PROCEDURE — 3078F DIAST BP <80 MM HG: CPT | Performed by: PSYCHIATRY & NEUROLOGY

## 2023-08-22 PROCEDURE — 3074F SYST BP LT 130 MM HG: CPT | Performed by: PSYCHIATRY & NEUROLOGY

## 2023-08-22 PROCEDURE — 99204 OFFICE O/P NEW MOD 45 MIN: CPT | Performed by: PSYCHIATRY & NEUROLOGY

## 2023-08-22 RX ORDER — GABAPENTIN 300 MG/1
300 CAPSULE ORAL 3 TIMES DAILY
Qty: 90 CAPSULE | Refills: 5 | Status: SHIPPED | OUTPATIENT
Start: 2023-08-22 | End: 2024-02-22

## 2023-08-22 RX ORDER — M-VIT,TX,IRON,MINS/CALC/FOLIC 27MG-0.4MG
1 TABLET ORAL DAILY
COMMUNITY

## 2023-08-22 RX ORDER — ASPIRIN 81 MG/1
81 TABLET ORAL DAILY
COMMUNITY

## 2023-08-22 ASSESSMENT — PATIENT HEALTH QUESTIONNAIRE - PHQ9
SUM OF ALL RESPONSES TO PHQ9 QUESTIONS 1 & 2: 0
SUM OF ALL RESPONSES TO PHQ QUESTIONS 1-9: 0
2. FEELING DOWN, DEPRESSED OR HOPELESS: 0
SUM OF ALL RESPONSES TO PHQ QUESTIONS 1-9: 0
SUM OF ALL RESPONSES TO PHQ QUESTIONS 1-9: 0
1. LITTLE INTEREST OR PLEASURE IN DOING THINGS: 0
SUM OF ALL RESPONSES TO PHQ QUESTIONS 1-9: 0

## 2023-08-23 NOTE — PROGRESS NOTES
movement is symmetric and intact bilaterally   Reflexes:   Deep tendon reflexes 1+/4 and symmetrical, and may be absent at the knee and ankle on the left. No babinski or clonus present   Sensory:   Abnormal to touch, pinprick and vibration and temperature in the whole left leg to his hip level. DSS is intact   Gait:  Abnormal gait for patient's age as he walks with a marked limp due to his quad weakness and uses a cane. Tremor:   No tremor noted. Cerebellar:  Mildly abnormal cerebellar signs present on Romberg and tandem testing and finger-nose-finger exam.   Neurovascular:  Normal heart sounds and regular rhythm, peripheral pulses decreased, and no carotid bruits. Assessment:      Diagnosis Orders   1. Injury of left femoral nerve, subsequent encounter  MRI FEMUR LEFT W WO CONTRAST    EMG LIMITED    gabapentin (NEURONTIN) 300 MG capsule    MD DUP-SCAN LXTR ART/ARTL BPGS UNI/LMTD STUDY      2. Femoral neuropathy of left lower extremity  MRI FEMUR LEFT W WO CONTRAST    EMG LIMITED    gabapentin (NEURONTIN) 300 MG capsule    MD DUP-SCAN LXTR ART/ARTL BPGS UNI/LMTD STUDY      3. Lumbar back pain with radiculopathy affecting left lower extremity  MRI FEMUR LEFT W WO CONTRAST    EMG LIMITED    gabapentin (NEURONTIN) 300 MG capsule    MD DUP-SCAN LXTR ART/ARTL BPGS UNI/LMTD STUDY      4. Erectile dysfunction, unspecified erectile dysfunction type  Lenore Elizondo MD, Urology, [address]        Active Problems:    * No active hospital problems. *  Resolved Problems:    * No resolved hospital problems. *      Plan:     Patient with what appears to be mainly a femoral nerve injury at the femoral canal, where he had IV access done, but it could be compression from previous lumbosacral plexopathy and possible hemorrhage, so we will check an MRI scan of the femur and pelvic area, looking for residual mass lesions or compressive lesions that might be slowing his recovery.   He had a very weak leg and could

## 2024-02-22 DIAGNOSIS — M54.16 LUMBAR BACK PAIN WITH RADICULOPATHY AFFECTING LEFT LOWER EXTREMITY: ICD-10-CM

## 2024-02-22 DIAGNOSIS — G57.22 FEMORAL NEUROPATHY OF LEFT LOWER EXTREMITY: ICD-10-CM

## 2024-02-22 DIAGNOSIS — S74.12XD INJURY OF LEFT FEMORAL NERVE, SUBSEQUENT ENCOUNTER: ICD-10-CM

## 2024-02-23 RX ORDER — GABAPENTIN 300 MG/1
CAPSULE ORAL
Qty: 90 CAPSULE | Refills: 5 | Status: SHIPPED | OUTPATIENT
Start: 2024-02-23 | End: 2024-08-23

## 2024-03-05 ENCOUNTER — TELEMEDICINE (OUTPATIENT)
Age: 64
End: 2024-03-05
Payer: MEDICAID

## 2024-03-05 DIAGNOSIS — G57.22 FEMORAL NEUROPATHY OF LEFT LOWER EXTREMITY: Primary | ICD-10-CM

## 2024-03-05 DIAGNOSIS — N52.9 ERECTILE DYSFUNCTION, UNSPECIFIED ERECTILE DYSFUNCTION TYPE: ICD-10-CM

## 2024-03-05 DIAGNOSIS — M79.2 NEUROPATHIC PAIN OF LOWER EXTREMITY, LEFT: ICD-10-CM

## 2024-03-05 PROCEDURE — 99441 PR PHYS/QHP TELEPHONE EVALUATION 5-10 MIN: CPT | Performed by: NURSE PRACTITIONER

## 2024-03-05 ASSESSMENT — PATIENT HEALTH QUESTIONNAIRE - PHQ9
SUM OF ALL RESPONSES TO PHQ QUESTIONS 1-9: 0
SUM OF ALL RESPONSES TO PHQ9 QUESTIONS 1 & 2: 0
1. LITTLE INTEREST OR PLEASURE IN DOING THINGS: 0
SUM OF ALL RESPONSES TO PHQ QUESTIONS 1-9: 0
2. FEELING DOWN, DEPRESSED OR HOPELESS: 0

## 2024-03-05 NOTE — PROGRESS NOTES
Neurology Note    Patient ID:  Giorgio Jones  [id number]  63 y.o.  1960      Date of Consultation:  March 5, 2024        Reason for Consultation:  left leg pain.    This is a telemedicine visit that was performed with in the originating site at patient's home and the distance site at [facility] outpatient clinic at [facility].  This telemedicine visit utilized synchronous (real-time) audio technology.  Verbal consent to participate in the audio visit was obtained.  This visit occurred during the corona (COVID -19) public health emergency.  I discussed with the patient the nature of our telemedicine visit, that:  - I would evaluate the patient and recommend diagnostic and treatment based on my assessment  - Our sessions are not being recorded and that personal health information is protected  - Our team will provide follow-up care in person if and when the patient needs it.    Consent:  The patient is aware that this patient-initiated Telehealth encounter is a billable service, with coverage as determined by the patient's insurance carrier. The patient is aware that they may receive a bill and has provided verbal consent to proceed:     Subjective:       History of Present Illness:   Giorgio Jones is a 63 y.o. male here today for regular follow-up, last seen with Dr. Fleming August 2023. He is still having left leg pain. He notes that he went to [facility] on [address] and had the MRI done. He is Still having swelling in his groin. Tries to do some walking for exercise.  EMG hasn't been done. He is willing to do it.  Urology: hasn't been seen, but still notes issues with erectile dysfunction. Needs to reschedule.  Gabapentin 300 mg 3 times a day rx'ed, pt is taking it BID: it does help some, keeps the leg from jumping at night.    Recap from last visit:Patient with what appears to be mainly a femoral nerve injury at the femoral canal, where he had IV access done, but it could be

## 2024-03-08 ENCOUNTER — TELEPHONE (OUTPATIENT)
Age: 64
End: 2024-03-08

## 2024-03-08 NOTE — TELEPHONE ENCOUNTER
Faxed referral along with last OV note to Dr Au office. Received confirmation that fax went through successfully

## 2024-03-08 NOTE — TELEPHONE ENCOUNTER
Attempted to call patient three times over the past three days. Had a VV with Abida Song NP and we need to schedule him for an EMG with Dr Fleming and a follow up with Abida in 3-4 months. I am going to send him the referral for the EMG and urology referral in the mail. Will attach a note to call our office to schedule the EMG and a follow up with Abida.

## 2024-12-13 NOTE — PROGRESS NOTES
1900 - 2000  Bedside and Verbal shift change report given to Carlos Benson (oncoming nurse) by TIMMY Colon (offgoing nurse). Report included the following information SBAR, Kardex, Procedure Summary, Intake/Output, MAR, Recent Results, and Cardiac Rhythm SR .   2000 - 0830  End of Shift Note    Bedside shift change report given to Jose Mckeon (oncoming nurse) by Netta Arvizu RN (offgoing nurse). Report included the following information SBAR, Kardex, Procedure Summary, Intake/Output, MAR, Recent Results, and Cardiac Rhythm SR    Shift worked:  Night     Shift summary and any significant changes:    CT done overnight     Concerns for physician to address:  Nutrition     Zone phone for oncoming shift:   [phone]         Activity:  Activity Level: Bed Rest  Number times ambulated in hallways past shift: 0  Number of times OOB to chair past shift: 0    Cardiac:   Cardiac Monitoring: Yes      Cardiac Rhythm: Sinus Rhythm    Access:  Current line(s): PIV     Genitourinary:   Urinary status: anuric    Respiratory:   O2 Device: Nasal cannula  Chronic home O2 use?: NO  Incentive spirometer at bedside: NO       GI:  Last Bowel Movement Date: 01/06/23  Current diet:  DIET NPO  TPN ADULT - CENTRAL  Passing flatus: YES  Tolerating current diet: NO       Pain Management:   Patient states pain is manageable on current regimen: YES    Skin:  Oneil Score: 15  Interventions: PT/OT consult    Patient Safety:  Fall Score:  Total Score: 5  Interventions: gripper socks  High Fall Risk: Yes    Length of Stay:  Expected LOS: 2d 12h  Actual LOS: [address] RN 14-Dec-2024

## 2025-01-31 ENCOUNTER — HOSPITAL ENCOUNTER (EMERGENCY)
Facility: HOSPITAL | Age: 65
Discharge: HOME | End: 2025-01-31
Attending: EMERGENCY MEDICINE
Payer: MEDICARE

## 2025-01-31 ENCOUNTER — APPOINTMENT (OUTPATIENT)
Facility: HOSPITAL | Age: 65
End: 2025-01-31
Payer: MEDICARE

## 2025-01-31 VITALS
RESPIRATION RATE: 13 BRPM | SYSTOLIC BLOOD PRESSURE: 142 MMHG | OXYGEN SATURATION: 100 % | DIASTOLIC BLOOD PRESSURE: 99 MMHG | TEMPERATURE: 98.5 F | HEART RATE: 69 BPM

## 2025-01-31 DIAGNOSIS — R07.81 RIB PAIN ON RIGHT SIDE: Primary | ICD-10-CM

## 2025-01-31 LAB
ALBUMIN SERPL-MCNC: 3.8 G/DL (ref 3.5–5)
ALBUMIN/GLOB SERPL: 1.1 (ref 1.1–2.2)
ALP SERPL-CCNC: 141 U/L (ref 45–117)
ALT SERPL-CCNC: 66 U/L (ref 12–78)
ANION GAP SERPL CALC-SCNC: 12 MMOL/L (ref 2–12)
AST SERPL-CCNC: 146 U/L (ref 15–37)
BASOPHILS # BLD: 0.05 K/UL (ref 0–0.1)
BASOPHILS NFR BLD: 0.7 % (ref 0–1)
BILIRUB SERPL-MCNC: 0.6 MG/DL (ref 0.2–1)
BUN SERPL-MCNC: 25 MG/DL (ref 6–20)
BUN/CREAT SERPL: 19 (ref 12–20)
CALCIUM SERPL-MCNC: 8.6 MG/DL (ref 8.5–10.1)
CHLORIDE SERPL-SCNC: 109 MMOL/L (ref 97–108)
CO2 SERPL-SCNC: 23 MMOL/L (ref 21–32)
CREAT SERPL-MCNC: 1.31 MG/DL (ref 0.7–1.3)
DIFFERENTIAL METHOD BLD: ABNORMAL
EOSINOPHIL # BLD: 0.1 K/UL (ref 0–0.4)
EOSINOPHIL NFR BLD: 1.3 % (ref 0–7)
ERYTHROCYTE [DISTWIDTH] IN BLOOD BY AUTOMATED COUNT: 17.2 % (ref 11.5–14.5)
FLUAV RNA SPEC QL NAA+PROBE: NOT DETECTED
FLUBV RNA SPEC QL NAA+PROBE: NOT DETECTED
GLOBULIN SER CALC-MCNC: 3.4 G/DL (ref 2–4)
GLUCOSE SERPL-MCNC: 74 MG/DL (ref 65–100)
HCT VFR BLD AUTO: 34.5 % (ref 36.6–50.3)
HGB BLD-MCNC: 11.5 G/DL (ref 12.1–17)
IMM GRANULOCYTES # BLD AUTO: 0.02 K/UL (ref 0–0.04)
IMM GRANULOCYTES NFR BLD AUTO: 0.3 % (ref 0–0.5)
LIPASE SERPL-CCNC: 35 U/L (ref 13–75)
LYMPHOCYTES # BLD: 3.1 K/UL (ref 0.8–3.5)
LYMPHOCYTES NFR BLD: 41.6 % (ref 12–49)
MCH RBC QN AUTO: 31.8 PG (ref 26–34)
MCHC RBC AUTO-ENTMCNC: 33.3 G/DL (ref 30–36.5)
MCV RBC AUTO: 95.3 FL (ref 80–99)
MONOCYTES # BLD: 0.55 K/UL (ref 0–1)
MONOCYTES NFR BLD: 7.4 % (ref 5–13)
NEUTS SEG # BLD: 3.64 K/UL (ref 1.8–8)
NEUTS SEG NFR BLD: 48.7 % (ref 32–75)
NRBC # BLD: 0 K/UL (ref 0–0.01)
NRBC BLD-RTO: 0 PER 100 WBC
NT PRO BNP: 25 PG/ML
PLATELET # BLD AUTO: 263 K/UL (ref 150–400)
PMV BLD AUTO: 11 FL (ref 8.9–12.9)
POTASSIUM SERPL-SCNC: 4.6 MMOL/L (ref 3.5–5.1)
PROT SERPL-MCNC: 7.2 G/DL (ref 6.4–8.2)
RBC # BLD AUTO: 3.62 M/UL (ref 4.1–5.7)
SARS-COV-2 RNA RESP QL NAA+PROBE: NOT DETECTED
SODIUM SERPL-SCNC: 144 MMOL/L (ref 136–145)
SOURCE: NORMAL
TROPONIN I SERPL HS-MCNC: 5 NG/L (ref 0–76)
WBC # BLD AUTO: 7.5 K/UL (ref 4.1–11.1)

## 2025-01-31 PROCEDURE — 83880 ASSAY OF NATRIURETIC PEPTIDE: CPT

## 2025-01-31 PROCEDURE — 71046 X-RAY EXAM CHEST 2 VIEWS: CPT

## 2025-01-31 PROCEDURE — 87636 SARSCOV2 & INF A&B AMP PRB: CPT

## 2025-01-31 PROCEDURE — 83690 ASSAY OF LIPASE: CPT

## 2025-01-31 PROCEDURE — 6360000002 HC RX W HCPCS: Performed by: EMERGENCY MEDICINE

## 2025-01-31 PROCEDURE — 6360000004 HC RX CONTRAST MEDICATION: Performed by: RADIOLOGY

## 2025-01-31 PROCEDURE — 85025 COMPLETE CBC W/AUTO DIFF WBC: CPT

## 2025-01-31 PROCEDURE — 96374 THER/PROPH/DIAG INJ IV PUSH: CPT

## 2025-01-31 PROCEDURE — 71275 CT ANGIOGRAPHY CHEST: CPT

## 2025-01-31 PROCEDURE — 99285 EMERGENCY DEPT VISIT HI MDM: CPT

## 2025-01-31 PROCEDURE — 36415 COLL VENOUS BLD VENIPUNCTURE: CPT

## 2025-01-31 PROCEDURE — 80053 COMPREHEN METABOLIC PANEL: CPT

## 2025-01-31 PROCEDURE — 84484 ASSAY OF TROPONIN QUANT: CPT

## 2025-01-31 RX ORDER — NAPROXEN 250 MG/1
500 TABLET ORAL
Status: DISCONTINUED | OUTPATIENT
Start: 2025-01-31 | End: 2025-01-31 | Stop reason: HOSPADM

## 2025-01-31 RX ORDER — KETOROLAC TROMETHAMINE 30 MG/ML
15 INJECTION, SOLUTION INTRAMUSCULAR; INTRAVENOUS
Status: COMPLETED | OUTPATIENT
Start: 2025-01-31 | End: 2025-01-31

## 2025-01-31 RX ORDER — IOPAMIDOL 755 MG/ML
100 INJECTION, SOLUTION INTRAVASCULAR
Status: COMPLETED | OUTPATIENT
Start: 2025-01-31 | End: 2025-01-31

## 2025-01-31 RX ADMIN — IOPAMIDOL 60 ML: 755 INJECTION, SOLUTION INTRAVENOUS at 06:45

## 2025-01-31 RX ADMIN — KETOROLAC TROMETHAMINE 15 MG: 30 INJECTION, SOLUTION INTRAMUSCULAR at 05:22

## 2025-01-31 ASSESSMENT — LIFESTYLE VARIABLES
HOW OFTEN DO YOU HAVE A DRINK CONTAINING ALCOHOL: 2-4 TIMES A MONTH
HOW MANY STANDARD DRINKS CONTAINING ALCOHOL DO YOU HAVE ON A TYPICAL DAY: 1 OR 2

## 2025-01-31 ASSESSMENT — PAIN DESCRIPTION - DESCRIPTORS
DESCRIPTORS: SHARP
DESCRIPTORS: SHARP

## 2025-01-31 ASSESSMENT — ENCOUNTER SYMPTOMS
VOMITING: 0
DIARRHEA: 0
NAUSEA: 0
SHORTNESS OF BREATH: 0
ABDOMINAL PAIN: 0

## 2025-01-31 ASSESSMENT — PAIN DESCRIPTION - ORIENTATION
ORIENTATION: RIGHT
ORIENTATION: RIGHT

## 2025-01-31 ASSESSMENT — PAIN DESCRIPTION - LOCATION
LOCATION: CHEST
LOCATION: CHEST

## 2025-01-31 ASSESSMENT — PAIN SCALES - GENERAL
PAINLEVEL_OUTOF10: 8
PAINLEVEL_OUTOF10: 8

## 2025-01-31 NOTE — DISCHARGE INSTRUCTIONS
It was a pleasure taking care of you in our Emergency Department today.  We know that when you come to [facility], you are entrusting us with your health, comfort, and safety.  Our physicians and nurses honor that trust, and truly appreciate the opportunity to care for you and your loved ones.      We also value your feedback.  If you receive a survey about your Emergency Department experience today, please fill it out.  We care about our patients' feedback, and we listen to what you have to say.  Thank you!      Dr. Gisel Serrato MD.

## 2025-01-31 NOTE — ED PROVIDER NOTES
48.7 32.0 - 75.0 %    Lymphocytes % 41.6 12.0 - 49.0 %    Monocytes % 7.4 5.0 - 13.0 %    Eosinophils % 1.3 0.0 - 7.0 %    Basophils % 0.7 0.0 - 1.0 %    Immature Granulocytes % 0.3 0.0 - 0.5 %    Neutrophils Absolute 3.64 1.80 - 8.00 K/UL    Lymphocytes Absolute 3.10 0.80 - 3.50 K/UL    Monocytes Absolute 0.55 0.00 - 1.00 K/UL    Eosinophils Absolute 0.10 0.00 - 0.40 K/UL    Basophils Absolute 0.05 0.00 - 0.10 K/UL    Immature Granulocytes Absolute 0.02 0.00 - 0.04 K/UL    Differential Type AUTOMATED     Comprehensive Metabolic Panel    Collection Time: 01/31/25  5:04 AM   Result Value Ref Range    Sodium 144 136 - 145 mmol/L    Potassium 4.6 3.5 - 5.1 mmol/L    Chloride 109 (H) 97 - 108 mmol/L    CO2 23 21 - 32 mmol/L    Anion Gap 12 2 - 12 mmol/L    Glucose 74 65 - 100 mg/dL    BUN 25 (H) 6 - 20 MG/DL    Creatinine 1.31 (H) 0.70 - 1.30 MG/DL    BUN/Creatinine Ratio 19 12 - 20      Est, Glom Filt Rate 60 (L) >60 ml/min/1.73m2    Calcium 8.6 8.5 - 10.1 MG/DL    Total Bilirubin 0.6 0.2 - 1.0 MG/DL    ALT 66 12 - 78 U/L     (H) 15 - 37 U/L    Alk Phosphatase 141 (H) 45 - 117 U/L    Total Protein 7.2 6.4 - 8.2 g/dL    Albumin 3.8 3.5 - 5.0 g/dL    Globulin 3.4 2.0 - 4.0 g/dL    Albumin/Globulin Ratio 1.1 1.1 - 2.2     Troponin    Collection Time: 01/31/25  5:04 AM   Result Value Ref Range    Troponin, High Sensitivity 5 0 - 76 ng/L   Lipase    Collection Time: 01/31/25  5:04 AM   Result Value Ref Range    Lipase 35 13 - 75 U/L   Brain Natriuretic Peptide    Collection Time: 01/31/25  5:04 AM   Result Value Ref Range    NT Pro-BNP 25 <125 PG/ML   COVID-19 & Influenza Combo    Collection Time: 01/31/25  5:04 AM    Specimen: Nasopharyngeal   Result Value Ref Range    Source Nasopharyngeal      SARS-CoV-2, PCR Not detected NOTD      Rapid Influenza A By PCR Not detected NOTD      Rapid Influenza B By PCR Not detected NOTD             Radiology:  Non-plain film images such as CT, Ultrasound and MRI are read by the

## 2025-02-02 LAB
EKG ATRIAL RATE: 68 BPM
EKG DIAGNOSIS: NORMAL
EKG P AXIS: 39 DEGREES
EKG P-R INTERVAL: 182 MS
EKG Q-T INTERVAL: 412 MS
EKG QRS DURATION: 86 MS
EKG QTC CALCULATION (BAZETT): 438 MS
EKG R AXIS: 22 DEGREES
EKG T AXIS: 43 DEGREES
EKG VENTRICULAR RATE: 68 BPM